# Patient Record
Sex: FEMALE | Race: WHITE | NOT HISPANIC OR LATINO | Employment: OTHER | URBAN - METROPOLITAN AREA
[De-identification: names, ages, dates, MRNs, and addresses within clinical notes are randomized per-mention and may not be internally consistent; named-entity substitution may affect disease eponyms.]

---

## 2017-01-03 ENCOUNTER — LAB (OUTPATIENT)
Dept: LAB | Facility: CLINIC | Age: 82
End: 2017-01-03
Payer: MEDICARE

## 2017-01-03 ENCOUNTER — GENERIC CONVERSION - ENCOUNTER (OUTPATIENT)
Dept: OTHER | Facility: OTHER | Age: 82
End: 2017-01-03

## 2017-01-03 ENCOUNTER — ALLSCRIPTS OFFICE VISIT (OUTPATIENT)
Dept: OTHER | Facility: OTHER | Age: 82
End: 2017-01-03

## 2017-01-03 DIAGNOSIS — G62.9 POLYNEUROPATHY: ICD-10-CM

## 2017-01-03 LAB
ALBUMIN SERPL BCP-MCNC: 4 G/DL (ref 3.5–5)
ALP SERPL-CCNC: 81 U/L (ref 46–116)
ALT SERPL W P-5'-P-CCNC: 34 U/L (ref 12–78)
ANION GAP SERPL CALCULATED.3IONS-SCNC: 7 MMOL/L (ref 4–13)
AST SERPL W P-5'-P-CCNC: 23 U/L (ref 5–45)
BASOPHILS # BLD AUTO: 0.02 THOUSANDS/ΜL (ref 0–0.1)
BASOPHILS NFR BLD AUTO: 0 % (ref 0–1)
BILIRUB SERPL-MCNC: 1.11 MG/DL (ref 0.2–1)
BUN SERPL-MCNC: 14 MG/DL (ref 5–25)
CALCIUM SERPL-MCNC: 9.1 MG/DL (ref 8.3–10.1)
CHLORIDE SERPL-SCNC: 103 MMOL/L (ref 100–108)
CO2 SERPL-SCNC: 31 MMOL/L (ref 21–32)
CREAT SERPL-MCNC: 0.7 MG/DL (ref 0.6–1.3)
EOSINOPHIL # BLD AUTO: 0.05 THOUSAND/ΜL (ref 0–0.61)
EOSINOPHIL NFR BLD AUTO: 1 % (ref 0–6)
ERYTHROCYTE [DISTWIDTH] IN BLOOD BY AUTOMATED COUNT: 14.3 % (ref 11.6–15.1)
GFR SERPL CREATININE-BSD FRML MDRD: >60 ML/MIN/1.73SQ M
GLUCOSE SERPL-MCNC: 105 MG/DL (ref 65–140)
HCT VFR BLD AUTO: 41.2 % (ref 34.8–46.1)
HGB BLD-MCNC: 13.4 G/DL (ref 11.5–15.4)
LYMPHOCYTES # BLD AUTO: 1.1 THOUSANDS/ΜL (ref 0.6–4.47)
LYMPHOCYTES NFR BLD AUTO: 17 % (ref 14–44)
MCH RBC QN AUTO: 28.9 PG (ref 26.8–34.3)
MCHC RBC AUTO-ENTMCNC: 32.5 G/DL (ref 31.4–37.4)
MCV RBC AUTO: 89 FL (ref 82–98)
MONOCYTES # BLD AUTO: 0.63 THOUSAND/ΜL (ref 0.17–1.22)
MONOCYTES NFR BLD AUTO: 10 % (ref 4–12)
NEUTROPHILS # BLD AUTO: 4.72 THOUSANDS/ΜL (ref 1.85–7.62)
NEUTS SEG NFR BLD AUTO: 72 % (ref 43–75)
NRBC BLD AUTO-RTO: 0 /100 WBCS
PLATELET # BLD AUTO: 184 THOUSANDS/UL (ref 149–390)
PMV BLD AUTO: 10.3 FL (ref 8.9–12.7)
POTASSIUM SERPL-SCNC: 3.7 MMOL/L (ref 3.5–5.3)
PROT SERPL-MCNC: 7.8 G/DL (ref 6.4–8.2)
RBC # BLD AUTO: 4.63 MILLION/UL (ref 3.81–5.12)
SODIUM SERPL-SCNC: 141 MMOL/L (ref 136–145)
WBC # BLD AUTO: 6.54 THOUSAND/UL (ref 4.31–10.16)

## 2017-01-03 PROCEDURE — 85025 COMPLETE CBC W/AUTO DIFF WBC: CPT

## 2017-01-03 PROCEDURE — 80053 COMPREHEN METABOLIC PANEL: CPT

## 2017-01-03 PROCEDURE — 36415 COLL VENOUS BLD VENIPUNCTURE: CPT

## 2017-03-24 ENCOUNTER — ALLSCRIPTS OFFICE VISIT (OUTPATIENT)
Dept: OTHER | Facility: OTHER | Age: 82
End: 2017-03-24

## 2017-04-18 ENCOUNTER — GENERIC CONVERSION - ENCOUNTER (OUTPATIENT)
Dept: OTHER | Facility: OTHER | Age: 82
End: 2017-04-18

## 2017-04-20 ENCOUNTER — HOSPITAL ENCOUNTER (OUTPATIENT)
Dept: GASTROENTEROLOGY | Facility: HOSPITAL | Age: 82
Discharge: HOME | End: 2017-04-20
Attending: INTERNAL MEDICINE | Admitting: INTERNAL MEDICINE
Payer: MEDICARE

## 2017-04-20 VITALS
BODY MASS INDEX: 25.11 KG/M2 | HEIGHT: 61 IN | WEIGHT: 133 LBS | SYSTOLIC BLOOD PRESSURE: 196 MMHG | DIASTOLIC BLOOD PRESSURE: 98 MMHG | OXYGEN SATURATION: 96 % | HEART RATE: 70 BPM | RESPIRATION RATE: 18 BRPM

## 2017-04-20 PROCEDURE — 91020 GASTRIC MOTILITY STUDIES: CPT

## 2017-04-25 ENCOUNTER — ALLSCRIPTS OFFICE VISIT (OUTPATIENT)
Dept: OTHER | Facility: OTHER | Age: 82
End: 2017-04-25

## 2017-05-16 ENCOUNTER — ALLSCRIPTS OFFICE VISIT (OUTPATIENT)
Dept: OTHER | Facility: OTHER | Age: 82
End: 2017-05-16

## 2017-06-14 ENCOUNTER — ALLSCRIPTS OFFICE VISIT (OUTPATIENT)
Dept: OTHER | Facility: OTHER | Age: 82
End: 2017-06-14

## 2017-06-14 DIAGNOSIS — G62.9 POLYNEUROPATHY: ICD-10-CM

## 2017-06-14 DIAGNOSIS — R13.10 DYSPHAGIA: ICD-10-CM

## 2017-06-14 DIAGNOSIS — L93.0 DISCOID LUPUS ERYTHEMATOSUS: ICD-10-CM

## 2017-06-14 DIAGNOSIS — K20.90 ESOPHAGITIS: ICD-10-CM

## 2017-06-14 DIAGNOSIS — R68.89 OTHER GENERAL SYMPTOMS AND SIGNS: ICD-10-CM

## 2017-06-14 DIAGNOSIS — R03.0 ELEVATED BLOOD PRESSURE READING WITHOUT DIAGNOSIS OF HYPERTENSION: ICD-10-CM

## 2017-07-11 ENCOUNTER — APPOINTMENT (OUTPATIENT)
Dept: LAB | Facility: CLINIC | Age: 82
End: 2017-07-11
Payer: MEDICARE

## 2017-07-11 ENCOUNTER — TRANSCRIBE ORDERS (OUTPATIENT)
Dept: LAB | Facility: CLINIC | Age: 82
End: 2017-07-11

## 2017-07-11 ENCOUNTER — GENERIC CONVERSION - ENCOUNTER (OUTPATIENT)
Dept: OTHER | Facility: OTHER | Age: 82
End: 2017-07-11

## 2017-07-11 ENCOUNTER — ALLSCRIPTS OFFICE VISIT (OUTPATIENT)
Dept: OTHER | Facility: OTHER | Age: 82
End: 2017-07-11

## 2017-07-11 DIAGNOSIS — R68.89 OTHER GENERAL SYMPTOMS AND SIGNS: ICD-10-CM

## 2017-07-11 DIAGNOSIS — G62.9 POLYNEUROPATHY: ICD-10-CM

## 2017-07-11 DIAGNOSIS — L93.0 DISCOID LUPUS ERYTHEMATOSUS: ICD-10-CM

## 2017-07-11 DIAGNOSIS — K20.90 ESOPHAGITIS: ICD-10-CM

## 2017-07-11 DIAGNOSIS — R03.0 ELEVATED BLOOD PRESSURE READING WITHOUT DIAGNOSIS OF HYPERTENSION: ICD-10-CM

## 2017-07-11 LAB
ALBUMIN SERPL BCP-MCNC: 4.2 G/DL (ref 3.5–5)
ALP SERPL-CCNC: 65 U/L (ref 46–116)
ALT SERPL W P-5'-P-CCNC: 22 U/L (ref 12–78)
ANION GAP SERPL CALCULATED.3IONS-SCNC: 6 MMOL/L (ref 4–13)
AST SERPL W P-5'-P-CCNC: 21 U/L (ref 5–45)
BASOPHILS # BLD AUTO: 0.02 THOUSANDS/ΜL (ref 0–0.1)
BASOPHILS NFR BLD AUTO: 0 % (ref 0–1)
BILIRUB SERPL-MCNC: 1.17 MG/DL (ref 0.2–1)
BUN SERPL-MCNC: 20 MG/DL (ref 5–25)
CALCIUM SERPL-MCNC: 9.6 MG/DL (ref 8.3–10.1)
CHLORIDE SERPL-SCNC: 103 MMOL/L (ref 100–108)
CHOLEST SERPL-MCNC: 164 MG/DL (ref 50–200)
CO2 SERPL-SCNC: 30 MMOL/L (ref 21–32)
CREAT SERPL-MCNC: 0.75 MG/DL (ref 0.6–1.3)
EOSINOPHIL # BLD AUTO: 0.04 THOUSAND/ΜL (ref 0–0.61)
EOSINOPHIL NFR BLD AUTO: 1 % (ref 0–6)
ERYTHROCYTE [DISTWIDTH] IN BLOOD BY AUTOMATED COUNT: 13.5 % (ref 11.6–15.1)
GFR SERPL CREATININE-BSD FRML MDRD: >60 ML/MIN/1.73SQ M
GLUCOSE P FAST SERPL-MCNC: 96 MG/DL (ref 65–99)
HCT VFR BLD AUTO: 39.9 % (ref 34.8–46.1)
HDLC SERPL-MCNC: 80 MG/DL (ref 40–60)
HGB BLD-MCNC: 13.5 G/DL (ref 11.5–15.4)
LDLC SERPL CALC-MCNC: 74 MG/DL (ref 0–100)
LYMPHOCYTES # BLD AUTO: 1.31 THOUSANDS/ΜL (ref 0.6–4.47)
LYMPHOCYTES NFR BLD AUTO: 21 % (ref 14–44)
MCH RBC QN AUTO: 30.1 PG (ref 26.8–34.3)
MCHC RBC AUTO-ENTMCNC: 33.8 G/DL (ref 31.4–37.4)
MCV RBC AUTO: 89 FL (ref 82–98)
MONOCYTES # BLD AUTO: 0.72 THOUSAND/ΜL (ref 0.17–1.22)
MONOCYTES NFR BLD AUTO: 11 % (ref 4–12)
NEUTROPHILS # BLD AUTO: 4.21 THOUSANDS/ΜL (ref 1.85–7.62)
NEUTS SEG NFR BLD AUTO: 67 % (ref 43–75)
NRBC BLD AUTO-RTO: 0 /100 WBCS
PLATELET # BLD AUTO: 173 THOUSANDS/UL (ref 149–390)
PMV BLD AUTO: 10.9 FL (ref 8.9–12.7)
POTASSIUM SERPL-SCNC: 4.1 MMOL/L (ref 3.5–5.3)
PROT SERPL-MCNC: 7.8 G/DL (ref 6.4–8.2)
RBC # BLD AUTO: 4.49 MILLION/UL (ref 3.81–5.12)
SODIUM SERPL-SCNC: 139 MMOL/L (ref 136–145)
TRIGL SERPL-MCNC: 48 MG/DL
WBC # BLD AUTO: 6.31 THOUSAND/UL (ref 4.31–10.16)

## 2017-07-11 PROCEDURE — 80061 LIPID PANEL: CPT

## 2017-07-11 PROCEDURE — 36415 COLL VENOUS BLD VENIPUNCTURE: CPT

## 2017-07-11 PROCEDURE — 85025 COMPLETE CBC W/AUTO DIFF WBC: CPT

## 2017-07-11 PROCEDURE — 80053 COMPREHEN METABOLIC PANEL: CPT

## 2017-07-14 ENCOUNTER — APPOINTMENT (OUTPATIENT)
Dept: RADIOLOGY | Facility: MEDICAL CENTER | Age: 82
End: 2017-07-14
Attending: INTERNAL MEDICINE
Payer: MEDICARE

## 2017-07-14 PROCEDURE — 71020 HB CHEST X-RAY 2VW FRONTAL&LATL: CPT

## 2017-07-24 ENCOUNTER — GENERIC CONVERSION - ENCOUNTER (OUTPATIENT)
Dept: OTHER | Facility: OTHER | Age: 82
End: 2017-07-24

## 2017-11-22 ENCOUNTER — GENERIC CONVERSION - ENCOUNTER (OUTPATIENT)
Dept: OTHER | Facility: OTHER | Age: 82
End: 2017-11-22

## 2018-01-09 NOTE — RESULT NOTES
Verified Results  (1) CBC/PLT/DIFF 21Jun2016 10:51AM Zulma Buitrago Order Number: [id number]   Order Number: [id number]     Test Name Result Flag Reference   WBC COUNT 6 57 Thousand/uL  4 31-10 16   RBC COUNT 4 73 Million/uL  3 81-5 12   HEMOGLOBIN 14 0 g/dL  11 5-15 4   HEMATOCRIT 42 1 %  34 8-46  1   MCV 89 fL  82-98   MCH 29 6 pg  26 8-34 3   MCHC 33 3 g/dL  31 4-37 4   RDW 13 7 %  11 6-15 1   MPV 10 9 fL  8 9-12 7   PLATELET COUNT 470 Thousands/uL  149-390   nRBC AUTOMATED 0 /100 WBCs     NEUTROPHILS RELATIVE PERCENT 65 %  43-75   LYMPHOCYTES RELATIVE PERCENT 22 %  14-44   MONOCYTES RELATIVE PERCENT 12 %  4-12   EOSINOPHILS RELATIVE PERCENT 1 %  0-6   BASOPHILS RELATIVE PERCENT 0 %  0-1   NEUTROPHILS ABSOLUTE COUNT 4 25 Thousands/?L  1 85-7 62   LYMPHOCYTES ABSOLUTE COUNT 1 47 Thousands/?L  0 60-4 47   MONOCYTES ABSOLUTE COUNT 0 78 Thousand/?L  0 17-1 22   EOSINOPHILS ABSOLUTE COUNT 0 05 Thousand/?L  0 00-0 61   BASOPHILS ABSOLUTE COUNT 0 02 Thousands/?L  0 00-0 10     (1) COMPREHENSIVE METABOLIC PANEL 60NFM8502 89:49QC Zulma Buitrago Order Number: [id number]   Order Number: [id number]     Test Name Result Flag Reference   GLUCOSE,RANDM 100 mg/dL     If the patient is fasting, the ADA then defines impaired fasting glucose as > 100 mg/dL and diabetes as > or equal to 123 mg/dL     SODIUM 138 mmol/L  136-145   POTASSIUM 3 7 mmol/L  3 5-5 3   CHLORIDE 102 mmol/L  100-108   CARBON DIOXIDE 29 mmol/L  21-32   ANION GAP (CALC) 7 mmol/L  4-13   BLOOD UREA NITROGEN 18 mg/dL  5-25   CREATININE 0 76 mg/dL  0 60-1 30   Standardized to IDMS reference method   CALCIUM 9 3 mg/dL  8 3-10 1   BILI, TOTAL 1 15 mg/dL H 0 20-1 00   ALK PHOSPHATAS 51 U/L     ALT (SGPT) 22 U/L  12-78   AST(SGOT) 21 U/L  5-45   ALBUMIN 4 1 g/dL  3 5-5 0   TOTAL PROTEIN 7 9 g/dL  6 4-8 2   eGFR Non-African American      >60 0 ml/min/1 73sq [facility] [employer] Disease Education Program recommendations are as follows:  GFR calculation is accurate only with a steady state creatinine  Chronic Kidney disease less than 60 ml/min/1 73 sq  meters  Kidney failure less than 15 ml/min/1 73 sq  meters

## 2018-01-10 NOTE — PROGRESS NOTES
Chief Complaint  patient's bp was reported elevated at another appointment and she also went to drug store and it was elevated there  bp today check by charles was 128/78  double checked by harman was 126/80   The patient has an appointment scheduled on 5/16/17 for a check on her bp  please advise      Active Problems    1  Acute reaction to stress (308 9) (F43 0)   2  Aortic regurgitation (424 1) (I35 1)   3  Constipation (564 00) (K59 00)   4  Discoid lupus erythematosus (695 4) (L93 0)   5  Dysphagia (787 20) (R13 10)   6  Esophagitis (530 10) (K20 9)   7  Forgetfulness (780 99) (R68 89)   8  Gastric erosion, 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)   9  History of chest pain (V13 89) (Z87 898)   10  Left shoulder tendonitis (726 10) (M75 82)   11  Medicare annual wellness visit, subsequent (V70 0) (Z00 00)   12  Osteoarthritis (715 90) (M19 90)   13  Peripheral neuropathy (356 9) (G62 9)   14  Subacromial bursitis (726 19) (M75 50)   15  History of Weight loss (783 21) (R63 4)    Current Meds   1  Aspirin 81 MG TABS; TAKE 1 TABLET DAILY; Therapy: (Recorded:01Jun2012) to Recorded   2  CVS Adult 50+ Probiotic Oral Capsule; take 1 capsule daily; Therapy: 95EXK2033 to Recorded   3  Fish Oil CAPS; 1000mg 2 po qd; Therapy: (Christie Blanco) to Recorded   4  Gabapentin 100 MG Oral Capsule; TAKE 1 CAPSULE AT BEDTIME,;   Therapy: 37Ylj9587 to (Evaluate:16Sep2017)  Requested for: 20Mar2017; Last   Rx:20Mar2017 Ordered   5  Metamucil 28 3 % POWD; PRN; Therapy: 06UKJ7105 to Recorded   6  MiraLax Oral Powder; Therapy: (Geoffrey Cabrera) to Recorded   7  Multivitamins TABS; TAKE 1 TABLET DAILY; Therapy: (Christie Blanco) to Recorded   8  PreserVision AREDS Oral Capsule; TAKE 1 CAPSULE DAILY; Therapy: 74JFI4776 to (Evaluate:06Jan2016); Last Rx:05Jan2016 Ordered   9  Stool Softener CAPS; Therapy: (Geoffrey Cabrera) to Recorded   10  Turmeric 450 MG Oral Capsule; take 1 capsule daily;     Therapy: 38ORC2512 to Recorded    Allergies    1  No Known Drug Allergies    Vitals  Signs    Systolic: 873  Diastolic: 80  Systolic: 804  Diastolic: 78    Discussion/Summary    BP ok today keep appt in May  Future Appointments    Date/Time Provider Specialty Site   05/16/2017 09:15 AM Yazmin Bobby DO Family Medicine [address] FAMILY MEDICINE   07/11/2017 11:15 AM Yazmin Bobby DO Family Medicine [address] FAMILY MEDICINE     Signatures   Electronically signed by : Shabana Olivarez DO;  Apr 25 2017  3:44PM EST                       (Author)

## 2018-01-10 NOTE — PROGRESS NOTES
Assessment    1  Acute reaction to stress (308 9) (F43 0)   2  Peripheral neuropathy (356 9) (G62 9)    Plan  Peripheral neuropathy    · Gabapentin 100 MG Oral Capsule; TAKE 1 CAPSULE AT BEDTIME, can increase to  2 at night if needed    Discussion/Summary    June CBC/CMP ok - Hb 14     Inquires re donating blood which she has done for years - as Hb 14, she could donate - but limit to twice a year and do not do if feels any worse  She will be seeing GI re dysphagia treatment/ EGD  Can use stool softener daily, use Miralax as needed  She will see Ortho re left knee- also discuss left hip and shoulder w them, therapy can help  Re neuropathy- saw Pain Mngmnt yrs ago - could try Gabapentin low dose at bedtime and titrate as tolerated - she will consider    BP much improved after sitting    Try to attend [employer] program, use family support re stress reaction dealing w alcoholic daughter w over 15 rehabs, now back on house arrest  Hold other meds  Chief Complaint  Patient is in today for multiples issues      History of Present Illness  was here in June- upset daughter Vi Edwards in senior living / work release again w alcoholism - past involved w [facility] - looking at getting back into program   Pain "left hip" - no trauma - has appt w [facility] 8/31 re knee    Also - c/o long term neuropathy Poor sleep w pain and stress      Review of Systems    Constitutional: no fever and no chills  Cardiovascular: No complaints of slow heart rate, no fast heart rate, no chest pain, no palpitations, no leg claudication, no lower extremity edema  Respiratory: No complaints of shortness of breath, no wheezing, no cough, no SOB on exertion, no orthopnea, no PND  Gastrointestinal: no abdominal pain  Genitourinary: no dysuria  Active Problems    1  Aortic regurgitation (424 1) (I35 1)   2  Constipation (564 00) (K59 00)   3  Discoid lupus erythematosus (695 4) (L93 0)   4  Dysphagia (787 20) (R13 10)   5   Esophagitis (530 10) (K20 9)   6  Forgetfulness (780 99) (R68 89)   7  History of chest pain (V13 89) (Z87 898)   8  Left shoulder tendonitis (726 10) (M75 82)   9  Osteoarthritis (715 90) (M19 90)   10  Subacromial bursitis (726 19) (M75 50)   11  History of Weight loss (783 21) (R63 4)    Past Medical History    1  History of chest pain (V13 89) (Z87 898)   2  History of Weight loss (783 21) (R63 4)    Surgical History    1  History of Appendectomy   2  History of Cataract Extraction   3  History of Cholecystectomy   4  History of Diagnostic Esophagogastroduodenoscopy   5  History of Hysterectomy   6  History of Knee Replacement    Family History  Mother    1  Family history of Diabetes Mellitus (V18 0)  Father    2  Family history of Coronary Artery Disease (V17 49)    Social History    · Denied: History of Alcohol Use (History)   · Marital History - Currently    · Never A Smoker    Current Meds   1  Aspirin 81 MG TABS; TAKE 1 TABLET DAILY; Therapy: (Recorded:01Jun2012) to Recorded   2  Biotin 1000 MCG Oral Tablet; Take 1 tablet twice daily Recorded   3  CholestOff 450 MG Oral Tablet; TAKE AS DIRECTED PER PACKAGE INSTRUCTIONS; Therapy: 17ONL4290 to (Last Rx:81Czk0881) Ordered   4  Fish Oil CAPS; 1000mg 2 po qd; Therapy: (Sherral Mins) to Recorded   5  Lecithin 1200 MG Oral Capsule; TAKE 1 CAPSULE DAILY; Therapy: (Sherral Mins) to Recorded   6  MiraLax Oral Powder; Therapy: (Ashley Brayden) to Recorded   7  Multivitamins TABS; TAKE 1 TABLET DAILY; Therapy: (Sherral Mins) to Recorded   8  Pantoprazole Sodium 40 MG Oral Tablet Delayed Release; TAKE 1 TABLET Daily for   stomach acid; Therapy: 21Jun2016 to (Evaluate:62Ltr6080); Last Rx:21Jun2016 Ordered   9  Plaquenil 200 MG Oral Tablet; Uses 1 pill a day Nov-April and 1 pill twice a day May-Oct;   Therapy: (Lc Slaughter) to Recorded   10  PreserVision AREDS Oral Capsule; TAKE 1 CAPSULE DAILY;     Therapy: 39XGY5832 to (Evaluate:06Jan2016); Last Rx:05Jan2016 Ordered   11  Stool Softener CAPS; Therapy: (Subha Pacheco) to Recorded   12  Vitamin D 1000 UNIT Oral Tablet; TAKE 1 TABLET DAILY; Therapy: (Recorded:01Jun2012) to Recorded    Allergies    1  No Known Drug Allergies    Vitals  Vital Signs    Recorded: 71Fwk1304 01:30PM Recorded: 84OLP3244 71:01NL   Systolic 596 506   Diastolic 70 80   Heart Rate  68   Height  5 ft 6 in   Weight  133 lb 4 00 oz   BMI Calculated  21 51   BSA Calculated  1 69     Physical Exam    Constitutional very anxious, upset today but looks healthy at 85! Gaming Perry Eyes   Conjunctiva and lids: No swelling, erythema or discharge  Pulmonary   Auscultation of lungs: Clear to auscultation  Cardiovascular RRR  no edema  Musculoskeletal min tender left fem head w good ROM Neg SLR no weakness leg  left shoulder dec ROM  Psychiatric   Orientation to person, place, and time: Normal   anxious          Future Appointments    Date/Time Provider Specialty Site   09/16/2016 02:00 PM Yasmin Stacy MD Gastroenterology Adult [facility] OUTPATIENT   10/06/2016 03:30 PM Yasmin Stacy MD Gastroenterology Adult [facility] 9   01/03/2017 10:30 AM Juan Francisco Mcguire DO Family Medicine [address] FAMILY MEDICINE     Signatures   Electronically signed by : Dayanna Paez DO; Aug 29 2016  1:54PM EST                       (Author)

## 2018-01-11 NOTE — RESULT NOTES
Message   esophagus has distal narrowing, suspicious for achalasia  please bring patient back in July to see me to discuss possible endoscopic intervention  thanks  Verified Results  * FL ESOPHAGRAM COMPLETE 48LJI9800 08:38AM Sharmaine Grand Order Number: [id number]   Performing Comments: evaluate for achalasia and presbyesophagus    - Patient Instructions: To schedule this appointment, please contact Central Scheduling at [phone]  Test Name Result Flag Reference   FL ESOPHAGRAM COMPLETE (Report)     BARIUM SWALLOW-ESOPHAGRAM     INDICATION: Food getting caught in throat     COMPARISON: None     IMAGES: 13 exposures, 422 fluoroscopic image captures including cine images of swallowing  FLUOROSCOPY TIME: 2 9 minutes     TECHNIQUE:   The patient was given effervescent granules and barium by mouth and images of the esophagus were obtained  FINDINGS:     Images of the pharynx during swallowing demonstrate no evidence of penetration or aspiration  There is no evidence of cricopharyngeal dysfunction  No mass is visualized  There is decreased primary peristalsis with nonperistaltic contractions  There may be narrowing of the distal esophagus best seen on upright swallows  The esophagus above this is tortuous and mildly dilated with retention of barium following swallows  The proximal esophageal mucosa is granular appearance which is suggestive of esophagitis  Evaluation of the distal esophageal mucosa is limited as this was not viewed in double contrast      Gastroesophageal reflux was not observed  There is a small hiatal hernia  IMPRESSION:     1  Decreased primary peristalsis with nonperistaltic contractions  This may be related to narrowing of the distal esophagus with more proximal dilatation and barium retention  Early or mild achalasia is not excluded  2  Granular appearing proximal esophageal mucosa suggesting esophagitis   Limited double contrast evaluation of the distal esophagus  3  Small hiatal hernia         Workstation performed: [id number]     Signed by:   Sherlyn Haskins MD   5/25/16   355

## 2018-01-12 VITALS — DIASTOLIC BLOOD PRESSURE: 80 MMHG | SYSTOLIC BLOOD PRESSURE: 126 MMHG

## 2018-01-13 VITALS
SYSTOLIC BLOOD PRESSURE: 132 MMHG | DIASTOLIC BLOOD PRESSURE: 64 MMHG | HEIGHT: 66 IN | WEIGHT: 130.25 LBS | BODY MASS INDEX: 20.93 KG/M2 | HEART RATE: 72 BPM

## 2018-01-13 NOTE — MISCELLANEOUS
Message  I called the patient to inform her the biopsy result showing gastropathy  She reported having no symptoms and she does not want to take PPI  I also proposed to her regarding esophageal manometry, patient would rather wait to see how severe the problem is before she does the manometry test  I explained to her the functional and structural disoders of esaphgus and the purposes of EGD and manometry  She voiced understanding  She has an appointment with Mariely Edwards in November  She also wants to see me in January  we will schedule        Signatures   Electronically signed by : Musa Guallpa MD; Sep 24 2016 11:18AM EST                       (Author)

## 2018-01-13 NOTE — MISCELLANEOUS
Franklyn Benton,    Our office has tried to contact you  Please call us at [phone] for your results      Thank you,  [facility]      Electronically signed by:Vibha Harvey   Oct 20 2016 11:09AM EST Author

## 2018-01-14 VITALS
TEMPERATURE: 98.1 F | BODY MASS INDEX: 21.05 KG/M2 | WEIGHT: 131 LBS | DIASTOLIC BLOOD PRESSURE: 70 MMHG | HEIGHT: 66 IN | HEART RATE: 60 BPM | SYSTOLIC BLOOD PRESSURE: 120 MMHG | OXYGEN SATURATION: 99 %

## 2018-01-14 VITALS
DIASTOLIC BLOOD PRESSURE: 74 MMHG | HEART RATE: 76 BPM | WEIGHT: 132 LBS | BODY MASS INDEX: 21.21 KG/M2 | HEIGHT: 66 IN | SYSTOLIC BLOOD PRESSURE: 130 MMHG

## 2018-01-14 VITALS
HEIGHT: 66 IN | SYSTOLIC BLOOD PRESSURE: 130 MMHG | TEMPERATURE: 98.2 F | WEIGHT: 132 LBS | BODY MASS INDEX: 21.21 KG/M2 | OXYGEN SATURATION: 98 % | DIASTOLIC BLOOD PRESSURE: 60 MMHG | HEART RATE: 68 BPM

## 2018-01-14 VITALS
DIASTOLIC BLOOD PRESSURE: 74 MMHG | BODY MASS INDEX: 24.4 KG/M2 | WEIGHT: 129.13 LBS | HEART RATE: 72 BPM | SYSTOLIC BLOOD PRESSURE: 133 MMHG

## 2018-01-15 NOTE — PROGRESS NOTES
Assessment    1  Medicare annual wellness visit, subsequent (V70 0) (Z00 00)   2  Peripheral neuropathy (356 9) (G62 9)   3  Never a smoker    Plan  Medicare annual wellness visit, subsequent    · *VB - Fall Risk Assessment  (Dx Z13 89 Screen for Neurologic Disorder);  Status:Complete - Retrospective By Protocol Authorization;   Done: 23ANQ0588 10:04AM   · *VB - Urinary Incontinence Screen (Dx Z13 89 Screen for UI); Status:Complete -  Retrospective By Protocol Authorization;   Done: 97JRO3704 10:04AM   · *VB-Depression Screening; Status:Complete - Retrospective By Protocol Authorization;    Done: 02ZBL3105 10:05AM  Peripheral neuropathy    · From  Gabapentin 100 MG Oral Capsule TAKE 1 CAPSULE AT BEDTIME, can  increase to 2 at night if needed To Gabapentin 100 MG Oral Capsule TAKE 1 CAPSULE  AT BEDTIME,   · (1) CBC/PLT/DIFF; Status:Active; Requested KPL:36PTB6032;    · (1) COMPREHENSIVE METABOLIC PANEL; Status:Active; Requested PNE:58OCK1136;     Discussion/Summary    She is doing well, she does note some weight loss, has been watching diet due to concern with diabetes  We did discuss last glucometer reading was fine at 100, we will redo fasting blood work today  She will continue to see her gastroenterologist     She continues to remain very active, is exercising regularly, call us if any chest pain, shortness of breath, increase lightheadedness or other issues arise  She does feel gabapentin 1 at bedtime is helping with neuropathy, continue as is  Sees derm Q 6 mo as before  Visit here 6 mo - call sooner as needed  Impression: Subsequent Annual Wellness Visit  Cardiovascular screening and counseling: screening is current  Diabetes screening and counseling: screening is current  Immunizations: influenza vaccine is up to date this year, the lifetime pneumococcal vaccine has been completed, Tdap vaccination up to date and 2010        Chief Complaint  AWV/ Check up      Advance Directives  Advance Directive [facility]:   YES - Patient has an advance health care directive  The patient has a living will located   History of Present Illness  HPI: She is in today for routine follow-up/AWV  She is doing very well at 80years of age but is frustrated with 's declining health  Exercises Ã45 minutes a day 6 days a week    Does see gastroenterology, is concerned with using PPIs, does admit to trying omeprazole Ã30 days as was directed but noted no benefit and stopped it  Had undergone EGD back in September  Does use gabapentin 1 at bedtime and feels this is helping quite a bit with paresthesias feet  She is watching her diet, perhaps to closely  Is concerned has diabetes which she does not   Welcome to [employer] and Wellness Visits: The patient is being seen for the subsequent annual wellness visit  Medicare Screening and Risk Factors   Hospitalizations: no previous hospitalizations  Medicare Screening Tests Risk Questions   Drug and Alcohol Use: The patient has never smoked cigarettes  The patient reports never drinking alcohol  She has never used illicit drugs  Diet and Physical Activity: Current diet includes 4 servings of fruit per day, 3 servings of vegetables per day, 1 servings of meat per day, 1 servings of whole grains per day, 1 servings of dairy products per day, 3 cups of tea per day and 6 glasses of water per day  She exercises 6 times per week  Exercise: walking, stretching, strength training 45 minutes per day  Mood Disorder and Cognitive Impairment Screening: She denies feeling down, depressed, or hopeless over the past two weeks  She denies feeling little interest or pleasure in doing things over the past two weeks     Cognitive impairment screening: denies difficulty learning/retaining new information, denies difficulty handling complex tasks, denies difficulty with reasoning, denies difficulty with spatial ability and orientation, denies difficulty with language and denies difficulty with behavior  Functional Ability/Level of Safety: Hearing is slightly decreased and a hearing aid is not used  Activities of daily living details: does not need help using the phone, no transportation help needed, does not need help shopping, no meal preparation help needed, does not need help doing housework, does not need help doing laundry, does not need help managing medications and does not need help managing money  Fall risk factors: The patient fell NONE times in the past 12 months  and no urinary incontinence  Home safety risk factors:  no grab bars in the bathroom and no handrails on the stairs, but no unfamiliar surroundings, no loose rugs, no poor household lighting, no uneven floors and no household clutter  Advance Directives: Advance directives: living will and durable power of  for health care directives  Co-Managers and Medical Equipment/Suppliers: See Patient Care Team      Patient Care Team    Care Team Member Role Specialty Office Number   Brennan Singh MD Specialist Gastroenterology Adult [phone]   [facility]  Physician Assistant [phone]     Review of Systems    Constitutional: Some fatigue which appears very appropriate at 80years of age, but no fever, no chills and no malaise  Eyes: Sees eye doctor  ENT: no earache and no sore throat  Cardiovascular: no chest pain, no palpitations, the heart is not racing, no lightheadedness and no lower extremity edema  Respiratory: no shortness of breath, no wheezing and no cough  Gastrointestinal: as noted in HPI  Genitourinary: no dysuria  Musculoskeletal: no diffuse joint pain and no generalized muscle aches  Integumentary and Breasts: Sees dermatology as before  Neurological: no headache, no confusion, no dizziness and no fainting  Psychiatric: no anxiety and no depression  Hematologic and Lymphatic: negative  Active Problems    1  Acute reaction to stress (308 9) (F43 0)   2  Aortic regurgitation (424 1) (I35 1)   3  Constipation (564 00) (K59 00)   4  Discoid lupus erythematosus (695 4) (L93 0)   5  Dysphagia (787 20) (R13 10)   6  Esophagitis (530 10) (K20 9)   7  Forgetfulness (780 99) (R68 89)   8  Gastric erosion, 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)   9  History of chest pain (V13 89) (Z87 898)   10  Left shoulder tendonitis (726 10) (M75 82)   11  Osteoarthritis (715 90) (M19 90)   12  Peripheral neuropathy (356 9) (G62 9)   13  Subacromial bursitis (726 19) (M75 50)   14  History of Weight loss (783 21) (R63 4)    Past Medical History    · History of chest pain (V13 89) (G66 035)   · History of Weight loss (783 21) (R63 4)    The active problems and past medical history were reviewed and updated today  Surgical History    · History of Appendectomy   · History of Cataract Extraction   · History of Cholecystectomy   · History of Diagnostic Esophagogastroduodenoscopy   · History of Hysterectomy   · History of Knee Replacement    The surgical history was reviewed and updated today  Family History  Mother    · Family history of Diabetes Mellitus (V18 0)  Father    · Family history of Coronary Artery Disease (V17 49)    Social History    · Denied: History of Alcohol Use (History)   · Marital History - Currently    · Never a smoker  The social history was reviewed and updated today  Current Meds   1  Aspirin 81 MG TABS; TAKE 1 TABLET DAILY; Therapy: (Recorded:01Jun2012) to Recorded   2  CVS Adult 50+ Probiotic Oral Capsule; take 1 capsule daily; Therapy: 44AXF7628 to Recorded   3  Fish Oil CAPS; 1000mg 2 po qd; Therapy: (Johnathan Lopez) to Recorded   4  Gabapentin 100 MG Oral Capsule; TAKE 1 CAPSULE AT BEDTIME, can increase to 2 at   night if needed; Therapy: 14Xqy1175 to (Evaluate:64Kll2163)  Requested for: 01Sep2016; Last   Rx:01Sep2016 Ordered   5  Metamucil 28 3 % POWD; PRN; Therapy: 19LWQ0351 to Recorded   6  MiraLax Oral Powder;    Therapy: (Monika Point) to Recorded   7  Multivitamins TABS; TAKE 1 TABLET DAILY; Therapy: (Arlyce Leriche) to Recorded   8  Omeprazole 40 MG Oral Capsule Delayed Release; TAKE 1 CAPSULE DAILY; Therapy: 52KOH0306 to (Evaluate:15Mar2017)  Requested for: 16Sep2016; Last   Rx:16Sep2016 Ordered   9  PreserVision AREDS Oral Capsule; TAKE 1 CAPSULE DAILY; Therapy: 38NIF4962 to (Evaluate:06Jan2016); Last Rx:05Jan2016 Ordered   10  Stool Softener CAPS; Therapy: (Monika Point) to Recorded   11  Turmeric 450 MG Oral Capsule; take 1 capsule daily; Therapy: 51OSB8030 to Recorded    The medication list was reviewed and updated today  Allergies    1  No Known Drug Allergies    Immunizations   ** Printed in Appendix #1 below  Vitals  Signs    Heart Rate: 72  Systolic: 562  Diastolic: 74  Weight: 166 lb 2 08 oz    Physical Exam    Constitutional   General appearance: No acute distress, well appearing and well nourished  Head and Face   Head and face: Normal     Eyes No pallor or icterus  Ears, Nose, Mouth, and Throat Has hearing aids  Neck   Neck: Supple, symmetric, trachea midline, no masses  Thyroid: Normal, no thyromegaly  Pulmonary   Auscultation of lungs: Clear to auscultation  Cardiovascular RRR 1/6 BILL R2ics  Carotid pulses: 2+ bilaterally  No ankle edema  Lymphatic   Palpation of lymph nodes in neck: No lymphadenopathy  Musculoskeletal   Gait and station: Normal     Neurologic A&O x 3  Cortical function: Normal mental status  Coordination: Normal finger to nose and heel to shin  Psychiatric   Judgment and insight: Normal     Orientation to person, place, and time: Normal     Recent and remote memory: Intact      Mood and affect: Normal        Results/Data  *VB-Depression Screening 87XCZ8666 10:05AM Hubert Penny     Test Name Result Flag Reference   Depression Scale Result      Depression Screen - Negative For Symptoms     *VB - Fall Risk Assessment  (Dx Z13 89 Screen for Neurologic Disorder) 95IOI6818 10:04AM Josiephine Chimera     Test Name Result Flag Reference   Falls Risk      No falls in the past year     *VB - Urinary Incontinence Screen (Dx Z13 89 Screen for UI) 81LNB2558 10:04AM Josiephine Chimera     Test Name Result Flag Reference   Urinary Incontinence Assessment 40TGW6726         Future Appointments    Date/Time Provider Specialty Site   2017 10:15 AM Indira Tena DO Family Medicine [facility] MEDICINE     Signatures   Electronically signed by : Harry Farley DO; Reynaldo  3 2017 10:58AM EST                       (Author)    Appendix #1     Patient: Dana Cedeno ; : 1930; MRN: [id number]      1 2 3 4 5    Influenza  12-Nov-2008 Oct 2012 18-Sep-2013 18-Sep-2014 01-Sep-2016    PPSV  2014        Pneumo Other  2001        Tdap  16-Sep-2010

## 2018-01-15 NOTE — RESULT NOTES
Message   gastritis on biopsy  Verified Results  (1) TISSUE EXAM [id number] 02:27PM Blair Ruggiero     Test Name Result Flag Reference   LAB AP CASE REPORT (Report)     Surgical Pathology Report             Case: [id number]                   Authorizing Provider: Amalia Soria MD       Collected:      09/16/2016 1427        Ordering Location:   [address] [place] [address]   Received:      09/19/2016 [address] [place] [address] Endoscopy                               Pathologist:      Faisal Aldana MD                               Specimens:  A) - Stomach, Gastric erosion bx                                    B) - Esophagus   LAB AP FINAL DIAGNOSIS (Report)     A  Stomach, biopsy:   - Reactive (chemical) gastropathy   - H pylori organisms are not identified on immunostain   - No intestinal metaplasia, dysplasia, or malignancy identified    B  Esophagus, biopsy:   - Mild chronic inflammation in junctional mucosa   - No intestinal metaplasia, dysplasia, or malignancy identified  Electronically signed by Faisal Aldana MD on 9/21/2016 at 3:24 PM   LAB AP SURGICAL ADDITIONAL INFORMATION (Report)     These tests were developed and their performance characteristics   determined by [facility] or [facility]  They may not be cleared or approved by the [place]  Food and   Drug Administration  The FDA has determined that such clearance or   approval is not necessary  These tests are used for clinical purposes  They should not be regarded as investigational or for research  This   laboratory has been approved by CLIA 88, designated as a high-complexity   laboratory and is qualified to perform these tests  Interpretation performed at [facility]   LAB AP GROSS DESCRIPTION (Report)     A  The specimen is received in formalin, labeled with the patient's name   and hospital number, and is designated gastric erosion biopsy rule out   H  pylori    The specimen consists of several, rubbery, tan-brown tissue   fragments measuring 0 6 x 0 6 x 0 2 cm in aggregate dimension  Entirely   submitted  One cassette  B  The specimen is received in formalin, labeled with the patient's name   and hospital number, and is designated esophagus rule out eosinophilic   esophagitis  The specimen consists of several, rubbery, tan-brown tissue   fragments measuring 0 5 x 0 5 x 0 2 cm in aggregate dimension  Entirely   submitted  One cassette  Note: The estimated total formalin fixation time based upon information   provided by the submitting clinician and the standard processing schedule   is over 72 hours      SRS   LAB AP CLINICAL INFORMATION R/o H  Pylori     R/o H  Pylori

## 2018-01-16 NOTE — RESULT NOTES
Verified Results  (1) CBC/PLT/DIFF [id number] 10:53AM Yulisa Veras Order Number: [id number]     Test Name Result Flag Reference   WBC COUNT 6 54 Thousand/uL  4 31-10 16   RBC COUNT 4 63 Million/uL  3 81-5 12   HEMOGLOBIN 13 4 g/dL  11 5-15 4   HEMATOCRIT 41 2 %  34 8-46  1   MCV 89 fL  82-98   MCH 28 9 pg  26 8-34 3   MCHC 32 5 g/dL  31 4-37 4   RDW 14 3 %  11 6-15 1   MPV 10 3 fL  8 9-12 7   PLATELET COUNT 156 Thousands/uL  149-390   nRBC AUTOMATED 0 /100 WBCs     NEUTROPHILS RELATIVE PERCENT 72 %  43-75   LYMPHOCYTES RELATIVE PERCENT 17 %  14-44   MONOCYTES RELATIVE PERCENT 10 %  4-12   EOSINOPHILS RELATIVE PERCENT 1 %  0-6   BASOPHILS RELATIVE PERCENT 0 %  0-1   NEUTROPHILS ABSOLUTE COUNT 4 72 Thousands/?L  1 85-7 62   LYMPHOCYTES ABSOLUTE COUNT 1 10 Thousands/?L  0 60-4 47   MONOCYTES ABSOLUTE COUNT 0 63 Thousand/?L  0 17-1 22   EOSINOPHILS ABSOLUTE COUNT 0 05 Thousand/?L  0 00-0 61   BASOPHILS ABSOLUTE COUNT 0 02 Thousands/?L  0 00-0 10   - Patient Instructions: This bloodwork is non-fasting  Please drink two glasses of water morning of bloodwork  - Patient Instructions: This bloodwork is non-fasting  Please drink two glasses of water morning of bloodwork  (1) COMPREHENSIVE METABOLIC PANEL [id number] 36:61FM Yulisa Veras Order Number: [id number]     Test Name Result Flag Reference   GLUCOSE,RANDM 105 mg/dL     If the patient is fasting, the ADA then defines impaired fasting glucose as > 100 mg/dL and diabetes as > or equal to 123 mg/dL     SODIUM 141 mmol/L  136-145   POTASSIUM 3 7 mmol/L  3 5-5 3   CHLORIDE 103 mmol/L  100-108   CARBON DIOXIDE 31 mmol/L  21-32   ANION GAP (CALC) 7 mmol/L  4-13   BLOOD UREA NITROGEN 14 mg/dL  5-25   CREATININE 0 70 mg/dL  0 60-1 30   Standardized to IDMS reference method   CALCIUM 9 1 mg/dL  8 3-10 1   BILI, TOTAL 1 11 mg/dL H 0 20-1 00   ALK PHOSPHATAS 81 U/L     ALT (SGPT) 34 U/L  12-78   AST(SGOT) 23 U/L  5-45   ALBUMIN 4 0 g/dL  3 5-5 0   TOTAL PROTEIN 7 8 g/dL  6 4-8 2   eGFR Non-African American      >60 0 ml/min/1 73sq [facility] [employer] Disease Education Program recommendations are as follows:  GFR calculation is accurate only with a steady state creatinine  Chronic Kidney disease less than 60 ml/min/1 73 sq  meters  Kidney failure less than 15 ml/min/1 73 sq  meters

## 2018-01-16 NOTE — RESULT NOTES
Discussion/Summary   chest x-ray was negative  Verified Results  * XR CHEST PA & LATERAL [id number] 02:02AM Stacy Goodrich Order Number: [id number]     Test Name Result Flag Reference   XR CHEST PA & LATERAL (Report)     CHEST - DUAL ENERGY     INDICATION: Dysphagia for a year     COMPARISON: January 20, 2015     VIEWS: PA (including soft tissue/bone algorithms) and lateral projections     IMAGES: 4     FINDINGS:        Heart is normal in size  Aorta is ectatic  Hilar regions were unremarkable  Lungs are hyperinflated  There is no evidence of focal consolidation, pulmonary edema pleural effusion  Thoracic kyphosis noted with diffuse degenerative changes similar to prior exam        IMPRESSION:     No active pulmonary disease         Workstation performed: [id number]     Signed by:   Arnoldo Vogel MD   7/19/17

## 2018-01-18 NOTE — PROGRESS NOTES
Assessment    1  Subacromial bursitis (858 49) (G19 98)    Plan  Subacromial bursitis    · Naproxen 500 MG Oral Tablet; TAKE 1 TABLET EVERY 12 HOURS AS NEEDED    Discussion/Summary  Discussion Summary:   Patient's symptoms are likely consistent with subacromial bursitis  At this point I will treat patient with a short course of anti-inflammatories  Advised to avoid any overhead activities  Follow-up with PCP in 2 weeks or sooner if any worsening of the symptoms as patient may need physical therapy or orthopedics consult on the line if no improvement in symptoms  Medication Side Effects Reviewed: Possible side effects of new medications were reviewed with the patient/guardian today  Understands and agrees with treatment plan: The treatment plan was reviewed with the patient/guardian  The patient/guardian understands and agrees with the treatment plan      Chief Complaint    1  Shoulder Pain  Chief Complaint Free Text Note Form: Patient here with left shoulder pain for several weeks  Denies injury  Pain level "8" with movement  No pain at rest       History of Present Illness  HPI: Patient denies any injury or trauma to the left shoulder but complains of pain in the left lateral shoulder which is worse with reaching above the shoulder and away from the body  Hospital Based Practices Required Assessment:   Pain Assessment   the patient states they have pain  The pain is located in the left shoulder  (on a scale of 0 to 10, the patient rates the pain at 8 )   Abuse And Domestic Violence Screen    Yes, the patient is safe at home  The patient states no one is hurting them  Depression And Suicide Screen  No, the patient has not had thoughts of hurting themself  No, the patient has not felt depressed in the past 7 days  Prefered Language is  [place]  Primary Language is  English  Shoulder Pain: Angel Guzman presents with complaints of shoulder pain     Associated symptoms include decreased range of motion, but no swelling, no clicking, no shoulder bruising, no instability, no redness, no warmth, no numbness in the arm, no weakness in the arm, no pain in the arm, no paresthesias, no pain in the neck, no fever, no localized rash and no generalized rash  Review of Systems  Focused-Female:   Constitutional: No fever, no chills, feels well, no tiredness, no recent weight gain or loss  Active Problems    1  Aortic regurgitation (424 1) (I35 1)   2  Discoid lupus erythematosus (695 4) (L93 0)   3  Dysphagia (787 20) (R13 10)   4  History of chest pain (V13 89) (Z87 898)   5  Osteoarthritis (715 90) (M19 90)   6  History of Weight loss (783 21) (R63 4)    Past Medical History    1  History of chest pain (V13 89) (Z87 898)   2  History of Weight loss (783 21) (R63 4)    Family History    1  Family history of Diabetes Mellitus (V18 0)    2  Family history of Coronary Artery Disease (V17 49)    Social History    · Denied: History of Alcohol Use (History)   · Marital History - Currently    · Never A Smoker    Surgical History    1  History of Appendectomy   2  History of Cataract Extraction   3  History of Cholecystectomy   4  History of Diagnostic Esophagogastroduodenoscopy   5  History of Hysterectomy   6  History of Knee Replacement    Current Meds   1  Aspirin 81 MG Oral Tablet; TAKE 1 TABLET DAILY; Therapy: (Recorded:01Jun2012) to Recorded   2  Biotin 1000 MCG Oral Tablet; Take 1 tablet twice daily Recorded   3  Calcium + D TABS; 600mg-200mg 1 po bid; Therapy: (Ravindra Kraus) to Recorded   4  CholestOff 450 MG Oral Tablet; TAKE AS DIRECTED PER PACKAGE INSTRUCTIONS; Therapy: [id number] to (Last Rx:[id number]) Ordered   5  Fish Oil CAPS; 1000mg 2 po qd; Therapy: (Ravindra Kraus) to Recorded   6  Lecithin 1200 MG Oral Capsule; TAKE 1 CAPSULE DAILY; Therapy: (Ravindra Kraus) to Recorded   7  Multivitamins TABS; TAKE 1 TABLET DAILY;    Therapy: (Recorded:01Jun2012) to Recorded   8  Plaquenil 200 MG Oral Tablet; Uses 1 pill a day Nov-April and 1 pill twice a day May-Oct;   Therapy: ([facility]) to Recorded   9  PreserVision AREDS Oral Capsule; TAKE 1 CAPSULE DAILY; Therapy: 89MKP4705 to (Evaluate:06Jan2016); Last Rx:05Jan2016 Ordered   10  Vitamin D 1000 UNIT Oral Tablet; TAKE 1 TABLET DAILY; Therapy: (Recorded:01Jun2012) to Recorded    Allergies    1  No Known Drug Allergies    Vitals  Signs [Data Includes: Current Encounter]   Recorded: 23AIM9422 10:41AM   Temperature: 98 F  Heart Rate: 56  Respiration: 16  Systolic: 348  Diastolic: 80  Height: 5 ft 6 in  Weight: 140 lb   BMI Calculated: 22 6  BSA Calculated: 1 72  O2 Saturation: 99  Pain Scale: 8    Physical Exam    Constitutional   General appearance: No acute distress, well appearing and well nourished  Musculoskeletal   Gait and station: Normal   Vital signs stable and no acute pain or distress  Patient has mild-to-moderate restriction of internal rotation and flexion and abduction of the shoulder with normal range of motion in other areas  Moderate tenderness to palpation of the before meals bursal area  Also mild tenderness to palpation of the coracoid process  Unable to do good Bateman and impingement testing due to guarding from the patient  Normal palpation of the paravertebral muscles of the neck and trapezius area  Distal examination neurovascularly intact        Future Appointments    Date/Time Provider Specialty Site   05/16/2016 04:00 PM Cristina Franco MD Gastroenterology Adult [facility]   06/21/2016 10:00 AM Ervin Swann [facility] Medicine [address] [place] [address]     Signatures   Electronically signed by : SOWMYA Rod ; Mar 30 2016  1:03PM EST                       (Author)

## 2018-02-01 ENCOUNTER — TELEPHONE (OUTPATIENT)
Dept: FAMILY MEDICINE CLINIC | Facility: CLINIC | Age: 83
End: 2018-02-01

## 2018-02-01 NOTE — TELEPHONE ENCOUNTER
She called to ask if she should have any lab work done prior  To visit in Feb?  I did mention that you usually prefer to see pt and then order any testing

## 2018-02-13 DIAGNOSIS — G62.89 OTHER POLYNEUROPATHY: Primary | ICD-10-CM

## 2018-02-13 RX ORDER — POLYETHYLENE GLYCOL 3350 17 G/17G
POWDER, FOR SOLUTION ORAL
COMMUNITY
End: 2021-08-10 | Stop reason: ALTCHOICE

## 2018-02-13 RX ORDER — DIAPER,BRIEF,ADULT, DISPOSABLE
EACH MISCELLANEOUS
COMMUNITY
End: 2018-04-24 | Stop reason: ALTCHOICE

## 2018-02-13 RX ORDER — DOCUSATE CALCIUM 240 MG
CAPSULE ORAL
COMMUNITY

## 2018-02-13 RX ORDER — GABAPENTIN 100 MG/1
CAPSULE ORAL
Qty: 90 CAPSULE | Refills: 3 | Status: SHIPPED | OUTPATIENT
Start: 2018-02-13 | End: 2018-02-22 | Stop reason: SDUPTHER

## 2018-02-13 RX ORDER — PROPRANOLOL/HYDROCHLOROTHIAZID 40 MG-25MG
1 TABLET ORAL DAILY
COMMUNITY
Start: 2016-11-11 | End: 2018-08-14 | Stop reason: ALTCHOICE

## 2018-02-13 RX ORDER — HYDROXYCHLOROQUINE SULFATE 200 MG/1
TABLET, FILM COATED ORAL
COMMUNITY
End: 2019-06-18 | Stop reason: ALTCHOICE

## 2018-02-14 ENCOUNTER — OFFICE VISIT (OUTPATIENT)
Dept: GASTROENTEROLOGY | Facility: MEDICAL CENTER | Age: 83
End: 2018-02-14
Payer: MEDICARE

## 2018-02-14 VITALS
WEIGHT: 131 LBS | HEIGHT: 61 IN | TEMPERATURE: 98.2 F | BODY MASS INDEX: 24.73 KG/M2 | DIASTOLIC BLOOD PRESSURE: 82 MMHG | HEART RATE: 66 BPM | SYSTOLIC BLOOD PRESSURE: 134 MMHG

## 2018-02-14 DIAGNOSIS — K22.89 PRESBYESOPHAGUS: Primary | ICD-10-CM

## 2018-02-14 PROCEDURE — 99214 OFFICE O/P EST MOD 30 MIN: CPT | Performed by: INTERNAL MEDICINE

## 2018-02-14 NOTE — LETTER
February 15, 2018     Stacie Albarran DO  [address]    Patient: Matt Ontiveros   YOB: 1930   Date of Visit: 2/14/2018       Dear Dr Mily Morales: Thank you for referring Mey Lancaster to me for evaluation  Below are my notes for this consultation  If you have questions, please do not hesitate to call me  I look forward to following your patient along with you           Sincerely,        Sera Payne DO        CC: No Recipients

## 2018-02-14 NOTE — PROGRESS NOTES
[facility] - Outpatient Follow-up Note  Kevin Liu 80 y o  female MRN: [id number]  Encounter: [id number]          ASSESSMENT AND PLAN:      1  Dysphagia -  This is likely due to presbyesophagus, could also be due other age related dysmotility issue  Symptomatic management discussed with the patient  I encouraged her to eat small meals to control symptoms as they present  Follow up on an as needed basis  ______________________________________________________________________    SUBJECTIVE:  Kevin Liu, 80 y o  female here today for follow up for dysphagia  She has a long history of dysphagia, which she believes is related mainly to stress  She estimates she has had 4-6 episodes of difficulty swallowing since her last visit in March of 2017  During these episodes she feels as if food is getting stuck and she then becomes nauseated  She is not having any reflux symptoms including heart burn, acid taste in mouth  Her weight has been stable  REVIEW OF SYSTEMS IS OTHERWISE NEGATIVE  Historical Information   Past Medical History:   Diagnosis Date    Aortic regurgitation     Constipation     Discoid lupus erythematosus     Dysphagia     Esophagitis     Forgetfulness     Weight loss      Past Surgical History:   Procedure Laterality Date    APPENDECTOMY      CHOLECYSTECTOMY      EYE SURGERY      HYSTERECTOMY      SC ESOPHAGOGASTRODUODENOSCOPY TRANSORAL DIAGNOSTIC N/A 9/16/2016    Procedure: ESOPHAGOGASTRODUODENOSCOPY (EGD); Surgeon: Ahmed Galeazzi, MD;  Location: [facility]; Service: Gastroenterology    REPLACEMENT TOTAL KNEE       Social History   History   Alcohol Use No     History   Drug Use No     History   Smoking Status    Never Smoker   Smokeless Tobacco    Never Used     No family history on file      Meds/Allergies       Current Outpatient Prescriptions:     aspirin 81 MG tablet    Cyanocobalamin (B-12 PO)    docusate calcium (SURFAK) 240 mg capsule    fish oil 1,000 mg    gabapentin (NEURONTIN) 100 mg capsule    hydroxychloroquine (PLAQUENIL) 200 mg tablet    Lecithin 1200 MG CAPS    Multiple Vitamins-Minerals (PRESERVISION AREDS PO)    Plant Sterols and Stanols (CHOLESTOFF) 450 MG TABS    polyethylene glycol (MIRALAX) powder    Psyllium (METAMUCIL FIBER PO)    Turmeric 450 MG CAPS    Vitamin D, Cholecalciferol, 1000 UNITS CAPS    No Known Allergies        Objective     Blood pressure 134/82, pulse 66, temperature 98 2 °F (36 8 °C), temperature source Oral, height 5' 1" (1 549 m), weight 59 4 kg (131 lb)  PHYSICAL EXAM:      General Appearance:   Alert, cooperative, no distress   HEENT:   Normocephalic, atraumatic, anicteric      Neck:  Supple, symmetrical, trachea midline   Lungs:   Clear to auscultation bilaterally; no rales, rhonchi or wheezing; respirations unlabored    Heart[de-identified]   Regular rate and rhythm; no murmur, rub, or gallop  Abdomen:   Soft, non-tender, non-distended; normal bowel sounds; no masses, no organomegaly    Genitalia:   Deferred    Rectal:   Deferred    Extremities:  No cyanosis, clubbing or edema    Pulses:  2+ and symmetric    Skin:  No jaundice, rashes, or lesions    Lymph nodes:  No palpable cervical lymphadenopathy        Lab Results:   No visits with results within 1 Day(s) from this visit     Latest known visit with results is:   Appointment on 07/11/2017   Component Date Value    WBC 07/11/2017 6 31     RBC 07/11/2017 4 49     Hemoglobin 07/11/2017 13 5     Hematocrit 07/11/2017 39 9     MCV 07/11/2017 89     MCH 07/11/2017 30 1     MCHC 07/11/2017 33 8     RDW 07/11/2017 13 5     MPV 07/11/2017 10 9     Platelets 95/95/8445 173     nRBC 07/11/2017 0     Neutrophils Relative 07/11/2017 67     Lymphocytes Relative 07/11/2017 21     Monocytes Relative 07/11/2017 11     Eosinophils Relative 07/11/2017 1     Basophils Relative 07/11/2017 0     Neutrophils Absolute 07/11/2017 4 21     Lymphocytes Absolute 07/11/2017 1 31     Monocytes Absolute 07/11/2017 0 72     Eosinophils Absolute 07/11/2017 0 04     Basophils Absolute 07/11/2017 0 02     Sodium 07/11/2017 139     Potassium 07/11/2017 4 1     Chloride 07/11/2017 103     CO2 07/11/2017 30     Anion Gap 07/11/2017 6     BUN 07/11/2017 20     Creatinine 07/11/2017 0 75     Glucose, Fasting 07/11/2017 96     Calcium 07/11/2017 9 6     AST 07/11/2017 21     ALT 07/11/2017 22     Alkaline Phosphatase 07/11/2017 65     Total Protein 07/11/2017 7 8     Albumin 07/11/2017 4 2     Total Bilirubin 07/11/2017 1 17*    eGFR 07/11/2017 >60 0     Cholesterol 07/11/2017 164     Triglycerides 07/11/2017 48     HDL, Direct 07/11/2017 80*    LDL Calculated 07/11/2017 74          Radiology Results:   CXR performed 7/14/2017 showed no active pulmonary disease  Heart normal in size  Thoracic kyphosis noted with diffuse degenerative changes similar to prior exam      Endoscopy Results:  EGD perfroemd 9/16/2016 showed normal esophagus  Areas of gastric erosion found  Normal duodenum  Past esophagram showed decreased primary peristalisis with non peristatic contractions  Attestation:   By signing my name below, Suzanne Palacios, [email or web address] that this documentation has been prepared under the direction and in the presence of [employer], DO  Electronically Signed: Lester Barros  2/14/2018    I, [employer], personally performed the services described in this documentation  All medical record entries made by the scribe were at my direction and in my presence  I have reviewed the chart and discharge instructions and agree that the record reflects my personal performance and is accurate and complete  [employer], DO  2/14/2018

## 2018-02-22 ENCOUNTER — OFFICE VISIT (OUTPATIENT)
Dept: FAMILY MEDICINE CLINIC | Facility: CLINIC | Age: 83
End: 2018-02-22
Payer: MEDICARE

## 2018-02-22 VITALS
OXYGEN SATURATION: 99 % | SYSTOLIC BLOOD PRESSURE: 130 MMHG | HEIGHT: 61 IN | HEART RATE: 63 BPM | DIASTOLIC BLOOD PRESSURE: 62 MMHG | WEIGHT: 130.8 LBS | BODY MASS INDEX: 24.7 KG/M2

## 2018-02-22 DIAGNOSIS — G62.89 OTHER POLYNEUROPATHY: ICD-10-CM

## 2018-02-22 DIAGNOSIS — Z00.00 MEDICARE ANNUAL WELLNESS VISIT, SUBSEQUENT: Primary | ICD-10-CM

## 2018-02-22 DIAGNOSIS — R07.9 CHEST PAIN, UNSPECIFIED TYPE: ICD-10-CM

## 2018-02-22 PROCEDURE — 93000 ELECTROCARDIOGRAM COMPLETE: CPT | Performed by: FAMILY MEDICINE

## 2018-02-22 PROCEDURE — G0439 PPPS, SUBSEQ VISIT: HCPCS | Performed by: FAMILY MEDICINE

## 2018-02-22 PROCEDURE — 99213 OFFICE O/P EST LOW 20 MIN: CPT | Performed by: FAMILY MEDICINE

## 2018-02-22 RX ORDER — ASCORBIC ACID 500 MG
500 TABLET ORAL DAILY
COMMUNITY
End: 2018-08-14 | Stop reason: ALTCHOICE

## 2018-02-22 RX ORDER — HYDROCORTISONE ACETATE 0.5 %
1 CREAM (GRAM) TOPICAL DAILY
COMMUNITY
End: 2018-04-24 | Stop reason: ALTCHOICE

## 2018-02-22 RX ORDER — DIPHENOXYLATE HYDROCHLORIDE AND ATROPINE SULFATE 2.5; .025 MG/1; MG/1
1 TABLET ORAL DAILY
COMMUNITY

## 2018-02-22 RX ORDER — GABAPENTIN 100 MG/1
200 CAPSULE ORAL
Qty: 180 CAPSULE | Refills: 3 | Status: SHIPPED | OUTPATIENT
Start: 2018-02-22 | End: 2018-05-09 | Stop reason: ALTCHOICE

## 2018-02-22 NOTE — PROGRESS NOTES
[place] DRUMMOND   [facility]    [facility]    [address] [facility] [address]    [place], [place], [address]       MEDICARE SUBSEQUENT VISIT NOTE      NAME: Alice Padilla  AGE: 80 y o  SEX: female  : 1930   MRN: [id number]    DATE: 2018  TIME: 4:19 PM    Assessment and Plan     Problem List Items Addressed This Visit     Peripheral neuropathy    Relevant Medications    gabapentin (NEURONTIN) 100 mg capsule      Other Visit Diagnoses     Medicare annual wellness visit, subsequent    -  Primary    Chest pain, unspecified type        Relevant Orders    CBC    Comprehensive metabolic panel    POCT ECG (Completed)            Patient Instructions    She looks well here today, she did have an episode bilateral sternal chest pain times 30 minutes 1 week ago with walking back into home, no issues since  EKG run here today, no prior for comparison available, we will try to track down tracing from her 2015 stress testing for comparison  If she would develop chest pain again I would want her to be seen at the emergency room  Her blood pressure is fine here today, last lipid panel in July showed cholesterol 164, HDL 80, LDL 74  She will continue on baby aspirin daily  She continues to see Dermatology, she uses Plaquenil via them April through September roughly  I did give slip to redo CBC, CMP  Last blood work in July had also included same which were okay   TSH was also fine  She is up-to-date with immunizations, does a yearly flu shot, has had pneumococcal vaccine x2, tetanus shot was   she will continue to see her orthopedist, CHRIS, regarding left shoulder pain  We will see her again in 6 months, sooner if needed  If EKG has changed we will set her up with Cardiology        Medicare Wellness Counseling/ Discussion              Chief Complaint     Chief Complaint   Patient presents with   [facility] Wellness Visit History of Present Illness     Meli Delgado is a 80y o -year-old female who is in today for a routine visit/annual wellness visit, she did have episode bilateral upper sternal chest pressure walking into home 1 week ago, lasted about 30 minutes, not associated with shortness of breath, belching, lightheadedness, palpitations  Otherwise has felt well, since 1 week ago she has felt as at her baseline  She does continue to be under stress caring for her  with dementia, continues to reside in her daughter's home  She is currently off Plaquenil  Bowels have been okay, has not been requiring supplementation for this  She is using Neurontin 200 milligram at night regarding pain, tolerating this well  Review of Systems     Review of Systems   Constitutional: Positive for fatigue  Negative for activity change, appetite change, chills, diaphoresis, fever and unexpected weight change  HENT: Negative for congestion, mouth sores, sore throat and voice change  Eyes: Negative for pain, discharge and visual disturbance  Respiratory: Positive for chest tightness (See HPI, no issue since)  Negative for cough, shortness of breath and wheezing  Cardiovascular: Positive for chest pain and palpitations (Past palpitations, none recent)  Negative for leg swelling  Gastrointestinal: Positive for abdominal distention (Had seen Gastroenterology in past, ongoing distention at)  Negative for abdominal pain, anal bleeding, blood in stool, constipation, diarrhea, nausea and vomiting  Endocrine: Negative for polydipsia and polyuria  Genitourinary: Negative for dysuria and hematuria  Musculoskeletal: Positive for arthralgias  Negative for joint swelling  Skin: Negative for rash and wound  Neurological: Negative for dizziness, seizures, syncope, speech difficulty, weakness, light-headedness and headaches  Hematological: Negative for adenopathy  Does not bruise/bleed easily     Psychiatric/Behavioral: Negative for behavioral problems and confusion  The patient is nervous/anxious  Active Problem List     Patient Active Problem List   Diagnosis    Aortic regurgitation    Constipation    Discoid lupus erythematosus    Dysphagia    Esophagitis    Forgetfulness    Gastric erosion, chronic    Left shoulder tendonitis    Osteoarthritis    Peripheral neuropathy    Subacromial bursitis       Past Medical History:  Past Medical History:   Diagnosis Date    Aortic regurgitation     Constipation     Discoid lupus erythematosus     Dysphagia     Esophagitis     Forgetfulness     Transient elevated blood pressure     last assessed: 5/16/2017    Weight loss        Past Surgical History:  Past Surgical History:   Procedure Laterality Date    APPENDECTOMY      CATARACT EXTRACTION      CHOLECYSTECTOMY      ESOPHAGOGASTRODUODENOSCOPY  06/2013    diagnostic- neg id have dilatation    EYE SURGERY      HYSTERECTOMY      MO ESOPHAGOGASTRODUODENOSCOPY TRANSORAL DIAGNOSTIC N/A 9/16/2016    Procedure: ESOPHAGOGASTRODUODENOSCOPY (EGD); Surgeon: Camila Madrid MD;  Location: [facility]; Service: Gastroenterology    REPLACEMENT TOTAL KNEE Left 01/2007       Family History:  Family History   Problem Relation Age of Onset    Diabetes Mother     Coronary artery disease Father        Social History:  Social History     Social History    Marital status: /Civil Union     Spouse name: N/A    Number of children: N/A    Years of education: N/A     Occupational History    Not on file       Social History Main Topics    Smoking status: Never Smoker    Smokeless tobacco: Never Used    Alcohol use No    Drug use: No    Sexual activity: Not on file     Other Topics Concern    Not on file     Social History Narrative    No narrative on file       Objective     Vitals:    02/22/18 1351   BP: 130/62   Pulse: 63   SpO2: 99%   Weight: 59 3 kg (130 lb 12 8 oz)   Height: 5' 1" (1 549 m)     Body mass index is 24 71 kg/m²  BP Readings from Last 3 Encounters:   02/22/18 130/62   02/14/18 134/82   07/11/17 132/64       Wt Readings from Last 3 Encounters:   02/22/18 59 3 kg (130 lb 12 8 oz)   02/14/18 59 4 kg (131 lb)   07/11/17 59 1 kg (130 lb 4 oz)       Physical Exam   Constitutional: She is oriented to person, place, and time  She appears well-developed and well-nourished  No distress  Pleasant mildly anxious 80year-old, appears younger   HENT:   Mouth/Throat: Oropharynx is clear and moist    TM clear b/l hearing aid bilateral     Eyes: Conjunctivae are normal  No scleral icterus  Neck: Normal range of motion  Cardiovascular: Normal rate and regular rhythm  2/6 BILL R2ics     Pulmonary/Chest: Effort normal and breath sounds normal    Abdominal: Soft  There is no tenderness  Musculoskeletal: She exhibits no edema  Lymphadenopathy:     She has no cervical adenopathy  Neurological: She is alert and oriented to person, place, and time  No cranial nerve deficit  Coordination normal    Skin: Skin is warm and dry  She is not diaphoretic  Psychiatric: She has a normal mood and affect  Her behavior is normal  Judgment and thought content normal          ALLERGIES:  No Known Allergies    Current Medications     Current Outpatient Prescriptions   Medication Sig Dispense Refill    ascorbic acid (VITAMIN C) 500 mg tablet Take 500 mg by mouth daily      aspirin 81 MG tablet Take 81 mg by mouth daily   fish oil 1,000 mg Take 1,000 mg by mouth daily   gabapentin (NEURONTIN) 100 mg capsule Take 2 capsules (200 mg total) by mouth daily at bedtime 180 capsule 3    Glucosamine-Chondroit-Vit C-Mn (GLUCOSAMINE CHONDR 1500 COMPLX) CAPS Take 1 capsule by mouth daily      hydroxychloroquine (PLAQUENIL) 200 mg tablet Take by mouth Uses 1 daily April through September via Dermatology or as directed      Lecithin 1200 MG CAPS Take by mouth      Multiple Vitamins-Minerals (PRESERVISION AREDS PO) Take by mouth   multivitamin (THERAGRAN) TABS Take 1 tablet by mouth daily      Turmeric 500 MG CAPS Take 1 capsule by mouth daily      Vitamin D, Cholecalciferol, 1000 UNITS CAPS Take by mouth   Cyanocobalamin (B-12 PO) Take by mouth   docusate calcium (SURFAK) 240 mg capsule Take by mouth      Plant Sterols and Stanols (CHOLESTOFF) 450 MG TABS Take by mouth   polyethylene glycol (MIRALAX) powder Take by mouth      Psyllium (METAMUCIL FIBER PO) Take by mouth       No current facility-administered medications for this visit            Health Maintenance     Health Maintenance   Topic Date Due    GLAUCOMA SCREENING 67+ YR  12/23/1997    DTaP,Tdap,and Td Vaccines (2 - Td) 10/14/2010    INFLUENZA VACCINE  09/01/2017    Fall Risk  02/22/2019    Depression Screening PHQ-9  02/22/2019    Urinary Incontinence Screening  02/22/2019    PNEUMOCOCCAL POLYSACCHARIDE VACCINE AGE 72 AND OVER  Completed       Immunization History   Administered Date(s) Administered    Influenza 11/01/2005, 10/03/2012, 09/20/2017    Influenza Split High Dose Preservative Free IM 09/18/2014, 09/01/2016    Influenza TIV (IM) 11/12/2008, 10/01/2012, 09/18/2013    Pneumococcal Polysaccharide PPV23 04/29/1991, 07/26/2001, 06/17/2014    Tdap 09/16/2010       AWV Clinical     ISAR:   Previous hospitalizations?:  No       Once in a Lifetime Medicare Screening:       Medicare Screening Tests and Risk Assessment:   AAA Risk Assessment    Osteoporosis Risk Assessment    HIV Risk Assessment        Drug and Alcohol Use:   Tobacco use    Cigarettes:  never smoker    Tobacco use duration    Tobacco Cessation Readiness    Alcohol use    Alcohol use:  never    Alcohol Treatment Readiness   Illicit Drug Use    Drug use:  never        Diet & Exercise:   Diet   What is your diet?:  Regular, Limited junk food   How many servings a day of the following:   Fruits and Vegetables:  5 or more Meat:  1-2   Whole Grains:  2 Simple Carbs:  2   Dairy:  1 Tea:  3   Exercise    Do you currently exercise?:  yes   Times per week:  6     Type of exercise:  walking, weights   stretching        Cognitive Impairment Screening:   Cognitive Impairment Screening    Do you have difficulty learning or retaining new information?:  No Do you have difficulty handling new tasks?:  No   Do you have difficulty with reasoning?:  No Do you have difficulty with spatial ability and orientation?:  No   Do you have difficulty with language?:  No Do you have difficulty with behavior?:  No       Functional Ability/Level of Safety:   Hearing    Hearing difficulties:  No    Hearing aid:  Yes    Hearing Impairment Assessment    Current Activities    Help needed with the folllowing:    Using the phone:  No Transportation:  No   Shopping:  No Preparing Meals:  No   Doing Housework:  No Doing Laundry:  No   Managing Medications:  No Managing Money:  No   ADL    Fall Risk   Have you fallen in the last 12 months?:  No    Injury History       Home Safety:   Home Safety Risk Factors   Unfamilar with surroundings:  No Uneven floors:  No   Stairs or handrail saftey risk:  No Loose rugs:  No   Household clutter:  No Poor household lighting:  No   No grab bars in bathroom:  Yes        Advanced Directives:   Advanced Directives    Living Will:  Yes    Patient's End of Life Decisions        Urinary Incontinence:       Glaucoma:             Provider Screening    No data filed              Pertinent Laboratory/Diagnostic Studies:  Results for orders placed or performed in visit on 07/11/17   CBC and differential   Result Value Ref Range    WBC 6 31 4 31 - 10 16 Thousand/uL    RBC 4 49 3 81 - 5 12 Million/uL    Hemoglobin 13 5 11 5 - 15 4 g/dL    Hematocrit 39 9 34 8 - 46 1 %    MCV 89 82 - 98 fL    MCH 30 1 26 8 - 34 3 pg    MCHC 33 8 31 4 - 37 4 g/dL    RDW 13 5 11 6 - 15 1 %    MPV 10 9 8 9 - 12 7 fL    Platelets 032 049 - 300 Thousands/uL    nRBC 0 /100 WBCs    Neutrophils Relative 67 43 - 75 % Lymphocytes Relative 21 14 - 44 %    Monocytes Relative 11 4 - 12 %    Eosinophils Relative 1 0 - 6 %    Basophils Relative 0 0 - 1 %    Neutrophils Absolute 4 21 1 85 - 7 62 Thousands/µL    Lymphocytes Absolute 1 31 0 60 - 4 47 Thousands/µL    Monocytes Absolute 0 72 0 17 - 1 22 Thousand/µL    Eosinophils Absolute 0 04 0 00 - 0 61 Thousand/µL    Basophils Absolute 0 02 0 00 - 0 10 Thousands/µL   Comprehensive metabolic panel   Result Value Ref Range    Sodium 139 136 - 145 mmol/L    Potassium 4 1 3 5 - 5 3 mmol/L    Chloride 103 100 - 108 mmol/L    CO2 30 21 - 32 mmol/L    Anion Gap 6 4 - 13 mmol/L    BUN 20 5 - 25 mg/dL    Creatinine 0 75 0 60 - 1 30 mg/dL    Glucose, Fasting 96 65 - 99 mg/dL    Calcium 9 6 8 3 - 10 1 mg/dL    AST 21 5 - 45 U/L    ALT 22 12 - 78 U/L    Alkaline Phosphatase 65 46 - 116 U/L    Total Protein 7 8 6 4 - 8 2 g/dL    Albumin 4 2 3 5 - 5 0 g/dL    Total Bilirubin 1 17 (H) 0 20 - 1 00 mg/dL    eGFR >60 0 ml/min/1 73sq m   Lipid panel   Result Value Ref Range    Cholesterol 164 50 - 200 mg/dL    Triglycerides 48 <=150 mg/dL    HDL, Direct 80 (H) 40 - 60 mg/dL    LDL Calculated 74 0 - 100 mg/dL       Orders Placed This Encounter   Procedures    CBC    Comprehensive metabolic panel    POCT ECG             Misty Kanner, DO

## 2018-02-22 NOTE — PATIENT INSTRUCTIONS
She looks well here today, she did have an episode bilateral sternal chest pain times 30 minutes 1 week ago with walking back into home, no issues since  EKG run here today, no prior for comparison available, we will try to track down tracing from her 2015 stress testing for comparison  If she would develop chest pain again I would want her to be seen at the emergency room  Her blood pressure is fine here today, last lipid panel in July showed cholesterol 164, HDL 80, LDL 74  She will continue on baby aspirin daily  She continues to see Dermatology, she uses Plaquenil via them April through September roughly  I did give slip to redo CBC, CMP  Last blood work in July had also included same which were okay  2015 TSH was also fine  She is up-to-date with immunizations, does a yearly flu shot, has had pneumococcal vaccine x2, tetanus shot was 2010  she will continue to see her orthopedist, CHRIS, regarding left shoulder pain  We will see her again in 6 months, sooner if needed  If EKG has changed we will set her up with Cardiology

## 2018-02-22 NOTE — PROGRESS NOTES
Problem List Items Addressed This Visit     Peripheral neuropathy    Relevant Medications    gabapentin (NEURONTIN) 100 mg capsule      Other Visit Diagnoses     Medicare annual wellness visit, subsequent    -  Primary    Chest pain, unspecified type        Relevant Orders    CBC    Comprehensive metabolic panel    POCT ECG (Completed)          Patient Instructions    She looks well here today, she did have an episode bilateral sternal chest pain times 30 minutes 1 week ago with walking back into home, no issues since  EKG run here today, no prior for comparison available, we will try to track down tracing from her 2015 stress testing for comparison  If she would develop chest pain again I would want her to be seen at the emergency room  Her blood pressure is fine here today, last lipid panel in July showed cholesterol 164, HDL 80, LDL 74  She will continue on baby aspirin daily  She continues to see Dermatology, she uses Plaquenil via them April through September roughly  I did give slip to redo CBC, CMP  Last blood work in July had also included same which were okay  2015 TSH was also fine  She is up-to-date with immunizations, does a yearly flu shot, has had pneumococcal vaccine x2, tetanus shot was 2010  she will continue to see her orthopedist, CHRIS, regarding left shoulder pain  We will see her again in 6 months, sooner if needed  If EKG has changed we will set her up with Cardiology        AWV Clinical     ISAR:   Previous hospitalizations?:  No       Once in a Lifetime Medicare Screening:       Medicare Screening Tests and Risk Assessment:   AAA Risk Assessment    Osteoporosis Risk Assessment    HIV Risk Assessment        Drug and Alcohol Use:   Tobacco use    Cigarettes:  never smoker    Tobacco use duration    Tobacco Cessation Readiness    Alcohol use    Alcohol use:  never    Alcohol Treatment Readiness   Illicit Drug Use    Drug use:  never        Diet & Exercise:   Diet   What is your diet?:  Regular, Limited junk food   How many servings a day of the following:   Fruits and Vegetables:  5 or more Meat:  1-2   Whole Grains:  2 Simple Carbs:  2   Dairy:  1     Tea:  3   Exercise    Do you currently exercise?:  yes   Times per week:  6     Type of exercise:  walking, weights   stretching        Cognitive Impairment Screening:   Cognitive Impairment Screening    Do you have difficulty learning or retaining new information?:  No Do you have difficulty handling new tasks?:  No   Do you have difficulty with reasoning?:  No Do you have difficulty with spatial ability and orientation?:  No   Do you have difficulty with language?:  No Do you have difficulty with behavior?:  No       Functional Ability/Level of Safety:   Hearing    Hearing difficulties:  No    Hearing aid:  Yes    Hearing Impairment Assessment    Current Activities    Help needed with the folllowing:    Using the phone:  No Transportation:  No   Shopping:  No Preparing Meals:  No   Doing Housework:  No Doing Laundry:  No   Managing Medications:  No Managing Money:  No   ADL    Fall Risk   Have you fallen in the last 12 months?:  No    Injury History       Home Safety:   Home Safety Risk Factors   Unfamilar with surroundings:  No Uneven floors:  No   Stairs or handrail saftey risk:  No Loose rugs:  No   Household clutter:  No Poor household lighting:  No   No grab bars in bathroom:  Yes        Advanced Directives:   Advanced Directives    Living Will:  Yes    Patient's End of Life Decisions        Urinary Incontinence:       Glaucoma:             Provider Screening    No data filed          ### SEE OTHER NOTE TODAY RE: VITALS/ROS/PMHx/PSHx/ALLERGIES/FAMHx/SOCHx/IMMUN/HEALTH MAINT ###

## 2018-04-24 ENCOUNTER — OFFICE VISIT (OUTPATIENT)
Dept: FAMILY MEDICINE CLINIC | Facility: CLINIC | Age: 83
End: 2018-04-24
Payer: MEDICARE

## 2018-04-24 VITALS
SYSTOLIC BLOOD PRESSURE: 148 MMHG | HEART RATE: 79 BPM | DIASTOLIC BLOOD PRESSURE: 82 MMHG | WEIGHT: 127.8 LBS | HEIGHT: 61 IN | OXYGEN SATURATION: 95 % | BODY MASS INDEX: 24.13 KG/M2

## 2018-04-24 DIAGNOSIS — R03.0 ELEVATED SYSTOLIC BLOOD PRESSURE READING WITHOUT DIAGNOSIS OF HYPERTENSION: ICD-10-CM

## 2018-04-24 DIAGNOSIS — F43.9 STRESS AT HOME: Primary | ICD-10-CM

## 2018-04-24 DIAGNOSIS — S00.33XS CONTUSION OF NOSE, SEQUELA: ICD-10-CM

## 2018-04-24 PROCEDURE — 99213 OFFICE O/P EST LOW 20 MIN: CPT | Performed by: FAMILY MEDICINE

## 2018-04-24 NOTE — PATIENT INSTRUCTIONS
Medically stable here today, we discussed home stressors, she will observe, hold off on other meds, continue with routine exercise/walking, try to get proper rest, continue with healthy diet  Systolic blood pressure slightly on higher side at 1:48 a m  After sitting, we will hold off on adding medication for now  She did have a contusion nasal when door at home was blown shot by when, slight drift, just observe as she is healing  She does have a routine appointment in August, she will keep that appointment and call us sooner if needed  Recent CMP, CBC were fine, we will also  TSH at next visit  She last saw Gastroenterology in February, no increased dysphagia

## 2018-04-24 NOTE — PROGRESS NOTES
FAMILY PRACTICE OFFICE VISIT  Tracy GONZALEZ  [facility]  [address], [address]      NAME: Tasha Hernandez  AGE: 80 y o  SEX: female  : 1930   MRN: [id number]    DATE: 2018  TIME: 6:35 PM    Assessment and Plan     Problem List Items Addressed This Visit     None      Visit Diagnoses     Stress at home    -  Primary    Elevated systolic blood pressure reading without diagnosis of hypertension        Contusion of nose, sequela              Patient Instructions   Medically stable here today, we discussed home stressors, she will observe, hold off on other meds, continue with routine exercise/walking, try to get proper rest, continue with healthy diet  Systolic blood pressure slightly on higher side at 1:48 a m  After sitting, we will hold off on adding medication for now  She did have a contusion nasal when door at home was blown shot by when, slight drift, just observe as she is healing  She does have a routine appointment in August, she will keep that appointment and call us sooner if needed  Recent CMP, CBC were fine, we will also  TSH at next visit  She last saw Gastroenterology in February, no increased dysphagia        Chief Complaint     Chief Complaint   Patient presents with    Headache     on and off for the last couple weeks        History of Present Illness   Tasha Hernandez is a 80y o -year-old female who is in to discuss anxiety she has been noting -    w dementia  A lot of stress w this -  Feels stress w family ' telling me what to do'     She continues w walking/ exercise bike w/out any lightheadedness, CP or inc SOB   No inc palps    Also had episode w wind shutting door-  Hit nose -  Improving but still swollen    Review of Systems   Review of Systems   Constitutional: Negative for appetite change, fatigue (has fatigue but actually good energy for 80 yrs age), fever and unexpected weight change  HENT: Negative for congestion, sore throat and trouble swallowing  Respiratory: Negative for cough, chest tightness and shortness of breath  Cardiovascular: Negative for chest pain, palpitations and leg swelling  Gastrointestinal: Negative for abdominal pain, blood in stool and nausea  No change bowel   Genitourinary: Negative for dysuria and hematuria  Musculoskeletal: Positive for arthralgias (shoulder pain   felt injections did not help)  Negative for gait problem  Neurological: Positive for headaches  Negative for dizziness, seizures, syncope, speech difficulty and light-headedness  Hematological: Does not bruise/bleed easily  Psychiatric/Behavioral: The patient is nervous/anxious  Active Problem List     Patient Active Problem List   Diagnosis    Aortic regurgitation    Constipation    Discoid lupus erythematosus    Dysphagia    Esophagitis    Forgetfulness    Gastric erosion, chronic    Left shoulder tendonitis    Osteoarthritis    Peripheral neuropathy    Subacromial bursitis       Past Medical History:  Past Medical History:   Diagnosis Date    Aortic regurgitation     Constipation     Discoid lupus erythematosus     Dysphagia     Esophagitis     Forgetfulness     Transient elevated blood pressure     last assessed: 5/16/2017    Weight loss        Past Surgical History:  Past Surgical History:   Procedure Laterality Date    APPENDECTOMY      CATARACT EXTRACTION      CHOLECYSTECTOMY      ESOPHAGOGASTRODUODENOSCOPY  06/2013    diagnostic- neg id have dilatation    EYE SURGERY      HYSTERECTOMY      [place] ESOPHAGOGASTRODUODENOSCOPY TRANSORAL DIAGNOSTIC N/A 9/16/2016    Procedure: ESOPHAGOGASTRODUODENOSCOPY (EGD); Surgeon: Gentry Perez MD;  Location: [facility];   Service: Gastroenterology    REPLACEMENT TOTAL KNEE Left 01/2007       Family History:  Family History   Problem Relation Age of Onset    Diabetes Mother    Mya Perez Coronary artery disease Father        Social History:  Social History     Social History    Marital status: /Civil Union     Spouse name: N/A    Number of children: N/A    Years of education: N/A     Occupational History    Not on file  Social History Main Topics    Smoking status: Never Smoker    Smokeless tobacco: Never Used    Alcohol use No    Drug use: No    Sexual activity: Not on file     Other Topics Concern    Not on file     Social History Narrative    No narrative on file       Objective     Vitals:    04/24/18 0918   BP: 148/82   Pulse: 79   SpO2: 95%   Weight: 58 kg (127 lb 12 8 oz)   Height: 5' 1" (1 549 m)     Body mass index is 24 15 kg/m²  BP Readings from Last 3 Encounters:   04/24/18 148/82   02/22/18 130/62   02/14/18 134/82       Wt Readings from Last 3 Encounters:   04/24/18 58 kg (127 lb 12 8 oz)   02/22/18 59 3 kg (130 lb 12 8 oz)   02/14/18 59 4 kg (131 lb)       Physical Exam   Constitutional: She is oriented to person, place, and time  She appears well-developed and well-nourished  No distress  Somewhat anxious but looks very healthy at 80 yrs age   HENT:   Mouth/Throat: Oropharynx is clear and moist  No oropharyngeal exudate  Swelling mid nasal bridge w tenderness   Good air flow   Eyes: Conjunctivae and EOM are normal  Pupils are equal, round, and reactive to light  No scleral icterus  Cardiovascular: Normal rate and regular rhythm  Murmur (1/6 BILL) heard  No carotid bruit   Pulmonary/Chest: Effort normal and breath sounds normal  No respiratory distress  Abdominal: Soft  There is no tenderness  Musculoskeletal: She exhibits no edema  Lymphadenopathy:     She has no cervical adenopathy  Neurological: She is alert and oriented to person, place, and time  No cranial nerve deficit  She exhibits normal muscle tone  Coordination normal    Psychiatric: She has a normal mood and affect   Her behavior is normal        ALLERGIES:  No Known Allergies    Current Medications     Current Outpatient Prescriptions   Medication Sig Dispense Refill    ascorbic acid (VITAMIN C) 500 mg tablet Take 500 mg by mouth daily      aspirin 81 MG tablet Take 81 mg by mouth daily   Cyanocobalamin (B-12 PO) Take by mouth   docusate calcium (SURFAK) 240 mg capsule Take by mouth      fish oil 1,000 mg Take 1,000 mg by mouth daily   gabapentin (NEURONTIN) 100 mg capsule Take 2 capsules (200 mg total) by mouth daily at bedtime 180 capsule 3    hydroxychloroquine (PLAQUENIL) 200 mg tablet Take by mouth Uses 1 daily April through September via Dermatology or as directed      Multiple Vitamins-Minerals (PRESERVISION AREDS PO) Take by mouth   multivitamin (THERAGRAN) TABS Take 1 tablet by mouth daily      polyethylene glycol (MIRALAX) powder Take by mouth      Psyllium (METAMUCIL FIBER PO) Take by mouth      Turmeric 500 MG CAPS Take 1 capsule by mouth daily      Vitamin D, Cholecalciferol, 1000 UNITS CAPS Take by mouth  No current facility-administered medications for this visit  Most recent labs available from [address] [place] [address]   ( others may be available in Care Everywhere / Media sections)  Lab Results   Component Value Date    WBC 6 31 07/11/2017    HGB 13 5 07/11/2017    HCT 39 9 07/11/2017     07/11/2017    CHOL 164 07/11/2017    TRIG 48 07/11/2017    HDL 80 (H) 07/11/2017    ALT 22 07/11/2017    AST 21 07/11/2017     07/11/2017    K 4 1 07/11/2017     07/11/2017    CREATININE 0 75 07/11/2017    BUN 20 07/11/2017    CO2 30 07/11/2017    GLUF 96 07/11/2017     Lab Results   Component Value Date    LDLCALC 74 07/11/2017     No results found for: TSH      No orders of the defined types were placed in this encounter          Ramiro Wong DO

## 2018-05-09 ENCOUNTER — TELEPHONE (OUTPATIENT)
Dept: FAMILY MEDICINE CLINIC | Facility: CLINIC | Age: 83
End: 2018-05-09

## 2018-05-09 NOTE — TELEPHONE ENCOUNTER
Pt called stating that she would like to stop her gabapentin until she sees you in August   Pt states over the past few months she has been having night terrors and mood swings and she would like to see if it is related to the gabapentin  She would like to know if and how she should taper the medication to stop it  Please advise

## 2018-06-01 ENCOUNTER — OFFICE VISIT (OUTPATIENT)
Dept: FAMILY MEDICINE CLINIC | Facility: CLINIC | Age: 83
End: 2018-06-01
Payer: MEDICARE

## 2018-06-01 VITALS
SYSTOLIC BLOOD PRESSURE: 128 MMHG | HEIGHT: 61 IN | WEIGHT: 126.2 LBS | OXYGEN SATURATION: 95 % | BODY MASS INDEX: 23.83 KG/M2 | DIASTOLIC BLOOD PRESSURE: 74 MMHG | HEART RATE: 78 BPM

## 2018-06-01 DIAGNOSIS — R07.9 CHEST PAIN, UNSPECIFIED TYPE: Primary | ICD-10-CM

## 2018-06-01 DIAGNOSIS — R94.31 ABNORMAL ELECTROCARDIOGRAM (ECG) (EKG): ICD-10-CM

## 2018-06-01 PROCEDURE — 99214 OFFICE O/P EST MOD 30 MIN: CPT | Performed by: FAMILY MEDICINE

## 2018-06-01 NOTE — PROGRESS NOTES
FAMILY PRACTICE OFFICE VISIT  Selene Roberson HODA GONZALEZ  [address] [place] [address] Practice  [address], [address]      NAME: Rommel Waite  AGE: 80 y o  SEX: female  : 1930   MRN: [id number]    DATE: 2018  TIME: 3:06 PM    Assessment and Plan     Problem List Items Addressed This Visit     None      Visit Diagnoses     Chest pain, unspecified type    -  Primary    Relevant Orders    NM myocardial perfusion spect (rx stress and/or rest)    Abnormal electrocardiogram (ECG) (EKG)        Relevant Orders    NM myocardial perfusion spect (rx stress and/or rest)          Patient Instructions   She has ongoing  Intermittent episodes right-sided chest pain which are occasionally exertional   Last EKG in February with normal sinus rhythm, first-degree AV block/ LAD, possible anteroseptal infarction  She has been exercising without increased shortness of breath and is able to do exercise bike without chest pain most of the time --   but I would like her to redo nuclear stress test, last was in   She feels uncomfortable walking treadmill due to fall risk  -- see previous blood work, she does not wish to use statins  Stay on aspirin 81 mg   BP is excellent  If pain would worsen or if she develops shortness of breath/ other symptomatology she should present to emergency room  Chief Complaint     Chief Complaint   Patient presents with    Pain     right rib cage, consistent        History of Present Illness   Rommel Waite is a 80y o -year-old female who presents today as she had an episode overnight right-sided chest pain, I had seen her earlier this year with right-sided chest pain which was nonexertional, this was also nonexertional but she relates she does occasionally get right-sided chest pain with her exercise  No increased dysphagia, no recent illness or cold, no cough    No chest pain, not associated with increased palpitations or lightheadedness  She does not feel pain is worsened with range of motion, does not feel it is associated with back pain, nonradicular  She denies abdominal pain or change in bowel, no acid wash  She did have stress testing few years ago, EKG earlier this year was unchanged from prior      Review of Systems   Review of Systems   Constitutional: Negative for appetite change, fatigue, fever and unexpected weight change  HENT: Negative for sore throat and trouble swallowing  Respiratory: Negative for cough, shortness of breath and wheezing  Cardiovascular: Positive for chest pain  Negative for palpitations and leg swelling  Gastrointestinal: Negative for abdominal pain and blood in stool  Genitourinary: Negative for dysuria and hematuria  Neurological: Negative for dizziness, light-headedness and headaches  Active Problem List     Patient Active Problem List   Diagnosis    Aortic regurgitation    Constipation    Discoid lupus erythematosus    Dysphagia    Esophagitis    Forgetfulness    Gastric erosion, chronic    Left shoulder tendonitis    Osteoarthritis    Peripheral neuropathy    Subacromial bursitis       Past Medical History:  Past Medical History:   Diagnosis Date    Aortic regurgitation     Constipation     Discoid lupus erythematosus     Dysphagia     Esophagitis     Forgetfulness     Transient elevated blood pressure     last assessed: 5/16/2017    Weight loss        Past Surgical History:  Past Surgical History:   Procedure Laterality Date    APPENDECTOMY      CATARACT EXTRACTION      CHOLECYSTECTOMY      ESOPHAGOGASTRODUODENOSCOPY  06/2013    diagnostic- neg id have dilatation    EYE SURGERY      HYSTERECTOMY      [place] ESOPHAGOGASTRODUODENOSCOPY TRANSORAL DIAGNOSTIC N/A 9/16/2016    Procedure: ESOPHAGOGASTRODUODENOSCOPY (EGD); Surgeon: Luis Holm MD;  Location: [facility];   Service: Gastroenterology    REPLACEMENT TOTAL KNEE Left 01/2007       Family History:  Family History   Problem Relation Age of Onset    Diabetes Mother     Coronary artery disease Father        Social History:  Social History     Social History    Marital status: /Civil Union     Spouse name: N/A    Number of children: N/A    Years of education: N/A     Occupational History    Not on file  Social History Main Topics    Smoking status: Never Smoker    Smokeless tobacco: Never Used    Alcohol use No    Drug use: No    Sexual activity: Not on file     Other Topics Concern    Not on file     Social History Narrative    No narrative on file       Objective     Vitals:    06/01/18 1106   BP: 128/74   Pulse: 78   SpO2: 95%   Weight: 57 2 kg (126 lb 3 2 oz)   Height: 5' 1" (1 549 m)     Body mass index is 23 85 kg/m²  BP Readings from Last 3 Encounters:   06/01/18 128/74   04/24/18 148/82   02/22/18 130/62       Wt Readings from Last 3 Encounters:   06/01/18 57 2 kg (126 lb 3 2 oz)   04/24/18 58 kg (127 lb 12 8 oz)   02/22/18 59 3 kg (130 lb 12 8 oz)       Physical Exam   Constitutional: She is oriented to person, place, and time  She appears well-developed and well-nourished  No distress  Very pleasant 51-year-old, appears younger than stated age   Eyes: Conjunctivae are normal  No scleral icterus  Cardiovascular: Normal rate and regular rhythm  Murmur (1/6 systolic ejection murmur right 2nd intercostal space) heard  Pulmonary/Chest: Effort normal and breath sounds normal  No respiratory distress  Abdominal: Soft  There is no tenderness  Musculoskeletal: She exhibits no edema  Nontender thoracic spine, nontender chest wall, pain not reproduced with range of motion right shoulder   Lymphadenopathy:     She has no cervical adenopathy  Neurological: She is alert and oriented to person, place, and time  Psychiatric: She has a normal mood and affect   Her behavior is normal        ALLERGIES:  No Known Allergies    Current Medications     Current Outpatient Prescriptions   Medication Sig Dispense Refill    ascorbic acid (VITAMIN C) 500 mg tablet Take 500 mg by mouth daily      aspirin 81 MG tablet Take 81 mg by mouth daily   Cyanocobalamin (B-12 PO) Take by mouth   docusate calcium (SURFAK) 240 mg capsule Take by mouth      fish oil 1,000 mg Take 1,000 mg by mouth daily   hydroxychloroquine (PLAQUENIL) 200 mg tablet Take by mouth Uses 1 daily April through September via Dermatology or as directed      Multiple Vitamins-Minerals (PRESERVISION AREDS PO) Take by mouth   multivitamin (THERAGRAN) TABS Take 1 tablet by mouth daily      polyethylene glycol (MIRALAX) powder Take by mouth      Psyllium (METAMUCIL FIBER PO) Take by mouth      Turmeric 500 MG CAPS Take 1 capsule by mouth daily      Vitamin D, Cholecalciferol, 1000 UNITS CAPS Take by mouth  No current facility-administered medications for this visit                Most recent labs available from [address] [place] [address]   ( others may be available in [facility] / Media sections)  Lab Results   Component Value Date    WBC 6 31 07/11/2017    HGB 13 5 07/11/2017    HCT 39 9 07/11/2017     07/11/2017    CHOL 164 07/11/2017    TRIG 48 07/11/2017    HDL 80 (H) 07/11/2017    ALT 22 07/11/2017    AST 21 07/11/2017     07/11/2017    K 4 1 07/11/2017     07/11/2017    CREATININE 0 75 07/11/2017    BUN 20 07/11/2017    CO2 30 07/11/2017    GLUF 96 07/11/2017     Lab Results   Component Value Date    LDLCALC 74 07/11/2017     No results found for: TSH      Orders Placed This Encounter   Procedures    NM myocardial perfusion spect (rx stress and/or rest)         Obadiah Alpers, DO

## 2018-06-01 NOTE — PATIENT INSTRUCTIONS
She has ongoing  Intermittent episodes right-sided chest pain which are occasionally exertional   Last EKG in February with normal sinus rhythm, first-degree AV block/ LAD, possible anteroseptal infarction  She has been exercising without increased shortness of breath and is able to do exercise bike without chest pain most of the time --   but I would like her to redo nuclear stress test, last was in 2015  She feels uncomfortable walking treadmill due to fall risk  -- see previous blood work, she does not wish to use statins  Stay on aspirin 81 mg   BP is excellent  If pain would worsen or if she develops shortness of breath/ other symptomatology she should present to emergency room

## 2018-06-13 ENCOUNTER — HOSPITAL ENCOUNTER (OUTPATIENT)
Dept: NON INVASIVE DIAGNOSTICS | Facility: HOSPITAL | Age: 83
Discharge: HOME | End: 2018-06-13
Payer: MEDICARE

## 2018-06-13 ENCOUNTER — HOSPITAL ENCOUNTER (OUTPATIENT)
Dept: NUCLEAR MEDICINE | Facility: HOSPITAL | Age: 83
Discharge: HOME | End: 2018-06-13
Payer: MEDICARE

## 2018-06-13 DIAGNOSIS — R94.31 ABNORMAL ELECTROCARDIOGRAM (ECG) (EKG): ICD-10-CM

## 2018-06-13 DIAGNOSIS — R07.9 CHEST PAIN, UNSPECIFIED TYPE: ICD-10-CM

## 2018-06-13 PROCEDURE — 93017 CV STRESS TEST TRACING ONLY: CPT

## 2018-06-13 PROCEDURE — 78452 HT MUSCLE IMAGE SPECT MULT: CPT

## 2018-06-13 PROCEDURE — A9502 TC99M TETROFOSMIN: HCPCS

## 2018-06-13 RX ADMIN — REGADENOSON 0.4 MG: 0.08 INJECTION, SOLUTION INTRAVENOUS at 13:10

## 2018-06-14 LAB
CHEST PAIN STATEMENT: NORMAL
MAX DIASTOLIC BP: 80 MMHG
MAX HEART RATE: 108 BPM
MAX PREDICTED HEART RATE: 133 BPM
MAX. SYSTOLIC BP: 168 MMHG
PROTOCOL NAME: NORMAL
REASON FOR TERMINATION: NORMAL
TARGET HR FORMULA: NORMAL
TEST INDICATION: NORMAL
TIME IN EXERCISE PHASE: NORMAL

## 2018-08-13 RX ORDER — NAPROXEN 500 MG/1
TABLET ORAL
COMMUNITY
End: 2018-08-14 | Stop reason: ALTCHOICE

## 2018-08-13 RX ORDER — CHLORAL HYDRATE 500 MG
CAPSULE ORAL
COMMUNITY
End: 2019-09-20 | Stop reason: ALTCHOICE

## 2018-08-13 RX ORDER — LEVOFLOXACIN 500 MG/1
TABLET, FILM COATED ORAL
COMMUNITY
End: 2018-07-14 | Stop reason: ALTCHOICE

## 2018-08-13 RX ORDER — METHYLPREDNISOLONE ACETATE 40 MG/ML
1 INJECTION, SUSPENSION INTRA-ARTICULAR; INTRALESIONAL; INTRAMUSCULAR; SOFT TISSUE
COMMUNITY
End: 2018-08-14 | Stop reason: ALTCHOICE

## 2018-08-13 RX ORDER — OXYCODONE HYDROCHLORIDE AND ACETAMINOPHEN 5; 325 MG/1; MG/1
TABLET ORAL
COMMUNITY
End: 2018-08-01

## 2018-08-13 RX ORDER — PYRIDOXINE HCL (VITAMIN B6) 100 MG
TABLET ORAL
COMMUNITY
End: 2018-08-14 | Stop reason: ALTCHOICE

## 2018-08-13 RX ORDER — CLOBETASOL PROPIONATE 0.46 MG/ML
SOLUTION TOPICAL
COMMUNITY
End: 2018-08-14 | Stop reason: ALTCHOICE

## 2018-08-13 RX ORDER — FLUOCINOLONE ACETONIDE 0.11 MG/ML
OIL AURICULAR (OTIC)
COMMUNITY
End: 2018-08-14 | Stop reason: ALTCHOICE

## 2018-08-13 RX ORDER — GABAPENTIN 100 MG/1
CAPSULE ORAL
COMMUNITY
End: 2018-08-14 | Stop reason: ALTCHOICE

## 2018-08-13 RX ORDER — OMEPRAZOLE 40 MG/1
CAPSULE, DELAYED RELEASE ORAL
COMMUNITY
End: 2018-08-14 | Stop reason: ALTCHOICE

## 2018-08-13 RX ORDER — NICOTINE POLACRILEX 2 MG
GUM BUCCAL
COMMUNITY
End: 2018-08-14 | Stop reason: ALTCHOICE

## 2018-08-14 ENCOUNTER — APPOINTMENT (OUTPATIENT)
Dept: LAB | Facility: CLINIC | Age: 83
End: 2018-08-14
Payer: MEDICARE

## 2018-08-14 ENCOUNTER — OFFICE VISIT (OUTPATIENT)
Dept: FAMILY MEDICINE CLINIC | Facility: CLINIC | Age: 83
End: 2018-08-14
Payer: MEDICARE

## 2018-08-14 VITALS
HEART RATE: 83 BPM | BODY MASS INDEX: 22.66 KG/M2 | SYSTOLIC BLOOD PRESSURE: 116 MMHG | DIASTOLIC BLOOD PRESSURE: 90 MMHG | OXYGEN SATURATION: 96 % | WEIGHT: 120 LBS | HEIGHT: 61 IN

## 2018-08-14 DIAGNOSIS — G60.9 PERIPHERAL NEUROPATHY, IDIOPATHIC: ICD-10-CM

## 2018-08-14 DIAGNOSIS — R53.83 FATIGUE, UNSPECIFIED TYPE: ICD-10-CM

## 2018-08-14 DIAGNOSIS — R68.89 FORGETFULNESS: Primary | ICD-10-CM

## 2018-08-14 DIAGNOSIS — R13.10 DYSPHAGIA, UNSPECIFIED TYPE: ICD-10-CM

## 2018-08-14 DIAGNOSIS — L93.0 DISCOID LUPUS ERYTHEMATOSUS: ICD-10-CM

## 2018-08-14 DIAGNOSIS — R63.4 WEIGHT LOSS, UNINTENTIONAL: ICD-10-CM

## 2018-08-14 DIAGNOSIS — F41.8 SITUATIONAL ANXIETY: ICD-10-CM

## 2018-08-14 DIAGNOSIS — K90.89 OTHER INTESTINAL MALABSORPTION: ICD-10-CM

## 2018-08-14 PROBLEM — F43.0 STRESS REACTION: Status: ACTIVE | Noted: 2018-08-14

## 2018-08-14 LAB
25(OH)D3 SERPL-MCNC: 27 NG/ML (ref 30–100)
ALBUMIN SERPL BCP-MCNC: 4.1 G/DL (ref 3.5–5)
ALP SERPL-CCNC: 57 U/L (ref 46–116)
ALT SERPL W P-5'-P-CCNC: 21 U/L (ref 12–78)
ANION GAP SERPL CALCULATED.3IONS-SCNC: 7 MMOL/L (ref 4–13)
AST SERPL W P-5'-P-CCNC: 20 U/L (ref 5–45)
BILIRUB SERPL-MCNC: 1.32 MG/DL (ref 0.2–1)
BUN SERPL-MCNC: 17 MG/DL (ref 5–25)
CALCIUM SERPL-MCNC: 9.3 MG/DL (ref 8.3–10.1)
CHLORIDE SERPL-SCNC: 102 MMOL/L (ref 100–108)
CO2 SERPL-SCNC: 29 MMOL/L (ref 21–32)
CREAT SERPL-MCNC: 0.76 MG/DL (ref 0.6–1.3)
ERYTHROCYTE [DISTWIDTH] IN BLOOD BY AUTOMATED COUNT: 13.2 % (ref 11.6–15.1)
GFR SERPL CREATININE-BSD FRML MDRD: 71 ML/MIN/1.73SQ M
GLUCOSE P FAST SERPL-MCNC: 87 MG/DL (ref 65–99)
HCT VFR BLD AUTO: 41.1 % (ref 34.8–46.1)
HGB BLD-MCNC: 13.3 G/DL (ref 11.5–15.4)
MCH RBC QN AUTO: 30.1 PG (ref 26.8–34.3)
MCHC RBC AUTO-ENTMCNC: 32.4 G/DL (ref 31.4–37.4)
MCV RBC AUTO: 93 FL (ref 82–98)
PLATELET # BLD AUTO: 177 THOUSANDS/UL (ref 149–390)
PMV BLD AUTO: 10.9 FL (ref 8.9–12.7)
POTASSIUM SERPL-SCNC: 3.7 MMOL/L (ref 3.5–5.3)
PROT SERPL-MCNC: 7.9 G/DL (ref 6.4–8.2)
RBC # BLD AUTO: 4.42 MILLION/UL (ref 3.81–5.12)
SODIUM SERPL-SCNC: 138 MMOL/L (ref 136–145)
TSH SERPL DL<=0.05 MIU/L-ACNC: 2.48 UIU/ML (ref 0.36–3.74)
VIT B12 SERPL-MCNC: 875 PG/ML (ref 100–900)
WBC # BLD AUTO: 7.61 THOUSAND/UL (ref 4.31–10.16)

## 2018-08-14 PROCEDURE — 84443 ASSAY THYROID STIM HORMONE: CPT | Performed by: FAMILY MEDICINE

## 2018-08-14 PROCEDURE — 36415 COLL VENOUS BLD VENIPUNCTURE: CPT | Performed by: FAMILY MEDICINE

## 2018-08-14 PROCEDURE — 80053 COMPREHEN METABOLIC PANEL: CPT | Performed by: FAMILY MEDICINE

## 2018-08-14 PROCEDURE — 82607 VITAMIN B-12: CPT | Performed by: FAMILY MEDICINE

## 2018-08-14 PROCEDURE — 99214 OFFICE O/P EST MOD 30 MIN: CPT | Performed by: FAMILY MEDICINE

## 2018-08-14 PROCEDURE — 82306 VITAMIN D 25 HYDROXY: CPT | Performed by: FAMILY MEDICINE

## 2018-08-14 PROCEDURE — 85027 COMPLETE CBC AUTOMATED: CPT | Performed by: FAMILY MEDICINE

## 2018-08-14 NOTE — PROGRESS NOTES
FAMILY PRACTICE OFFICE VISIT  Selene DRUMMOND  Jarrett GONZALEZ  [address] [facility]  [address] [facility] 97  [facility], [address] [place] [address], [facility]      NAME: Rommel Waite  AGE: 80 y o  SEX: female  : 1930   MRN: [id number]    DATE: 2018  TIME: 12:12 PM    Assessment and Plan     Problem List Items Addressed This Visit        Digestive    Dysphagia       Musculoskeletal and Integument    Discoid lupus erythematosus       Other    Forgetfulness - Primary    Relevant Orders    CBC    Comprehensive metabolic panel    TSH, 3rd generation with Free T4 reflex    Vitamin B12    Vitamin D 25 hydroxy    Situational anxiety    Weight loss, unintentional    Relevant Orders    CBC    Comprehensive metabolic panel    TSH, 3rd generation with Free T4 reflex    Vitamin B12    Vitamin D 25 hydroxy      Other Visit Diagnoses     Peripheral neuropathy, idiopathic         Relevant Orders    Vitamin B12    Vitamin D 25 hydroxy    Fatigue, unspecified type        Relevant Orders    Vitamin D 25 hydroxy    Other intestinal malabsorption         Relevant Orders    Vitamin D 25 hydroxy          Patient Instructions   With her ongoing weight loss ( down 10 lbs x 1 yr, down 7 lbs x 4 months) we will  further blood work, I would like her to try to make time for herself, she does have some minor forgetfulness which does not appear inappropriate for her age, we did discuss doing a brain scan, after discussion we will hold off for now  She could see Geriatrics for consultation/2nd opinion, she will consider this  Regarding dysphagia she could re-evaluate with Gastroenterology, she will consider this  She will continue to see her Dermatologist, she is currently on Plaquenil which she uses part time of the year longterm  We will hold off on medication for anxiety as this could worsen her forgetfulness  Visits here Q 4 months, sooner as needed  Also do flu shot in October        Chief Complaint     Chief Complaint   Patient presents with    Follow-up     Check up, c/o possible poison ivy, fatigue, hair loss        History of Present Illness   Fredrick Mahajan is a 80y o -year-old female who  is in today for a routine follow-up, she had an annual wellness visit back in February, had seen her few times since, back in early June she had chest discomfort with subsequent negative stress testing  She remains active, actually feels very stressed,  continues w dementia,  'so much to do' also dealing w one daughters relapse again w alcoholism  Eating reg meals but' still losing weight "  ( Saw GI earlier in yr -had EGD 2016 -does not want to use PPI, dysphagia 'once a month or so' Sleeps 7 hrs  Some forgetfulness  Notes generalized hair loss   ' just want to get away'    At 80 yrs age she is doing well but having a very difficult time dealing w aging  Sees Derm routinely re discoid lupus -   Currently back on Plaquenil      Review of Systems   Review of Systems   Constitutional: Positive for fatigue and unexpected weight change  Negative for activity change, appetite change, chills and fever  HENT: Negative for congestion, ear pain, sinus pain and sore throat  Eyes: Negative for visual disturbance  Respiratory: Negative for cough, choking, chest tightness, shortness of breath and wheezing  Cardiovascular: Positive for chest pain (had occ chest pain  nonexertional -   had neg stress test)  Negative for palpitations and leg swelling  Gastrointestinal: Negative for abdominal pain, blood in stool, nausea and vomiting  Genitourinary: Negative for dysuria and hematuria  Skin: Negative for rash  Neurological: Positive for light-headedness (rare w positional chnge)   Negative for dizziness, seizures, syncope, facial asymmetry, speech difficulty (neg other than occ difficulty recalling words for objects ie 'mariola' was diff to come up w recently), weakness and headaches (minimal at times, related to 'stress' and 'glasses')  Hematological: Negative for adenopathy  Does not bruise/bleed easily  Psychiatric/Behavioral: Negative for behavioral problems  The patient is nervous/anxious  Active Problem List     Patient Active Problem List   Diagnosis    Aortic regurgitation    Constipation    Discoid lupus erythematosus    Dysphagia    Esophagitis    Forgetfulness    Gastric erosion, chronic    Left shoulder tendonitis    Osteoarthritis of knee    Peripheral neuropathy    Situational anxiety    Weight loss, unintentional       Past Medical History:  Past Medical History:   Diagnosis Date    Aortic regurgitation     Constipation     Discoid lupus erythematosus     Dysphagia     Esophagitis     Forgetfulness     Transient elevated blood pressure     last assessed: 5/16/2017    Weight loss        Past Surgical History:  Past Surgical History:   Procedure Laterality Date    APPENDECTOMY      CATARACT EXTRACTION      CHOLECYSTECTOMY      ESOPHAGOGASTRODUODENOSCOPY  06/2013    diagnostic- neg id have dilatation    EYE SURGERY      HYSTERECTOMY      AR ESOPHAGOGASTRODUODENOSCOPY TRANSORAL DIAGNOSTIC N/A 9/16/2016    Procedure: ESOPHAGOGASTRODUODENOSCOPY (EGD); Surgeon: Silvia Enriquez MD;  [facility]: [facility]; Service: Gastroenterology    REPLACEMENT TOTAL KNEE Left 01/2007       Family History:  Family History   Problem Relation Age of Onset    Diabetes Mother     Coronary artery disease Father        Social History:  Social History     Social History    Marital status: /Civil Union     Spouse name: N/A    Number of children: N/A    Years of education: N/A     Occupational History    Not on file       Social History Main Topics    Smoking status: Never Smoker    Smokeless tobacco: Never Used    Alcohol use No    Drug use: No    Sexual activity: Not on file     Other Topics Concern    Not on file     Social History Narrative    No narrative on file       Objective     Vitals:    08/14/18 1127   BP: 116/90   Pulse: 83   SpO2: 96%   Weight: 54 4 kg (120 lb)   Height: 5' 1" (1 549 m)     Body mass index is 22 67 kg/m²  BP Readings from Last 3 Encounters:   08/14/18 116/90   06/01/18 128/74   04/24/18 148/82       Wt Readings from Last 3 Encounters:   08/14/18 54 4 kg (120 lb)   06/01/18 57 2 kg (126 lb 3 2 oz)   04/24/18 58 kg (127 lb 12 8 oz)       Physical Exam   Constitutional: She is oriented to person, place, and time  Sl anxious, well kept, appears well   HENT:   Mouth/Throat: Oropharynx is clear and moist  No oropharyngeal exudate  Eyes: Conjunctivae are normal  No scleral icterus  Neck: Neck supple  Cardiovascular: Normal rate and regular rhythm  Murmur (2/6 BILL R2ics) heard  Pulmonary/Chest: Effort normal and breath sounds normal  No respiratory distress  She has no wheezes  She has no rales  Abdominal: Soft  There is no tenderness  Musculoskeletal: She exhibits no edema  Lymphadenopathy:     She has no cervical adenopathy  Neurological: She is alert and oriented to person, place, and time  No cranial nerve deficit  She exhibits normal muscle tone  Coordination normal    Aware of day/ date -   3/3 recall   Clock draw wnl       ALLERGIES:  Allergies   Allergen Reactions    Gabapentin      dreams       Current Medications     Current Outpatient Prescriptions   Medication Sig Dispense Refill    aspirin 81 MG tablet Take 81 mg by mouth daily   docusate calcium (SURFAK) 240 mg capsule Take by mouth      fish oil 1,000 mg Take 1,000 mg by mouth daily   hydroxychloroquine (PLAQUENIL) 200 mg tablet Take by mouth Uses 1 daily April through September via Dermatology or as directed      Multiple Vitamins-Minerals (PRESERVISION AREDS PO) Take by mouth        multivitamin (THERAGRAN) TABS Take 1 tablet by mouth daily      Omega-3 1000 MG CAPS Omega 3      polyethylene glycol (MIRALAX) powder Take by mouth      Psyllium (METAMUCIL FIBER PO) Take by mouth      Vitamin D, Cholecalciferol, 1000 UNITS CAPS Take by mouth  No current facility-administered medications for this visit                Most recent labs available from [address] [place] [address]   ( others may be available in [facility] / Media sections)  Lab Results   Component Value Date    WBC 6 31 07/11/2017    HGB 13 5 07/11/2017    HCT 39 9 07/11/2017     07/11/2017    CHOL 164 07/11/2017    TRIG 48 07/11/2017    HDL 80 (H) 07/11/2017    ALT 22 07/11/2017    AST 21 07/11/2017     07/11/2017    K 4 1 07/11/2017     07/11/2017    CREATININE 0 75 07/11/2017    BUN 20 07/11/2017    CO2 30 07/11/2017    GLUF 96 07/11/2017     Lab Results   Component Value Date    LDLCALC 74 07/11/2017     Lab Results   Component Value Date    HKU4VMDJRUUX 2 450 12/22/2015         Orders Placed This Encounter   Procedures    CBC    Comprehensive metabolic panel    TSH, 3rd generation with Free T4 reflex    Vitamin B12    Vitamin D 25 hydroxy         Homero Carvalho, DO

## 2018-08-14 NOTE — PATIENT INSTRUCTIONS
With her ongoing weight loss ( down 10 lbs x 1 yr, down 7 lbs x 4 months) we will  further blood work, I would like her to try to make time for herself, she does have some minor forgetfulness which does not appear inappropriate for her age, we did discuss doing a brain scan, after discussion we will hold off for now  She could see Geriatrics for consultation/2nd opinion, she will consider this  Regarding dysphagia she could re-evaluate with Gastroenterology, she will consider this  She will continue to see her Dermatologist, she is currently on Plaquenil which she uses part time of the year longterm  We will hold off on medication for anxiety as this could worsen her forgetfulness  Visits here Q 4 months, sooner as needed  Also do flu shot in October

## 2018-08-15 DIAGNOSIS — E55.9 VITAMIN D DEFICIENCY: Primary | ICD-10-CM

## 2018-08-15 RX ORDER — MULTIVIT-MIN/IRON/FOLIC ACID/K 18-600-40
1 CAPSULE ORAL DAILY
Refills: 0
Start: 2018-08-15

## 2018-12-11 ENCOUNTER — OFFICE VISIT (OUTPATIENT)
Dept: FAMILY MEDICINE CLINIC | Facility: CLINIC | Age: 83
End: 2018-12-11
Payer: MEDICARE

## 2018-12-11 VITALS
DIASTOLIC BLOOD PRESSURE: 62 MMHG | WEIGHT: 125 LBS | BODY MASS INDEX: 23.6 KG/M2 | HEIGHT: 61 IN | OXYGEN SATURATION: 97 % | HEART RATE: 81 BPM | SYSTOLIC BLOOD PRESSURE: 106 MMHG

## 2018-12-11 DIAGNOSIS — R51.9 NONINTRACTABLE EPISODIC HEADACHE, UNSPECIFIED HEADACHE TYPE: ICD-10-CM

## 2018-12-11 DIAGNOSIS — R68.89 FORGETFULNESS: Primary | ICD-10-CM

## 2018-12-11 PROBLEM — R63.4 WEIGHT LOSS, UNINTENTIONAL: Status: RESOLVED | Noted: 2018-08-14 | Resolved: 2018-12-11

## 2018-12-11 PROCEDURE — 99214 OFFICE O/P EST MOD 30 MIN: CPT | Performed by: FAMILY MEDICINE

## 2018-12-11 NOTE — PATIENT INSTRUCTIONS
She has ongoing mild forgetfulness along w frontoparietal headaches almost daily  Otherwise she looks very good at 80years of age, she will continue with medication as is, her blood work few months ago showed CBC, CMP, TSH, B12 to be fine with vitamin-D level of 27  Stress testing was okay back in June  Weight loss has stabilized  She is no longer seeing Gastroenterology  She will do CT scan of head without dye  If change in headache pattern, other new symptoms consider MRI  As before she could see geriatric specialist for 2nd opinion      Visit with us 6 months with IRLANDA

## 2018-12-11 NOTE — PROGRESS NOTES
FAMILY PRACTICE OFFICE VISIT  Jayden Barbosa HODA GONZALEZ  [facility]  [address], [address]      NAME: Dawit Goldberg  AGE: 80 y o  SEX: female  : 1930   MRN: [id number]    DATE: 2018  TIME: 2:00 PM    Assessment and Plan     Problem List Items Addressed This Visit        Unprioritized    Forgetfulness - Primary    Relevant Orders    CT head wo contrast      Other Visit Diagnoses     Nonintractable episodic headache, unspecified headache type        Relevant Orders    CT head wo contrast          Patient Instructions   She has ongoing mild forgetfulness along w frontoparietal headaches almost daily  Otherwise she looks very good at 80years of age, she will continue with medication as is, her blood work few months ago showed CBC, CMP, TSH, B12 to be fine with vitamin-D level of 27  Stress testing was okay back in   Weight loss has stabilized  She is no longer seeing Gastroenterology  She will do CT scan of head without dye  If change in headache pattern, other new symptoms consider MRI  As before she could see geriatric specialist for 2nd opinion  Visit with us 6 months with AWV      Chief Complaint     Chief Complaint   Patient presents with    Follow-up     4 month check up        History of Present Illness   Dawit Goldberg is a 80y o -year-old female who is in for reg check -  Was seen in August -   BW was ok then -  Mild forgetfulness, bothersome,     Almost daily headache frontoparietal  -  Not assoc w vision change, weakness limb, imbalance, N/V -  Does not awaken her  Feels 'something is not right"  Had seen Eye Dr in past re 'flashing light both sides' at times-  None recently     Continues to care for  w dementia  Walks 6 days a week, does light weights 3 x a week,  Does ROM -   No exertional c/o        Review of Systems   Review of Systems   Constitutional: Negative for appetite change, fatigue, fever and unexpected weight change  HENT: Negative for sore throat and trouble swallowing  Respiratory: Negative for cough, chest tightness and shortness of breath  Cardiovascular: Negative for chest pain, palpitations and leg swelling  Gastrointestinal: Negative for abdominal pain and blood in stool  No acid reflux     No change in bowel   Genitourinary: Negative for dysuria and hematuria  Neurological: Negative for dizziness, seizures, syncope, weakness, light-headedness and headaches  Hematological: Does not bruise/bleed easily  Psychiatric/Behavioral: Negative for behavioral problems and hallucinations  Active Problem List     Patient Active Problem List   Diagnosis    Aortic regurgitation    Constipation    Discoid lupus erythematosus    Dysphagia    Esophagitis    Forgetfulness    Gastric erosion, chronic    Left shoulder tendonitis    Osteoarthritis of knee    Peripheral neuropathy    Situational anxiety       Past Medical History:  Past Medical History:   Diagnosis Date    Aortic regurgitation     Constipation     Discoid lupus erythematosus     Dysphagia     Esophagitis     Forgetfulness     Transient elevated blood pressure     last assessed: 5/16/2017    Weight loss        Past Surgical History:  Past Surgical History:   Procedure Laterality Date    APPENDECTOMY      CATARACT EXTRACTION      CHOLECYSTECTOMY      ESOPHAGOGASTRODUODENOSCOPY  06/2013    diagnostic- neg id have dilatation    EYE SURGERY      HYSTERECTOMY      CA ESOPHAGOGASTRODUODENOSCOPY TRANSORAL DIAGNOSTIC N/A 9/16/2016    Procedure: ESOPHAGOGASTRODUODENOSCOPY (EGD); Surgeon: Prince Gill MD;  [facility];   Service: Gastroenterology    REPLACEMENT TOTAL KNEE Left 01/2007       Family History:  Family History   Problem Relation Age of Onset    Diabetes Mother     Coronary artery disease Father        Social History:  Social History     Social History    Marital status: /Civil Union     Spouse name: N/A    Number of children: N/A    Years of education: N/A     Occupational History    Not on file  Social History Main Topics    Smoking status: Never Smoker    Smokeless tobacco: Never Used    Alcohol use No    Drug use: No    Sexual activity: Not on file     Other Topics Concern    Not on file     Social History Narrative    No narrative on file       Objective     Vitals:    12/11/18 1253   BP: 106/62   BP Location: Left arm   Patient Position: Sitting   Cuff Size: Standard   Pulse: 81   SpO2: 97%   Weight: 56 7 kg (125 lb)   Height: 5' 1" (1 549 m)     Body mass index is 23 62 kg/m²  BP Readings from Last 3 Encounters:   12/11/18 106/62   08/14/18 116/90   06/01/18 128/74       Wt Readings from Last 3 Encounters:   12/11/18 56 7 kg (125 lb)   08/14/18 54 4 kg (120 lb)   06/01/18 57 2 kg (126 lb 3 2 oz)       Physical Exam   Constitutional: She is oriented to person, place, and time  She appears well-developed and well-nourished  No distress  Eyes: Conjunctivae are normal  No scleral icterus  Neck: Neck supple  Cardiovascular: Normal rate, regular rhythm and normal heart sounds  Pulmonary/Chest: Effort normal and breath sounds normal  No respiratory distress  She has no wheezes  She has no rales  Musculoskeletal: She exhibits no edema  Lymphadenopathy:     She has no cervical adenopathy  Neurological: She is alert and oriented to person, place, and time  No cranial nerve deficit  Coordination normal      Aware of day/ date -   3/3 recall   Clock draw wnl    Psychiatric: She has a normal mood and affect  Her behavior is normal        ALLERGIES:  Allergies   Allergen Reactions    Gabapentin      dreams       Current Medications     Current Outpatient Prescriptions   Medication Sig Dispense Refill    aspirin 81 MG tablet Take 81 mg by mouth daily        BIOTIN PO Take 1 capsule by mouth daily      Cholecalciferol (VITAMIN D) 2000 units CAPS Take 1 capsule (2,000 Units total) by mouth daily  0    docusate calcium (SURFAK) 240 mg capsule Take by mouth      fish oil 1,000 mg Take 1,000 mg by mouth daily   hydroxychloroquine (PLAQUENIL) 200 mg tablet Take by mouth Uses 1 daily April through September via Dermatology or as directed      Multiple Vitamins-Minerals (PRESERVISION AREDS PO) Take by mouth   multivitamin (THERAGRAN) TABS Take 1 tablet by mouth daily      Omega-3 1000 MG CAPS Omega 3      polyethylene glycol (MIRALAX) powder Take by mouth      Psyllium (METAMUCIL FIBER PO) Take by mouth       No current facility-administered medications for this visit                Most recent labs available from [address] [place] [address]   ( others may be available in [facility] / Media sections)  Lab Results   Component Value Date    WBC 7 61 08/14/2018    HGB 13 3 08/14/2018    HCT 41 1 08/14/2018     08/14/2018    CHOL 159 11/26/2013    TRIG 48 07/11/2017    HDL 80 (H) 07/11/2017    ALT 21 08/14/2018    AST 20 08/14/2018     12/22/2015    K 3 7 08/14/2018     08/14/2018    CREATININE 0 76 08/14/2018    BUN 17 08/14/2018    CO2 29 08/14/2018    GLUF 87 08/14/2018     Lab Results   Component Value Date    LDLCALC 74 07/11/2017     Lab Results   Component Value Date    [id number] 2 480 08/14/2018         Orders Placed This Encounter   Procedures    CT head wo contrast         Desiree Dennis DO

## 2018-12-26 ENCOUNTER — HOSPITAL ENCOUNTER (OUTPATIENT)
Dept: CT IMAGING | Facility: HOSPITAL | Age: 83
Discharge: HOME | End: 2018-12-26
Payer: MEDICARE

## 2018-12-26 DIAGNOSIS — R51.9 NONINTRACTABLE EPISODIC HEADACHE, UNSPECIFIED HEADACHE TYPE: ICD-10-CM

## 2018-12-26 DIAGNOSIS — R68.89 FORGETFULNESS: ICD-10-CM

## 2018-12-26 PROCEDURE — 70450 CT HEAD/BRAIN W/O DYE: CPT

## 2019-03-21 ENCOUNTER — OFFICE VISIT (OUTPATIENT)
Dept: FAMILY MEDICINE CLINIC | Facility: CLINIC | Age: 84
End: 2019-03-21
Payer: MEDICARE

## 2019-03-21 VITALS
SYSTOLIC BLOOD PRESSURE: 152 MMHG | HEIGHT: 61 IN | DIASTOLIC BLOOD PRESSURE: 74 MMHG | WEIGHT: 121.8 LBS | BODY MASS INDEX: 23 KG/M2 | HEART RATE: 67 BPM | OXYGEN SATURATION: 97 %

## 2019-03-21 DIAGNOSIS — F41.8 SITUATIONAL ANXIETY: Primary | ICD-10-CM

## 2019-03-21 PROCEDURE — 99213 OFFICE O/P EST LOW 20 MIN: CPT | Performed by: FAMILY MEDICINE

## 2019-03-21 NOTE — PROGRESS NOTES
FAMILY PRACTICE OFFICE VISIT  Pamela GONZALEZ  [address] [facility]  [address], [address]      NAME: Tung Magallanes  AGE: 80 y o  SEX: female  : 1930   MRN: [id number]    DATE: 3/21/2019  TIME: 3:01 PM    Assessment and Plan     Problem List Items Addressed This Visit        Other    Situational anxiety - Primary          Patient Instructions   We reviewed her 's health conditions, she will continue to try and maintain proper sleep, watch healthy diet, do her exercises along with devotionals as is  We did discuss the options of using low-dose benzodiazepine, I would like to avoid those if we can, if situation worsens consider alprazolam 0 25 mg 1/2 to 1 pill once or twice daily only as needed  She will call us as needed      Chief Complaint     Chief Complaint   Patient presents with    Stress       History of Present Illness   Tung Magallanes is a 80y o -year-old female who is in today accompanied by 1 of her daughters  She has been under significant stress, her  with long-term dementia had undergone surgery for bladder tumor, postop aspiration, had been in rehab, went home for 5 days and was readmitted, currently back in rehab  She is concerned she will not be able to take care of him, uncertain what she will do, has to drive significant distance to visit him, she does continue to reside with 1 of her daughters  [facility]  That daughter occasionally travels to [place]  She does note some imbalance at times but otherwise is very healthy at 80years of age  She tries to do her exercise bike routinely, occasionally walks for exercise  No confusion or increased mental status change  Review of Systems   Review of Systems   Constitutional: Negative for appetite change  HENT: Negative for congestion and trouble swallowing      Respiratory: Negative for cough, chest tightness, shortness of breath and wheezing  Cardiovascular: Negative for chest pain, palpitations and leg swelling  Gastrointestinal: Negative for abdominal pain, blood in stool, nausea and vomiting  Genitourinary: Negative for dysuria and hematuria  Neurological: Negative for dizziness, syncope and headaches  Psychiatric/Behavioral: Positive for sleep disturbance (Sleeps about 5 and 0 5 hours at night, occasionally naps)  Negative for confusion  Active Problem List     Patient Active Problem List   Diagnosis    Aortic regurgitation    Constipation    Discoid lupus erythematosus    Dysphagia    Esophagitis    Forgetfulness    Gastric erosion, chronic    Left shoulder tendonitis    Osteoarthritis of knee    Peripheral neuropathy    Situational anxiety       Past Medical History:  Past Medical History:   Diagnosis Date    Aortic regurgitation     Constipation     Discoid lupus erythematosus     Dysphagia     Esophagitis     Forgetfulness     Transient elevated blood pressure     last assessed: 5/16/2017    Weight loss        Past Surgical History:  Past Surgical History:   Procedure Laterality Date    APPENDECTOMY      CATARACT EXTRACTION      CHOLECYSTECTOMY      ESOPHAGOGASTRODUODENOSCOPY  06/2013    diagnostic- neg id have dilatation    EYE SURGERY      HYSTERECTOMY      OK ESOPHAGOGASTRODUODENOSCOPY TRANSORAL DIAGNOSTIC N/A 9/16/2016    Procedure: ESOPHAGOGASTRODUODENOSCOPY (EGD); Surgeon: Herson Dan MD;  [facility];   Service: Gastroenterology    REPLACEMENT TOTAL KNEE Left 01/2007       Family History:  Family History   Problem Relation Age of Onset    Diabetes Mother     Coronary artery disease Father        Social History:  Social History     Tobacco Use    Smoking status: Never Smoker    Smokeless tobacco: Never Used   Substance Use Topics    Alcohol use: No       Objective     Vitals:    03/21/19 0943   BP: 152/74   BP Location: Left arm   Patient Position: Sitting   Cuff Size: Standard   Pulse: 67   SpO2: 97%   Weight: 55 2 kg (121 lb 12 8 oz)   Height: 5' 1" (1 549 m)     Body mass index is 23 01 kg/m²  BP Readings from Last 3 Encounters:   03/21/19 152/74   12/11/18 106/62   08/14/18 116/90       Wt Readings from Last 3 Encounters:   03/21/19 55 2 kg (121 lb 12 8 oz)   12/11/18 56 7 kg (125 lb)   08/14/18 54 4 kg (120 lb)       Physical Exam   Constitutional: She is oriented to person, place, and time  Anxious, slightly tearful but overall appears well   Eyes: Conjunctivae are normal  No scleral icterus  Cardiovascular: Normal rate, regular rhythm and normal heart sounds  Pulmonary/Chest: Effort normal and breath sounds normal  No respiratory distress  Musculoskeletal: She exhibits no edema  Neurological: She is alert and oriented to person, place, and time  ALLERGIES:  Allergies   Allergen Reactions    Gabapentin      dreams       Current Medications     Current Outpatient Medications   Medication Sig Dispense Refill    aspirin 81 MG tablet Take 81 mg by mouth daily   BIOTIN PO Take 1 capsule by mouth daily      Cholecalciferol (VITAMIN D) 2000 units CAPS Take 1 capsule (2,000 Units total) by mouth daily  0    fish oil 1,000 mg Take 1,000 mg by mouth daily   hydroxychloroquine (PLAQUENIL) 200 mg tablet Take by mouth Uses 1 daily April through September via Dermatology or as directed      Multiple Vitamins-Minerals (PRESERVISION AREDS PO) Take by mouth   multivitamin (THERAGRAN) TABS Take 1 tablet by mouth daily      Omega-3 1000 MG CAPS Omega 3      polyethylene glycol (MIRALAX) powder Take by mouth      Psyllium (METAMUCIL FIBER PO) Take by mouth      docusate calcium (SURFAK) 240 mg capsule Take by mouth       No current facility-administered medications for this visit                Most recent labs available from [address]   ( others may be available in [facility] / Media sections)  Lab Results   Component Value Date WBC 7 61 08/14/2018    HGB 13 3 08/14/2018    HCT 41 1 08/14/2018     08/14/2018    CHOL 159 11/26/2013    TRIG 48 07/11/2017    HDL 80 (H) 07/11/2017    ALT 21 08/14/2018    AST 20 08/14/2018     12/22/2015    K 3 7 08/14/2018     08/14/2018    CREATININE 0 76 08/14/2018    BUN 17 08/14/2018    CO2 29 08/14/2018    GLUF 87 08/14/2018     Lab Results   Component Value Date    LDLCALC 74 07/11/2017     Lab Results   Component Value Date    [id number] 2 480 08/14/2018         No orders of the defined types were placed in this encounter          Bi Herrera DO

## 2019-03-21 NOTE — PATIENT INSTRUCTIONS
We reviewed her 's health conditions, she will continue to try and maintain proper sleep, watch healthy diet, do her exercises along with devotionals as is  We did discuss the options of using low-dose benzodiazepine, I would like to avoid those if we can, if situation worsens consider alprazolam 0 25 mg 1/2 to 1 pill once or twice daily only as needed    She will call us as needed

## 2019-06-18 ENCOUNTER — OFFICE VISIT (OUTPATIENT)
Dept: FAMILY MEDICINE CLINIC | Facility: CLINIC | Age: 84
End: 2019-06-18
Payer: MEDICARE

## 2019-06-18 ENCOUNTER — APPOINTMENT (OUTPATIENT)
Dept: LAB | Facility: CLINIC | Age: 84
End: 2019-06-18
Payer: MEDICARE

## 2019-06-18 VITALS
OXYGEN SATURATION: 98 % | HEIGHT: 61 IN | BODY MASS INDEX: 22.09 KG/M2 | DIASTOLIC BLOOD PRESSURE: 76 MMHG | WEIGHT: 117 LBS | HEART RATE: 71 BPM | SYSTOLIC BLOOD PRESSURE: 124 MMHG

## 2019-06-18 DIAGNOSIS — F41.8 SITUATIONAL ANXIETY: ICD-10-CM

## 2019-06-18 DIAGNOSIS — Z00.00 MEDICARE ANNUAL WELLNESS VISIT, SUBSEQUENT: Primary | ICD-10-CM

## 2019-06-18 DIAGNOSIS — K27.9 PUD (PEPTIC ULCER DISEASE): ICD-10-CM

## 2019-06-18 DIAGNOSIS — R68.89 FORGETFULNESS: ICD-10-CM

## 2019-06-18 DIAGNOSIS — R63.4 WEIGHT LOSS: ICD-10-CM

## 2019-06-18 LAB
ALBUMIN SERPL BCP-MCNC: 4.3 G/DL (ref 3.5–5)
ALP SERPL-CCNC: 66 U/L (ref 46–116)
ALT SERPL W P-5'-P-CCNC: 25 U/L (ref 12–78)
ANION GAP SERPL CALCULATED.3IONS-SCNC: 5 MMOL/L (ref 4–13)
AST SERPL W P-5'-P-CCNC: 18 U/L (ref 5–45)
BILIRUB SERPL-MCNC: 1.01 MG/DL (ref 0.2–1)
BUN SERPL-MCNC: 15 MG/DL (ref 5–25)
CALCIUM SERPL-MCNC: 9.5 MG/DL (ref 8.3–10.1)
CHLORIDE SERPL-SCNC: 100 MMOL/L (ref 100–108)
CO2 SERPL-SCNC: 30 MMOL/L (ref 21–32)
CREAT SERPL-MCNC: 0.68 MG/DL (ref 0.6–1.3)
ERYTHROCYTE [DISTWIDTH] IN BLOOD BY AUTOMATED COUNT: 13.6 % (ref 11.6–15.1)
GFR SERPL CREATININE-BSD FRML MDRD: 78 ML/MIN/1.73SQ M
GLUCOSE P FAST SERPL-MCNC: 93 MG/DL (ref 65–99)
HCT VFR BLD AUTO: 41.4 % (ref 34.8–46.1)
HGB BLD-MCNC: 13.3 G/DL (ref 11.5–15.4)
MCH RBC QN AUTO: 30 PG (ref 26.8–34.3)
MCHC RBC AUTO-ENTMCNC: 32.1 G/DL (ref 31.4–37.4)
MCV RBC AUTO: 93 FL (ref 82–98)
PLATELET # BLD AUTO: 157 THOUSANDS/UL (ref 149–390)
PMV BLD AUTO: 11.3 FL (ref 8.9–12.7)
POTASSIUM SERPL-SCNC: 3.7 MMOL/L (ref 3.5–5.3)
PROT SERPL-MCNC: 8.1 G/DL (ref 6.4–8.2)
RBC # BLD AUTO: 4.44 MILLION/UL (ref 3.81–5.12)
SODIUM SERPL-SCNC: 135 MMOL/L (ref 136–145)
WBC # BLD AUTO: 5.07 THOUSAND/UL (ref 4.31–10.16)

## 2019-06-18 PROCEDURE — G0438 PPPS, INITIAL VISIT: HCPCS | Performed by: FAMILY MEDICINE

## 2019-06-18 PROCEDURE — 85027 COMPLETE CBC AUTOMATED: CPT | Performed by: FAMILY MEDICINE

## 2019-06-18 PROCEDURE — 80053 COMPREHEN METABOLIC PANEL: CPT | Performed by: FAMILY MEDICINE

## 2019-06-18 PROCEDURE — 99213 OFFICE O/P EST LOW 20 MIN: CPT | Performed by: FAMILY MEDICINE

## 2019-06-18 PROCEDURE — 36415 COLL VENOUS BLD VENIPUNCTURE: CPT | Performed by: FAMILY MEDICINE

## 2019-08-12 ENCOUNTER — TELEPHONE (OUTPATIENT)
Dept: FAMILY MEDICINE CLINIC | Facility: CLINIC | Age: 84
End: 2019-08-12

## 2019-08-12 DIAGNOSIS — R68.89 FORGETFULNESS: Primary | ICD-10-CM

## 2019-08-12 NOTE — TELEPHONE ENCOUNTER
Pt LM that she would like a referral to a geriatric doctor in the area   She said you discussed this with her at previous visit

## 2019-09-20 ENCOUNTER — OFFICE VISIT (OUTPATIENT)
Dept: FAMILY MEDICINE CLINIC | Facility: CLINIC | Age: 84
End: 2019-09-20
Payer: MEDICARE

## 2019-09-20 VITALS
WEIGHT: 118 LBS | SYSTOLIC BLOOD PRESSURE: 132 MMHG | HEART RATE: 73 BPM | HEIGHT: 61 IN | DIASTOLIC BLOOD PRESSURE: 80 MMHG | OXYGEN SATURATION: 98 % | BODY MASS INDEX: 22.28 KG/M2

## 2019-09-20 DIAGNOSIS — R68.89 FORGETFULNESS: Primary | ICD-10-CM

## 2019-09-20 DIAGNOSIS — Z23 NEED FOR INFLUENZA VACCINATION: ICD-10-CM

## 2019-09-20 DIAGNOSIS — I35.1 AORTIC VALVE INSUFFICIENCY, ETIOLOGY OF CARDIAC VALVE DISEASE UNSPECIFIED: ICD-10-CM

## 2019-09-20 DIAGNOSIS — F41.8 SITUATIONAL ANXIETY: ICD-10-CM

## 2019-09-20 PROCEDURE — 99213 OFFICE O/P EST LOW 20 MIN: CPT | Performed by: FAMILY MEDICINE

## 2019-09-20 PROCEDURE — G0008 ADMIN INFLUENZA VIRUS VAC: HCPCS | Performed by: FAMILY MEDICINE

## 2019-09-20 PROCEDURE — 90662 IIV NO PRSV INCREASED AG IM: CPT | Performed by: FAMILY MEDICINE

## 2019-09-20 NOTE — PROGRESS NOTES
FAMILY PRACTICE OFFICE VISIT  [facility]  [address] [facility] [address]  [place], [place], [address]      NAME: Tomma Lefort  AGE: 80 y o  SEX: female  : 1930   MRN: [id number]    DATE: 2019  TIME: 2:48 PM    Assessment and Plan     Problem List Items Addressed This Visit        Cardiovascular and Mediastinum    Aortic regurgitation       Other    Forgetfulness - Primary    Situational anxiety      Other Visit Diagnoses     Need for influenza vaccination        Relevant Orders    influenza vaccine, [id number], high-dose, PF 0 5 mL (FLUZONE HIGH-DOSE) (Completed)          Patient Instructions     She is doing well here today, she had been here back in  for a full annual wellness visit, , continue with exercise as tolerated/ medications as is  CBC, CMP were fine back in   CT of head was performed in 2018, that was unremarkable  1 year ago vitamin-D 27, B12 875, TSH 2 5  Had scored 26/30 with MMSE at last visit in     I do feel she is okay for independent living, we will fill out form/paperwork for this  She will keep next appointment as scheduled  Call us sooner if needed    Flu shot given here today    Immunization History   Administered Date(s) Administered    INFLUENZA 2005, 10/03/2012, 2017, 10/25/2018    Influenza Split High Dose Preservative Free IM 2014, 2016    Influenza TIV (IM) 2008, 10/01/2012, 2013    Influenza, high dose seasonal 0 5 mL 2019    Pneumococcal Polysaccharide PPV23 1991, 2001, 2014    Tdap 2010       Chief Complaint     Chief Complaint   Patient presents with   [place] Evaluation       History of Present Illness   Tomma Lefort is a 80y o -year-old female who is in today to fill out forms for a move, she is planning on moving into an apartment at facility accompanied by her  who has dementia, Kristina Olivo is at a level where she can do independent living, her  requires more care  She has mild forgetfulness, no worse than before, I had seen her back in June for a full physical,   She is feeling about the same, continues to exercise routinely with walking along with exercise bike, has had no falls  Remains anxious especially with 's health  Has no increased shortness of breath, chest discomfort, abdominal pain, dysphagia  Does see Dermatology, off all medication for discoid lupus  She does drive  Review of Systems   Review of Systems   Constitutional: Negative for appetite change, fatigue, fever and unexpected weight change  HENT: Negative for sore throat and trouble swallowing  Respiratory: Negative for cough, chest tightness and shortness of breath  Cardiovascular: Negative for chest pain, palpitations and leg swelling  Gastrointestinal: Negative for abdominal pain, blood in stool, nausea and vomiting  No acid reflux     No change in bowel   Genitourinary: Negative for dysuria and hematuria  Skin: Negative for wound  Neurological: Negative for dizziness, syncope, light-headedness and headaches  Hematological: Does not bruise/bleed easily     Psychiatric/Behavioral:        Only mild forgetfulness, is anxious, no depression       Active Problem List     Patient Active Problem List   Diagnosis    Aortic regurgitation    Constipation    Discoid lupus erythematosus    Dysphagia    Forgetfulness    Gastric erosion, chronic    Left shoulder tendonitis    Osteoarthritis of knee    Peripheral neuropathy    Situational anxiety       Past Medical History:  Reviewed    Past Surgical History:  Past Surgical History:   Procedure Laterality Date    APPENDECTOMY      CATARACT EXTRACTION      CHOLECYSTECTOMY      ESOPHAGOGASTRODUODENOSCOPY  06/2013    diagnostic- neg id have dilatation    EYE SURGERY      HYSTERECTOMY      ND ESOPHAGOGASTRODUODENOSCOPY TRANSORAL DIAGNOSTIC N/A 9/16/2016    Procedure: ESOPHAGOGASTRODUODENOSCOPY (EGD); Surgeon: Garret Ozuna MD;  Location: [facility]; Service: Gastroenterology    REPLACEMENT TOTAL KNEE Left 01/2007       Family History:  Reviewed    Social History:  Social History     Tobacco Use    Smoking status: Never Smoker    Smokeless tobacco: Never Used   Substance Use Topics    Alcohol use: No       Objective     Vitals:    09/20/19 1343   BP: 132/80   BP Location: Left arm   Patient Position: Sitting   Cuff Size: Large   Pulse: 73   SpO2: 98%   Weight: 53 5 kg (118 lb)   Height: 5' 1" (1 549 m)     Body mass index is 22 3 kg/m²  BP Readings from Last 3 Encounters:   09/20/19 132/80   06/18/19 124/76   03/21/19 152/74       Wt Readings from Last 3 Encounters:   09/20/19 53 5 kg (118 lb)   06/18/19 53 1 kg (117 lb)   03/21/19 55 2 kg (121 lb 12 8 oz)       Physical Exam   Constitutional: She is oriented to person, place, and time  She appears well-developed and well-nourished  No distress  Pleasant female, looks great at age 80, ambulates without cane or walker  HENT:   Mouth/Throat: Oropharynx is clear and moist  No oropharyngeal exudate  TM clear   Eyes: Conjunctivae are normal  No scleral icterus  Cardiovascular: Normal rate and regular rhythm  Murmur (1/6 systolic ejection murmur right 2nd intercostal space) heard  No carotid bruit   Pulmonary/Chest: Effort normal and breath sounds normal  No respiratory distress  Abdominal: Soft  There is no tenderness  Musculoskeletal: She exhibits no edema  Lymphadenopathy:     She has no cervical adenopathy  Neurological: She is alert and oriented to person, place, and time  Psychiatric: She has a normal mood and affect  Her behavior is normal        ALLERGIES:  Allergies   Allergen Reactions    Gabapentin      dreams       Current Medications     Current Outpatient Medications   Medication Sig Dispense Refill    aspirin 81 MG tablet Take 81 mg by mouth daily        BIOTIN PO Take 1 capsule by mouth daily      Cholecalciferol (VITAMIN D) 2000 units CAPS Take 1 capsule (2,000 Units total) by mouth daily  0    fish oil 1,000 mg Take 1,000 mg by mouth daily   Multiple Vitamins-Minerals (PRESERVISION AREDS PO) Take by mouth   multivitamin (THERAGRAN) TABS Take 1 tablet by mouth daily      docusate calcium (SURFAK) 240 mg capsule Take by mouth      polyethylene glycol (MIRALAX) powder Take by mouth      Psyllium (METAMUCIL FIBER PO) Take by mouth      TURMERIC PO Take by mouth daily       No current facility-administered medications for this visit               Orders Placed This Encounter   Procedures    influenza vaccine, [id number], high-dose, PF 0 5 mL (FLUZONE HIGH-DOSE)         Vinh Hairston DO

## 2019-09-20 NOTE — PATIENT INSTRUCTIONS
She is doing well here today, she had been here back in June for a full annual wellness visit, , continue with exercise as tolerated/ medications as is  CBC, CMP were fine back in June  CT of head was performed in December 2018, that was unremarkable  1 year ago vitamin-D 27, B12 875, TSH 2 5  Had scored 26/30 with MMSE at last visit in June    I do feel she is okay for independent living, we will fill out form/paperwork for this  She will keep next appointment as scheduled  Call us sooner if needed    Flu shot given here today    Immunization History   Administered Date(s) Administered    INFLUENZA 11/01/2005, 10/03/2012, 09/20/2017, 10/25/2018    Influenza Split High Dose Preservative Free IM 09/18/2014, 09/01/2016    Influenza TIV (IM) 11/12/2008, 10/01/2012, 09/18/2013    Influenza, high dose seasonal 0 5 mL 09/20/2019    Pneumococcal Polysaccharide PPV23 04/29/1991, 07/26/2001, 06/17/2014    Tdap 09/16/2010

## 2019-11-04 ENCOUNTER — TELEPHONE (OUTPATIENT)
Dept: OTHER | Facility: OTHER | Age: 84
End: 2019-11-04

## 2019-11-05 ENCOUNTER — TELEPHONE (OUTPATIENT)
Dept: FAMILY MEDICINE CLINIC | Facility: CLINIC | Age: 84
End: 2019-11-05

## 2019-11-05 NOTE — TELEPHONE ENCOUNTER
Alma from [facility]/[phone]/ PT: Jensen Blanton/:1930/PT requesting constipation medication daily   [place] Text Dr [email or web address]

## 2019-11-05 NOTE — TELEPHONE ENCOUNTER
Dr Aaron Franz received a call last evening regarding constipation, unsure why this was sent to him, I had received a recent notice from them that Lucy Hodges did not want to use Metamucil or MiraLax daily, she was to use this as needed    We will re-fax at order back to them

## 2019-11-26 ENCOUNTER — OFFICE VISIT (OUTPATIENT)
Dept: FAMILY MEDICINE CLINIC | Facility: CLINIC | Age: 84
End: 2019-11-26
Payer: MEDICARE

## 2019-11-26 VITALS
BODY MASS INDEX: 23.03 KG/M2 | SYSTOLIC BLOOD PRESSURE: 130 MMHG | HEIGHT: 61 IN | WEIGHT: 122 LBS | RESPIRATION RATE: 18 BRPM | OXYGEN SATURATION: 98 % | HEART RATE: 88 BPM | TEMPERATURE: 98.3 F | DIASTOLIC BLOOD PRESSURE: 78 MMHG

## 2019-11-26 DIAGNOSIS — R68.89 FORGETFULNESS: Primary | ICD-10-CM

## 2019-11-26 DIAGNOSIS — F41.9 ANXIETY: ICD-10-CM

## 2019-11-26 PROCEDURE — 99214 OFFICE O/P EST MOD 30 MIN: CPT | Performed by: FAMILY MEDICINE

## 2019-11-26 RX ORDER — SERTRALINE HYDROCHLORIDE 25 MG/1
25 TABLET, FILM COATED ORAL EVERY MORNING
Qty: 90 TABLET | Refills: 1 | Status: SHIPPED | OUTPATIENT
Start: 2019-11-26 | End: 2019-11-29 | Stop reason: SINTOL

## 2019-11-26 NOTE — PROGRESS NOTES
FAMILY PRACTICE OFFICE VISIT  Mary BARBA O  [facility]  [address] [facility] [address]  [address] [place], [place], [address]      NAME: Lisandra Morton  AGE: 80 y o  SEX: female  : 1930   MRN: [id number]    DATE: 2019  TIME: 1:56 PM    Assessment and Plan     Problem List Items Addressed This Visit        Other    Anxiety    Relevant Medications    sertraline (ZOLOFT) 25 mg tablet    Forgetfulness - Primary          Patient Instructions   We reviewed medication, she relates her bowels have been okay, she wishes to stop Metamucil, stop Biotin  Regarding anxiety she will start sertraline 25 mg once every morning, we did discuss this may take 3 to 6 weeks to take effect, I would like to re-evaluate in 3 months  Call sooner if needed  Regarding forgetfulness, she inquires if she should use Aricept which her  uses, we can consider adding that at follow-up, hold off for now- MMSE   earlier in year  Regarding blood work, back in  CBC, CMP were unremarkable  She does continue with headaches, fortunately CT of head was unremarkable back in December  Last year vitamin-D 27, stay on vitamin-D as is  B12 was normal as was TSH in   Continue with routine walking/exercise as tolerated  Chief Complaint     Chief Complaint   Patient presents with    Follow-up       History of Present Illness   Lisandra Morton is a 80y o -year-old female who is in today for a follow-up visit, she had a full annual wellness back in , I had also seen her back in September  She is now residing in personal care at [address] [place] [address]  See previous visits regarding forgetfulness, MMSE back in   Easily distracted, anxious, she relates family would like her to take something for anxiety, she is agreeable to this  Does use weights 3 x a week, walks 290 mins a day ( at least)    Sleeps well      Past issue w constipation -  she relates she is fairly regular now with utilizing prune juice daily, also receives stool softener daily  Wishes to discontinue order for Metamucil  Does use MiraLax as needed  Review of Systems   Review of Systems   Constitutional: Positive for unexpected weight change (up 4 lbs)  Negative for appetite change, fatigue and fever  HENT: Negative for sore throat and trouble swallowing  Respiratory: Negative for cough, chest tightness and shortness of breath  Cardiovascular: Negative for chest pain, palpitations and leg swelling  Gastrointestinal: Negative for abdominal pain, blood in stool, nausea and vomiting  No acid reflux     No change in bowel   Genitourinary: Negative for dysuria and hematuria  Neurological: Positive for headaches (daily headache as before -    prior imaging ok   no change in pattern)  Negative for dizziness, syncope and light-headedness  Hematological: Does not bruise/bleed easily  Active Problem List     Patient Active Problem List   Diagnosis    Aortic regurgitation    Constipation    Discoid lupus erythematosus    Dysphagia    Forgetfulness    Gastric erosion, chronic    Left shoulder tendonitis    Osteoarthritis of knee    Peripheral neuropathy    Anxiety       Past Medical History:  Reviewed    Past Surgical History:  Reviewed    Family History:  Reviewed    Social History:  Reviewed    Objective     Vitals:    11/26/19 1302   BP: 130/78   Pulse: 88   Resp: 18   Temp: 98 3 °F (36 8 °C)   SpO2: 98%   Weight: 55 3 kg (122 lb)   Height: 5' 1" (1 549 m)     Body mass index is 23 05 kg/m²  BP Readings from Last 3 Encounters:   11/26/19 130/78   09/20/19 132/80   06/18/19 124/76       Wt Readings from Last 3 Encounters:   11/26/19 55 3 kg (122 lb)   09/20/19 53 5 kg (118 lb)   06/18/19 53 1 kg (117 lb)       Physical Exam   Constitutional: She is oriented to person, place, and time  She appears well-developed and well-nourished  No distress     Cardiovascular: Normal rate, regular rhythm and normal heart sounds  Pulmonary/Chest: Effort normal and breath sounds normal  No respiratory distress  Abdominal: Soft  She exhibits no distension  Musculoskeletal: She exhibits no edema  Neurological: She is alert and oriented to person, place, and time  ALLERGIES:  Allergies   Allergen Reactions    Gabapentin      dreams       Current Medications     Current Outpatient Medications   Medication Sig Dispense Refill    aspirin 81 MG tablet Take 81 mg by mouth daily   Cholecalciferol (VITAMIN D) 2000 units CAPS Take 1 capsule (2,000 Units total) by mouth daily  0    docusate calcium (SURFAK) 240 mg capsule Take by mouth      fish oil 1,000 mg Take 1,000 mg by mouth daily   Multiple Vitamins-Minerals (PRESERVISION AREDS PO) Take by mouth   multivitamin (THERAGRAN) TABS Take 1 tablet by mouth daily      polyethylene glycol (MIRALAX) powder Take by mouth      TURMERIC PO Take by mouth daily      sertraline (ZOLOFT) 25 mg tablet Take 1 tablet (25 mg total) by mouth every morning ( Anxiety) 90 tablet 1     No current facility-administered medications for this visit  No orders of the defined types were placed in this encounter          Josephine Chong DO

## 2019-11-29 ENCOUNTER — TELEPHONE (OUTPATIENT)
Dept: FAMILY MEDICINE CLINIC | Facility: CLINIC | Age: 84
End: 2019-11-29

## 2019-11-29 NOTE — TELEPHONE ENCOUNTER
Pt will try taking it tonight  If she still feels so poorly on it she will stop it  Stated that she felt so shakey on med, that  she thought she would fall

## 2019-11-29 NOTE — TELEPHONE ENCOUNTER
Since starting sertraline, she feels nauseous, woozy, and more shakey than she felt from the anxiety  She did not take it today

## 2019-11-29 NOTE — TELEPHONE ENCOUNTER
I had started her on the lowest dose of sertraline hoping she would tolerate it regarding anxiety, usually side effects resolve after few days but if she feels poorly with it she can stop it  Other medications may have the same side effect

## 2019-12-06 ENCOUNTER — TELEPHONE (OUTPATIENT)
Dept: FAMILY MEDICINE CLINIC | Facility: CLINIC | Age: 84
End: 2019-12-06

## 2019-12-06 NOTE — TELEPHONE ENCOUNTER
Pt called today regarding her anxiety meds, she said that it is making her feel whoozy and took her 3 days to get over how she was feeling when she stopped taking it  She said that she would like another medication that isn't as strong because she doesn't want to take that one anymore  Please advise

## 2019-12-09 NOTE — TELEPHONE ENCOUNTER
Please call her back, I had tried very low-dose dose of sertraline at 25 mg, this caused significant side effect, I am concerned that any medication in that class may cause side effects, I would hold off on adding other medication

## 2019-12-18 ENCOUNTER — TELEPHONE (OUTPATIENT)
Dept: FAMILY MEDICINE CLINIC | Facility: CLINIC | Age: 84
End: 2019-12-18

## 2019-12-18 NOTE — TELEPHONE ENCOUNTER
Pt called requesting us to d/c Sertraline as this was d/c'd but Dr Idalia Neal but [facility] has dispenced this  I have called [facility] and spoke w/[facility]) And have updated to longer dispense  Pt also wanted to stop dispensing all supplement meds as cost is double through pharmacy  Have also told to hold dispensing

## 2020-02-27 ENCOUNTER — OFFICE VISIT (OUTPATIENT)
Dept: FAMILY MEDICINE CLINIC | Facility: CLINIC | Age: 85
End: 2020-02-27

## 2020-02-27 VITALS
OXYGEN SATURATION: 97 % | HEART RATE: 84 BPM | RESPIRATION RATE: 18 BRPM | WEIGHT: 124.4 LBS | BODY MASS INDEX: 23.49 KG/M2 | TEMPERATURE: 97.8 F | SYSTOLIC BLOOD PRESSURE: 128 MMHG | HEIGHT: 61 IN | DIASTOLIC BLOOD PRESSURE: 76 MMHG

## 2020-02-27 DIAGNOSIS — F41.9 ANXIETY: ICD-10-CM

## 2020-02-27 DIAGNOSIS — K59.00 CONSTIPATION, UNSPECIFIED CONSTIPATION TYPE: ICD-10-CM

## 2020-02-27 DIAGNOSIS — R13.10 DYSPHAGIA, UNSPECIFIED TYPE: ICD-10-CM

## 2020-02-27 DIAGNOSIS — K25.7 GASTRIC EROSION, CHRONIC: Primary | ICD-10-CM

## 2020-02-27 PROCEDURE — 1160F RVW MEDS BY RX/DR IN RCRD: CPT | Performed by: INTERNAL MEDICINE

## 2020-02-27 PROCEDURE — 1036F TOBACCO NON-USER: CPT | Performed by: INTERNAL MEDICINE

## 2020-02-27 PROCEDURE — 4040F PNEUMOC VAC/ADMIN/RCVD: CPT | Performed by: INTERNAL MEDICINE

## 2020-02-27 PROCEDURE — 99214 OFFICE O/P EST MOD 30 MIN: CPT | Performed by: INTERNAL MEDICINE

## 2020-02-27 PROCEDURE — 3008F BODY MASS INDEX DOCD: CPT | Performed by: INTERNAL MEDICINE

## 2020-02-27 RX ORDER — LIDOCAINE HCL/PF 50 MG/5 ML
1 SYRINGE (ML) INJECTION
COMMUNITY
End: 2020-11-27 | Stop reason: ALTCHOICE

## 2020-02-27 NOTE — PROGRESS NOTES
Assessment/Plan:     1  Gastric erosion, chronic  Assessment & Plan:  Reports that at times she feels reflux but does not take anything regularly for this  2  Dysphagia, unspecified type  Assessment & Plan: At times this can act but but overall she is managing okay as well  3  Constipation, unspecified constipation type  Assessment & Plan:  Discussed measures to help including walking, fluid intake, prune juice (which she has been doing)      4  Anxiety  Assessment & Plan:  Was given a trial of Zoloft but felt like a zombie  She is dealing with this okay  Subjective:      Patient ID: Fahad Camargo is a 80 y o  female  [place] is here today for a follow up/paperwork  She is generally doing okay overall  At times she will note dysphagia but is largely okay  She denies nausea/vomiting  She reports constipation at times  ROS is largely negative  The following portions of the patient's history were reviewed and updated as appropriate: allergies, past family history, past medical history, past social history, past surgical history and problem list     Current Outpatient Medications:     aspirin 81 MG tablet, Take 81 mg by mouth daily  , Disp: , Rfl:     Cholecalciferol (VITAMIN D) 2000 units CAPS, Take 1 capsule (2,000 Units total) by mouth daily, Disp: , Rfl: 0    docusate calcium (SURFAK) 240 mg capsule, Take by mouth, Disp: , Rfl:     fish oil 1,000 mg, Take 1,000 mg by mouth daily  , Disp: , Rfl:     Lidocaine HCl 10 MG/ML SOSY, 1 mL, Disp: , Rfl:     Multiple Vitamins-Minerals (PRESERVISION AREDS PO), Take by mouth , Disp: , Rfl:     multivitamin (THERAGRAN) TABS, Take 1 tablet by mouth daily, Disp: , Rfl:     polyethylene glycol (MIRALAX) powder, Take by mouth, Disp: , Rfl:     TURMERIC PO, Take by mouth daily, Disp: , Rfl:     Review of Systems   Constitutional: Negative for appetite change, fever and unexpected weight change     Respiratory: Negative for cough, choking and shortness of breath  Cardiovascular: Negative for chest pain, palpitations and leg swelling  Gastrointestinal: Positive for constipation  Negative for abdominal pain, nausea and vomiting  Musculoskeletal: Positive for arthralgias  Neurological: Negative for dizziness, light-headedness and headaches  Psychiatric/Behavioral: Negative for sleep disturbance  Objective:      /76   Pulse 84   Temp 97 8 °F (36 6 °C)   Resp 18   Ht 5' 1" (1 549 m)   Wt 56 4 kg (124 lb 6 4 oz)   SpO2 97%   BMI 23 51 kg/m²          Physical Exam   Constitutional: She is oriented to person, place, and time  She appears well-developed and well-nourished  No distress  HENT:   Head: Normocephalic and atraumatic  Eyes: Conjunctivae and EOM are normal  Right eye exhibits no discharge  Left eye exhibits no discharge  No scleral icterus  Neck: Normal range of motion  Cardiovascular: Normal rate and regular rhythm  Murmur heard  Pulmonary/Chest: Effort normal and breath sounds normal  No respiratory distress  She has no wheezes  Abdominal: Soft  Bowel sounds are normal  There is no tenderness  Musculoskeletal: Normal range of motion  She exhibits no edema  Lymphadenopathy:     She has no cervical adenopathy  Neurological: She is alert and oriented to person, place, and time  Skin: Skin is warm and dry  She is not diaphoretic  No erythema  Psychiatric: She has a normal mood and affect  Her speech is normal and behavior is normal  Judgment and thought content normal    Vitals reviewed

## 2020-06-11 ENCOUNTER — OFFICE VISIT (OUTPATIENT)
Dept: FAMILY MEDICINE CLINIC | Facility: CLINIC | Age: 85
End: 2020-06-11
Payer: MEDICARE

## 2020-06-11 VITALS
RESPIRATION RATE: 18 BRPM | TEMPERATURE: 98.6 F | WEIGHT: 126.6 LBS | OXYGEN SATURATION: 97 % | SYSTOLIC BLOOD PRESSURE: 138 MMHG | HEIGHT: 61 IN | DIASTOLIC BLOOD PRESSURE: 82 MMHG | HEART RATE: 80 BPM | BODY MASS INDEX: 23.9 KG/M2

## 2020-06-11 DIAGNOSIS — F41.9 ANXIETY: ICD-10-CM

## 2020-06-11 DIAGNOSIS — L93.0 DISCOID LUPUS ERYTHEMATOSUS: ICD-10-CM

## 2020-06-11 DIAGNOSIS — R68.89 FORGETFULNESS: ICD-10-CM

## 2020-06-11 DIAGNOSIS — Z00.00 MEDICARE ANNUAL WELLNESS VISIT, SUBSEQUENT: Primary | ICD-10-CM

## 2020-06-11 PROCEDURE — G0439 PPPS, SUBSEQ VISIT: HCPCS | Performed by: FAMILY MEDICINE

## 2020-06-11 PROCEDURE — 4040F PNEUMOC VAC/ADMIN/RCVD: CPT | Performed by: FAMILY MEDICINE

## 2020-06-11 PROCEDURE — 1160F RVW MEDS BY RX/DR IN RCRD: CPT | Performed by: FAMILY MEDICINE

## 2020-06-11 PROCEDURE — 1170F FXNL STATUS ASSESSED: CPT | Performed by: FAMILY MEDICINE

## 2020-06-11 PROCEDURE — 3008F BODY MASS INDEX DOCD: CPT | Performed by: FAMILY MEDICINE

## 2020-06-11 PROCEDURE — 99213 OFFICE O/P EST LOW 20 MIN: CPT | Performed by: FAMILY MEDICINE

## 2020-06-11 PROCEDURE — 1036F TOBACCO NON-USER: CPT | Performed by: FAMILY MEDICINE

## 2020-06-11 PROCEDURE — 1123F ACP DISCUSS/DSCN MKR DOCD: CPT | Performed by: FAMILY MEDICINE

## 2020-06-11 PROCEDURE — 1125F AMNT PAIN NOTED PAIN PRSNT: CPT | Performed by: FAMILY MEDICINE

## 2020-07-22 LAB — EXT SARS-COV-2: NOT DETECTED

## 2020-11-02 LAB — EXT SARS-COV-2: NOT DETECTED

## 2021-02-09 ENCOUNTER — ANESTHESIA (INPATIENT)
Dept: NON INVASIVE DIAGNOSTICS | Facility: HOSPITAL | Age: 86
DRG: 242 | End: 2021-02-09
Payer: MEDICARE

## 2021-02-09 ENCOUNTER — APPOINTMENT (INPATIENT)
Dept: RADIOLOGY | Facility: HOSPITAL | Age: 86
DRG: 242 | End: 2021-02-09
Payer: MEDICARE

## 2021-02-09 ENCOUNTER — OFFICE VISIT (OUTPATIENT)
Dept: FAMILY MEDICINE CLINIC | Facility: CLINIC | Age: 86
End: 2021-02-09
Payer: MEDICARE

## 2021-02-09 ENCOUNTER — APPOINTMENT (EMERGENCY)
Dept: RADIOLOGY | Facility: HOSPITAL | Age: 86
DRG: 242 | End: 2021-02-09
Payer: MEDICARE

## 2021-02-09 ENCOUNTER — HOSPITAL ENCOUNTER (INPATIENT)
Facility: HOSPITAL | Age: 86
LOS: 3 days | Discharge: HOME HEALTH | DRG: 242 | End: 2021-02-12
Attending: EMERGENCY MEDICINE | Admitting: HOSPITALIST
Payer: MEDICARE

## 2021-02-09 VITALS
DIASTOLIC BLOOD PRESSURE: 70 MMHG | WEIGHT: 129 LBS | TEMPERATURE: 97.1 F | BODY MASS INDEX: 24.35 KG/M2 | SYSTOLIC BLOOD PRESSURE: 164 MMHG | HEART RATE: 40 BPM | HEIGHT: 61 IN | OXYGEN SATURATION: 96 %

## 2021-02-09 VITALS — HEART RATE: 66 BPM

## 2021-02-09 DIAGNOSIS — R00.1 BRADYCARDIA: Primary | ICD-10-CM

## 2021-02-09 DIAGNOSIS — I44.2 COMPLETE HEART BLOCK (HCC): ICD-10-CM

## 2021-02-09 DIAGNOSIS — I44.2 HEART BLOCK AV THIRD DEGREE (HCC): ICD-10-CM

## 2021-02-09 DIAGNOSIS — R53.83 FATIGUE, UNSPECIFIED TYPE: ICD-10-CM

## 2021-02-09 DIAGNOSIS — R42 LIGHTHEADEDNESS: ICD-10-CM

## 2021-02-09 PROBLEM — I44.1 HEART BLOCK AV SECOND DEGREE: Status: ACTIVE | Noted: 2021-02-09

## 2021-02-09 PROBLEM — I16.1 HYPERTENSIVE EMERGENCY: Status: RESOLVED | Noted: 2021-02-09 | Resolved: 2021-02-09

## 2021-02-09 PROBLEM — I49.9 DYSRHYTHMIAS: Status: ACTIVE | Noted: 2021-02-09

## 2021-02-09 PROBLEM — R77.8 ELEVATED TROPONIN LEVEL: Status: ACTIVE | Noted: 2021-02-09

## 2021-02-09 PROBLEM — I16.0 HYPERTENSIVE URGENCY: Status: ACTIVE | Noted: 2021-02-09

## 2021-02-09 PROBLEM — R10.9 ABDOMINAL DISCOMFORT: Status: ACTIVE | Noted: 2021-02-09

## 2021-02-09 PROBLEM — I16.1 HYPERTENSIVE EMERGENCY: Status: ACTIVE | Noted: 2021-02-09

## 2021-02-09 PROBLEM — R79.89 ELEVATED TROPONIN LEVEL: Status: ACTIVE | Noted: 2021-02-09

## 2021-02-09 LAB
ANION GAP SERPL CALCULATED.3IONS-SCNC: 10 MMOL/L (ref 4–13)
APTT PPP: 36 SECONDS (ref 23–37)
ATRIAL RATE: 74 BPM
BASOPHILS # BLD AUTO: 0.03 THOUSANDS/ΜL (ref 0–0.1)
BASOPHILS NFR BLD AUTO: 0 % (ref 0–1)
BUN SERPL-MCNC: 27 MG/DL (ref 5–25)
CALCIUM SERPL-MCNC: 9.3 MG/DL (ref 8.3–10.1)
CHLORIDE SERPL-SCNC: 106 MMOL/L (ref 100–108)
CO2 SERPL-SCNC: 27 MMOL/L (ref 21–32)
CREAT SERPL-MCNC: 0.95 MG/DL (ref 0.6–1.3)
EOSINOPHIL # BLD AUTO: 0.04 THOUSAND/ΜL (ref 0–0.61)
EOSINOPHIL NFR BLD AUTO: 1 % (ref 0–6)
ERYTHROCYTE [DISTWIDTH] IN BLOOD BY AUTOMATED COUNT: 14.4 % (ref 11.6–15.1)
GFR SERPL CREATININE-BSD FRML MDRD: 53 ML/MIN/1.73SQ M
GLUCOSE SERPL-MCNC: 101 MG/DL (ref 65–140)
GLUCOSE SERPL-MCNC: 123 MG/DL (ref 65–140)
HCT VFR BLD AUTO: 41.9 % (ref 34.8–46.1)
HGB BLD-MCNC: 13.5 G/DL (ref 11.5–15.4)
IMM GRANULOCYTES # BLD AUTO: 0.05 THOUSAND/UL (ref 0–0.2)
IMM GRANULOCYTES NFR BLD AUTO: 1 % (ref 0–2)
INR PPP: 1.21 (ref 0.84–1.19)
LYMPHOCYTES # BLD AUTO: 1 THOUSANDS/ΜL (ref 0.6–4.47)
LYMPHOCYTES NFR BLD AUTO: 11 % (ref 14–44)
MCH RBC QN AUTO: 30.3 PG (ref 26.8–34.3)
MCHC RBC AUTO-ENTMCNC: 32.2 G/DL (ref 31.4–37.4)
MCV RBC AUTO: 94 FL (ref 82–98)
MONOCYTES # BLD AUTO: 0.75 THOUSAND/ΜL (ref 0.17–1.22)
MONOCYTES NFR BLD AUTO: 9 % (ref 4–12)
NEUTROPHILS # BLD AUTO: 6.96 THOUSANDS/ΜL (ref 1.85–7.62)
NEUTS SEG NFR BLD AUTO: 78 % (ref 43–75)
NRBC BLD AUTO-RTO: 0 /100 WBCS
P AXIS: 91 DEGREES
PLATELET # BLD AUTO: 196 THOUSANDS/UL (ref 149–390)
PMV BLD AUTO: 10.3 FL (ref 8.9–12.7)
POTASSIUM SERPL-SCNC: 3.5 MMOL/L (ref 3.5–5.3)
PROTHROMBIN TIME: 15.1 SECONDS (ref 11.6–14.5)
QRS AXIS: -60 DEGREES
QRSD INTERVAL: 134 MS
QT INTERVAL: 582 MS
QTC INTERVAL: 456 MS
RBC # BLD AUTO: 4.46 MILLION/UL (ref 3.81–5.12)
SODIUM SERPL-SCNC: 143 MMOL/L (ref 136–145)
T WAVE AXIS: 92 DEGREES
TROPONIN I SERPL-MCNC: 0.05 NG/ML
TROPONIN I SERPL-MCNC: 0.22 NG/ML
TROPONIN I SERPL-MCNC: 0.22 NG/ML
TSH SERPL DL<=0.05 MIU/L-ACNC: 3.13 UIU/ML (ref 0.36–3.74)
VENTRICULAR RATE: 37 BPM
WBC # BLD AUTO: 8.83 THOUSAND/UL (ref 4.31–10.16)

## 2021-02-09 PROCEDURE — 02H63JZ INSERTION OF PACEMAKER LEAD INTO RIGHT ATRIUM, PERCUTANEOUS APPROACH: ICD-10-PCS | Performed by: INTERNAL MEDICINE

## 2021-02-09 PROCEDURE — 85610 PROTHROMBIN TIME: CPT | Performed by: EMERGENCY MEDICINE

## 2021-02-09 PROCEDURE — 33208 INSRT HEART PM ATRIAL & VENT: CPT | Performed by: INTERNAL MEDICINE

## 2021-02-09 PROCEDURE — 99214 OFFICE O/P EST MOD 30 MIN: CPT | Performed by: FAMILY MEDICINE

## 2021-02-09 PROCEDURE — 93306 TTE W/DOPPLER COMPLETE: CPT

## 2021-02-09 PROCEDURE — 93010 ELECTROCARDIOGRAM REPORT: CPT

## 2021-02-09 PROCEDURE — 71045 X-RAY EXAM CHEST 1 VIEW: CPT

## 2021-02-09 PROCEDURE — 84443 ASSAY THYROID STIM HORMONE: CPT | Performed by: STUDENT IN AN ORGANIZED HEALTH CARE EDUCATION/TRAINING PROGRAM

## 2021-02-09 PROCEDURE — 85730 THROMBOPLASTIN TIME PARTIAL: CPT | Performed by: EMERGENCY MEDICINE

## 2021-02-09 PROCEDURE — 84484 ASSAY OF TROPONIN QUANT: CPT | Performed by: STUDENT IN AN ORGANIZED HEALTH CARE EDUCATION/TRAINING PROGRAM

## 2021-02-09 PROCEDURE — 0JH606Z INSERTION OF PACEMAKER, DUAL CHAMBER INTO CHEST SUBCUTANEOUS TISSUE AND FASCIA, OPEN APPROACH: ICD-10-PCS | Performed by: INTERNAL MEDICINE

## 2021-02-09 PROCEDURE — 99285 EMERGENCY DEPT VISIT HI MDM: CPT | Performed by: EMERGENCY MEDICINE

## 2021-02-09 PROCEDURE — 85025 COMPLETE CBC W/AUTO DIFF WBC: CPT | Performed by: EMERGENCY MEDICINE

## 2021-02-09 PROCEDURE — C1898 LEAD, PMKR, OTHER THAN TRANS: HCPCS

## 2021-02-09 PROCEDURE — 93005 ELECTROCARDIOGRAM TRACING: CPT

## 2021-02-09 PROCEDURE — 93000 ELECTROCARDIOGRAM COMPLETE: CPT | Performed by: FAMILY MEDICINE

## 2021-02-09 PROCEDURE — 80048 BASIC METABOLIC PNL TOTAL CA: CPT | Performed by: EMERGENCY MEDICINE

## 2021-02-09 PROCEDURE — 99223 1ST HOSP IP/OBS HIGH 75: CPT

## 2021-02-09 PROCEDURE — C1785 PMKR, DUAL, RATE-RESP: HCPCS

## 2021-02-09 PROCEDURE — 99285 EMERGENCY DEPT VISIT HI MDM: CPT

## 2021-02-09 PROCEDURE — 84484 ASSAY OF TROPONIN QUANT: CPT | Performed by: EMERGENCY MEDICINE

## 2021-02-09 PROCEDURE — 36415 COLL VENOUS BLD VENIPUNCTURE: CPT | Performed by: EMERGENCY MEDICINE

## 2021-02-09 PROCEDURE — 82948 REAGENT STRIP/BLOOD GLUCOSE: CPT

## 2021-02-09 PROCEDURE — C1892 INTRO/SHEATH,FIXED,PEEL-AWAY: HCPCS | Performed by: PHYSICIAN ASSISTANT

## 2021-02-09 PROCEDURE — 33208 INSRT HEART PM ATRIAL & VENT: CPT | Performed by: PHYSICIAN ASSISTANT

## 2021-02-09 PROCEDURE — 02HK3JZ INSERTION OF PACEMAKER LEAD INTO RIGHT VENTRICLE, PERCUTANEOUS APPROACH: ICD-10-PCS | Performed by: INTERNAL MEDICINE

## 2021-02-09 PROCEDURE — 99223 1ST HOSP IP/OBS HIGH 75: CPT | Performed by: STUDENT IN AN ORGANIZED HEALTH CARE EDUCATION/TRAINING PROGRAM

## 2021-02-09 RX ORDER — ONDANSETRON 2 MG/ML
4 INJECTION INTRAMUSCULAR; INTRAVENOUS ONCE AS NEEDED
Status: DISCONTINUED | OUTPATIENT
Start: 2021-02-09 | End: 2021-02-09 | Stop reason: HOSPADM

## 2021-02-09 RX ORDER — ASPIRIN 81 MG/1
81 TABLET, CHEWABLE ORAL DAILY
Status: DISCONTINUED | OUTPATIENT
Start: 2021-02-10 | End: 2021-02-12 | Stop reason: HOSPADM

## 2021-02-09 RX ORDER — CEFAZOLIN SODIUM 1 G/50ML
SOLUTION INTRAVENOUS AS NEEDED
Status: DISCONTINUED | OUTPATIENT
Start: 2021-02-09 | End: 2021-02-09

## 2021-02-09 RX ORDER — HYDRALAZINE HYDROCHLORIDE 20 MG/ML
10 INJECTION INTRAMUSCULAR; INTRAVENOUS ONCE
Status: DISCONTINUED | OUTPATIENT
Start: 2021-02-09 | End: 2021-02-09

## 2021-02-09 RX ORDER — ATROPINE SULFATE 0.4 MG/ML
0.4 AMPUL (ML) INJECTION 3 TIMES DAILY PRN
Status: DISCONTINUED | OUTPATIENT
Start: 2021-02-09 | End: 2021-02-12 | Stop reason: HOSPADM

## 2021-02-09 RX ORDER — HYDRALAZINE HYDROCHLORIDE 20 MG/ML
10 INJECTION INTRAMUSCULAR; INTRAVENOUS ONCE
Status: COMPLETED | OUTPATIENT
Start: 2021-02-09 | End: 2021-02-09

## 2021-02-09 RX ORDER — CHLORHEXIDINE GLUCONATE 0.12 MG/ML
15 RINSE ORAL ONCE
Status: DISCONTINUED | OUTPATIENT
Start: 2021-02-09 | End: 2021-02-12 | Stop reason: HOSPADM

## 2021-02-09 RX ORDER — GENTAMICIN SULFATE 40 MG/ML
INJECTION, SOLUTION INTRAMUSCULAR; INTRAVENOUS CODE/TRAUMA/SEDATION MEDICATION
Status: COMPLETED | OUTPATIENT
Start: 2021-02-09 | End: 2021-02-09

## 2021-02-09 RX ORDER — HEPARIN SODIUM 5000 [USP'U]/ML
5000 INJECTION, SOLUTION INTRAVENOUS; SUBCUTANEOUS EVERY 8 HOURS SCHEDULED
Status: DISCONTINUED | OUTPATIENT
Start: 2021-02-09 | End: 2021-02-12 | Stop reason: HOSPADM

## 2021-02-09 RX ORDER — SODIUM CHLORIDE 9 MG/ML
100 INJECTION, SOLUTION INTRAVENOUS CONTINUOUS
Status: DISCONTINUED | OUTPATIENT
Start: 2021-02-09 | End: 2021-02-09

## 2021-02-09 RX ORDER — OXYCODONE HYDROCHLORIDE AND ACETAMINOPHEN 5; 325 MG/1; MG/1
1 TABLET ORAL EVERY 8 HOURS PRN
Status: DISCONTINUED | OUTPATIENT
Start: 2021-02-09 | End: 2021-02-12 | Stop reason: HOSPADM

## 2021-02-09 RX ORDER — FENTANYL CITRATE/PF 50 MCG/ML
25 SYRINGE (ML) INJECTION
Status: DISCONTINUED | OUTPATIENT
Start: 2021-02-09 | End: 2021-02-09 | Stop reason: HOSPADM

## 2021-02-09 RX ORDER — FENTANYL CITRATE 50 UG/ML
INJECTION, SOLUTION INTRAMUSCULAR; INTRAVENOUS AS NEEDED
Status: DISCONTINUED | OUTPATIENT
Start: 2021-02-09 | End: 2021-02-09

## 2021-02-09 RX ORDER — PROPOFOL 10 MG/ML
INJECTION, EMULSION INTRAVENOUS CONTINUOUS PRN
Status: DISCONTINUED | OUTPATIENT
Start: 2021-02-09 | End: 2021-02-09

## 2021-02-09 RX ORDER — SODIUM CHLORIDE 9 MG/ML
50 INJECTION, SOLUTION INTRAVENOUS CONTINUOUS
Status: DISCONTINUED | OUTPATIENT
Start: 2021-02-10 | End: 2021-02-09

## 2021-02-09 RX ORDER — ACETAMINOPHEN 325 MG/1
650 TABLET ORAL EVERY 4 HOURS PRN
Status: DISCONTINUED | OUTPATIENT
Start: 2021-02-09 | End: 2021-02-12 | Stop reason: HOSPADM

## 2021-02-09 RX ORDER — KETAMINE HYDROCHLORIDE 50 MG/ML
INJECTION, SOLUTION, CONCENTRATE INTRAMUSCULAR; INTRAVENOUS AS NEEDED
Status: DISCONTINUED | OUTPATIENT
Start: 2021-02-09 | End: 2021-02-09

## 2021-02-09 RX ORDER — LIDOCAINE HYDROCHLORIDE 10 MG/ML
INJECTION, SOLUTION EPIDURAL; INFILTRATION; INTRACAUDAL; PERINEURAL CODE/TRAUMA/SEDATION MEDICATION
Status: COMPLETED | OUTPATIENT
Start: 2021-02-09 | End: 2021-02-09

## 2021-02-09 RX ADMIN — LIDOCAINE HYDROCHLORIDE 30 ML: 10 INJECTION, SOLUTION EPIDURAL; INFILTRATION; INTRACAUDAL; PERINEURAL at 16:08

## 2021-02-09 RX ADMIN — HYDRALAZINE HYDROCHLORIDE 10 MG: 20 INJECTION INTRAMUSCULAR; INTRAVENOUS at 18:38

## 2021-02-09 RX ADMIN — HEPARIN SODIUM 5000 UNITS: 5000 INJECTION INTRAVENOUS; SUBCUTANEOUS at 21:22

## 2021-02-09 RX ADMIN — FENTANYL CITRATE 50 MCG: 50 INJECTION, SOLUTION INTRAMUSCULAR; INTRAVENOUS at 15:54

## 2021-02-09 RX ADMIN — SODIUM CHLORIDE 100 ML/HR: 0.9 INJECTION, SOLUTION INTRAVENOUS at 14:22

## 2021-02-09 RX ADMIN — PROPOFOL 50 MCG/KG/MIN: 10 INJECTION, EMULSION INTRAVENOUS at 15:54

## 2021-02-09 RX ADMIN — HYDRALAZINE HYDROCHLORIDE 10 MG: 20 INJECTION INTRAMUSCULAR; INTRAVENOUS at 18:03

## 2021-02-09 RX ADMIN — KETAMINE HYDROCHLORIDE 10 MG: 50 INJECTION INTRAMUSCULAR; INTRAVENOUS at 15:54

## 2021-02-09 RX ADMIN — FENTANYL CITRATE 25 MCG: 50 INJECTION, SOLUTION INTRAMUSCULAR; INTRAVENOUS at 16:46

## 2021-02-09 RX ADMIN — CEFAZOLIN SODIUM 1000 MG: 1 SOLUTION INTRAVENOUS at 15:54

## 2021-02-09 RX ADMIN — FENTANYL CITRATE 25 MCG: 50 INJECTION, SOLUTION INTRAMUSCULAR; INTRAVENOUS at 16:24

## 2021-02-09 RX ADMIN — GENTAMICIN SULFATE 80 MG: 40 INJECTION, SOLUTION INTRAMUSCULAR; INTRAVENOUS at 16:26

## 2021-02-09 NOTE — ANESTHESIA POSTPROCEDURE EVALUATION
Post-Op Assessment Note    CV Status:  Stable    Pain management: adequate     Mental Status:  Alert   PONV Controlled:  None   Airway Patency:  Patent      Post Op Vitals Reviewed: Yes      Staff: Anesthesiologist         No complications documented      BP (!) 222/97 (02/09/21 1740)    Temp     Pulse 56 (02/09/21 1740)   Resp 12 (02/09/21 1740)    SpO2 95 % (02/09/21 1740)

## 2021-02-09 NOTE — DISCHARGE INSTRUCTIONS
Please refer to post pacemaker implantation discharge instructions and restrictions and your pacemaker booklet/temporary card  Keep incision dry for one week  Do not use lotions/powders/creams on incision  Leave outer bandage in place for 1 week - it is water proof, and as long as it is fully adhered to your skin you may shower with it  If it appears as though the bandage is coming off and/or there is any communication to the area of device incision, please then keep the whole area dry for the remaining week  After 1 week, please remove by pulling all edges away from the center of the bandage  No overhead reaching/pushing/pulling/lifting greater than 5-10lbs with left arm for six weeks  Please call the office if you notice redness, swelling, bleeding, or drainage from incision or if you develop fevers  AFTER PACEMAKER CARE:    If you have any questions, please call [phone] to speak with a nurse (8:30am-4pm, or [phone] after hours)  For appointments, please call [phone]  WHAT YOU SHOULD KNOW:   A pacemaker is a small, battery-powered device that is placed under your skin in your upper chest area with wires placed through a vein that lead directly into the heart  It helps regulate your heart rate and prevent your heart from beating too slowly  AFTER YOU LEAVE:     Medicines:     · Pain medicine: You may need medicine to take away or decrease pain  ¨ Learn how to take your medicine  Ask what medicine and how much you should take  Be sure you know how, when, and how often to take it  Usually Over the counter pain medicine is sufficient to control pain (Acetominophen or Ibuprofen) Ask your doctor if you may take these  If this does not control your pain, narcotic pain killers may be prescribed, please call if you need prescription  ¨ Do not wait until the pain is severe before you take your medicine  Tell caregivers if your pain does not decrease      ¨ Pain medicine can make you dizzy or sleepy  Prevent falls by calling someone when you get out of bed or if you need help  Take your medicine as directed  Call your healthcare provider if you think your medicine is not helping or if you have side effects  Tell him if you are allergic to any medicine  Follow up with your cardiologist after your procedure: You will need a follow-up visit approximately 2 weeks after you leave the hospital  Your cardiologist will check your wound and make sure that your pacemaker is working correctly  Follow the instructions to check your pacemaker: Your cardiologist or primary healthcare provider will check your pacemaker and the battery regularly  He will use a computer to check your pacemaker over the telephone or wireless device which will be given to you  Pacemaker batteries usually last 8 to 10 years  The pacemaker unit will be replaced when the battery gets low  This is a simpler procedure than the original one to implant your pacemaker  Wound care:  Keep your incision dry for one week  Do not use lotions/powders/creams on incision  Leave outer bandage in place for 1 week - it is water proof, and as long as it is fully adhered to your skin you may shower with it  If it appears as though the bandage is coming off and/or there is any communication to the area of device incision, please then keep the whole area dry for the remaining week  After 1 week, please remove by pulling all edges away from the center of the bandage  Please call the office if you notice redness, swelling, bleeding, or drainage from incision or if you develop fevers  Activity:   · Arm movement and lifting:  Be careful using the arm on the side of your pacemaker  Do not move your arm for the first 24 hours after your procedure  Do not  lift your arm above your shoulder or lift more than 10 pounds for one month after your procedure   Avoid pushing, pulling, or repetitive arm movements for one month  This helps the leads stay in place and helps your wound heal  Ask your caregiver when you can drive after your procedure  You may move your arm side to side without lifting above your shoulder, and do not need to wear a sling at home  · Driving: you are ok to drive 48 hours after pacemaker is implanted   · Sports:  Ask your caregiver when it is okay to play tennis, golf, basketball, or any sport that requires you to lift your arms  Do not play full contact sports, such as football, that could damage your pacemaker  Ask your cardiologist or primary healthcare provider how much and what kinds of physical activity are safe for you  Living with a pacemaker:   · Tell all caregivers you have a pacemaker: This includes surgeons, radiologists, and medical technicians  You may want to wear a medical alert ID bracelet or necklace that states that you have a pacemaker  · Carry your pacemaker ID card: Make sure you receive a pacemaker ID card  Carry it with you at all times  It lists important information about your pacemaker  Show it to airport security if you travel  · Avoid electrical interference:  Avoid welding equipment and other equipment with large magnets or electric fields  These things could interfere with how your pacemaker works  Use your cell phone on the ear opposite from your pacemaker  Do not carry your cell phone in your shirt pocket over your chest      · Some Pacemakers are MRI safe  Ask you doctor if it is safe to proceed with MRI and let the radiologist and staff know you have a pacemaker  · Do not touch the skin around your pacemaker: This can cause damage to the lead wires or move the pacemaker unit from where it should be  Contact your cardiologist or primary healthcare provider if:   · The area around your pacemaker has increasing amount of pain after surgery  The pain should improve over first few days after implantation       · The skin around your stitches has increasing redness, swelling, or has drainage  This may mean that you have an infection  · You have a fever  · You have chills, a cough, and feel weak or achy  These are also signs of infection  · Your feet or ankles are more swollen than your baseline  · Your Heart rate is less than 50 beats per minute     Seek care immediately if:   · Your bandage becomes soaked with blood  · Your pacemaker is swelling rapidly    · Your stitches open up  · You feel your heart suddenly beating very slowly or quickly  · You become too weak or dizzy to stand, or you pass out  · Your arm or leg feels warm, tender, and painful  It may look swollen and red  · You have chest pain that does not go away with rest or medicine  · You feel lightheaded, short of breath, and have chest pain  · You cough up blood  © 2014 [address] is for End User's use only and may not be sold, redistributed or otherwise used for commercial purposes  All illustrations and images included in CareNotes® are the copyrighted property of [employer]  or Toni Coelho  The above information is an  only  It is not intended as medical advice for individual conditions or treatments  Talk to your doctor, nurse or pharmacist before following any medical regimen to see if it is safe and effective for you

## 2021-02-09 NOTE — ASSESSMENT & PLAN NOTE
Possibly secondary to complete heart block  Has not been on any anti-hypertensives  Currently downtrending without intervention   - BP control  Would avoid overcorrecting more than 25% from her admission BP of 231/103 within the first 24 hours

## 2021-02-09 NOTE — ED NOTES
Sami Hadley (Daughter)  [phone]  Requesting to be called with an update when procedure is complete     Barbara Morgan  02/09/21 [address] Hiren Oglesby  02/09/21 7675

## 2021-02-09 NOTE — H&P
H&P- Rebeka Coto 12/23/1930, [address]  female MRN: [id number]    Unit/Bed#: ED 14 Encounter: [id number]    Primary Care Provider: Mary Mercedes DO   Date and time admitted to hospital: 2/9/2021 11:25 AM        * Complete heart block [facility])  Assessment & Plan  [address] female admitted after being sent from her PCP office due to bradycardia secondary to a complete heart block  - Cardiology recommendations appreciated  Echo pending   - TSH pending  - Telemetry  - For interventional procedure this afternoon    Elevated troponin level  Assessment & Plan  Secondary to CHB  No chest pain  Trend  Recent Labs     02/09/21  1149   TROPONINI 0 05*         Abdominal discomfort  Assessment & Plan  Chronic  Was in the process of obtaining referral for GI evaluation  She would like to defer GI consultation at this time until her primary issue has been addressed  Hypertensive urgency  Assessment & Plan  Possibly secondary to complete heart block  Has not been on any anti-hypertensives  Currently downtrending without intervention   - BP control  Would avoid overcorrecting more than 25% from her admission BP of 231/103 within the first 24 hours  VTE Prophylaxis: Pharmacologic VTE Prophylaxis contraindicated due to For procedure  / sequential compression device   Code Status: DNR/DNI  POLST: There is no POLST form on file for this patient (pre-hospital)  Discussion with family:  Kash Means    Anticipated Length of Stay:  Patient will be admitted on an Inpatient basis with an anticipated length of stay of  > 2 midnights  Justification for Hospital Stay: cardiology intervention, echo, likely pacemaker placement, hypertension management    Total Time for Visit, including Counseling / Coordination of Care: 75 minutes  Greater than 50% of this total time spent on direct patient counseling and coordination of care      Chief Complaint:   Fatigue    History of Present Illness:    Rebeka Coto is a [address]  female who presents with Fatigue  Patient states that she has been having intermittent weakness and fatigue  She describes herself as a healthy active lady who would go biking and brisk walking on a regular basis  Over the last 2 weeks, she noted that she was having significant fatigue even with minimal activity and could no longer walk 100 steps before feeling wasted  This was associated with occasional palpitations, but without any chest pain, fever, chills, or shortness of breath  She was seen in the primary care office this morning where an EKG was performed due to her symptoms  She was noted to have complete heart block, thus was requested to be transferred to our institution for further evaluation  In the ED, her EKG findings were confirmed  Cardiology is currently planning pacemaker placement this afternoon  I confirmed her code status and she stated that she would like to be placed on DNR/DNI  I spoke to her  on the phone  Unfortunately, her  has dementia as per Coleman Redmond  She stated that her daughter is currently aware of her current treatment plan  She was sent to our institution after she was found to have intermittent complete heart block in her PCP office  Review of Systems:    Review of Systems   Constitutional: Positive for fatigue  Negative for chills and fever  HENT: Negative for ear pain  Eyes: Negative for pain  Respiratory: Negative for apnea, cough, chest tightness, shortness of breath and wheezing  Cardiovascular: Positive for palpitations  Negative for chest pain and leg swelling  Gastrointestinal: Positive for abdominal pain (Discomfort)  Negative for abdominal distention, diarrhea, nausea and vomiting  Endocrine: Negative for polydipsia  Genitourinary: Negative for dysuria and hematuria  Musculoskeletal: Negative for gait problem, neck pain and neck stiffness  Skin: Negative for color change and pallor     Neurological: Positive for weakness and light-headedness  Negative for seizures, syncope and numbness  Psychiatric/Behavioral: Negative for agitation and confusion  Past Medical and Surgical History:     Past Medical History:   Diagnosis Date    Aortic regurgitation     Constipation     Discoid lupus erythematosus     Dysphagia     Esophagitis     Forgetfulness     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    Transient elevated blood pressure     last assessed: 5/16/2017    Weight loss        Past Surgical History:   Procedure Laterality Date    APPENDECTOMY      CATARACT EXTRACTION      CHOLECYSTECTOMY      ESOPHAGOGASTRODUODENOSCOPY  06/2013    diagnostic- neg id have dilatation    EYE SURGERY      HYSTERECTOMY      HI ESOPHAGOGASTRODUODENOSCOPY TRANSORAL DIAGNOSTIC N/A 9/16/2016    Procedure: ESOPHAGOGASTRODUODENOSCOPY (EGD); Surgeon: Ashley Verma MD;  Location: [facility]; Service: Gastroenterology    REPLACEMENT TOTAL KNEE Left 01/2007       Meds/Allergies:    Prior to Admission medications    Medication Sig Start Date End Date Taking? Authorizing Provider   aspirin 81 MG tablet Take 81 mg by mouth daily  Yes Historical Provider, MD   Cholecalciferol (VITAMIN D) 2000 units CAPS Take 1 capsule (2,000 Units total) by mouth daily 8/15/18  Yes Tiara Ramirez DO   docusate calcium (SURFAK) 240 mg capsule Take by mouth   Yes Historical Provider, MD   fish oil 1,000 mg Take 1,000 mg by mouth daily  Yes Historical Provider, MD   Multiple Vitamins-Minerals (PRESERVISION AREDS PO) Take by mouth  Yes Historical Provider, MD   multivitamin (THERAGRAN) TABS Take 1 tablet by mouth daily   Yes Historical Provider, MD   polyethylene glycol (MIRALAX) powder Take by mouth   Yes Historical Provider, MD   TURMERIC PO Take by mouth daily  2/9/21  Historical Provider, MD     I have reviewed home medications with patient personally  Allergies:    Allergies   Allergen Reactions    Gabapentin      dreams    Sertraline GI Intolerance ' felt like a zombie'       Social History:     Marital Status: /Civil Union   Occupation: Retired  Substance Use History:   Social History     Substance and Sexual Activity   Alcohol Use No     Social History     Tobacco Use   Smoking Status Never Smoker   Smokeless Tobacco Never Used     Social History     Substance and Sexual Activity   Drug Use No       Family History:    Family History   Problem Relation Age of Onset    Diabetes Mother     Coronary artery disease Father        Physical Exam:     Vitals:   Blood Pressure: (!) 205/91 (02/09/21 1325)  Pulse: (!) 36 (02/09/21 1325)  Temperature: 97 7 °F (36 5 °C) (02/09/21 1140)  Temp Source: Oral (02/09/21 1140)  Respirations: 18 (02/09/21 1325)  Weight - Scale: 58 5 kg (129 lb) (02/09/21 1132)  SpO2: 94 % (02/09/21 1325)    Physical Exam  Vitals signs reviewed  Constitutional:       General: She is not in acute distress  Appearance: She is not ill-appearing or toxic-appearing  HENT:      Head: Normocephalic  Mouth/Throat:      Mouth: Mucous membranes are moist    Eyes:      General: No scleral icterus  Pupils: Pupils are equal, round, and reactive to light  Cardiovascular:      Rate and Rhythm: Regular rhythm  Bradycardia present  Heart sounds: No murmur  No gallop  Pulmonary:      Effort: Pulmonary effort is normal  No respiratory distress  Breath sounds: No wheezing or rales  Abdominal:      General: There is no distension  Palpations: Abdomen is soft  Tenderness: There is no abdominal tenderness  There is no guarding  Musculoskeletal:      Right lower leg: No edema  Left lower leg: No edema  Skin:     General: Skin is warm  Neurological:      General: No focal deficit present  Mental Status: She is alert  Psychiatric:         Mood and Affect: Mood normal          Behavior: Behavior normal        Additional Data:     Lab Results: I have personally reviewed pertinent reports        Results from last 7 days   Lab Units 02/09/21  1149   WBC Thousand/uL 8 83   HEMOGLOBIN g/dL 13 5   HEMATOCRIT % 41 9   PLATELETS Thousands/uL 196   NEUTROS PCT % 78*   LYMPHS PCT % 11*   MONOS PCT % 9   EOS PCT % 1     Results from last 7 days   Lab Units 02/09/21  1149   SODIUM mmol/L 143   POTASSIUM mmol/L 3 5   CHLORIDE mmol/L 106   CO2 mmol/L 27   BUN mg/dL 27*   CREATININE mg/dL 0 95   ANION GAP mmol/L 10   CALCIUM mg/dL 9 3   GLUCOSE RANDOM mg/dL 123     Results from last 7 days   Lab Units 02/09/21  1149   INR  1 21*                   Imaging: I have personally reviewed pertinent reports  XR chest portable    (Results Pending)       EKG, Pathology, and Other Studies Reviewed on Admission:   · EKG: sinus rhythm with 2nd degree A-V block with 2:1 AV conduction, bifascicular block    Allscripts / Epic Records Reviewed: Yes     ** Please Note: This note has been constructed using a voice recognition system   **

## 2021-02-09 NOTE — PATIENT INSTRUCTIONS
She has had increased lightheadedness, palpitations along with fatigue for 2 weeks, heart rate here today is only 40, EKG run today in office     -  EKG shows sinus rhythm with complete heart block, 36 bpm, idioventricular rhythm with fusion complex, she will proceed to the emergency room at [facility] for further evaluation, workup

## 2021-02-09 NOTE — PROGRESS NOTES
FAMILY PRACTICE OFFICE VISIT  [facility]  [address], [address]      NAME: Imelda Tomlin  AGE: 80 y o  SEX: female  : 1930   MRN: [id number]    DATE: 2021  TIME: 12:05 PM    Assessment and Plan     Problem List Items Addressed This Visit     None      Visit Diagnoses     Bradycardia    -  Primary    Relevant Orders    Transfer to other facility (Completed)    POCT ECG (Completed)    Lightheadedness        Relevant Orders    Transfer to other facility (Completed)    POCT ECG (Completed)    Fatigue, unspecified type        Relevant Orders    Transfer to other facility (Completed)    Complete heart block ([address] )        Relevant Orders    Transfer to other facility (Completed)    POCT ECG (Completed)          Patient Instructions   She has had increased lightheadedness, palpitations along with fatigue for 2 weeks, heart rate here today is only 40, EKG run today in office     -  EKG shows sinus rhythm with complete heart block, 36 bpm, idioventricular rhythm with fusion complex, she will proceed to the emergency room at [facility] for further evaluation, workup  Chief Complaint     Chief Complaint   Patient presents with    Follow-up       History of Present Illness   Imelda Tomlin is a 80y o -year-old female who is in today for a routine follow-up  She admits to increased fatigue with increased lightheadedness especially with positional changes, for the past 2 weeks  She has not passed out  She has occ palps  She is not on any prescription medications  She continues to live with her  in assisted living, he has dementia  Review of Systems   Review of Systems   Constitutional: Positive for fatigue  Negative for appetite change, fever and unexpected weight change  HENT: Negative for sore throat and trouble swallowing  Respiratory: Positive for chest tightness  Negative for cough and shortness of breath  Cardiovascular: Positive for chest pain (in past few weeks has had some nonspec chest discomfort) and palpitations  Negative for leg swelling  Gastrointestinal: Positive for abdominal pain  Negative for blood in stool, nausea and vomiting  Long hx abd discomfort-   Has seen GI   Genitourinary: Negative for dysuria and hematuria  Neurological: Positive for light-headedness  Negative for dizziness, syncope and headaches  Hematological: Does not bruise/bleed easily  Psychiatric/Behavioral: Negative for behavioral problems and confusion  The patient is nervous/anxious  Active Problem List     Patient Active Problem List   Diagnosis    Aortic regurgitation    Constipation    Discoid lupus erythematosus    Dysphagia - improved    Forgetfulness    Gastric erosion, -  resolved    Left shoulder tendonitis    Osteoarthritis of knee    Peripheral neuropathy    Anxiety       Past Medical History:  Reviewed    Past Surgical History:  Reviewed    Family History:  Reviewed    Social History:  Reviewed    Objective     Vitals:    02/09/21 0921   BP: 164/70   BP Location: Left arm   Patient Position: Sitting   Cuff Size: Standard   Pulse: (!) 40   Temp: (!) 97 1 °F (36 2 °C)   SpO2: 96%   Weight: 58 5 kg (129 lb)   Height: 5' 1" (1 549 m)     Body mass index is 24 37 kg/m²  BP Readings from Last 3 Encounters:   02/09/21 (!) 231/103   02/09/21 164/70   06/11/20 138/82       Wt Readings from Last 3 Encounters:   02/09/21 58 5 kg (129 lb)   02/09/21 58 5 kg (129 lb)   06/11/20 57 4 kg (126 lb 9 6 oz)       Physical Exam  Constitutional:       General: She is not in acute distress  Appearance: She is not toxic-appearing  Cardiovascular:      Rate and Rhythm: Regular rhythm  Bradycardia present  Pulmonary:      Effort: Pulmonary effort is normal  No respiratory distress  Breath sounds: No wheezing, rhonchi or rales     Abdominal: Palpations: Abdomen is soft  Tenderness: There is no abdominal tenderness  There is no guarding  Musculoskeletal:      Comments: Tr edema ankle   Skin:     Coloration: Skin is not jaundiced  Neurological:      Mental Status: She is alert and oriented to person, place, and time  Mental status is at baseline  Cranial Nerves: No cranial nerve deficit  Psychiatric:         Mood and Affect: Mood normal          Behavior: Behavior normal          ALLERGIES:  Allergies   Allergen Reactions    Gabapentin      dreams    Sertraline GI Intolerance     ' felt like a zombie'       Current Medications     Current Outpatient Medications   Medication Sig Dispense Refill    aspirin 81 MG tablet Take 81 mg by mouth daily   Cholecalciferol (VITAMIN D) 2000 units CAPS Take 1 capsule (2,000 Units total) by mouth daily  0    docusate calcium (SURFAK) 240 mg capsule Take by mouth      fish oil 1,000 mg Take 1,000 mg by mouth daily   Multiple Vitamins-Minerals (PRESERVISION AREDS PO) Take by mouth   multivitamin (THERAGRAN) TABS Take 1 tablet by mouth daily      polyethylene glycol (MIRALAX) powder Take by mouth      TURMERIC PO Take by mouth daily       No current facility-administered medications for this visit               Orders Placed This Encounter   Procedures    POCT ECG    Transfer to other facility         Tiaar Girard DO

## 2021-02-09 NOTE — ANESTHESIA PREPROCEDURE EVALUATION
Procedure:  CARDIAC PACER IMPLANT    Relevant Problems   CARDIO   (+) Aortic regurgitation   (+) Complete heart block (HCC)   (+) Dysrhythmias   (+) Heart block AV second degree   (+) Hypertensive emergency      GI/HEPATIC   (+) Dysphagia - improved   (+) Gastric erosion, -  resolved      MUSCULOSKELETAL   (+) Osteoarthritis of knee      NEURO/PSYCH   (+) Anxiety        Physical Exam    Airway    Mallampati score: II  TM Distance: >3 FB       Dental   No notable dental hx     Cardiovascular  Rhythm: irregular, Rate: abnormal,     Pulmonary  Breath sounds clear to auscultation,     Other Findings        Anesthesia Plan  ASA Score- 3 Emergent    Anesthesia Type- IV sedation with anesthesia with ASA Monitors  Additional Monitors:   Airway Plan:     Comment: Npo since 7:30am        Plan Factors-Exercise tolerance (METS): <4 METS  Chart reviewed  EKG reviewed  Existing labs reviewed  Patient summary reviewed  Patient is not a current smoker  Patient not instructed to abstain from smoking on day of procedure  Induction- intravenous  Postoperative Plan-     Informed Consent- Anesthetic plan and risks discussed with patient

## 2021-02-09 NOTE — ASSESSMENT & PLAN NOTE
Chronic  Was in the process of obtaining referral for GI evaluation  She would like to defer GI consultation at this time until her primary issue has been addressed

## 2021-02-09 NOTE — ASSESSMENT & PLAN NOTE
80year old female admitted after being sent from her PCP office due to bradycardia secondary to a complete heart block  - Cardiology recommendations appreciated   Echo pending   - TSH pending  - Telemetry  - For interventional procedure this afternoon

## 2021-02-09 NOTE — CONSULTS
Consult - Cardiology   Pippa Joseph 80 y o  female MRN: [id number]  Unit/Bed#: ED 14 Encounter: [id number]        Reason For Consult: Heart block                ASSESSMENT:  1  Bradycardia, initial EKG sinus with 2-1 av block and subsequent telemetry strips showing intermittent complete heart block  2  Palpitations  3  Anxiety  4  Nonischemic nuclear stress test in June 2018, EF reported at 68% at this time    PLAN/ DISCUSSION:     Patient seen in the ED with heart rate in the low 30s  Blood pressure stable  Feeling relatively well  · Stat echocardiogram    · Stat chest x-ray    · NPO, start gentle IV fluids    · Discussed the case electrophysiology who will be in-house this afternoon  Will plan to add the patient on for pacemaker as long as staff is available  Type of device pending images of the echocardiogram    · Patient and daughter at bedside agreeable with plan     History Of Present Illness: This is a rather healthy 51-year-old female without any major medical issues  She follows closely with her PCP, Dr Tan Fountain  She is still relatively independent for her age  She does have a reported history of "forgetfulness" but is alert awake and oriented x3 today and sharp with her conversation  She has history of anxiety for which she does not take any medications  She has no history radiation or surgeries to the chest   She has no allergies to antibiotics that she knows of  She has never seen a cardiologist and has no problems with her heart that she knows of  This patient has been dealing with some lightheadedness for approximately 2 weeks  With this she has had some occasional palpitations  She has not passed out but notes that it is more of a lingering feeling  She was hoping to see her PCP earlier but due to the impending snowstorm a week before last and the actual snow storm last week she was unable to make it to his office        When she arrived her PCP office an EKG was performed which reportedly showed complete heart block with heart rate of 36  She was referred to the ED  EKG on arrival showed sinus with 2-1 av block  Telemetry showing intermittent third-degree heart block  Past Medical History:        Past Medical History:   Diagnosis Date    Aortic regurgitation     Constipation     Discoid lupus erythematosus     Dysphagia     Esophagitis     Forgetfulness     Transient elevated blood pressure     last assessed: 5/16/2017    Weight loss       Past Surgical History:   Procedure Laterality Date    APPENDECTOMY      CATARACT EXTRACTION      CHOLECYSTECTOMY      ESOPHAGOGASTRODUODENOSCOPY  06/2013    diagnostic- neg id have dilatation    EYE SURGERY      HYSTERECTOMY      NM ESOPHAGOGASTRODUODENOSCOPY TRANSORAL DIAGNOSTIC N/A 9/16/2016    Procedure: ESOPHAGOGASTRODUODENOSCOPY (EGD); Surgeon: Nas Ramirez MD;  Location: [facility]; Service: Gastroenterology    REPLACEMENT TOTAL KNEE Left 01/2007        Allergy:        Allergies   Allergen Reactions    Gabapentin      dreams    Sertraline GI Intolerance     ' felt like a zombie'       Medications:       Prior to Admission medications    Medication Sig Start Date End Date Taking? Authorizing Provider   aspirin 81 MG tablet Take 81 mg by mouth daily  Yes Historical Provider, MD   Cholecalciferol (VITAMIN D) 2000 units CAPS Take 1 capsule (2,000 Units total) by mouth daily 8/15/18  Yes Erla January, DO   docusate calcium (SURFAK) 240 mg capsule Take by mouth   Yes Historical Provider, MD   fish oil 1,000 mg Take 1,000 mg by mouth daily  Yes Historical Provider, MD   Multiple Vitamins-Minerals (PRESERVISION AREDS PO) Take by mouth     Yes Historical Provider, MD   multivitamin (THERAGRAN) TABS Take 1 tablet by mouth daily   Yes Historical Provider, MD   polyethylene glycol (MIRALAX) powder Take by mouth   Yes Historical Provider, MD   TURMERIC PO Take by mouth daily  2/9/21  Historical Provider, MD       Family History: Family History   Problem Relation Age of Onset    Diabetes Mother     Coronary artery disease Father         Social History:       Social History     Socioeconomic History    Marital status: /Civil Union     Spouse name: None    Number of children: None    Years of education: None    Highest education level: None   Occupational History    None   Social Needs    Financial resource strain: None    Food insecurity     Worry: None     Inability: None    Transportation needs     Medical: None     Non-medical: None   Tobacco Use    Smoking status: Never Smoker    Smokeless tobacco: Never Used   Substance and Sexual Activity    Alcohol use: No    Drug use: No    Sexual activity: None   Lifestyle    Physical activity     Days per week: None     Minutes per session: None    Stress: None   Relationships    Social connections     Talks on phone: None     Gets together: None     Attends Synagogue service: None     Active member of club or organization: None     Attends meetings of clubs or organizations: None     Relationship status: None    Intimate partner violence     Fear of current or ex partner: None     Emotionally abused: None     Physically abused: None     Forced sexual activity: None   Other Topics Concern    None   Social History Narrative    None       ROS:  Symptoms per HPI  Remainder review of systems is negative    Exam:  General:  alert, oriented and in no distress, cooperative  Head: Normocephalic, atraumatic  Eyes:  EOMI  Pupils - equal, round, reactive to accomodation  No icterus  Normal Conjunctiva     Oropharynx: moist and normal-appearing mucosa  Neck: supple, symmetrical, trachea midline and no JVD  Heart:  Bradycardic, No: murmer, rub or gallop, S1 & S2 normal   Respiratory effort / Chest Inspection: unlabored  Lungs:  normal air entry, lungs clear to auscultation and no rales, rhonchi or wheezing   Abdomen: flat, normal findings: bowel sounds normal and soft, non-tender  Lower Limbs:  no pitting edema  Pulses[de-identified]  RLE - DP: present 2+                 LLE - DP: present 2+  Musculoskeletal: ROM grossly normal        DATA:      ECG:        Sinus rhythm with 2nd degree A-V block with 2:1 A-V conduction  Right bundle branch block  Left anterior fascicular block   Bifascicular block   Left ventricular hypertrophy with QRS widening and repolarization abnormality  Abnormal ECG               Telemetry: Mobitz II, Third degree (CHB)  HR low 30's          Echocardiogram:           Ischemic Testing:         Weights: Wt Readings from Last 3 Encounters:   02/09/21 58 5 kg (129 lb)   02/09/21 58 5 kg (129 lb)   06/11/20 57 4 kg (126 lb 9 6 oz)   , Body mass index is 24 37 kg/m²           Lab Studies:    Results from last 7 days   Lab Units 02/09/21  1149   TROPONIN I ng/mL 0 05*          Results from last 7 days   Lab Units 02/09/21  1149   WBC Thousand/uL 8 83   HEMOGLOBIN g/dL 13 5   HEMATOCRIT % 41 9   PLATELETS Thousands/uL 196   ,   Results from last 7 days   Lab Units 02/09/21  1149   POTASSIUM mmol/L 3 5   CHLORIDE mmol/L 106   CO2 mmol/L 27   BUN mg/dL 27*   CREATININE mg/dL 0 95   CALCIUM mg/dL 9 3

## 2021-02-09 NOTE — ED PROVIDER NOTES
History  Chief Complaint   Patient presents with    Palpitations     Pt coming from Dr Aditi Tucker office  EKG showed completed heart block  Reporting palpitations, dizziness, lethargy  81 yo female referred to ED from Dr Carmen Mcfadden office, for bradycardia  She was being evaluated for 6 month checkup and had also been experiencing fatigue, FRANK, and lightheadedness  EKG in the office which I reviewed is consistent with 3rd degree heart block  She is awake alert, hypertensive, not currently experiencing any chest pain or shortness of breath, symptomatic with exertion  History provided by:  Patient      Prior to Admission Medications   Prescriptions Last Dose Informant Patient Reported? Taking? Cholecalciferol (VITAMIN D) 2000 units CAPS 2/9/2021 at Unknown time  No Yes   Sig: Take 1 capsule (2,000 Units total) by mouth daily   Multiple Vitamins-Minerals (PRESERVISION AREDS PO)   Yes Yes   Sig: Take by mouth  aspirin 81 MG tablet 2/9/2021 at Unknown time  Yes Yes   Sig: Take 81 mg by mouth daily  docusate calcium (SURFAK) 240 mg capsule   Yes Yes   Sig: Take by mouth   fish oil 1,000 mg   Yes Yes   Sig: Take 1,000 mg by mouth daily     multivitamin (THERAGRAN) TABS   Yes Yes   Sig: Take 1 tablet by mouth daily   polyethylene glycol (MIRALAX) powder   Yes Yes   Sig: Take by mouth      Facility-Administered Medications: None       Past Medical History:   Diagnosis Date    Aortic regurgitation     Constipation     Discoid lupus erythematosus     Dysphagia     Esophagitis     Forgetfulness     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    Transient elevated blood pressure     last assessed: 5/16/2017    Weight loss        Past Surgical History:   Procedure Laterality Date    APPENDECTOMY      CATARACT EXTRACTION      CHOLECYSTECTOMY      ESOPHAGOGASTRODUODENOSCOPY  06/2013    diagnostic- neg id have dilatation    EYE SURGERY      HYSTERECTOMY      [place] ESOPHAGOGASTRODUODENOSCOPY TRANSORAL DIAGNOSTIC N/A 9/16/2016    Procedure: ESOPHAGOGASTRODUODENOSCOPY (EGD); Surgeon: Orlando Carson MD;  Location[facility]; Service: Gastroenterology    REPLACEMENT TOTAL KNEE Left 01/2007       Family History   Problem Relation Age of Onset    Diabetes Mother     Coronary artery disease Father      I have reviewed and agree with the history as documented  E-Cigarette/Vaping     E-Cigarette/Vaping Substances     Social History     Tobacco Use    Smoking status: Never Smoker    Smokeless tobacco: Never Used   Substance Use Topics    Alcohol use: No    Drug use: No       Review of Systems   Constitutional: Positive for activity change and fatigue  Negative for appetite change, chills and fever  HENT: Negative for sore throat  Respiratory: Positive for shortness of breath  Negative for cough and wheezing  Cardiovascular: Negative for chest pain and palpitations  Gastrointestinal: Negative for abdominal pain, diarrhea, nausea and vomiting  Genitourinary: Negative for dysuria and hematuria  Musculoskeletal: Negative for neck pain  Skin: Negative for rash  Neurological: Positive for weakness and light-headedness  Negative for dizziness and headaches  Psychiatric/Behavioral: Negative for suicidal ideas  All other systems reviewed and are negative  Physical Exam  Physical Exam  Vitals signs and nursing note reviewed  Constitutional:       General: She is not in acute distress  Appearance: She is well-developed  She is not diaphoretic  Comments: Elderly appearing c/w recorded age, hypertensive in the ED, increased from the office   HENT:      Head: Normocephalic and atraumatic  Right Ear: External ear normal       Left Ear: External ear normal       Nose: Nose normal    Eyes:      Conjunctiva/sclera: Conjunctivae normal       Pupils: Pupils are equal, round, and reactive to light  Neck:      Musculoskeletal: Normal range of motion and neck supple     Cardiovascular:      Rate and Rhythm: Regular rhythm  Bradycardia present  Pulmonary:      Effort: Pulmonary effort is normal    Abdominal:      Palpations: Abdomen is soft  Musculoskeletal: Normal range of motion  Skin:     General: Skin is warm and dry  Findings: No rash  Neurological:      Mental Status: She is alert and oriented to person, place, and time  Gait: Gait normal    Psychiatric:         Behavior: Behavior normal          Thought Content:  Thought content normal          Judgment: Judgment normal          Vital Signs  ED Triage Vitals   Temperature Pulse Respirations Blood Pressure SpO2   02/09/21 1140 02/09/21 1132 02/09/21 1132 02/09/21 1132 02/09/21 1132   97 7 °F (36 5 °C) (!) 38 20 (!) 231/103 93 %      Temp Source Heart Rate Source Patient Position - Orthostatic VS BP Location FiO2 (%)   02/09/21 1140 02/09/21 1132 02/09/21 1132 02/09/21 1132 --   Oral Monitor Lying Right arm       Pain Score       02/09/21 1224       No Pain           Vitals:    02/09/21 1655 02/09/21 1710 02/09/21 1713 02/09/21 1740   BP: (!) 198/95 (!) 212/102 (!) 209/93 (!) 222/97   Pulse: 60 58 62 56   Patient Position - Orthostatic VS:             Visual Acuity      ED Medications  Medications   chlorhexidine (PERIDEX) 0 12 % oral rinse 15 mL (15 mL Swish & Spit Not Given 2/9/21 1441)   sodium chloride 0 9 % infusion (100 mL/hr Intravenous Continued from OR 2/9/21 1655)   atropine injection 0 4 mg (has no administration in time range)   fentaNYL (SUBLIMAZE) injection 25 mcg (has no administration in time range)   ondansetron (ZOFRAN) injection 4 mg (has no administration in time range)   lidocaine (PF) (XYLOCAINE-MPF) 1 % injection (30 mL Infiltration Given 2/9/21 1608)   gentamicin (GARAMYCIN) 40 mg/mL injection (80 mg Irrigation Given 2/9/21 1626)       Diagnostic Studies  Results Reviewed     Procedure Component Value Units Date/Time    Troponin I [[id number]]  (Abnormal) Collected: 02/09/21 1149    Lab Status: Final result Specimen: Blood from Arm, Left Updated: 02/09/21 1225     Troponin I 0 05 ng/mL     Protime-INR [[id number]]  (Abnormal) Collected: 02/09/21 1149    Lab Status: Final result Specimen: Blood from Arm, Left Updated: 02/09/21 1224     Protime 15 1 seconds      INR 1 21    APTT [[id number]]  (Normal) Collected: 02/09/21 1149    Lab Status: Final result Specimen: Blood from Arm, Left Updated: 02/09/21 1224     PTT 36 seconds     Basic metabolic panel [[id number]]  (Abnormal) Collected: 02/09/21 1149    Lab Status: Final result Specimen: Blood from Arm, Left Updated: 02/09/21 1214     Sodium 143 mmol/L      Potassium 3 5 mmol/L      Chloride 106 mmol/L      CO2 27 mmol/L      ANION GAP 10 mmol/L      BUN 27 mg/dL      Creatinine 0 95 mg/dL      Glucose 123 mg/dL      Calcium 9 3 mg/dL      eGFR 53 ml/min/1 73sq m     Narrative:      [place] guidelines for Chronic Kidney Disease (CKD):     Stage 1 with normal or high GFR (GFR > 90 mL/min/1 73 square meters)    Stage 2 Mild CKD (GFR = 60-89 mL/min/1 73 square meters)    Stage 3A Moderate CKD (GFR = 45-59 mL/min/1 73 square meters)    Stage 3B Moderate CKD (GFR = 30-44 mL/min/1 73 square meters)    Stage 4 Severe CKD (GFR = 15-29 mL/min/1 73 square meters)    Stage 5 End Stage CKD (GFR <15 mL/min/1 73 square meters)  Note: GFR calculation is accurate only with a steady state creatinine    CBC and differential [[id number]]  (Abnormal) Collected: 02/09/21 1149    Lab Status: Final result Specimen: Blood from Arm, Left Updated: 02/09/21 1200     WBC 8 83 Thousand/uL      RBC 4 46 Million/uL      Hemoglobin 13 5 g/dL      Hematocrit 41 9 %      MCV 94 fL      MCH 30 3 pg      MCHC 32 2 g/dL      RDW 14 4 %      MPV 10 3 fL      Platelets 624 Thousands/uL      nRBC 0 /100 WBCs      Neutrophils Relative 78 %      Immat GRANS % 1 %      Lymphocytes Relative 11 %      Monocytes Relative 9 %      Eosinophils Relative 1 %      Basophils Relative 0 %      Neutrophils Absolute 6 96 Thousands/µL      Immature Grans Absolute 0 05 Thousand/uL      Lymphocytes Absolute 1 00 Thousands/µL      Monocytes Absolute 0 75 Thousand/µL      Eosinophils Absolute 0 04 Thousand/µL      Basophils Absolute 0 03 Thousands/µL                  XR chest portable   Final Result by Ruben Odell MD (02/09 1739)      Pacemaker well-positioned with no pneumothorax  Persistent moderate pulmonary edema with small effusions  Workstation performed: [id number]         XR chest portable   Final Result by Eloise Salvador DO (02/09 1529)      Patchy airspace opacity right lung base may represent atelectasis or developing pneumonia  Trace right pleural effusion  Workstation performed: [id number]                    Procedures  ECG 12 Lead Documentation Only    Date/Time: 2/9/2021 11:34 AM  Performed by: Aidee Forte MD  Authorized by: Aidee Forte MD     Rate:     ECG rate:  37    ECG rate assessment: bradycardic    Conduction:     Conduction: abnormal      Abnormal conduction: bifascicular block and 3rd degree               ED Course  ED Course as of Feb 09 1759   Tue Feb 09, 2021   1230 TT message to cardiology      1251 Minimally elevated, no chest pain   Troponin I(!): 0 05   1322 Spoke with cardiology, who will see her in ED      1347 D/W Dr Bobby Mendez  Will go to cath lab for pacemaker, admit to [address] 20yo+      Most Recent Value   SBIRT (25 yo +)   In order to provide better care to our patients, we are screening all of our patients for alcohol and drug use  Would it be okay to ask you these screening questions?   Unable to answer at this time Filed at: 02/09/2021 1133                    MDM    Disposition  Final diagnoses:   Bradycardia   Heart block AV third degree [facility])     Time reflects when diagnosis was documented in both MDM as applicable and the Disposition within this note     Time User Action Codes Description Comment    2/9/2021  1:12 PM Renetta Thomas Add [R00 1] Bradycardia     2/9/2021  1:12 PM Moody MATHIS Add [I44 2] Heart block AV third degree [facility])       ED Disposition     ED Disposition Condition Date/Time Comment    Admit Stable Tue Feb 9, 2021  2:19 PM Case was discussed with Dr Lyn Garcia and the patient's admission status was agreed to be Admission Status: inpatient status to the service of Dr Nivia Maria          Follow-up Information     Follow up With Specialties Details Why Contact Info Additional [facility] Follow up 2/26/2021 - 1:00PM  This is an appointment for device interrogation and site check  Please call the office to reschedule if necessary at: [phone]  *This is NOT an appointment with the physician, only a follow up with our device clinic  * [facility] [id number]  [address] [place], [place], [address] [phone]          Patient's Medications   Discharge Prescriptions    No medications on file     No discharge procedures on file      PDMP Review     None          ED Provider  Electronically Signed by           Janki Nieto MD  02/09/21 16 Brooke Jenkins MD  02/09/21 2446

## 2021-02-10 PROBLEM — R93.89 ABNORMAL CHEST XRAY: Status: ACTIVE | Noted: 2021-02-10

## 2021-02-10 LAB
ALBUMIN SERPL BCP-MCNC: 3 G/DL (ref 3.5–5)
ALP SERPL-CCNC: 63 U/L (ref 46–116)
ALT SERPL W P-5'-P-CCNC: 87 U/L (ref 12–78)
ANION GAP SERPL CALCULATED.3IONS-SCNC: 9 MMOL/L (ref 4–13)
AST SERPL W P-5'-P-CCNC: 26 U/L (ref 5–45)
BASOPHILS # BLD AUTO: 0.04 THOUSANDS/ΜL (ref 0–0.1)
BASOPHILS NFR BLD AUTO: 0 % (ref 0–1)
BILIRUB SERPL-MCNC: 1.65 MG/DL (ref 0.2–1)
BUN SERPL-MCNC: 23 MG/DL (ref 5–25)
CALCIUM ALBUM COR SERPL-MCNC: 9.4 MG/DL (ref 8.3–10.1)
CALCIUM SERPL-MCNC: 8.6 MG/DL (ref 8.3–10.1)
CHLORIDE SERPL-SCNC: 109 MMOL/L (ref 100–108)
CO2 SERPL-SCNC: 24 MMOL/L (ref 21–32)
CREAT SERPL-MCNC: 0.74 MG/DL (ref 0.6–1.3)
EOSINOPHIL # BLD AUTO: 0.03 THOUSAND/ΜL (ref 0–0.61)
EOSINOPHIL NFR BLD AUTO: 0 % (ref 0–6)
ERYTHROCYTE [DISTWIDTH] IN BLOOD BY AUTOMATED COUNT: 14.5 % (ref 11.6–15.1)
GFR SERPL CREATININE-BSD FRML MDRD: 72 ML/MIN/1.73SQ M
GLUCOSE SERPL-MCNC: 99 MG/DL (ref 65–140)
HCT VFR BLD AUTO: 38.5 % (ref 34.8–46.1)
HGB BLD-MCNC: 12.1 G/DL (ref 11.5–15.4)
IMM GRANULOCYTES # BLD AUTO: 0.03 THOUSAND/UL (ref 0–0.2)
IMM GRANULOCYTES NFR BLD AUTO: 0 % (ref 0–2)
LYMPHOCYTES # BLD AUTO: 0.98 THOUSANDS/ΜL (ref 0.6–4.47)
LYMPHOCYTES NFR BLD AUTO: 10 % (ref 14–44)
MAGNESIUM SERPL-MCNC: 2.4 MG/DL (ref 1.6–2.6)
MCH RBC QN AUTO: 29.8 PG (ref 26.8–34.3)
MCHC RBC AUTO-ENTMCNC: 31.4 G/DL (ref 31.4–37.4)
MCV RBC AUTO: 95 FL (ref 82–98)
MONOCYTES # BLD AUTO: 0.81 THOUSAND/ΜL (ref 0.17–1.22)
MONOCYTES NFR BLD AUTO: 8 % (ref 4–12)
NEUTROPHILS # BLD AUTO: 8.15 THOUSANDS/ΜL (ref 1.85–7.62)
NEUTS SEG NFR BLD AUTO: 82 % (ref 43–75)
NRBC BLD AUTO-RTO: 0 /100 WBCS
PLATELET # BLD AUTO: 152 THOUSANDS/UL (ref 149–390)
PMV BLD AUTO: 9.7 FL (ref 8.9–12.7)
POTASSIUM SERPL-SCNC: 3.5 MMOL/L (ref 3.5–5.3)
PROT SERPL-MCNC: 6.1 G/DL (ref 6.4–8.2)
RBC # BLD AUTO: 4.06 MILLION/UL (ref 3.81–5.12)
SODIUM SERPL-SCNC: 142 MMOL/L (ref 136–145)
TROPONIN I SERPL-MCNC: 0.36 NG/ML
TROPONIN I SERPL-MCNC: 0.37 NG/ML
WBC # BLD AUTO: 10.04 THOUSAND/UL (ref 4.31–10.16)

## 2021-02-10 PROCEDURE — 99024 POSTOP FOLLOW-UP VISIT: CPT

## 2021-02-10 PROCEDURE — 97166 OT EVAL MOD COMPLEX 45 MIN: CPT

## 2021-02-10 PROCEDURE — 85025 COMPLETE CBC W/AUTO DIFF WBC: CPT | Performed by: STUDENT IN AN ORGANIZED HEALTH CARE EDUCATION/TRAINING PROGRAM

## 2021-02-10 PROCEDURE — 80053 COMPREHEN METABOLIC PANEL: CPT | Performed by: STUDENT IN AN ORGANIZED HEALTH CARE EDUCATION/TRAINING PROGRAM

## 2021-02-10 PROCEDURE — 83735 ASSAY OF MAGNESIUM: CPT | Performed by: STUDENT IN AN ORGANIZED HEALTH CARE EDUCATION/TRAINING PROGRAM

## 2021-02-10 PROCEDURE — 97163 PT EVAL HIGH COMPLEX 45 MIN: CPT

## 2021-02-10 PROCEDURE — 84484 ASSAY OF TROPONIN QUANT: CPT | Performed by: STUDENT IN AN ORGANIZED HEALTH CARE EDUCATION/TRAINING PROGRAM

## 2021-02-10 PROCEDURE — 99232 SBSQ HOSP IP/OBS MODERATE 35: CPT | Performed by: PHYSICIAN ASSISTANT

## 2021-02-10 RX ORDER — FUROSEMIDE 10 MG/ML
20 INJECTION INTRAMUSCULAR; INTRAVENOUS ONCE
Status: COMPLETED | OUTPATIENT
Start: 2021-02-10 | End: 2021-02-10

## 2021-02-10 RX ORDER — HYDRALAZINE HYDROCHLORIDE 20 MG/ML
5 INJECTION INTRAMUSCULAR; INTRAVENOUS ONCE
Status: COMPLETED | OUTPATIENT
Start: 2021-02-10 | End: 2021-02-10

## 2021-02-10 RX ORDER — AMLODIPINE BESYLATE 5 MG/1
5 TABLET ORAL DAILY
Status: DISCONTINUED | OUTPATIENT
Start: 2021-02-10 | End: 2021-02-12 | Stop reason: HOSPADM

## 2021-02-10 RX ADMIN — FUROSEMIDE 20 MG: 10 INJECTION, SOLUTION INTRAMUSCULAR; INTRAVENOUS at 14:03

## 2021-02-10 RX ADMIN — HEPARIN SODIUM 5000 UNITS: 5000 INJECTION INTRAVENOUS; SUBCUTANEOUS at 06:27

## 2021-02-10 RX ADMIN — Medication 1 TABLET: at 09:11

## 2021-02-10 RX ADMIN — HEPARIN SODIUM 5000 UNITS: 5000 INJECTION INTRAVENOUS; SUBCUTANEOUS at 14:03

## 2021-02-10 RX ADMIN — ASPIRIN 81 MG: 81 TABLET, CHEWABLE ORAL at 09:11

## 2021-02-10 RX ADMIN — AMLODIPINE BESYLATE 5 MG: 5 TABLET ORAL at 09:13

## 2021-02-10 RX ADMIN — HEPARIN SODIUM 5000 UNITS: 5000 INJECTION INTRAVENOUS; SUBCUTANEOUS at 22:32

## 2021-02-10 RX ADMIN — HYDRALAZINE HYDROCHLORIDE 5 MG: 20 INJECTION INTRAMUSCULAR; INTRAVENOUS at 17:24

## 2021-02-10 NOTE — ASSESSMENT & PLAN NOTE
Recent Labs     02/09/21  2312 02/10/21  0312 02/10/21  0641   TROPONINI 0 22* 0 37* 0 36*   Secondary to complete heart block   No chest pain

## 2021-02-10 NOTE — PLAN OF CARE
Problem: Potential for Falls  Goal: Patient will remain free of falls  Description: INTERVENTIONS:  - Assess patient frequently for physical needs  -  Identify cognitive and physical deficits and behaviors that affect risk of falls    -  [place] fall precautions as indicated by assessment   - Educate patient/family on patient safety including physical limitations  - Instruct patient to call for assistance with activity based on assessment  - Modify environment to reduce risk of injury  - Consider OT/PT consult to assist with strengthening/mobility  Outcome: Progressing     Problem: PAIN - ADULT  Goal: Verbalizes/displays adequate comfort level or baseline comfort level  Description: Interventions:  - Encourage patient to monitor pain and request assistance  - Assess pain using appropriate pain scale  - Administer analgesics based on type and severity of pain and evaluate response  - Implement non-pharmacological measures as appropriate and evaluate response  - Consider cultural and social influences on pain and pain management  - Notify physician/advanced practitioner if interventions unsuccessful or patient reports new pain  Outcome: Progressing     Problem: INFECTION - ADULT  Goal: Absence or prevention of progression during hospitalization  Description: INTERVENTIONS:  - Assess and monitor for signs and symptoms of infection  - Monitor lab/diagnostic results  - Monitor all insertion sites, i e  indwelling lines, tubes, and drains  - Monitor endotracheal if appropriate and nasal secretions for changes in amount and color  - [place] appropriate cooling/warming therapies per order  - Administer medications as ordered  - Instruct and encourage patient and family to use good hand hygiene technique  - Identify and instruct in appropriate isolation precautions for identified infection/condition  Outcome: Progressing  Goal: Absence of fever/infection during neutropenic period  Description: INTERVENTIONS:  - Monitor WBC    Outcome: Progressing     Problem: SAFETY ADULT  Goal: Patient will remain free of falls  Description: INTERVENTIONS:  - Assess patient frequently for physical needs  -  Identify cognitive and physical deficits and behaviors that affect risk of falls    -  [place] fall precautions as indicated by assessment   - Educate patient/family on patient safety including physical limitations  - Instruct patient to call for assistance with activity based on assessment  - Modify environment to reduce risk of injury  - Consider OT/PT consult to assist with strengthening/mobility  Outcome: Progressing  Goal: Maintain or return to baseline ADL function  Description: INTERVENTIONS:  -  Assess patient's ability to carry out ADLs; assess patient's baseline for ADL function and identify physical deficits which impact ability to perform ADLs (bathing, care of mouth/teeth, toileting, grooming, dressing, etc )  - Assess/evaluate cause of self-care deficits   - Assess range of motion  - Assess patient's mobility; develop plan if impaired  - Assess patient's need for assistive devices and provide as appropriate  - Encourage maximum independence but intervene and supervise when necessary  - Involve family in performance of ADLs  - Assess for home care needs following discharge   - Consider OT consult to assist with ADL evaluation and planning for discharge  - Provide patient education as appropriate  Outcome: Progressing  Goal: Maintain or return mobility status to optimal level  Description: INTERVENTIONS:  - Assess patient's baseline mobility status (ambulation, transfers, stairs, etc )    - Identify cognitive and physical deficits and behaviors that affect mobility  - Identify mobility aids required to assist with transfers and/or ambulation (gait belt, sit-to-stand, lift, walker, cane, etc )  - [place] fall precautions as indicated by assessment  - Record patient progress and toleration of activity level on Mobility SBAR; progress patient to next Phase/Stage  - Instruct patient to call for assistance with activity based on assessment  - Consider rehabilitation consult to assist with strengthening/weightbearing, etc   Outcome: Progressing     Problem: DISCHARGE PLANNING  Goal: Discharge to home or other facility with appropriate resources  Description: INTERVENTIONS:  - Identify barriers to discharge w/patient and caregiver  - Arrange for needed discharge resources and transportation as appropriate  - Identify discharge learning needs (meds, wound care, etc )  - Arrange for interpretive services to assist at discharge as needed  - Refer to Case Management Department for coordinating discharge planning if the patient needs post-hospital services based on physician/advanced practitioner order or complex needs related to functional status, cognitive ability, or social support system  Outcome: Progressing     Problem: Knowledge Deficit  Goal: Patient/family/caregiver demonstrates understanding of disease process, treatment plan, medications, and discharge instructions  Description: Complete learning assessment and assess knowledge base    Interventions:  - Provide teaching at level of understanding  - Provide teaching via preferred learning methods  Outcome: Progressing

## 2021-02-10 NOTE — ASSESSMENT & PLAN NOTE
80year old female admitted after being sent from her PCP office due to bradycardia secondary to a complete heart block    - underwent insertion of permanent pacemaker 2/9/21   - TSH within normal limits at 3 132  - Currently ventricularly paced in the 70s

## 2021-02-10 NOTE — PLAN OF CARE
Problem: Potential for Falls  Goal: Patient will remain free of falls  Description: INTERVENTIONS:  - Assess patient frequently for physical needs  -  Identify cognitive and physical deficits and behaviors that affect risk of falls    -  [place] fall precautions as indicated by assessment   - Educate patient/family on patient safety including physical limitations  - Instruct patient to call for assistance with activity based on assessment  - Modify environment to reduce risk of injury  - Consider OT/PT consult to assist with strengthening/mobility  Outcome: Progressing     Problem: PAIN - ADULT  Goal: Verbalizes/displays adequate comfort level or baseline comfort level  Description: Interventions:  - Encourage patient to monitor pain and request assistance  - Assess pain using appropriate pain scale  - Administer analgesics based on type and severity of pain and evaluate response  - Implement non-pharmacological measures as appropriate and evaluate response  - Consider cultural and social influences on pain and pain management  - Notify physician/advanced practitioner if interventions unsuccessful or patient reports new pain  Outcome: Progressing     Problem: INFECTION - ADULT  Goal: Absence or prevention of progression during hospitalization  Description: INTERVENTIONS:  - Assess and monitor for signs and symptoms of infection  - Monitor lab/diagnostic results  - Monitor all insertion sites, i e  indwelling lines, tubes, and drains  - Monitor endotracheal if appropriate and nasal secretions for changes in amount and color  - [place] appropriate cooling/warming therapies per order  - Administer medications as ordered  - Instruct and encourage patient and family to use good hand hygiene technique  - Identify and instruct in appropriate isolation precautions for identified infection/condition  Outcome: Progressing  Goal: Absence of fever/infection during neutropenic period  Description: INTERVENTIONS:  - Monitor WBC    Outcome: Progressing     Problem: SAFETY ADULT  Goal: Patient will remain free of falls  Description: INTERVENTIONS:  - Assess patient frequently for physical needs  -  Identify cognitive and physical deficits and behaviors that affect risk of falls    -  [place] fall precautions as indicated by assessment   - Educate patient/family on patient safety including physical limitations  - Instruct patient to call for assistance with activity based on assessment  - Modify environment to reduce risk of injury  - Consider OT/PT consult to assist with strengthening/mobility  Outcome: Progressing  Goal: Maintain or return to baseline ADL function  Description: INTERVENTIONS:  -  Assess patient's ability to carry out ADLs; assess patient's baseline for ADL function and identify physical deficits which impact ability to perform ADLs (bathing, care of mouth/teeth, toileting, grooming, dressing, etc )  - Assess/evaluate cause of self-care deficits   - Assess range of motion  - Assess patient's mobility; develop plan if impaired  - Assess patient's need for assistive devices and provide as appropriate  - Encourage maximum independence but intervene and supervise when necessary  - Involve family in performance of ADLs  - Assess for home care needs following discharge   - Consider OT consult to assist with ADL evaluation and planning for discharge  - Provide patient education as appropriate  Outcome: Progressing  Goal: Maintain or return mobility status to optimal level  Description: INTERVENTIONS:  - Assess patient's baseline mobility status (ambulation, transfers, stairs, etc )    - Identify cognitive and physical deficits and behaviors that affect mobility  - Identify mobility aids required to assist with transfers and/or ambulation (gait belt, sit-to-stand, lift, walker, cane, etc )  - [place] fall precautions as indicated by assessment  - Record patient progress and toleration of activity level on Mobility SBAR; progress patient to next Phase/Stage  - Instruct patient to call for assistance with activity based on assessment  - Consider rehabilitation consult to assist with strengthening/weightbearing, etc   Outcome: Progressing     Problem: DISCHARGE PLANNING  Goal: Discharge to home or other facility with appropriate resources  Description: INTERVENTIONS:  - Identify barriers to discharge w/patient and caregiver  - Arrange for needed discharge resources and transportation as appropriate  - Identify discharge learning needs (meds, wound care, etc )  - Arrange for interpretive services to assist at discharge as needed  - Refer to Case Management Department for coordinating discharge planning if the patient needs post-hospital services based on physician/advanced practitioner order or complex needs related to functional status, cognitive ability, or social support system  Outcome: Progressing     Problem: Knowledge Deficit  Goal: Patient/family/caregiver demonstrates understanding of disease process, treatment plan, medications, and discharge instructions  Description: Complete learning assessment and assess knowledge base    Interventions:  - Provide teaching at level of understanding  - Provide teaching via preferred learning methods  Outcome: Progressing

## 2021-02-10 NOTE — PLAN OF CARE
Problem: Potential for Falls  Goal: Patient will remain free of falls  Description: INTERVENTIONS:  - Assess patient frequently for physical needs  -  Identify cognitive and physical deficits and behaviors that affect risk of falls    -  [place] fall precautions as indicated by assessment   - Educate patient/family on patient safety including physical limitations  - Instruct patient to call for assistance with activity based on assessment  - Modify environment to reduce risk of injury  - Consider OT/PT consult to assist with strengthening/mobility  Outcome: Progressing     Problem: PAIN - ADULT  Goal: Verbalizes/displays adequate comfort level or baseline comfort level  Description: Interventions:  - Encourage patient to monitor pain and request assistance  - Assess pain using appropriate pain scale  - Administer analgesics based on type and severity of pain and evaluate response  - Implement non-pharmacological measures as appropriate and evaluate response  - Consider cultural and social influences on pain and pain management  - Notify physician/advanced practitioner if interventions unsuccessful or patient reports new pain  Outcome: Progressing     Problem: INFECTION - ADULT  Goal: Absence or prevention of progression during hospitalization  Description: INTERVENTIONS:  - Assess and monitor for signs and symptoms of infection  - Monitor lab/diagnostic results  - Monitor all insertion sites, i e  indwelling lines, tubes, and drains  - Monitor endotracheal if appropriate and nasal secretions for changes in amount and color  - [place] appropriate cooling/warming therapies per order  - Administer medications as ordered  - Instruct and encourage patient and family to use good hand hygiene technique  - Identify and instruct in appropriate isolation precautions for identified infection/condition  Outcome: Progressing  Goal: Absence of fever/infection during neutropenic period  Description: INTERVENTIONS:  - Monitor WBC    Outcome: Progressing     Problem: SAFETY ADULT  Goal: Patient will remain free of falls  Description: INTERVENTIONS:  - Assess patient frequently for physical needs  -  Identify cognitive and physical deficits and behaviors that affect risk of falls    -  [place] fall precautions as indicated by assessment   - Educate patient/family on patient safety including physical limitations  - Instruct patient to call for assistance with activity based on assessment  - Modify environment to reduce risk of injury  - Consider OT/PT consult to assist with strengthening/mobility  Outcome: Progressing  Goal: Maintain or return to baseline ADL function  Description: INTERVENTIONS:  -  Assess patient's ability to carry out ADLs; assess patient's baseline for ADL function and identify physical deficits which impact ability to perform ADLs (bathing, care of mouth/teeth, toileting, grooming, dressing, etc )  - Assess/evaluate cause of self-care deficits   - Assess range of motion  - Assess patient's mobility; develop plan if impaired  - Assess patient's need for assistive devices and provide as appropriate  - Encourage maximum independence but intervene and supervise when necessary  - Involve family in performance of ADLs  - Assess for home care needs following discharge   - Consider OT consult to assist with ADL evaluation and planning for discharge  - Provide patient education as appropriate  Outcome: Progressing  Goal: Maintain or return mobility status to optimal level  Description: INTERVENTIONS:  - Assess patient's baseline mobility status (ambulation, transfers, stairs, etc )    - Identify cognitive and physical deficits and behaviors that affect mobility  - Identify mobility aids required to assist with transfers and/or ambulation (gait belt, sit-to-stand, lift, walker, cane, etc )  - [place] fall precautions as indicated by assessment  - Record patient progress and toleration of activity level on Mobility SBAR; progress patient to next Phase/Stage  - Instruct patient to call for assistance with activity based on assessment  - Consider rehabilitation consult to assist with strengthening/weightbearing, etc   Outcome: Progressing     Problem: DISCHARGE PLANNING  Goal: Discharge to home or other facility with appropriate resources  Description: INTERVENTIONS:  - Identify barriers to discharge w/patient and caregiver  - Arrange for needed discharge resources and transportation as appropriate  - Identify discharge learning needs (meds, wound care, etc )  - Arrange for interpretive services to assist at discharge as needed  - Refer to Case Management Department for coordinating discharge planning if the patient needs post-hospital services based on physician/advanced practitioner order or complex needs related to functional status, cognitive ability, or social support system  Outcome: Progressing     Problem: Knowledge Deficit  Goal: Patient/family/caregiver demonstrates understanding of disease process, treatment plan, medications, and discharge instructions  Description: Complete learning assessment and assess knowledge base    Interventions:  - Provide teaching at level of understanding  - Provide teaching via preferred learning methods  Outcome: Progressing

## 2021-02-10 NOTE — ASSESSMENT & PLAN NOTE
Noted pulmonary edema on chest x-ray  Worsened compared to prior a few days ago  Echo with normal EF but mild to moderate pulmonary hypertension  Will give 1 time dose of IV lasix now     May need additional dose but will defer this right now  Monitor volume status going foward

## 2021-02-10 NOTE — ASSESSMENT & PLAN NOTE
Possibly secondary to complete heart block  Has not been on any anti-hypertensives as an outpt    Started on oral amlodipine 5 mg daily by cards  Monitor pressures over the next 24 hrs for possible need of titration

## 2021-02-10 NOTE — PLAN OF CARE
Problem: OCCUPATIONAL THERAPY ADULT  Goal: Performs self-care activities at highest level of function for planned discharge setting  See evaluation for individualized goals  Description: Treatment Interventions: ADL retraining, Functional transfer training, UE strengthening/ROM, Endurance training, Patient/family training, Equipment evaluation/education, Compensatory technique education, Energy conservation, Activityengagement          See flowsheet documentation for full assessment, interventions and recommendations  Note: Limitation: Decreased ADL status, Decreased UE strength, Decreased Safe judgement during ADL, Decreased cognition, Decreased endurance, Decreased self-care trans, Decreased high-level ADLs  Prognosis: Fair  Assessment: Pt is a 80 y o  female seen for OT evaluation s/p adm [facility] on [facility]/2021 w/ intermitted weakness, bradycardia, and fatigue and dx'd w/ complete heart block, hypertensive urgency, and elevated troponin level  Pt s/p permanent pacemaker placement on 2/9/21  Comorbidities affecting pts functional performance include a significant PMH of Aortic regurgitation, Constipation, Dysphagia, Esophagitis, Forgetfulness, and Heart block AV second degree  Pt with active OT orders and activity orders for Up with assistance  Pt lives with spouse in a one level apt at [facility]  At baseline, pt was I w/ ADLs and functional mobility/transfers w/o use of AD, required assist w/ IADLs, (-) , and denies falls PTA  Upon evaluation, pt currently requires Min A for UB ADLs, Mod A for LB ADLs, Mod A for toileting, Min A of 2 for bed mobility, and Min A for functional mobility/transfers 2* the following deficits impacting occupational performance: weakness, decreased ROM, decreased strength, decreased balance, decreased tolerance, impaired problem solving and decreased safety awareness   These impairments, as well at pts difficulty performing ADLS and limited insight into deficits limit pts ability to safely engage in all baseline areas of occupation  The patient's raw score on the AM-PAC Daily Activity inpatient short form is 13, standardized score is 32 03, less than 39 4  Patients at this level are likely to benefit from DC to post-acute rehabilitation services  Please refer to the recommendation of the Occupational Therapist for safe DC planning  Pt to continue to benefit from continued acute OT services during hospital stay to address defined deficits and to maximize level of functional independence in the following Occupational Performance areas: grooming, bathing/shower, toilet hygiene, dressing, medication management, health maintenance, functional mobility, community mobility, clothing management and social participation  From OT standpoint, recommend return to [facility] with continued OT upon D/C   OT will continue to follow pt 3-5x/wk to address the following goals to  w/in 10-14 days:     OT Discharge Recommendation: Other (Comment)(Return to long term with OT as appropriate)  OT - OK to Discharge: Yes(when medically cleared )

## 2021-02-10 NOTE — PROGRESS NOTES
Progress Note - Cardiology   Soha Morley 80 y o  female MRN: [id number]  Unit/Bed#: E4 -01 Encounter: [id number]      Assessment/Recommendations/Discussion:   1  Complete heart block, now status post dual chamber pacemaker  2  Symptomatic bradycardia  3  Palpitations  4  Anxiety  5  Nonischemic nuclear stress 2018  6  Confusion today    PLAN   Reviewed telemetry  Currently with ventricular paced rhythm 70s   Blood pressure still elevated  Added amlodipine today   She is much more confused today compared to yesterday, and very anxious   Reviewed echo  Preserved EF 55% with markedly dilated left atrium, mildly dilated aorta in the ascending aorta 3 9 cm   Continue telemetry monitoring today   Physical therapy evaluation   Plan for discharge tomorrow, hopefully blood pressure will be improved by then    Subjective:   HPI  Stable post pacemaker  Tele paced at 70      Review of Systems: As noted in HPI  Rest of ROS is negative  Vitals:   /84 (BP Location: Right arm)   Pulse 65   Temp 98 1 °F (36 7 °C) (Temporal)   Resp 18   Ht 5' 1" (1 549 m)   Wt 58 8 kg (129 lb 10 1 oz)   LMP  (LMP Unknown)   SpO2 93%   BMI 24 49 kg/m²   Vitals:    02/09/21 2000 02/10/21 0554   Weight: 58 8 kg (129 lb 10 1 oz) 58 8 kg (129 lb 10 1 oz)       Intake/Output Summary (Last 24 hours) at 2/10/2021 1054  Last data filed at 2/9/2021 1840  Gross per 24 hour   Intake 500 ml   Output 200 ml   Net 300 ml       Physical Exam:  General appearance: alert and oriented, in no acute distress  Head: Normocephalic, without obvious abnormality, atraumatic  Eyes: conjunctivae/corneas clear  Anicteric    Neck: no adenopathy, no carotid bruit, no JVD  Lungs: clear to auscultation bilaterally  Heart:  Pacemaker left upper chest, regular rate and rhythm, S1, S2 normal, no murmur, no click, rub or gallop  Abdomen:  soft, non-tender; bowel sounds normal; no masses,  no organomegaly  Extremities: extremities normal, warm and well-perfused; no cyanosis, clubbing, or edema  Skin: Skin color, texture, turgor normal  No rashes or lesions     TELEMETRY:  Paced at 70    Lab Results:  Results from last 7 days   Lab Units 02/10/21  0323   WBC Thousand/uL 10 04   HEMOGLOBIN g/dL 12 1   HEMATOCRIT % 38 5   PLATELETS Thousands/uL 152     Results from last 7 days   Lab Units 02/10/21  0321   POTASSIUM mmol/L 3 5   CHLORIDE mmol/L 109*   CO2 mmol/L 24   BUN mg/dL 23   CREATININE mg/dL 0 74   CALCIUM mg/dL 8 6   ALK PHOS U/L 63   ALT U/L 87*   AST U/L 26     Results from last 7 days   Lab Units 02/10/21  0321   POTASSIUM mmol/L 3 5   CHLORIDE mmol/L 109*   CO2 mmol/L 24   BUN mg/dL 23   CREATININE mg/dL 0 74   CALCIUM mg/dL 8 6           Medications:    Current Facility-Administered Medications:     acetaminophen (TYLENOL) tablet 650 mg, 650 mg, Oral, Q4H PRN, Greyson Choe DO    amLODIPine (NORVASC) tablet 5 mg, 5 mg, Oral, Daily, Irene MixerDO, 5 mg at 02/10/21 4512    aspirin chewable tablet 81 mg, 81 mg, Oral, Daily, Shaun Nesbitt MD, 81 mg at 02/10/21 0911    atropine injection 0 4 mg, 0 4 mg, Intravenous, TID PRN, Shaun Nesbitt MD    chlorhexidine (PERIDEX) 0 12 % oral rinse 15 mL, 15 mL, Swish & Spit, Once, Shaun Nesbitt MD    heparin (porcine) subcutaneous injection 5,000 Units, 5,000 Units, Subcutaneous, Q8H [facility], 5,000 Units at 02/10/21 6488 **AND** Platelet count, , , Once, Taqueria Waite DO    multivitamin-minerals (CENTRUM) tablet 1 tablet, 1 tablet, Oral, Daily, Shaun Nesbitt MD, 1 tablet at 02/10/21 0911    oxyCODONE-acetaminophen (PERCOCET) 5-325 mg per tablet 1 tablet, 1 tablet, Oral, Q8H PRN, Greyson Choe DO    This note was completed in part utilizing [employer] Direct Software  Grammatical errors, random word insertions, spelling mistakes, and incomplete sentences may be an occasional consequence of this system secondary to software limitations, ambient noise, and hardware issues    If you have any questions or concerns about the content, text, or information contained within the body of this dictation, please contact the provider for clarification      Mir Ko DO, [facility] [place]  2/10/2021 10:54 AM

## 2021-02-10 NOTE — PHYSICAL THERAPY NOTE
PT EVALUATION    Pt  Name: Katie Robbins  Pt  Age: 80 y o  MRN: [id number]  LENGTH OF STAY: 1    Patient Active Problem List   Diagnosis    Aortic regurgitation    Constipation    Discoid lupus erythematosus    Dysphagia - improved    Forgetfulness    Gastric erosion, -  resolved    Left shoulder tendonitis    Osteoarthritis of knee    Peripheral neuropathy    Anxiety    Dysrhythmias    Heart block AV second degree    Complete heart block (HCC)    Hypertensive urgency    Abdominal discomfort    Elevated troponin level    Abnormal chest xray       Admitting Diagnoses:   Palpitations [R00 2]  Bradycardia [R00 1]  Heart block AV third degree (HCC) [I44 2]    Past Medical History:   Diagnosis Date    Aortic regurgitation     Constipation     Discoid lupus erythematosus     Dysphagia     Esophagitis     Forgetfulness     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    Transient elevated blood pressure     last assessed: 5/16/2017    Weight loss        Past Surgical History:   Procedure Laterality Date    APPENDECTOMY      CATARACT EXTRACTION      CHOLECYSTECTOMY      ESOPHAGOGASTRODUODENOSCOPY  06/2013    diagnostic- neg id have dilatation    EYE SURGERY      HYSTERECTOMY      OK ESOPHAGOGASTRODUODENOSCOPY TRANSORAL DIAGNOSTIC N/A 9/16/2016    Procedure: ESOPHAGOGASTRODUODENOSCOPY (EGD); Surgeon: Oskar Oropeza MD;  [facility]; Service: Gastroenterology    REPLACEMENT TOTAL KNEE Left 01/2007       Imaging Studies:  XR chest portable   Final Result by Rock Ari MD (02/09 3129)      Pacemaker well-positioned with no pneumothorax  Persistent moderate pulmonary edema with small effusions  Workstation performed: [id number]         XR chest portable   Final Result by Eloise Salvador DO (02/09 1529)      Patchy airspace opacity right lung base may represent atelectasis or developing pneumonia  Trace right pleural effusion                    Workstation performed: [id number]              02/10/21 1034   PT Last Visit   PT Visit Date 02/10/21   Note Type   Note type Evaluation   Pain Assessment   Pain Score No Pain   Home Living   Type of Home Assisted living  [facility]   Home Layout One level  (0STE)   Bathroom Shower/Tub Walk-in shower   Bathroom Toilet Raised   Bathroom Equipment Grab bars in shower; Shower chair;Grab bars around toilet   Home Equipment Walker;Cane  (does not use at baseline)   Additional Comments Pt [facility]/ [facility] at [facility]   Prior Function   Level of [address] with ADLs and functional mobility  (pt denies using AD)   Lives With Spouse; Facility staff   Receives Help From Personal care attendant   ADL Assistance Independent   Falls in the last 6 months 0   Vocational Retired   Comments (-)    Restrictions/Precautions   Wells Jennifer Bearing Precautions Per Order Yes   Braces or Orthoses Sling  (LUE )   Other Precautions Cognitive; Chair Alarm; Bed Alarm;Telemetry; Fall Risk  (LUE precautions s/p pacemaker placement)   General   Family/Caregiver Present No   Cognition   Overall Cognitive Status Impaired   Arousal/Participation Alert   Attention Attends with cues to redirect   Orientation Level Oriented to person;Oriented to place  (oriented to general time)   Following Commands Follows one step commands with increased time or repetition   Comments pt confused & agitated upon arrival require max redirection   RUE Assessment   RUE Assessment   (refer to OT)   RLE Assessment   RLE Assessment WFL  (4/5 grossly)   LLE Assessment   LLE Assessment WFL  (4/5 grossly)   Bed Mobility   Supine to Sit 4  Minimal assistance   Additional items HOB elevated; Increased time required;Verbal cues;LE management;Assist x 2   Additional Comments cues for techniques & safety   Transfers   Sit to Stand 4  Minimal assistance   Additional items Assist x 1; Increased time required;Verbal cues   Stand to Sit 4  Minimal assistance   Additional items Assist x 1; Increased time required;Verbal cues   Additional Comments cues for techniques & safety   Ambulation/Elevation   Gait pattern Wide LAILA; Decreased foot clearance; Inconsistent nathaniel   Gait Assistance 4  Minimal assist   Additional items Verbal cues; Tactile cues; Assist x 1   Assistive Device Other (Comment)  (hand held assistance)   Distance 30'x2  (require seated rest in between amb trial 2* to fatigue)   Balance   Static Sitting Fair +   Dynamic Sitting Fair   Static Standing Fair -   Dynamic Standing Poor +   Ambulatory Poor +   Endurance Deficit   Endurance Deficit Yes   Endurance Deficit Description fatigue; SOB   Activity Tolerance   Activity Tolerance Patient limited by fatigue;Treatment limited secondary to medical complications (Comment)   Medical Staff Made Aware NASRIN Suarez   Nurse Made Aware MICAELA Matute   Assessment   Prognosis Fair   Problem List Decreased strength;Decreased endurance; Impaired balance;Decreased mobility; Decreased cognition; Impaired judgement;Decreased safety awareness  (LUE precautions s/p pacemaker placement)   Assessment Pt  89 y  o female admitted for Complete heart block ([facility] [address] )  S/p pacemaker placement on 2/9/21  Pt referred to PT for mobility assessment & D/C planning w/ orders of up w/ assistance  PTA, pt resident of [facility] &  reports being I w/o AD  On eval, pt demonstrate dec mobility, balance, endurance & amb  Pt require minAx2 for bed mobility & minAx1 for transfers & amb w hand held assistance + cues for techniques  Gait deviations as above but no gross LOB noted  (+) SOB during amb requiring seated rest w/ SpO2 at 94-96% RA  No dizziness reported t/o session  Nsg staff most recent vital signs as follows: /100 (BP Location: Right arm)   Pulse 76   Temp 97 6 °F (36 4 °C) (Temporal)   Resp 18   Ht 5' 1" (1 549 m)   Wt 58 8 kg (129 lb 10 1 oz)   LMP  (LMP Unknown)   SpO2 94%   BMI 24 49 kg/m²    Of note, pt confused & agitated upon arrival  Pt require inc time to redirect, appease pt & emotional support  Called daughter over the phone to talk to pt  At end of session, pt OOB in chair w/o issues, call bell & phone in reach, chair alarm activated  Pt functioning below baseline hence will continue skilled PT to improve function & safety  The patient's AM-PAC Basic Mobility Inpatient Short Form Raw Score is 15, Standardized Score is 36 97  A standardized score less than 42 9 suggests the patient may benefit from discharge to post-acute rehabilitation services  From PT standpoint, pt may return to [facility] w/ PT when medically cleared  CM to follow  Nsg staff to continue to mobilized pt (OOB in chair for all meals & ambulate in room/unit) as tolerated to prevent further decline in function  Nsg notified  Goals   Patient Goals to see her children   STG Expiration Date 02/20/21   Short Term Goal #1 Goals to be met in 10 days; pt will be able to: 1) inc strength & balance by 1/2 grade to improve overall functional mobility & dec fall risk; 2) inc bed mobility to S for pt to be able to get in/OOB safely w/ proper techniques 100% of the time, to dec caregiver burden & safely function at home; 3) inc transfers to S for pt to transition safely from one surface to another w/o % of the time, to dec caregiver burden & safely function at home; 4) inc amb w/ appropriate AD approx  >80' w/ S for pt to ambulate household distances w/o any % of the time, to dec caregiver burden & safely function at home; 5) pt/caregiver ed   Plan   Treatment/Interventions Functional transfer training;LE strengthening/ROM; Therapeutic exercise; Endurance training;Patient/family training;Bed mobility;Gait training;Spoke to nursing;OT   PT Frequency Other (Comment)  (3-5x/wk)   Recommendation   PT Discharge Recommendation Home with skilled therapy; Return to previous environment with social support  (return to nursing home w/ PT)   Equipment Recommended Other (Comment)  (monitor)   AM-PAC Basic Mobility Inpatient   Turning in Bed Without Bedrails 2   Lying on Back to Sitting on Edge of Flat Bed 2   Moving Bed to Chair 3   Standing Up From Chair 3   Walk in Room 3   Climb 3-5 Stairs 2   Basic Mobility Inpatient Raw Score 15   Basic Mobility Standardized Score 36 97   Hx/personal factors: co-morbidities, advanced age, telemetry, dec cognition, fall risk and assist w/ ADL's  Examination: dec mobility, dec balance, dec endurance, dec amb, high fall risk, dec cognition  Clinical: unpredictable (ongoing medical status, abnormal lab values and high fall risk)  Complexity: high     Anahi Veliz, PT

## 2021-02-10 NOTE — OCCUPATIONAL THERAPY NOTE
Occupational Therapy Evaluation     Patient Name: Nadja Mckeon  GQPMM'O Date: 2/10/2021  Problem List  Principal Problem:    Complete heart block ([address] )  Active Problems:    Hypertensive urgency    Abdominal discomfort    Elevated troponin level    Abnormal chest xray    Past Medical History  Past Medical History:   Diagnosis Date    Aortic regurgitation     Constipation     Discoid lupus erythematosus     Dysphagia     Esophagitis     Forgetfulness     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    Transient elevated blood pressure     last assessed: 5/16/2017    Weight loss      Past Surgical History  Past Surgical History:   Procedure Laterality Date    APPENDECTOMY      CATARACT EXTRACTION      CHOLECYSTECTOMY      ESOPHAGOGASTRODUODENOSCOPY  06/2013    diagnostic- neg id have dilatation    EYE SURGERY      HYSTERECTOMY      HI ESOPHAGOGASTRODUODENOSCOPY TRANSORAL DIAGNOSTIC N/A 9/16/2016    Procedure: ESOPHAGOGASTRODUODENOSCOPY (EGD); Surgeon: Daphney Rivero MD;  Location:  [facility]; Service: Gastroenterology    REPLACEMENT TOTAL KNEE Left 01/2007             02/10/21 1029   Note Type   Note type Evaluation   Restrictions/Precautions   Weight Bearing Precautions Per Order Yes   LUE Weight Bearing Per Order NWB  (pacemaker placement 2/9/21)   Braces or Orthoses Sling  (L UE)   Other Precautions Cognitive; Chair Alarm; Bed Alarm;WBS;Fall Risk;Multiple lines;Telemetry  ((+) pacemaker placement 2/9/21)   Pain Assessment   Pain Assessment Tool Pain Assessment not indicated - pt denies pain   Pain Score No Pain   Home Living   Type of Home Assisted living  [facility]   Home Layout One level  (0 CARMELINA)   Bathroom Shower/Tub Walk-in shower   Bathroom Toilet Raised   Bathroom Equipment Grab bars in shower; Shower chair;Grab bars around toilet   [address] Walker;Cane  (denies use )   Additional Comments Pt lives with spouse in a one level apt at [facility]  Prior Function   Level of [place] Independent with ADLs and functional mobility; Needs assistance with IADLs   Lives With Spouse; Facility staff   Receives Help From Personal care attendant; Family   ADL Assistance Independent   IADLs Needs assistance   Falls in the last 6 months 0   Vocational Retired   Comments At baseline, pt was I w/ ADLs and functional mobility/transfers w/o use of AD, required assist w/ IADLs, (-) , and denies falls PTA  Lifestyle   Autonomy At baseline, pt was I w/ ADLs and functional mobility/transfers w/o use of AD, required assist w/ IADLs, (-) , and denies falls PTA  Reciprocal Relationships Spouse, dtr, facility staff   Service to Others Retired   Intrinsic Gratification Spending time with family   Psychosocial   Psychosocial (WDL) WDL   ADL   Where Assessed Chair   Eating Assistance 4  Minimal Assistance   Grooming Assistance 4  Minimal Assistance   [address] 4  Minimal Assistance   LB [address] 3  Moderate Assistance   [address] 4  [address] 3  Moderate [address]  3  Moderate Assistance   Functional Assistance 3  Moderate Assistance   Bed Mobility   Supine to Sit 4  Minimal assistance   Additional items Assist x 1;HOB elevated; Increased time required;Verbal cues;LE management   Sit to Supine Unable to assess   Additional Comments Pt seated OOB in chair at end of session w/ chair alarm activated  Call bell and phone within reach  All needs met and pt reports no further questions for OT at this time   Transfers   Sit to Stand 4  Minimal assistance   Additional items Assist x 1; Increased time required;Verbal cues   Stand to Sit 4  Minimal assistance   Additional items Assist x 1; Increased time required;Verbal cues   Additional Comments Cues for safe technique and hand placement   Functional Mobility   Functional Mobility 4  Minimal assistance   Additional Comments Assist x1 Additional items Hand hold assistance   Balance   Static Sitting Fair +   Dynamic Sitting Fair   Static Standing Fair -   Dynamic Standing Poor +   Ambulatory Poor +   Activity Tolerance   Activity Tolerance Patient limited by fatigue;Treatment limited secondary to medical complications (Comment); Other (Comment)  (treatment limited 2* cognitive deficits)   Medical Staff Made Aware Edwin PT   Nurse Made Aware yes; Andrew Velazquez, RN   RUE Assessment   RUE Assessment [facility]   RUE Strength   RUE Overall Strength Within Functional Limits - able to perform ADL tasks with strength  (4/5 throughout)   LUE Assessment   LUE Assessment X  (not tested, sling donned  Pacemaker placement 2/9/21)   Hand Function   Gross Motor Coordination Functional   Fine Motor Coordination Functional   Sensation   Light Touch No apparent deficits   Proprioception   Proprioception No apparent deficits   Vision-Basic Assessment   Current Vision Wears glasses all the time   Vision - Complex Assessment   Ocular Range of Motion [facility]   Acuity Able to read clock/calendar on wall without difficulty; Able to read employee name badge without difficulty   Perception   Inattention/Neglect Appears intact   Cognition   Overall Cognitive Status Impaired   Arousal/Participation Alert   Attention Attends with cues to redirect   Orientation Level Oriented to person;Oriented to place;Oriented to time;Disoriented to situation   Memory Decreased recall of precautions;Decreased recall of recent events;Decreased short term memory   Following Commands Follows one step commands with increased time or repetition   Comments Pt w/ increased confusion and agitiation upon entering room, requiring max redirection  Pt requesting to see her children right now, difficult to redirect  Fozia Prince, called and pt with increased participation after phone call  Assessment   Limitation Decreased ADL status; Decreased UE strength;Decreased Safe judgement during ADL;Decreased cognition;Decreased endurance;Decreased self-care trans;Decreased high-level ADLs   Prognosis Fair   Assessment Pt is a 80 y o  female seen for OT evaluation s/p adm to [facility] on 2/9/2021 w/ intermitted weakness, bradycardia, and fatigue and dx'd w/ complete heart block, hypertensive urgency, and elevated troponin level  Pt s/p permanent pacemaker placement on 2/9/21  Comorbidities affecting pts functional performance include a significant PMH of Aortic regurgitation, Constipation, Dysphagia, Esophagitis, Forgetfulness, and Heart block AV second degree  Pt with active OT orders and activity orders for Up with assistance  Pt lives with spouse in a one level apt at [facility]  At baseline, pt was I w/ ADLs and functional mobility/transfers w/o use of AD, required assist w/ IADLs, (-) , and denies falls PTA  Upon evaluation, pt currently requires Min A for UB ADLs, Mod A for LB ADLs, Mod A for toileting, Min A of 2 for bed mobility, and Min A for functional mobility/transfers 2* the following deficits impacting occupational performance: weakness, decreased ROM, decreased strength, decreased balance, decreased tolerance, impaired problem solving and decreased safety awareness  These impairments, as well at pts difficulty performing ADLS and limited insight into deficits limit pts ability to safely engage in all baseline areas of occupation  The patient's raw score on the AM-PAC Daily Activity inpatient short form is 13, standardized score is 32 03, less than 39 4  Patients at this level are likely to benefit from DC to post-acute rehabilitation services  Please refer to the recommendation of the Occupational Therapist for safe DC planning   Pt to continue to benefit from continued acute OT services during hospital stay to address defined deficits and to maximize level of functional independence in the following Occupational Performance areas: grooming, bathing/shower, toilet hygiene, dressing, medication management, health maintenance, functional mobility, community mobility, clothing management and social participation  From OT standpoint, recommend return to [facility] with continued OT upon D/C  OT will continue to follow pt 3-5x/wk to address the following goals to  w/in 10-14 days:   Goals   Patient Goals To see her children   LTG Time Frame 10-14   Long Term Goal Please refer to LTGs listed below   Plan   Treatment Interventions ADL retraining;Functional transfer training;UE strengthening/ROM; Endurance training;Patient/family training;Equipment evaluation/education; Compensatory technique education; Energy conservation; Activityengagement   Goal Expiration Date 21   OT Treatment Day 0   OT Frequency 3-5x/wk   Recommendation   OT Discharge Recommendation Other (Comment)  (Return to [facility] with OT as appropriate)   OT - OK to Discharge Yes  (when medically cleared )   AM-PAC Daily Activity Inpatient   Lower Body Dressing 2   Bathing 2   Toileting 3   Upper Body Dressing 2   Grooming 2   Eating 2   Daily Activity Raw Score 13   Daily Activity Standardized Score (Calc for Raw Score >=11) 32 03   AM-PAC Applied Cognition Inpatient   Following a Speech/Presentation 3   Understanding Ordinary Conversation 4   Taking Medications 2   Remembering Where Things Are Placed or Put Away 2   Remembering List of 4-5 Errands 2   Taking Care of Complicated Tasks 2   Applied Cognition Raw Score 15   Applied Cognition Standardized Score 33 54   Barthel Index   Feeding 5   Bathing 0   Grooming Score 0   Dressing Score 5   Bladder Score 10   Bowels Score 10   Toilet Use Score 5   Transfers (Bed/Chair) Score 10   Mobility (Level Surface) Score 0   Stairs Score 0   Barthel Index Score 45   Modified Vance Scale   Modified Vance Scale 4        GOALS    1  Pt will improve activity tolerance to G for min 30 min txment sessions for increase engagement in functional tasks    2   Pt will complete bed mobility at a Mod I level w/ G balance/safety demonstrated to decrease caregiver assistance required     3  Pt will complete UB/LB dressing/self care w/ mod I using adaptive device and DME as needed    4  Pt will complete bathing w/ Mod I w/ use of AE and DME as needed    5  Pt will complete toileting w/ mod I w/ G hygiene/thoroughness using DME as needed    6  Pt will improve functional transfers to Mod I on/off all surfaces using DME as needed w/ G balance/safety     7  Pt will improve functional mobility during ADL/IADL/leisure tasks to Mod I using DME as needed w/ G balance/safety     8  Pt will be attentive 100% of the time during ongoing cognitive assessment w/ G participation to assist w/ safe d/c planning/recommendations    9  Pt will demonstrate G carryover of pt/caregiver education and training as appropriate w/o cues w/ good tolerance to increase safety during functional tasks    10  Pt will verbalize 3 potential fall hazards and identify appropriate compensatory techniques to decrease fall risk in home environment     11   Pt will increase standing tolerance to 8-10 mins with Fair+ dynamic standing balance to increase safety during participation in ADLs       Flakito Baker OTR/L

## 2021-02-10 NOTE — PLAN OF CARE
Problem: PHYSICAL THERAPY ADULT  Goal: Performs mobility at highest level of function for planned discharge setting  See evaluation for individualized goals  Description: Treatment/Interventions: Functional transfer training, LE strengthening/ROM, Therapeutic exercise, Endurance training, Patient/family training, Bed mobility, Gait training, Spoke to nursing, OT  Equipment Recommended: Other (Comment)(monitor)       See flowsheet documentation for full assessment, interventions and recommendations  Note: Prognosis: Fair  Problem List: Decreased strength, Decreased endurance, Impaired balance, Decreased mobility, Decreased cognition, Impaired judgement, Decreased safety awareness(LUE precautions s/p pacemaker placement)  Assessment: Pt  89 y  o female admitted for Complete heart block ([facility] [address] )  S/p pacemaker placement on 2/9/21  Pt referred to PT for mobility assessment & D/C planning w/ orders of up w/ assistance  PTA, [facility] resident [facility] &  reports being I w/o AD  On eval, pt demonstrate dec mobility, balance, endurance & amb  Pt require minAx2 for bed mobility & minAx1 for transfers & amb w hand held assistance + cues for techniques  Gait deviations as above but no gross LOB noted  (+) SOB during amb requiring seated rest w/ SpO2 at 94-96% RA  No dizziness reported t/o session  Nsg staff most recent vital signs as follows: /100 (BP Location: Right arm)   Pulse 76   Temp 97 6 °F (36 4 °C) (Temporal)   Resp 18   Ht 5' 1" (1 549 m)   Wt 58 8 kg (129 lb 10 1 oz)   LMP  (LMP Unknown)   SpO2 94%   BMI 24 49 kg/m²   Of note, pt confused & agitated upon arrival  Pt require inc time to redirect, appease pt & emotional support  Called daughter over the phone to talk to pt  At end of session, pt OOB in chair w/o issues, call bell & phone in reach, chair alarm activated  Pt functioning below baseline hence will continue skilled PT to improve function & safety   The patient's AM-PAC Basic Mobility Inpatient Short Form Raw Score is 15, Standardized Score is 36 97  A standardized score less than 42 9 suggests the patient may benefit from discharge to post-acute rehabilitation services  From PT standpoint, pt may return to assisted w/ PT when medically cleared  CM to follow  Nsg staff to continue to mobilized pt (OOB in chair for all meals & ambulate in room/unit) as tolerated to prevent further decline in function  Nsg notified  PT Discharge Recommendation: Home with skilled therapy, Return to previous environment with social support(return to [facility] w/ PT)          See flowsheet documentation for full assessment

## 2021-02-10 NOTE — PROGRESS NOTES
Progress Note - Faina Payne 12/23/1930, 80 y o  female MRN: [id number]    Unit/Bed#: E4 -01 Encounter: [id number]    Primary Care Provider: Clarisse Ignacio DO   Date and time admitted to hospital: 2/9/2021 11:25 AM        * Complete heart block [facility])  Assessment & Plan  80year old female admitted after being sent from her PCP office due to bradycardia secondary to a complete heart block  - underwent insertion of permanent pacemaker 2/9/21   - TSH within normal limits at 3 132  - Currently ventricularly paced in the 70s    Hypertensive urgency  Assessment & Plan  Possibly secondary to complete heart block  Has not been on any anti-hypertensives as an outpt  Started on oral amlodipine 5 mg daily by cards  Monitor pressures over the next 24 hrs for possible need of titration    Abnormal chest xray  Assessment & Plan  Noted pulmonary edema on chest x-ray  Worsened compared to prior a few days ago  Echo with normal EF but mild to moderate pulmonary hypertension  Will give 1 time dose of IV lasix now  May need additional dose but will defer this right now  Monitor volume status going foward    Elevated troponin level  Assessment & Plan    Recent Labs     02/09/21  2312 02/10/21  0312 02/10/21  0641   TROPONINI 0 22* 0 37* 0 36*   Secondary to complete heart block  No chest pain    Abdominal discomfort  Assessment & Plan  Chronic  Was in the process of obtaining referral for GI evaluation  She would like to defer GI consultation at this time until her primary issue has been addressed  From Shakira Ryan     VTE Pharmacologic Prophylaxis:   Pharmacologic: Heparin  Mechanical VTE Prophylaxis in Place: Yes    Discharge Plan: With need for continued hospitalization for blood pressure management    Discussions with Specialists or Other Care Team Provider:  nursing    Education and Discussions with Family / Patient:  Patient, daughter via telephone    Time Spent for Care: 20 minutes    More than 50% of total time spent on counseling and coordination of care as described above  Current Length of Stay: 1 day(s)  Current Patient Status: Inpatient   Code Status: Level 1 - Full Code    Subjective:   Patient resting in bed comfortably  She is quite angry that she is not allowed to have visitors  Requesting to see her daughter and  at bedside  Discussed need for continued inpatient stay for heart block and monitoring of blood pressures  Discussed on phone with daughter  -Updated  She will attempt to call the patient later today  Objective:     Vitals:   Temp (24hrs), Av 6 °F (36 4 °C), Min:97 4 °F (36 3 °C), Max:98 1 °F (36 7 °C)    Temp:  [97 4 °F (36 3 °C)-98 1 °F (36 7 °C)] 97 6 °F (36 4 °C)  HR:  [0-100] 76  Resp:  [12-34] 18  BP: (164-222)/() 166/100  SpO2:  [91 %-96 %] 94 %  Body mass index is 24 49 kg/m²  Input and Output Summary (last 24 hours): Intake/Output Summary (Last 24 hours) at 2/10/2021 1227  Last data filed at 2021 1840  Gross per 24 hour   Intake 500 ml   Output 200 ml   Net 300 ml       Physical Exam:     Physical Exam  Vitals signs and nursing note reviewed  Constitutional:       General: She is not in acute distress  Appearance: Normal appearance  She is normal weight  She is not ill-appearing, toxic-appearing or diaphoretic  HENT:      Head: Normocephalic and atraumatic  Cardiovascular:      Rate and Rhythm: Normal rate and regular rhythm  Pulmonary:      Effort: Pulmonary effort is normal  No respiratory distress  Breath sounds: Normal breath sounds  No stridor  No wheezing or rhonchi  Comments: Pacemaker bandage in place  Abdominal:      General: Bowel sounds are normal       Palpations: Abdomen is soft  Skin:     General: Skin is warm and dry     Neurological:      Comments: Anxious   Psychiatric:         Mood and Affect: Mood normal          Behavior: Behavior normal          Additional Data:     Labs:    Results from last 7 days Lab Units 02/10/21  0323   WBC Thousand/uL 10 04   HEMOGLOBIN g/dL 12 1   HEMATOCRIT % 38 5   PLATELETS Thousands/uL 152   NEUTROS PCT % 82*   LYMPHS PCT % 10*   MONOS PCT % 8   EOS PCT % 0     Results from last 7 days   Lab Units 02/10/21  0321   POTASSIUM mmol/L 3 5   CHLORIDE mmol/L 109*   CO2 mmol/L 24   BUN mg/dL 23   CREATININE mg/dL 0 74   CALCIUM mg/dL 8 6   ALK PHOS U/L 63   ALT U/L 87*   AST U/L 26     Results from last 7 days   Lab Units 02/09/21  1149   INR  1 21*       * I Have Reviewed All Lab Data Listed Above  * Additional Pertinent Lab Tests Reviewed: [place] Admission Reviewed    Imaging:    Imaging Reports Reviewed Today Include:   Imaging Personally Reviewed by Myself Includes:      Recent Cultures (last 7 days):           Last 24 Hours Medication List:   Current Facility-Administered Medications   Medication Dose Route Frequency Provider Last Rate    acetaminophen  650 mg Oral Q4H PRN Chin Diallo DO      amLODIPine  5 mg Oral Daily Jolanta Kamara DO      aspirin  81 mg Oral Daily Katie Howard MD      atropine  0 4 mg Intravenous TID PRN Katie Howard MD      chlorhexidine  15 mL Swish & Spit Once Katie Howard MD      furosemide  20 mg Intravenous Once Mandi Chairez PA-C      heparin (porcine)  5,000 Units Subcutaneous Q8H [address] Chin Diallo DO      multivitamin-minerals  1 tablet Oral Daily Katie Howard MD      oxyCODONE-acetaminophen  1 tablet Oral Q8H PRN Chin Diallo DO          Today, Patient Was Seen By: Mandi Chairez PA-C    ** Please Note: This note has been constructed using a voice recognition system   **

## 2021-02-11 PROBLEM — I50.31 ACUTE DIASTOLIC CONGESTIVE HEART FAILURE (HCC): Status: ACTIVE | Noted: 2021-02-10

## 2021-02-11 LAB
ALBUMIN SERPL BCP-MCNC: 3.2 G/DL (ref 3.5–5)
ALP SERPL-CCNC: 67 U/L (ref 46–116)
ALT SERPL W P-5'-P-CCNC: 72 U/L (ref 12–78)
ANION GAP SERPL CALCULATED.3IONS-SCNC: 11 MMOL/L (ref 4–13)
AST SERPL W P-5'-P-CCNC: 31 U/L (ref 5–45)
BILIRUB SERPL-MCNC: 1.67 MG/DL (ref 0.2–1)
BUN SERPL-MCNC: 22 MG/DL (ref 5–25)
CALCIUM ALBUM COR SERPL-MCNC: 9.3 MG/DL (ref 8.3–10.1)
CALCIUM SERPL-MCNC: 8.7 MG/DL (ref 8.3–10.1)
CHLORIDE SERPL-SCNC: 106 MMOL/L (ref 100–108)
CO2 SERPL-SCNC: 25 MMOL/L (ref 21–32)
CREAT SERPL-MCNC: 0.77 MG/DL (ref 0.6–1.3)
GFR SERPL CREATININE-BSD FRML MDRD: 68 ML/MIN/1.73SQ M
GLUCOSE SERPL-MCNC: 84 MG/DL (ref 65–140)
POTASSIUM SERPL-SCNC: 3 MMOL/L (ref 3.5–5.3)
PROT SERPL-MCNC: 6.6 G/DL (ref 6.4–8.2)
SODIUM SERPL-SCNC: 142 MMOL/L (ref 136–145)

## 2021-02-11 PROCEDURE — 99232 SBSQ HOSP IP/OBS MODERATE 35: CPT | Performed by: PHYSICIAN ASSISTANT

## 2021-02-11 PROCEDURE — 97530 THERAPEUTIC ACTIVITIES: CPT

## 2021-02-11 PROCEDURE — 99024 POSTOP FOLLOW-UP VISIT: CPT | Performed by: INTERNAL MEDICINE

## 2021-02-11 PROCEDURE — 97535 SELF CARE MNGMENT TRAINING: CPT

## 2021-02-11 PROCEDURE — 80053 COMPREHEN METABOLIC PANEL: CPT | Performed by: PHYSICIAN ASSISTANT

## 2021-02-11 RX ORDER — POTASSIUM CHLORIDE 20 MEQ/1
40 TABLET, EXTENDED RELEASE ORAL ONCE
Status: COMPLETED | OUTPATIENT
Start: 2021-02-11 | End: 2021-02-11

## 2021-02-11 RX ORDER — POTASSIUM CHLORIDE 20 MEQ/1
20 TABLET, EXTENDED RELEASE ORAL ONCE
Status: COMPLETED | OUTPATIENT
Start: 2021-02-12 | End: 2021-02-12

## 2021-02-11 RX ORDER — FUROSEMIDE 10 MG/ML
20 INJECTION INTRAMUSCULAR; INTRAVENOUS
Status: COMPLETED | OUTPATIENT
Start: 2021-02-11 | End: 2021-02-12

## 2021-02-11 RX ORDER — HYDRALAZINE HYDROCHLORIDE 20 MG/ML
5 INJECTION INTRAMUSCULAR; INTRAVENOUS ONCE
Status: COMPLETED | OUTPATIENT
Start: 2021-02-11 | End: 2021-02-11

## 2021-02-11 RX ADMIN — Medication 1 TABLET: at 08:49

## 2021-02-11 RX ADMIN — HEPARIN SODIUM 5000 UNITS: 5000 INJECTION INTRAVENOUS; SUBCUTANEOUS at 05:16

## 2021-02-11 RX ADMIN — FUROSEMIDE 20 MG: 10 INJECTION, SOLUTION INTRAMUSCULAR; INTRAVENOUS at 14:26

## 2021-02-11 RX ADMIN — HYDRALAZINE HYDROCHLORIDE 5 MG: 20 INJECTION INTRAMUSCULAR; INTRAVENOUS at 05:16

## 2021-02-11 RX ADMIN — MAGNESIUM OXIDE TAB 400 MG (241.3 MG ELEMENTAL MG) 400 MG: 400 (241.3 MG) TAB at 17:45

## 2021-02-11 RX ADMIN — ASPIRIN 81 MG: 81 TABLET, CHEWABLE ORAL at 08:49

## 2021-02-11 RX ADMIN — HEPARIN SODIUM 5000 UNITS: 5000 INJECTION INTRAVENOUS; SUBCUTANEOUS at 21:06

## 2021-02-11 RX ADMIN — HEPARIN SODIUM 5000 UNITS: 5000 INJECTION INTRAVENOUS; SUBCUTANEOUS at 14:09

## 2021-02-11 RX ADMIN — AMLODIPINE BESYLATE 5 MG: 5 TABLET ORAL at 08:49

## 2021-02-11 RX ADMIN — POTASSIUM CHLORIDE 40 MEQ: 1500 TABLET, EXTENDED RELEASE ORAL at 14:26

## 2021-02-11 RX ADMIN — POTASSIUM CHLORIDE 40 MEQ: 1500 TABLET, EXTENDED RELEASE ORAL at 21:06

## 2021-02-11 NOTE — ASSESSMENT & PLAN NOTE
Possibly secondary to complete heart block  BP was elevated in the 665N systolically upon arrival   Has not been on any anti-hypertensives as an outpt    Started on oral amlodipine 5 mg daily by cards  Continue to monitor pressures - they are improving   Most recent BP is 156/76  Cardiology considering adding ACE or ARB if BP still elevated

## 2021-02-11 NOTE — PLAN OF CARE
Problem: Potential for Falls  Goal: Patient will remain free of falls  Description: INTERVENTIONS:  - Assess patient frequently for physical needs  -  Identify cognitive and physical deficits and behaviors that affect risk of falls    -  [place] fall precautions as indicated by assessment   - Educate patient/family on patient safety including physical limitations  - Instruct patient to call for assistance with activity based on assessment  - Modify environment to reduce risk of injury  - Consider OT/PT consult to assist with strengthening/mobility  Outcome: Progressing     Problem: PAIN - ADULT  Goal: Verbalizes/displays adequate comfort level or baseline comfort level  Description: Interventions:  - Encourage patient to monitor pain and request assistance  - Assess pain using appropriate pain scale  - Administer analgesics based on type and severity of pain and evaluate response  - Implement non-pharmacological measures as appropriate and evaluate response  - Consider cultural and social influences on pain and pain management  - Notify physician/advanced practitioner if interventions unsuccessful or patient reports new pain  Outcome: Progressing     Problem: INFECTION - ADULT  Goal: Absence or prevention of progression during hospitalization  Description: INTERVENTIONS:  - Assess and monitor for signs and symptoms of infection  - Monitor lab/diagnostic results  - Monitor all insertion sites, i e  indwelling lines, tubes, and drains  - Monitor endotracheal if appropriate and nasal secretions for changes in amount and color  - [place] appropriate cooling/warming therapies per order  - Administer medications as ordered  - Instruct and encourage patient and family to use good hand hygiene technique  - Identify and instruct in appropriate isolation precautions for identified infection/condition  Outcome: Progressing  Goal: Absence of fever/infection during neutropenic period  Description: INTERVENTIONS:  - Monitor WBC    Outcome: Progressing     Problem: SAFETY ADULT  Goal: Patient will remain free of falls  Description: INTERVENTIONS:  - Assess patient frequently for physical needs  -  Identify cognitive and physical deficits and behaviors that affect risk of falls    -  [place] fall precautions as indicated by assessment   - Educate patient/family on patient safety including physical limitations  - Instruct patient to call for assistance with activity based on assessment  - Modify environment to reduce risk of injury  - Consider OT/PT consult to assist with strengthening/mobility  Outcome: Progressing  Goal: Maintain or return to baseline ADL function  Description: INTERVENTIONS:  -  Assess patient's ability to carry out ADLs; assess patient's baseline for ADL function and identify physical deficits which impact ability to perform ADLs (bathing, care of mouth/teeth, toileting, grooming, dressing, etc )  - Assess/evaluate cause of self-care deficits   - Assess range of motion  - Assess patient's mobility; develop plan if impaired  - Assess patient's need for assistive devices and provide as appropriate  - Encourage maximum independence but intervene and supervise when necessary  - Involve family in performance of ADLs  - Assess for home care needs following discharge   - Consider OT consult to assist with ADL evaluation and planning for discharge  - Provide patient education as appropriate  Outcome: Progressing  Goal: Maintain or return mobility status to optimal level  Description: INTERVENTIONS:  - Assess patient's baseline mobility status (ambulation, transfers, stairs, etc )    - Identify cognitive and physical deficits and behaviors that affect mobility  - Identify mobility aids required to assist with transfers and/or ambulation (gait belt, sit-to-stand, lift, walker, cane, etc )  - [place] fall precautions as indicated by assessment  - Record patient progress and toleration of activity level on Mobility SBAR; progress patient to next Phase/Stage  - Instruct patient to call for assistance with activity based on assessment  - Consider rehabilitation consult to assist with strengthening/weightbearing, etc   Outcome: Progressing     Problem: DISCHARGE PLANNING  Goal: Discharge to home or other facility with appropriate resources  Description: INTERVENTIONS:  - Identify barriers to discharge w/patient and caregiver  - Arrange for needed discharge resources and transportation as appropriate  - Identify discharge learning needs (meds, wound care, etc )  - Arrange for interpretive services to assist at discharge as needed  - Refer to Case Management Department for coordinating discharge planning if the patient needs post-hospital services based on physician/advanced practitioner order or complex needs related to functional status, cognitive ability, or social support system  Outcome: Progressing     Problem: Knowledge Deficit  Goal: Patient/family/caregiver demonstrates understanding of disease process, treatment plan, medications, and discharge instructions  Description: Complete learning assessment and assess knowledge base    Interventions:  - Provide teaching at level of understanding  - Provide teaching via preferred learning methods  Outcome: Progressing     Problem: CARDIOVASCULAR - ADULT  Goal: Maintains optimal cardiac output and hemodynamic stability  Description: INTERVENTIONS:  - Monitor I/O, vital signs and rhythm  - Monitor for S/S and trends of decreased cardiac output  - Administer and titrate ordered vasoactive medications to optimize hemodynamic stability  - Assess quality of pulses, skin color and temperature  - Assess for signs of decreased coronary artery perfusion  - Instruct patient to report change in severity of symptoms  Outcome: Progressing  Goal: Absence of cardiac dysrhythmias or at baseline rhythm  Description: INTERVENTIONS:  - Continuous cardiac monitoring, vital signs, obtain 12 lead EKG if ordered  - Administer antiarrhythmic and heart rate control medications as ordered  - Monitor electrolytes and administer replacement therapy as ordered  Outcome: Progressing     Problem: Prexisting or High Potential for Compromised Skin Integrity  Goal: Skin integrity is maintained or improved  Description: INTERVENTIONS:  - Identify patients at risk for skin breakdown  - Assess and monitor skin integrity  - Assess and monitor nutrition and hydration status  - Monitor labs   - Assess for incontinence   - Turn and reposition patient  - Assist with mobility/ambulation  - Relieve pressure over bony prominences  - Avoid friction and shearing  - Provide appropriate hygiene as needed including keeping skin clean and dry  - Evaluate need for skin moisturizer/barrier cream  - Collaborate with interdisciplinary team   - Patient/family teaching  - Consider wound care consult   Outcome: Progressing

## 2021-02-11 NOTE — CASE MANAGEMENT
Deep reviewed pt care coordination rounds with JENNIFER Acosta  Pt is a tentative d/c today back to [facility]  A post acute care recommendation was made by your care team for [facility]  Discussed Freedom of Choice with caregiver  List of agencies given to caregiver via in person  caregiver aware the list is custom filtered for them by preference  and that [facility] post acute providers are designated  Daughter states that [facility] is contracted with [address] [place] [address] home therapy and they would like to resume service with them  Deep informed MD to write scripts for PT/OT to be faxed to [facility]

## 2021-02-11 NOTE — PLAN OF CARE
Problem: Potential for Falls  Goal: Patient will remain free of falls  Description: INTERVENTIONS:  - Assess patient frequently for physical needs  -  Identify cognitive and physical deficits and behaviors that affect risk of falls    -  [place] fall precautions as indicated by assessment   - Educate patient/family on patient safety including physical limitations  - Instruct patient to call for assistance with activity based on assessment  - Modify environment to reduce risk of injury  - Consider OT/PT consult to assist with strengthening/mobility  Outcome: Progressing     Problem: PAIN - ADULT  Goal: Verbalizes/displays adequate comfort level or baseline comfort level  Description: Interventions:  - Encourage patient to monitor pain and request assistance  - Assess pain using appropriate pain scale  - Administer analgesics based on type and severity of pain and evaluate response  - Implement non-pharmacological measures as appropriate and evaluate response  - Consider cultural and social influences on pain and pain management  - Notify physician/advanced practitioner if interventions unsuccessful or patient reports new pain  Outcome: Progressing     Problem: INFECTION - ADULT  Goal: Absence or prevention of progression during hospitalization  Description: INTERVENTIONS:  - Assess and monitor for signs and symptoms of infection  - Monitor lab/diagnostic results  - Monitor all insertion sites, i e  indwelling lines, tubes, and drains  - Monitor endotracheal if appropriate and nasal secretions for changes in amount and color  - [place] appropriate cooling/warming therapies per order  - Administer medications as ordered  - Instruct and encourage patient and family to use good hand hygiene technique  - Identify and instruct in appropriate isolation precautions for identified infection/condition  Outcome: Progressing  Goal: Absence of fever/infection during neutropenic period  Description: INTERVENTIONS:  - Monitor WBC    Outcome: Progressing     Problem: SAFETY ADULT  Goal: Patient will remain free of falls  Description: INTERVENTIONS:  - Assess patient frequently for physical needs  -  Identify cognitive and physical deficits and behaviors that affect risk of falls    -  [place] fall precautions as indicated by assessment   - Educate patient/family on patient safety including physical limitations  - Instruct patient to call for assistance with activity based on assessment  - Modify environment to reduce risk of injury  - Consider OT/PT consult to assist with strengthening/mobility  Outcome: Progressing  Goal: Maintain or return to baseline ADL function  Description: INTERVENTIONS:  -  Assess patient's ability to carry out ADLs; assess patient's baseline for ADL function and identify physical deficits which impact ability to perform ADLs (bathing, care of mouth/teeth, toileting, grooming, dressing, etc )  - Assess/evaluate cause of self-care deficits   - Assess range of motion  - Assess patient's mobility; develop plan if impaired  - Assess patient's need for assistive devices and provide as appropriate  - Encourage maximum independence but intervene and supervise when necessary  - Involve family in performance of ADLs  - Assess for home care needs following discharge   - Consider OT consult to assist with ADL evaluation and planning for discharge  - Provide patient education as appropriate  Outcome: Progressing  Goal: Maintain or return mobility status to optimal level  Description: INTERVENTIONS:  - Assess patient's baseline mobility status (ambulation, transfers, stairs, etc )    - Identify cognitive and physical deficits and behaviors that affect mobility  - Identify mobility aids required to assist with transfers and/or ambulation (gait belt, sit-to-stand, lift, walker, cane, etc )  - [place] fall precautions as indicated by assessment  - Record patient progress and toleration of activity level on Mobility SBAR; progress patient to next Phase/Stage  - Instruct patient to call for assistance with activity based on assessment  - Consider rehabilitation consult to assist with strengthening/weightbearing, etc   Outcome: Progressing     Problem: DISCHARGE PLANNING  Goal: Discharge to home or other facility with appropriate resources  Description: INTERVENTIONS:  - Identify barriers to discharge w/patient and caregiver  - Arrange for needed discharge resources and transportation as appropriate  - Identify discharge learning needs (meds, wound care, etc )  - Arrange for interpretive services to assist at discharge as needed  - Refer to Case Management Department for coordinating discharge planning if the patient needs post-hospital services based on physician/advanced practitioner order or complex needs related to functional status, cognitive ability, or social support system  Outcome: Progressing     Problem: Knowledge Deficit  Goal: Patient/family/caregiver demonstrates understanding of disease process, treatment plan, medications, and discharge instructions  Description: Complete learning assessment and assess knowledge base    Interventions:  - Provide teaching at level of understanding  - Provide teaching via preferred learning methods  Outcome: Progressing     Problem: CARDIOVASCULAR - ADULT  Goal: Maintains optimal cardiac output and hemodynamic stability  Description: INTERVENTIONS:  - Monitor I/O, vital signs and rhythm  - Monitor for S/S and trends of decreased cardiac output  - Administer and titrate ordered vasoactive medications to optimize hemodynamic stability  - Assess quality of pulses, skin color and temperature  - Assess for signs of decreased coronary artery perfusion  - Instruct patient to report change in severity of symptoms  Outcome: Progressing  Goal: Absence of cardiac dysrhythmias or at baseline rhythm  Description: INTERVENTIONS:  - Continuous cardiac monitoring, vital signs, obtain 12 lead EKG if ordered  - Administer antiarrhythmic and heart rate control medications as ordered  - Monitor electrolytes and administer replacement therapy as ordered  Outcome: Progressing     Problem: Prexisting or High Potential for Compromised Skin Integrity  Goal: Skin integrity is maintained or improved  Description: INTERVENTIONS:  - Identify patients at risk for skin breakdown  - Assess and monitor skin integrity  - Assess and monitor nutrition and hydration status  - Monitor labs   - Assess for incontinence   - Turn and reposition patient  - Assist with mobility/ambulation  - Relieve pressure over bony prominences  - Avoid friction and shearing  - Provide appropriate hygiene as needed including keeping skin clean and dry  - Evaluate need for skin moisturizer/barrier cream  - Collaborate with interdisciplinary team   - Patient/family teaching  - Consider wound care consult   Outcome: Progressing

## 2021-02-11 NOTE — PLAN OF CARE
Problem: OCCUPATIONAL THERAPY ADULT  Goal: Performs self-care activities at highest level of function for planned discharge setting  See evaluation for individualized goals  Description: Treatment Interventions: ADL retraining, Functional transfer training, UE strengthening/ROM, Endurance training, Patient/family training, Equipment evaluation/education, Compensatory technique education, Energy conservation, Activityengagement          See flowsheet documentation for full assessment, interventions and recommendations  Outcome: Progressing  Note: Limitation: Decreased ADL status, Decreased UE strength, Decreased Safe judgement during ADL, Decreased cognition, Decreased endurance, Decreased self-care trans, Decreased high-level ADLs  Prognosis: Fair  Assessment: Pt seen for OT treatment session focusing on functional activity tolerance, bed mobility, ADLs, and functional mobility/transfers  Pt alert and cooperative throughout session  Pt lying supine at start of session, completing bed mobility (supine>sit) with Supervision with cues for technique  Transfers (sit<>stand) completed with Min A with cues for safe technique and hand placement  Min A required for functional mobility with increased dynamic standing tolerance demonstrated  Pt able to tolerate approx  5 mins of upright activity prior to requesting seated rest break  Grooming tasks completed with Supervision while standing at sink to brush hair  No LOBs noted  LB dressing completed with Min A  Pt able to don/doff B/L socks and thread B/L LEs into underwear with Supervision while seated  CGA required when standing to pull underwear over hips 2* decreased dynamic standing balance demonstrated  UB dressing completed with Min A with assist to thread L UE and cues for sequencing  Pt lying supine at end of session with call bell and phone within reach  Bed alarm activated  All needs met and pt reports no further questions for OT at this time   Continue to recommend return to [facility] with OT as appropriate when medically cleared  OT to follow pt on caseload        OT Discharge Recommendation: Other (Comment)([facility] with [facility] as [facility])  OT - OK to Discharge: Yes(when medically cleared)

## 2021-02-11 NOTE — OCCUPATIONAL THERAPY NOTE
[address] Progress Note     Patient Name: Alfie Gibbons  VCUFG'V Date: 2/11/2021  Problem List  Principal Problem:    Complete heart block [facility])  Active Problems:    Hypertensive urgency    Abdominal discomfort    Elevated troponin level    Acute diastolic congestive heart failure ([facility] [address] )          02/11/21 1556   OT Last Visit   OT Visit Date 02/11/21   Note Type   Note Type Treatment   Restrictions/Precautions   Weight Bearing Precautions Per Order Yes   LUE Weight Bearing Per Order NWB  (pacemaker placement 2/9/21)   Braces or Orthoses Sling  (L UE)   Other Precautions Cognitive; Chair Alarm; Bed Alarm;WBS;Fall Risk;Telemetry   General   Response to Previous Treatment Patient with no complaints from previous session   Lifestyle   Autonomy At baseline, pt was I w/ ADLs and functional mobility/transfers w/o use of AD, required assist w/ IADLs, (-) , and denies falls PTA  Reciprocal Relationships Spouse, dtr, facility staff   Service to Others Retired   Intrinsic Gratification Spending time with family   Pain Assessment   Pain Assessment Tool Pain Assessment not indicated - pt denies pain   Pain Score No Pain   ADL   Grooming Assistance 5  Supervision/Setup   Grooming Deficit Setup;Supervision/safety; Increased time to complete;Wash/dry hands;Brushing hair   Grooming Comments Standing at sink, no LOBs noted   UB Dressing Assistance 4  Minimal Assistance   UB Dressing Deficit Setup;Verbal cueing;Supervision/safety; Increased time to complete; Thread LUE   UB Dressing Comments UB dressing completed with Min A with assist to thread L UE and cues for sequencing  LB Dressing Assistance 4  Minimal Assistance   LB Dressing Deficit Setup;Steadying; Requires assistive device for steadying;Verbal cueing;Supervision/safety; Increased time to complete; Don/doff R sock; Don/doff L sock; Thread RLE into underwear; Thread LLE into underwear;Pull up over hips   LB Dressing Comments LB dressing completed with Min A   Pt able to don/doff B/L socks and thread B/L LEs into underwear with Supervision while seated  CGA required when standing to pull underwear over hips 2* decreased dynamic standing balance demonstrated  Functional Standing Tolerance   Time 5 mins   Activity Dynamic standing balance activity   Comments No LOBs noted   Bed Mobility   Supine to Sit 5  Supervision   Additional items HOB elevated; Bedrails; Increased time required;Verbal cues   Sit to Supine 5  Supervision   Additional items Increased time required;Verbal cues   Additional Comments Pt lying supine at end of session with call bell and phone within reach  Bed alarm activated  All needs met and pt reports no further questions for OT at this time  Transfers   Sit to Stand 4  Minimal assistance   Additional items Assist x 1; Increased time required;Verbal cues   Stand to Sit 4  Minimal assistance   Additional items Assist x 1; Armrests; Increased time required;Verbal cues   Additional Comments Cues for safe technique   Functional Mobility   Functional Mobility 4  Minimal assistance   Additional Comments Assist x1   Additional items Hand hold assistance   Cognition   Overall Cognitive Status Impaired   Arousal/Participation Alert   Attention Attends with cues to redirect   Orientation Level Oriented X4   Memory Decreased recall of precautions   Following Commands Follows one step commands with increased time or repetition   Activity Tolerance   Activity Tolerance Patient tolerated treatment well   Medical Staff Mis Delgadillo, RN   Assessment   Assessment Pt seen for OT treatment session focusing on functional activity tolerance, bed mobility, ADLs, and functional mobility/transfers  Pt alert and cooperative throughout session  Pt lying supine at start of session, completing bed mobility (supine>sit) with Supervision with cues for technique  Transfers (sit<>stand) completed with Min A with cues for safe technique and hand placement   Min A required for functional mobility with increased dynamic standing tolerance demonstrated  Pt able to tolerate approx  5 mins of upright activity prior to requesting seated rest break  Grooming tasks completed with Supervision while standing at sink to brush hair  No LOBs noted  LB dressing completed with Min A  Pt able to don/doff B/L socks and thread B/L LEs into underwear with Supervision while seated  CGA required when standing to pull underwear over hips 2* decreased dynamic standing balance demonstrated  UB dressing completed with Min A with assist to thread L UE and cues for sequencing  Pt lying supine at end of session with call bell and phone within reach  Bed alarm activated  All needs met and pt reports no further questions for OT at this time  Continue to recommend return to [facility] with OT as appropriate when medically cleared  OT to follow pt on caseload  Plan   Treatment Interventions ADL retraining;Functional transfer training; Endurance training;Patient/family training;Equipment evaluation/education; Compensatory technique education; Energy conservation; Activityengagement   Goal Expiration Date 02/24/21   OT Treatment Day 1   OT Frequency 3-5x/wk   Recommendation   OT Discharge Recommendation Other (Comment)  (Return to [facility] with OT as appropriate)   OT - OK to Discharge Yes  (when medically cleared)   AM-PAC Daily Activity Inpatient   Lower Body Dressing 3   Bathing 3   Toileting 3   Upper Body Dressing 3   Grooming 3   Eating 3   Daily Activity Raw Score 18   Daily Activity Standardized Score (Calc for Raw Score >=11) 38 66   AM-PAC Applied Cognition Inpatient   Following a Speech/Presentation 3   Understanding Ordinary Conversation 4   Taking Medications 2   Remembering Where Things Are Placed or Put Away 2   Remembering List of 4-5 Errands 2   Taking Care of Complicated Tasks 2   Applied Cognition Raw Score 15   Applied Cognition Standardized Score 33 54         Perri Crigler, OTR/L

## 2021-02-11 NOTE — PROGRESS NOTES
Cardiology   MD Arianna Barber MD Ather Mansoor, MD Bonner Keeler DO, Gerald Reilly DO, Jesus Barney DO, Formerly [facility] [place]  -------------------------------------------------------------------  Pending sale to [facility]  [address]  [place], [place] [address]  [place]  [phone]        Progress Note - Cardiology   Chito Lutz 80 y o  female MRN: [id number]  Unit/Bed#: E4 -01 Encounter: [id number]        Assessment/Recommendations/Discussion:   1  Complete HB, s/p dual chamber Medtronic PPM placed 2/9/2021  2  Acute diastolic CHF, exacerbated by above  3  Non MI related troponin elevation secondary to above  4  HTN    · Still SOB with walking to bathroom  CXR reviewed from 2/9/2021, revealing pulmonary edema and vascular congestion  Echo showed dilated IVC (normal IVC incorrectly reported)  Will repeat IV lasix 20 mg now and again in AM   Repeat CXR and NTproBNP, BMP tomorrow (ordered)  · Replete potassium (will order 40 MEQ now, again this evening, and 20 MEQ in AM)  · Add ACEi or ARB tomorrow if BP still up        Subjective: Pt seen/examined    C/O SOB with ambulation          Physical Exam:  GEN:  NAD  HEENT:  MMM, NCAT, pink conjunctiva, EOMI, nonicteric sclera  CV:  NO JVD/HJR, RR, NO M/R/G, +S1/S2, NO PARASTERNAL HEAVE/THRILL, NO LE EDEMA, NO HEPATIC SYSTOLIC PULSATION, WARM EXTREMITIES  RESP:  CTAB/L  ABD:  SOFT, NT, NO GROSS ORGANOMEGALY        Vitals:   /76 (BP Location: Right arm)   Pulse 66   Temp 98 3 °F (36 8 °C) (Tympanic)   Resp 20   Ht 5' 1" (1 549 m)   Wt 58 2 kg (128 lb 4 9 oz)   LMP  (LMP Unknown)   SpO2 96%   BMI 24 24 kg/m²   Vitals:    02/10/21 0554 02/11/21 0537   Weight: 58 8 kg (129 lb 10 1 oz) 58 2 kg (128 lb 4 9 oz)       Intake/Output Summary (Last 24 hours) at 2/11/2021 1402  Last data filed at 2/10/2021 2001  Gross per 24 hour   Intake 240 ml   Output 450 ml   Net -210 ml       TELEMETRY: v-paced  Lab Results:  Results from last 7 days   Lab Units 02/10/21  0323   WBC Thousand/uL 10 04   HEMOGLOBIN g/dL 12 1   HEMATOCRIT % 38 5   PLATELETS Thousands/uL 152     Results from last 7 days   Lab Units 02/11/21  0446   POTASSIUM mmol/L 3 0*   CHLORIDE mmol/L 106   CO2 mmol/L 25   BUN mg/dL 22   CREATININE mg/dL 0 77   CALCIUM mg/dL 8 7   ALK PHOS U/L 67   ALT U/L 72   AST U/L 31     Results from last 7 days   Lab Units 02/11/21  0446   POTASSIUM mmol/L 3 0*   CHLORIDE mmol/L 106   CO2 mmol/L 25   BUN mg/dL 22   CREATININE mg/dL 0 77   CALCIUM mg/dL 8 7           Medications:    Current Facility-Administered Medications:     acetaminophen (TYLENOL) tablet 650 mg, 650 mg, Oral, Q4H PRN, Simran Priest DO    amLODIPine (NORVASC) tablet 5 mg, 5 mg, Oral, Daily, Roxy Burks DO, 5 mg at 02/11/21 8173    aspirin chewable tablet 81 mg, 81 mg, Oral, Daily, Lacey Bland MD, 81 mg at 02/11/21 0849    atropine injection 0 4 mg, 0 4 mg, Intravenous, TID PRN, Lacey Bland MD    chlorhexidine (PERIDEX) 0 12 % oral rinse 15 mL, 15 mL, Swish & Spit, Once, Lacey Bland MD    heparin (porcine) subcutaneous injection 5,000 Units, 5,000 Units, Subcutaneous, Q8H [facility], 5,000 Units at 02/11/21 0516 **AND** Platelet count, , , Once, Taqueria Waite DO    multivitamin-minerals (CENTRUM) tablet 1 tablet, 1 tablet, Oral, Daily, Lacey Bland MD, 1 tablet at 02/11/21 0849    oxyCODONE-acetaminophen (PERCOCET) 5-325 mg per tablet 1 tablet, 1 tablet, Oral, Q8H PRN, Simran Priest DO    This note was completed in part utilizing [employer] Software  Grammatical errors, random word insertions, spelling mistakes, and incomplete sentences may be an occasional consequence of this system secondary to software limitations, ambient noise, and hardware issues  If you have any questions or concerns about the content, text, or information contained within the body of this dictation, please contact the provider for clarification

## 2021-02-11 NOTE — ASSESSMENT & PLAN NOTE
Noted by acute pulmonary edema on chest x-ray  Worsened compared to CXR a few days ago  In the setting of complete heart block requiring insertion of permanent pacemaker  Echo with normal EF but mild to moderate pulmonary hypertension  S/p 1 time dose of IV lasix 20 mg on 2/10/21  Additional 2 doses ordered by cardiology today - 1 tonight, 1 tomorrow  Repeat CXR, BNP, BMP for tomorrow

## 2021-02-11 NOTE — PROGRESS NOTES
Progress Note - Faina Payne 12/23/1930, 80 y o  female MRN: [id number]    Unit/Bed#: E4 -01 Encounter: [id number]    Primary Care Provider: Clarisse Ignacio DO   Date and time admitted to hospital: 2/9/2021 11:25 AM        * Complete heart block [facility])  Assessment & Plan  80year old female admitted after being sent from her PCP office   She was found to have bradycardia with HR in the 30s secondary to a complete heart block  Underwent insertion of dual chamber Medtronic permanent pacemaker 2/9/21 by Dr Arik Green  TSH within normal limits at 3 132  Currently ventricularly paced in the 70s    Hypertensive urgency  Assessment & Plan  Possibly secondary to complete heart block  BP was elevated in the 002Y systolically upon arrival   Has not been on any anti-hypertensives as an outpt  Started on oral amlodipine 5 mg daily by cards  Continue to monitor pressures - they are improving   Most recent BP is 156/76  Cardiology considering adding ACE or ARB if BP still elevated    Acute diastolic congestive heart failure ([address] )  Assessment & Plan  Noted by acute pulmonary edema on chest x-ray  Worsened compared to CXR a few days ago  In the setting of complete heart block requiring insertion of permanent pacemaker  Echo with normal EF but mild to moderate pulmonary hypertension  S/p 1 time dose of IV lasix 20 mg on 2/10/21  Additional 2 doses ordered by cardiology today - 1 tonight, 1 tomorrow  Repeat CXR, BNP, BMP for tomorrow  Elevated troponin level  Assessment & Plan    Recent Labs     02/09/21  2312 02/10/21  0312 02/10/21  0641   TROPONINI 0 22* 0 37* 0 36*   Secondary to complete heart block  No chest pain    Abdominal discomfort  Assessment & Plan  Chronic  Was in the process of obtaining referral for GI evaluation  She would like to defer GI consultation at this time until her primary issue has been addressed          VTE Pharmacologic Prophylaxis:   Pharmacologic: Heparin  Mechanical VTE Prophylaxis in Place: Yes    Discharge Plan: With need for continued inpatient hospital stay for IV diuresis  Discussions with Specialists or Other Care Team Provider: nursing    Education and Discussions with Family / Patient: patient, daughter via telephone - updated    Time Spent for Care: 20 minutes  More than 50% of total time spent on counseling and coordination of care as described above  Current Length of Stay: 2 day(s)  Current Patient Status: Inpatient   Code Status: Level 1 - Full Code    Subjective:     Seen and evaluated patient earlier during rounds  Complains of shortness of breath with short distances especially walking to the bathroom  Spoke with daughter via telephone -discharge was initially planned for today however patient will need continued IV diuresis  Daughter will provide transportation upon discharge  Objective:     Vitals:   Temp (24hrs), Av 9 °F (36 6 °C), Min:97 7 °F (36 5 °C), Max:98 3 °F (36 8 °C)    Temp:  [97 7 °F (36 5 °C)-98 3 °F (36 8 °C)] 98 3 °F (36 8 °C)  HR:  [64-76] 72  Resp:  [18-20] 20  BP: (153-186)/(72-98) 174/98  SpO2:  [91 %-96 %] 96 %  Body mass index is 24 24 kg/m²  Input and Output Summary (last 24 hours): Intake/Output Summary (Last 24 hours) at 2021 1426  Last data filed at 2/10/2021 2001  Gross per 24 hour   Intake 240 ml   Output 450 ml   Net -210 ml       Physical Exam:     Physical Exam  Vitals signs and nursing note reviewed  Constitutional:       General: She is not in acute distress  Appearance: Normal appearance  She is normal weight  She is not ill-appearing, toxic-appearing or diaphoretic  HENT:      Head: Normocephalic and atraumatic  Cardiovascular:      Rate and Rhythm: Normal rate and regular rhythm  Pulmonary:      Effort: Pulmonary effort is normal  No respiratory distress  Breath sounds: Normal breath sounds  No stridor  No wheezing or rhonchi     Abdominal:      General: Bowel sounds are normal  There is no distension  Palpations: Abdomen is soft  There is no mass  Tenderness: There is no abdominal tenderness  Hernia: No hernia is present  Musculoskeletal:         General: No swelling  Skin:     General: Skin is warm and dry  Neurological:      Mental Status: She is oriented to person, place, and time  Mental status is at baseline  Psychiatric:         Mood and Affect: Mood normal          Behavior: Behavior normal          Additional Data:     Labs:    Results from last 7 days   Lab Units 02/10/21  0323   WBC Thousand/uL 10 04   HEMOGLOBIN g/dL 12 1   HEMATOCRIT % 38 5   PLATELETS Thousands/uL 152   NEUTROS PCT % 82*   LYMPHS PCT % 10*   MONOS PCT % 8   EOS PCT % 0     Results from last 7 days   Lab Units 02/11/21  0446   POTASSIUM mmol/L 3 0*   CHLORIDE mmol/L 106   CO2 mmol/L 25   BUN mg/dL 22   CREATININE mg/dL 0 77   CALCIUM mg/dL 8 7   ALK PHOS U/L 67   ALT U/L 72   AST U/L 31     Results from last 7 days   Lab Units 02/09/21  1149   INR  1 21*       * I Have Reviewed All Lab Data Listed Above  * Additional Pertinent Lab Tests Reviewed:  [address] Admission Reviewed    Imaging:    Imaging Reports Reviewed Today Include:   Imaging Personally Reviewed by Myself Includes:      Recent Cultures (last 7 days):           Last 24 Hours Medication List:   Current Facility-Administered Medications   Medication Dose Route Frequency Provider Last Rate    acetaminophen  650 mg Oral Q4H PRN Simran Priest, DO      amLODIPine  5 mg Oral Daily Roxy Burks, DO      aspirin  81 mg Oral Daily Lacey Bland MD      atropine  0 4 mg Intravenous TID PRN Lacey Bland MD      chlorhexidine  15 mL Swish & Spit Once Lacey Bland MD      furosemide  20 mg Intravenous BID (diuretic) Lorenzo Curry DO      heparin (porcine)  5,000 Units Subcutaneous Q8H [facility] Taqueria Mueller,       magnesium oxide  400 mg Oral BID Lorenzo Bauman DO      multivitamin-minerals  1 tablet Oral Daily Lacey Bland MD      oxyCODONE-acetaminophen  1 tablet Oral Q8H PRN Dhara Graham DO      [START ON 2/12/2021] potassium chloride  20 mEq Oral Once Rujul Curry, DO      potassium chloride  40 mEq Oral Once Rujul Curry, DO      potassium chloride  40 mEq Oral Once Rujul Curry, DO          Today, Patient Was Seen By: Nidhi Restrepo PA-C    ** Please Note: This note has been constructed using a voice recognition system   **

## 2021-02-12 ENCOUNTER — APPOINTMENT (INPATIENT)
Dept: RADIOLOGY | Facility: HOSPITAL | Age: 86
DRG: 242 | End: 2021-02-12
Payer: MEDICARE

## 2021-02-12 VITALS
RESPIRATION RATE: 18 BRPM | BODY MASS INDEX: 24.18 KG/M2 | WEIGHT: 128.09 LBS | OXYGEN SATURATION: 98 % | SYSTOLIC BLOOD PRESSURE: 146 MMHG | HEIGHT: 61 IN | DIASTOLIC BLOOD PRESSURE: 79 MMHG | HEART RATE: 76 BPM | TEMPERATURE: 97.6 F

## 2021-02-12 LAB
ANION GAP SERPL CALCULATED.3IONS-SCNC: 10 MMOL/L (ref 4–13)
BUN SERPL-MCNC: 24 MG/DL (ref 5–25)
CALCIUM SERPL-MCNC: 8.7 MG/DL (ref 8.3–10.1)
CHLORIDE SERPL-SCNC: 106 MMOL/L (ref 100–108)
CO2 SERPL-SCNC: 22 MMOL/L (ref 21–32)
CREAT SERPL-MCNC: 0.62 MG/DL (ref 0.6–1.3)
GFR SERPL CREATININE-BSD FRML MDRD: 80 ML/MIN/1.73SQ M
GLUCOSE SERPL-MCNC: 83 MG/DL (ref 65–140)
NT-PROBNP SERPL-MCNC: 2442 PG/ML
POTASSIUM SERPL-SCNC: 4.2 MMOL/L (ref 3.5–5.3)
SODIUM SERPL-SCNC: 138 MMOL/L (ref 136–145)

## 2021-02-12 PROCEDURE — 83880 ASSAY OF NATRIURETIC PEPTIDE: CPT | Performed by: INTERNAL MEDICINE

## 2021-02-12 PROCEDURE — 97116 GAIT TRAINING THERAPY: CPT

## 2021-02-12 PROCEDURE — 80048 BASIC METABOLIC PNL TOTAL CA: CPT | Performed by: INTERNAL MEDICINE

## 2021-02-12 PROCEDURE — 71045 X-RAY EXAM CHEST 1 VIEW: CPT

## 2021-02-12 PROCEDURE — 97530 THERAPEUTIC ACTIVITIES: CPT

## 2021-02-12 PROCEDURE — 99239 HOSP IP/OBS DSCHRG MGMT >30: CPT | Performed by: HOSPITALIST

## 2021-02-12 RX ORDER — AMLODIPINE BESYLATE 5 MG/1
5 TABLET ORAL DAILY
Qty: 30 TABLET | Refills: 1 | Status: SHIPPED | OUTPATIENT
Start: 2021-02-13 | End: 2021-02-22

## 2021-02-12 RX ORDER — ACETAMINOPHEN 325 MG/1
650 TABLET ORAL EVERY 4 HOURS PRN
Qty: 30 TABLET | Refills: 0 | Status: SHIPPED | OUTPATIENT
Start: 2021-02-12

## 2021-02-12 RX ADMIN — FUROSEMIDE 20 MG: 10 INJECTION, SOLUTION INTRAMUSCULAR; INTRAVENOUS at 08:34

## 2021-02-12 RX ADMIN — AMLODIPINE BESYLATE 5 MG: 5 TABLET ORAL at 08:34

## 2021-02-12 RX ADMIN — Medication 1 TABLET: at 08:34

## 2021-02-12 RX ADMIN — MAGNESIUM OXIDE TAB 400 MG (241.3 MG ELEMENTAL MG) 400 MG: 400 (241.3 MG) TAB at 08:34

## 2021-02-12 RX ADMIN — ASPIRIN 81 MG: 81 TABLET, CHEWABLE ORAL at 08:34

## 2021-02-12 RX ADMIN — HEPARIN SODIUM 5000 UNITS: 5000 INJECTION INTRAVENOUS; SUBCUTANEOUS at 05:38

## 2021-02-12 RX ADMIN — POTASSIUM CHLORIDE 20 MEQ: 1500 TABLET, EXTENDED RELEASE ORAL at 05:38

## 2021-02-12 NOTE — PLAN OF CARE
Problem: PHYSICAL THERAPY ADULT  Goal: Performs mobility at highest level of function for planned discharge setting  See evaluation for individualized goals  Description: Treatment/Interventions: Functional transfer training, LE strengthening/ROM, Therapeutic exercise, Endurance training, Patient/family training, Bed mobility, Gait training, Spoke to nursing, OT  Equipment Recommended: Other (Comment)(monitor)       See flowsheet documentation for full assessment, interventions and recommendations  Outcome: Progressing  Note: Prognosis: Fair  Problem List: Decreased strength, Decreased endurance, Impaired balance, Decreased mobility, Decreased cognition, Decreased safety awareness  Assessment: Pt seen for PT per POC  Improved mobility & activity tolerance noted this tx session  Pt progressed to S for transfers & CGAx1 for amb w/o AD  See above levels of assistance required for all functional tasks  Gait deviations as above but no gross LOB noted  Improved amb tolerance compared to previous PT session  Pt refused to perform thera  ex as well as further amb as she wants to eat her lunch  Pt somewhat irritable during session but can be redirected or distracted  Pt asymptomatic t/o session  Nsg staff most recent vital signs as follows: /79 (BP Location: Right arm)   Pulse 76   Temp 97 6 °F (36 4 °C) (Tympanic)   Resp 18   Ht 5' 1" (1 549 m)   Wt 58 1 kg (128 lb 1 4 oz)   LMP  (LMP Unknown)   SpO2 98%   BMI 24 20 kg/m²   Will continue PT per POC  At end of session, pt OOB in chair in stable condition, call bell & phone in reach, chair alarm activated  Fall precautions reinforced w/ good understanding  The patient's AM-PAC Basic Mobility Inpatient Short Form Raw Score is 18, Standardized Score is 41 05  A standardized score less than 42 9 suggests the patient may benefit from discharge to home w/ home care services   Please also refer to the recommendation of the Physical Therapist for safe discharge planning  From PT standpoint, will recommend return to [facility] w/ PT when medically cleared  CM to follow  Nsg staff to continue to mobilized pt (OOB in chair for all meals & ambulate in room/unit) as tolerated to prevent decline in function  Nsg notified  Please refer to cardiology re: carlosing & CARMENCITA WBS  PT Discharge Recommendation: Home with skilled therapy, Return to previous environment with social support(return to [facility] w/ PT)          See flowsheet documentation for full assessment

## 2021-02-12 NOTE — PLAN OF CARE
Problem: Potential for Falls  Goal: Patient will remain free of falls  Description: INTERVENTIONS:  - Assess patient frequently for physical needs  -  Identify cognitive and physical deficits and behaviors that affect risk of falls    -  [place] fall precautions as indicated by assessment   - Educate patient/family on patient safety including physical limitations  - Instruct patient to call for assistance with activity based on assessment  - Modify environment to reduce risk of injury  - Consider OT/PT consult to assist with strengthening/mobility  Outcome: Progressing     Problem: PAIN - ADULT  Goal: Verbalizes/displays adequate comfort level or baseline comfort level  Description: Interventions:  - Encourage patient to monitor pain and request assistance  - Assess pain using appropriate pain scale  - Administer analgesics based on type and severity of pain and evaluate response  - Implement non-pharmacological measures as appropriate and evaluate response  - Consider cultural and social influences on pain and pain management  - Notify physician/advanced practitioner if interventions unsuccessful or patient reports new pain  Outcome: Progressing     Problem: INFECTION - ADULT  Goal: Absence or prevention of progression during hospitalization  Description: INTERVENTIONS:  - Assess and monitor for signs and symptoms of infection  - Monitor lab/diagnostic results  - Monitor all insertion sites, i e  indwelling lines, tubes, and drains  - Monitor endotracheal if appropriate and nasal secretions for changes in amount and color  - [place] appropriate cooling/warming therapies per order  - Administer medications as ordered  - Instruct and encourage patient and family to use good hand hygiene technique  - Identify and instruct in appropriate isolation precautions for identified infection/condition  Outcome: Progressing  Goal: Absence of fever/infection during neutropenic period  Description: INTERVENTIONS:  - Monitor WBC    Outcome: Progressing     Problem: SAFETY ADULT  Goal: Patient will remain free of falls  Description: INTERVENTIONS:  - Assess patient frequently for physical needs  -  Identify cognitive and physical deficits and behaviors that affect risk of falls    -  [place] fall precautions as indicated by assessment   - Educate patient/family on patient safety including physical limitations  - Instruct patient to call for assistance with activity based on assessment  - Modify environment to reduce risk of injury  - Consider OT/PT consult to assist with strengthening/mobility  Outcome: Progressing  Goal: Maintain or return to baseline ADL function  Description: INTERVENTIONS:  -  Assess patient's ability to carry out ADLs; assess patient's baseline for ADL function and identify physical deficits which impact ability to perform ADLs (bathing, care of mouth/teeth, toileting, grooming, dressing, etc )  - Assess/evaluate cause of self-care deficits   - Assess range of motion  - Assess patient's mobility; develop plan if impaired  - Assess patient's need for assistive devices and provide as appropriate  - Encourage maximum independence but intervene and supervise when necessary  - Involve family in performance of ADLs  - Assess for home care needs following discharge   - Consider OT consult to assist with ADL evaluation and planning for discharge  - Provide patient education as appropriate  Outcome: Progressing  Goal: Maintain or return mobility status to optimal level  Description: INTERVENTIONS:  - Assess patient's baseline mobility status (ambulation, transfers, stairs, etc )    - Identify cognitive and physical deficits and behaviors that affect mobility  - Identify mobility aids required to assist with transfers and/or ambulation (gait belt, sit-to-stand, lift, walker, cane, etc )  - [place] fall precautions as indicated by assessment  - Record patient progress and toleration of activity level on Mobility SBAR; progress patient to next Phase/Stage  - Instruct patient to call for assistance with activity based on assessment  - Consider rehabilitation consult to assist with strengthening/weightbearing, etc   Outcome: Progressing     Problem: DISCHARGE PLANNING  Goal: Discharge to home or other facility with appropriate resources  Description: INTERVENTIONS:  - Identify barriers to discharge w/patient and caregiver  - Arrange for needed discharge resources and transportation as appropriate  - Identify discharge learning needs (meds, wound care, etc )  - Arrange for interpretive services to assist at discharge as needed  - Refer to Case Management Department for coordinating discharge planning if the patient needs post-hospital services based on physician/advanced practitioner order or complex needs related to functional status, cognitive ability, or social support system  Outcome: Progressing     Problem: Knowledge Deficit  Goal: Patient/family/caregiver demonstrates understanding of disease process, treatment plan, medications, and discharge instructions  Description: Complete learning assessment and assess knowledge base    Interventions:  - Provide teaching at level of understanding  - Provide teaching via preferred learning methods  Outcome: Progressing     Problem: CARDIOVASCULAR - ADULT  Goal: Maintains optimal cardiac output and hemodynamic stability  Description: INTERVENTIONS:  - Monitor I/O, vital signs and rhythm  - Monitor for S/S and trends of decreased cardiac output  - Administer and titrate ordered vasoactive medications to optimize hemodynamic stability  - Assess quality of pulses, skin color and temperature  - Assess for signs of decreased coronary artery perfusion  - Instruct patient to report change in severity of symptoms  Outcome: Progressing  Goal: Absence of cardiac dysrhythmias or at baseline rhythm  Description: INTERVENTIONS:  - Continuous cardiac monitoring, vital signs, obtain 12 lead EKG if ordered  - Administer antiarrhythmic and heart rate control medications as ordered  - Monitor electrolytes and administer replacement therapy as ordered  Outcome: Progressing     Problem: Prexisting or High Potential for Compromised Skin Integrity  Goal: Skin integrity is maintained or improved  Description: INTERVENTIONS:  - Identify patients at risk for skin breakdown  - Assess and monitor skin integrity  - Assess and monitor nutrition and hydration status  - Monitor labs   - Assess for incontinence   - Turn and reposition patient  - Assist with mobility/ambulation  - Relieve pressure over bony prominences  - Avoid friction and shearing  - Provide appropriate hygiene as needed including keeping skin clean and dry  - Evaluate need for skin moisturizer/barrier cream  - Collaborate with interdisciplinary team   - Patient/family teaching  - Consider wound care consult   Outcome: Progressing

## 2021-02-12 NOTE — PLAN OF CARE
Problem: Potential for Falls  Goal: Patient will remain free of falls  Description: INTERVENTIONS:  - Assess patient frequently for physical needs  -  Identify cognitive and physical deficits and behaviors that affect risk of falls    -  [place] fall precautions as indicated by assessment   - Educate patient/family on patient safety including physical limitations  - Instruct patient to call for assistance with activity based on assessment  - Modify environment to reduce risk of injury  - Consider OT/PT consult to assist with strengthening/mobility  Outcome: Progressing     Problem: PAIN - ADULT  Goal: Verbalizes/displays adequate comfort level or baseline comfort level  Description: Interventions:  - Encourage patient to monitor pain and request assistance  - Assess pain using appropriate pain scale  - Administer analgesics based on type and severity of pain and evaluate response  - Implement non-pharmacological measures as appropriate and evaluate response  - Consider cultural and social influences on pain and pain management  - Notify physician/advanced practitioner if interventions unsuccessful or patient reports new pain  Outcome: Progressing     Problem: INFECTION - ADULT  Goal: Absence or prevention of progression during hospitalization  Description: INTERVENTIONS:  - Assess and monitor for signs and symptoms of infection  - Monitor lab/diagnostic results  - Monitor all insertion sites, i e  indwelling lines, tubes, and drains  - Monitor endotracheal if appropriate and nasal secretions for changes in amount and color  - [place] appropriate cooling/warming therapies per order  - Administer medications as ordered  - Instruct and encourage patient and family to use good hand hygiene technique  - Identify and instruct in appropriate isolation precautions for identified infection/condition  Outcome: Progressing  Goal: Absence of fever/infection during neutropenic period  Description: INTERVENTIONS:  - Monitor WBC    Outcome: Progressing     Problem: SAFETY ADULT  Goal: Patient will remain free of falls  Description: INTERVENTIONS:  - Assess patient frequently for physical needs  -  Identify cognitive and physical deficits and behaviors that affect risk of falls    -  [place] fall precautions as indicated by assessment   - Educate patient/family on patient safety including physical limitations  - Instruct patient to call for assistance with activity based on assessment  - Modify environment to reduce risk of injury  - Consider OT/PT consult to assist with strengthening/mobility  Outcome: Progressing  Goal: Maintain or return to baseline ADL function  Description: INTERVENTIONS:  -  Assess patient's ability to carry out ADLs; assess patient's baseline for ADL function and identify physical deficits which impact ability to perform ADLs (bathing, care of mouth/teeth, toileting, grooming, dressing, etc )  - Assess/evaluate cause of self-care deficits   - Assess range of motion  - Assess patient's mobility; develop plan if impaired  - Assess patient's need for assistive devices and provide as appropriate  - Encourage maximum independence but intervene and supervise when necessary  - Involve family in performance of ADLs  - Assess for home care needs following discharge   - Consider OT consult to assist with ADL evaluation and planning for discharge  - Provide patient education as appropriate  Outcome: Progressing  Goal: Maintain or return mobility status to optimal level  Description: INTERVENTIONS:  - Assess patient's baseline mobility status (ambulation, transfers, stairs, etc )    - Identify cognitive and physical deficits and behaviors that affect mobility  - Identify mobility aids required to assist with transfers and/or ambulation (gait belt, sit-to-stand, lift, walker, cane, etc )  - [place] fall precautions as indicated by assessment  - Record patient progress and toleration of activity level on Mobility SBAR; progress patient to next Phase/Stage  - Instruct patient to call for assistance with activity based on assessment  - Consider rehabilitation consult to assist with strengthening/weightbearing, etc   Outcome: Progressing     Problem: DISCHARGE PLANNING  Goal: Discharge to home or other facility with appropriate resources  Description: INTERVENTIONS:  - Identify barriers to discharge w/patient and caregiver  - Arrange for needed discharge resources and transportation as appropriate  - Identify discharge learning needs (meds, wound care, etc )  - Arrange for interpretive services to assist at discharge as needed  - Refer to Case Management Department for coordinating discharge planning if the patient needs post-hospital services based on physician/advanced practitioner order or complex needs related to functional status, cognitive ability, or social support system  Outcome: Progressing     Problem: Knowledge Deficit  Goal: Patient/family/caregiver demonstrates understanding of disease process, treatment plan, medications, and discharge instructions  Description: Complete learning assessment and assess knowledge base    Interventions:  - Provide teaching at level of understanding  - Provide teaching via preferred learning methods  Outcome: Progressing     Problem: CARDIOVASCULAR - ADULT  Goal: Maintains optimal cardiac output and hemodynamic stability  Description: INTERVENTIONS:  - Monitor I/O, vital signs and rhythm  - Monitor for S/S and trends of decreased cardiac output  - Administer and titrate ordered vasoactive medications to optimize hemodynamic stability  - Assess quality of pulses, skin color and temperature  - Assess for signs of decreased coronary artery perfusion  - Instruct patient to report change in severity of symptoms  Outcome: Progressing  Goal: Absence of cardiac dysrhythmias or at baseline rhythm  Description: INTERVENTIONS:  - Continuous cardiac monitoring, vital signs, obtain 12 lead EKG if ordered  - Administer antiarrhythmic and heart rate control medications as ordered  - Monitor electrolytes and administer replacement therapy as ordered  Outcome: Progressing     Problem: Prexisting or High Potential for Compromised Skin Integrity  Goal: Skin integrity is maintained or improved  Description: INTERVENTIONS:  - Identify patients at risk for skin breakdown  - Assess and monitor skin integrity  - Assess and monitor nutrition and hydration status  - Monitor labs   - Assess for incontinence   - Turn and reposition patient  - Assist with mobility/ambulation  - Relieve pressure over bony prominences  - Avoid friction and shearing  - Provide appropriate hygiene as needed including keeping skin clean and dry  - Evaluate need for skin moisturizer/barrier cream  - Collaborate with interdisciplinary team   - Patient/family teaching  - Consider wound care consult   Outcome: Progressing

## 2021-02-12 NOTE — DISCHARGE SUMMARY
Discharge- Nataly Cunningham 12/23/1930, 80 y o  female MRN: [id number]    Unit/Bed#: E4 -01 Encounter: [id number]    Primary Care Provider: Marcio Whitley DO   Date and time admitted to hospital: 2/9/2021 11:25 AM        * Complete heart block [facility])  Assessment & Plan  80year old female admitted after being sent from her PCP office   She was found to have bradycardia with HR in the 30s secondary to a complete heart block  Underwent insertion of dual chamber [employer]onic permanent pacemaker 2/9/21 by Dr Monroy Feeling  TSH within normal limits at 3 132  Currently ventricularly paced in the 70s    Hypertensive urgency  Assessment & Plan  Cardiology added norvasc          Discharging Physician / Practitioner: Herson Liang DO  PCP: Marcio Whitley DO  Admission Date:   Admission Orders (From admission, onward)     Ordered        02/09/21 1419  Inpatient Admission  Once                   Discharge Date: 02/12/21    Resolved Problems  Date Reviewed: 2/10/2021    None            Consultations During Hospital Stay:  · Cardiology      Procedures Performed:     · Pacemaker placement      Reason for Admission:  Symptomatic bradycardia      Hospital Course:     Nataly Cunningham is a 80 y o  female patient who originally presented to the hospital on 2/9/2021 due to symptomatic bradycardia  She was sent in from an outpatient appointment when she was noted noted to be bradycardic with a heart rate in the 30s  Patient was seen by Cardiology  They recommended pacemaker placement  They did well with that and she is able to go home with a normal heart rate now  She did have hypertensive urgency, so Cardiology recommended Norvasc which has controlled her blood pressure  Please see above list of diagnoses and related plan for additional information  Condition at Discharge: fair       Discharge Day Visit / Exam:     Subjective:  Doing well  No pain at pacer site  Walking well        Vitals: Blood Pressure: 146/79 (02/12/21 0700)  Pulse: 76 (02/12/21 0700)  Temperature: 97 6 °F (36 4 °C) (02/12/21 0700)  Temp Source: Tympanic (02/12/21 0700)  Respirations: 18 (02/12/21 0700)  Height: 5' 1" (154 9 cm) (02/09/21 2000)  Weight - Scale: 58 1 kg (128 lb 1 4 oz) (02/12/21 0600)  SpO2: 98 % (02/12/21 0700)    Exam:     Physical Exam  Vitals signs and nursing note reviewed  HENT:      Head: Normocephalic and atraumatic  Eyes:      Pupils: Pupils are equal, round, and reactive to light  Cardiovascular:      Rate and Rhythm: Normal rate and regular rhythm  Heart sounds: No murmur  No friction rub  No gallop  Pulmonary:      Effort: Pulmonary effort is normal       Breath sounds: Normal breath sounds  No wheezing or rales  Abdominal:      General: Bowel sounds are normal       Palpations: Abdomen is soft  Tenderness: There is no abdominal tenderness  Musculoskeletal:      Right lower leg: No edema  Left lower leg: No edema            Discharge instructions/Information to patient and family:   See after visit summary for information provided to patient and family  Provisions for Follow-Up Care:  See after visit summary for information related to follow-up care and any pertinent home health orders  Disposition:     Assisted Living Facility at   Discharge Statement:  I spent 41 minutes discharging the patient  This time was spent on the day of discharge  I had direct contact with the patient on the day of discharge  Greater than 50% of the total time was spent examining patient, answering all patient questions, arranging and discussing plan of care with patient as well as directly providing post-discharge instructions  Additional time then spent on discharge activities  Discharge Medications:  See after visit summary for reconciled discharge medications provided to patient and family        ** Please Note: This note has been constructed using a voice recognition system **

## 2021-02-12 NOTE — ASSESSMENT & PLAN NOTE
80year old female admitted after being sent from her PCP office   She was found to have bradycardia with HR in the 30s secondary to a complete heart block    Underwent insertion of dual chamber [employer] permanent pacemaker 2/9/21 by Dr Terrial Opitz  TSH within normal limits at 3 132  Currently ventricularly paced in the 70s

## 2021-02-12 NOTE — CASE MANAGEMENT
Cm reviewed pt care coordination rounds with Dr Juan Rose  Pt is medically cleared for d/c today back to [facility] [phone]/ Fx [phone]  Cm updated family and facility of above  Son Tran Ronquillo will  at

## 2021-02-12 NOTE — PLAN OF CARE
Problem: Potential for Falls  Goal: Patient will remain free of falls  Description: INTERVENTIONS:  - Assess patient frequently for physical needs  -  Identify cognitive and physical deficits and behaviors that affect risk of falls    -  [place] fall precautions as indicated by assessment   - Educate patient/family on patient safety including physical limitations  - Instruct patient to call for assistance with activity based on assessment  - Modify environment to reduce risk of injury  - Consider OT/PT consult to assist with strengthening/mobility  2/12/2021 1501 by Nemesio Hawthorne RN  Outcome: Adequate for Discharge  2/12/2021 0829 by Nemesio Hawthorne RN  Outcome: Progressing     Problem: PAIN - ADULT  Goal: Verbalizes/displays adequate comfort level or baseline comfort level  Description: Interventions:  - Encourage patient to monitor pain and request assistance  - Assess pain using appropriate pain scale  - Administer analgesics based on type and severity of pain and evaluate response  - Implement non-pharmacological measures as appropriate and evaluate response  - Consider cultural and social influences on pain and pain management  - Notify physician/advanced practitioner if interventions unsuccessful or patient reports new pain  2/12/2021 1501 by Nemesio Hawthorne RN  Outcome: Adequate for Discharge  2/12/2021 0829 by Nemesio Hawthorne RN  Outcome: Progressing     Problem: INFECTION - ADULT  Goal: Absence or prevention of progression during hospitalization  Description: INTERVENTIONS:  - Assess and monitor for signs and symptoms of infection  - Monitor lab/diagnostic results  - Monitor all insertion sites, i e  indwelling lines, tubes, and drains  - Monitor endotracheal if appropriate and nasal secretions for changes in amount and color  - [place] appropriate cooling/warming therapies per order  - Administer medications as ordered  - Instruct and encourage patient and family to use good hand hygiene technique  - Identify and instruct in appropriate isolation precautions for identified infection/condition  2/12/2021 1501 by Rowan Phillip RN  Outcome: Adequate for Discharge  2/12/2021 0829 by Rowan Phillip RN  Outcome: Progressing  Goal: Absence of fever/infection during neutropenic period  Description: INTERVENTIONS:  - Monitor WBC    2/12/2021 1501 by Rowan Phillip RN  Outcome: Adequate for Discharge  2/12/2021 0829 by Rowan Phillip RN  Outcome: Progressing     Problem: SAFETY ADULT  Goal: Patient will remain free of falls  Description: INTERVENTIONS:  - Assess patient frequently for physical needs  -  Identify cognitive and physical deficits and behaviors that affect risk of falls    -  [place] fall precautions as indicated by assessment   - Educate patient/family on patient safety including physical limitations  - Instruct patient to call for assistance with activity based on assessment  - Modify environment to reduce risk of injury  - Consider OT/PT consult to assist with strengthening/mobility  2/12/2021 1501 by Rowan Phillip RN  Outcome: Adequate for Discharge  2/12/2021 0829 by Rowan Phillip RN  Outcome: Progressing  Goal: Maintain or return to baseline ADL function  Description: INTERVENTIONS:  -  Assess patient's ability to carry out ADLs; assess patient's baseline for ADL function and identify physical deficits which impact ability to perform ADLs (bathing, care of mouth/teeth, toileting, grooming, dressing, etc )  - Assess/evaluate cause of self-care deficits   - Assess range of motion  - Assess patient's mobility; develop plan if impaired  - Assess patient's need for assistive devices and provide as appropriate  - Encourage maximum independence but intervene and supervise when necessary  - Involve family in performance of ADLs  - Assess for home care needs following discharge   - Consider OT consult to assist with ADL evaluation and planning for discharge  - Provide patient education as appropriate  2/12/2021 1501 by Veronica Mina RN  Outcome: Adequate for Discharge  2/12/2021 0829 by Veronica Mina RN  Outcome: Progressing  Goal: Maintain or return mobility status to optimal level  Description: INTERVENTIONS:  - Assess patient's baseline mobility status (ambulation, transfers, stairs, etc )    - Identify cognitive and physical deficits and behaviors that affect mobility  - Identify mobility aids required to assist with transfers and/or ambulation (gait belt, sit-to-stand, lift, walker, cane, etc )  - [place] fall precautions as indicated by assessment  - Record patient progress and toleration of activity level on Mobility SBAR; progress patient to next Phase/Stage  - Instruct patient to call for assistance with activity based on assessment  - Consider rehabilitation consult to assist with strengthening/weightbearing, etc   2/12/2021 1501 by Veronica Mina RN  Outcome: Adequate for Discharge  2/12/2021 0829 by Veronica Mina RN  Outcome: Progressing     Problem: DISCHARGE PLANNING  Goal: Discharge to home or other facility with appropriate resources  Description: INTERVENTIONS:  - Identify barriers to discharge w/patient and caregiver  - Arrange for needed discharge resources and transportation as appropriate  - Identify discharge learning needs (meds, wound care, etc )  - Arrange for interpretive services to assist at discharge as needed  - Refer to Case Management Department for coordinating discharge planning if the patient needs post-hospital services based on physician/advanced practitioner order or complex needs related to functional status, cognitive ability, or social support system  2/12/2021 1501 by Veronica Mina RN  Outcome: Adequate for Discharge  2/12/2021 0829 by Veronica Mina RN  Outcome: Progressing     Problem: Knowledge Deficit  Goal: Patient/family/caregiver demonstrates understanding of disease process, treatment plan, medications, and discharge instructions  Description: Complete learning assessment and assess knowledge base    Interventions:  - Provide teaching at level of understanding  - Provide teaching via preferred learning methods  2/12/2021 1501 by Luis Joyce RN  Outcome: Adequate for Discharge  2/12/2021 0829 by Luis Joyce RN  Outcome: Progressing     Problem: CARDIOVASCULAR - ADULT  Goal: Maintains optimal cardiac output and hemodynamic stability  Description: INTERVENTIONS:  - Monitor I/O, vital signs and rhythm  - Monitor for S/S and trends of decreased cardiac output  - Administer and titrate ordered vasoactive medications to optimize hemodynamic stability  - Assess quality of pulses, skin color and temperature  - Assess for signs of decreased coronary artery perfusion  - Instruct patient to report change in severity of symptoms  2/12/2021 1501 by Luis Joyce RN  Outcome: Adequate for Discharge  2/12/2021 0829 by Luis Joyce RN  Outcome: Progressing  Goal: Absence of cardiac dysrhythmias or at baseline rhythm  Description: INTERVENTIONS:  - Continuous cardiac monitoring, vital signs, obtain 12 lead EKG if ordered  - Administer antiarrhythmic and heart rate control medications as ordered  - Monitor electrolytes and administer replacement therapy as ordered  2/12/2021 1501 by Luis Joyce RN  Outcome: Adequate for Discharge  2/12/2021 0829 by Luis Joyce RN  Outcome: Progressing     Problem: Prexisting or High Potential for Compromised Skin Integrity  Goal: Skin integrity is maintained or improved  Description: INTERVENTIONS:  - Identify patients at risk for skin breakdown  - Assess and monitor skin integrity  - Assess and monitor nutrition and hydration status  - Monitor labs   - Assess for incontinence   - Turn and reposition patient  - Assist with mobility/ambulation  - Relieve pressure over bony prominences  - Avoid friction and shearing  - Provide appropriate hygiene as needed including keeping skin clean and dry  - Evaluate need for skin moisturizer/barrier cream  - Collaborate with interdisciplinary team   - Patient/family teaching  - Consider wound care consult   2/12/2021 1501 by Dari Barbosa RN  Outcome: Adequate for Discharge  2/12/2021 0829 by Dari Barbosa RN  Outcome: Progressing

## 2021-02-12 NOTE — PHYSICAL THERAPY NOTE
PT PROGRESS NOTE    Name: Imelda Tomlin  AGE: 80 y o  MRN: [id number]  LENGTH OF STAY: 3             02/12/21 1254   PT Last Visit   PT Visit Date 02/12/21   Note Type   Note Type Treatment   Pain Assessment   Pain Score No Pain   Restrictions/Precautions   Other Precautions Cognitive; Chair Alarm; Bed Alarm; Fall Risk  (LUE precautions s/p pacemaker placement)   General   Chart Reviewed Yes   Response to Previous Treatment Patient with no complaints from previous session  Family/Caregiver Present No   Cognition   Overall Cognitive Status Impaired   Arousal/Participation Alert   Attention Attends with cues to redirect   Orientation Level Oriented to person;Oriented to place;Oriented to time   Following Commands Follows one step commands without difficulty   Subjective   Subjective Pt somewhat irritable upon my arrival but willing participate in therapy  Bed Mobility   Supine to Sit Unable to assess   Sit to Supine Unable to assess   Additional Comments pt sitting at EOB upon my arrival & OOB in chair at post session   Transfers   Sit to Stand 5  Supervision   Additional items Increased time required;Verbal cues   Stand to Sit 5  Supervision   Additional items Increased time required;Verbal cues   Additional Comments cues for techniques & safety   Ambulation/Elevation   Gait pattern Wide LAILA; Decreased foot clearance; Excessively slow   Gait Assistance   (CGAx1)   Additional items Verbal cues; Tactile cues   Assistive Device None   Distance 40'x1  (pt refused to ambulate further)   Balance   Static Sitting Good   Dynamic Sitting Fair +   Static Standing Fair   Dynamic Standing Fair -   Ambulatory Fair -   Endurance Deficit   Endurance Deficit Yes   Endurance Deficit Description fatigue   Activity Tolerance   Activity Tolerance Patient limited by fatigue   Assessment   Prognosis Fair   Problem List Decreased strength;Decreased endurance; Impaired balance;Decreased mobility; Decreased cognition;Decreased safety awareness   Assessment Pt seen for PT per POC  Improved mobility & activity tolerance noted this tx session  Pt progressed to S for transfers & CGAx1 for amb w/o AD  See above levels of assistance required for all functional tasks  Gait deviations as above but no gross LOB noted  Improved amb tolerance compared to previous PT session  Pt refused to perform thera  ex as well as further amb as she wants to eat her lunch  Pt somewhat irritable during session but can be redirected or distracted  Pt asymptomatic t/o session  Nsg staff most recent vital signs as follows: /79 (BP Location: Right arm)   Pulse 76   Temp 97 6 °F (36 4 °C) (Tympanic)   Resp 18   Ht 5' 1" (1 549 m)   Wt 58 1 kg (128 lb 1 4 oz)   LMP  (LMP Unknown)   SpO2 98%   BMI 24 20 kg/m²   Will continue PT per POC  At end of session, pt OOB in chair in stable condition, call bell & phone in reach, chair alarm activated  Fall precautions reinforced w/ good understanding  The patient's AM-PAC Basic Mobility Inpatient Short Form Raw Score is 18, Standardized Score is 41 05  A standardized score less than 42 9 suggests the patient may benefit from discharge to home w/ home care services  Please also refer to the recommendation of the Physical Therapist for safe discharge planning  From PT standpoint, will recommend return to custodial w/ PT when medically cleared  CM to follow  Nsg staff to continue to mobilized pt (OOB in chair for all meals & ambulate in room/unit) as tolerated to prevent decline in function  Nsg notified  Please refer to cardiology re: sling & LUE WBS  Goals   Patient Goals to go home   STG Expiration Date 02/20/21   PT Treatment Day 1   Plan   Treatment/Interventions Functional transfer training;LE strengthening/ROM; Therapeutic exercise; Endurance training;Patient/family training;Bed mobility;Gait training;Spoke to nursing;OT;Spoke to case management   Progress Progressing toward goals   PT Frequency Other (Comment)  (3-5x/wk) Recommendation   PT Discharge Recommendation Home with skilled therapy; Return to previous environment with social support  (return to [facility] w/ PT)   AM-PAC Basic Mobility Inpatient   Turning in Bed Without Bedrails 3   Lying on Back to Sitting on Edge of Flat Bed 3   Moving Bed to Chair 3   Standing Up From Chair 3   Walk in Room 3   Climb 3-5 Stairs 3   Basic Mobility Inpatient Raw Score 18   Basic Mobility Standardized Score 41 05   Jerry Mazariegos, PT

## 2021-02-14 PROBLEM — I44.1 HEART BLOCK AV SECOND DEGREE: Status: RESOLVED | Noted: 2021-02-09 | Resolved: 2021-02-14

## 2021-02-14 PROBLEM — R79.89 ELEVATED TROPONIN LEVEL: Status: RESOLVED | Noted: 2021-02-09 | Resolved: 2021-02-14

## 2021-02-14 PROBLEM — I49.9 DYSRHYTHMIAS: Status: RESOLVED | Noted: 2021-02-09 | Resolved: 2021-02-14

## 2021-02-14 PROBLEM — I16.0 HYPERTENSIVE URGENCY: Status: RESOLVED | Noted: 2021-02-09 | Resolved: 2021-02-14

## 2021-02-14 PROBLEM — Z95.0 PACEMAKER: Status: ACTIVE | Noted: 2021-02-11

## 2021-02-14 PROBLEM — R77.8 ELEVATED TROPONIN LEVEL: Status: RESOLVED | Noted: 2021-02-09 | Resolved: 2021-02-14

## 2021-02-15 ENCOUNTER — TRANSITIONAL CARE MANAGEMENT (OUTPATIENT)
Dept: FAMILY MEDICINE CLINIC | Facility: CLINIC | Age: 86
End: 2021-02-15

## 2021-02-22 ENCOUNTER — OFFICE VISIT (OUTPATIENT)
Dept: FAMILY MEDICINE CLINIC | Facility: CLINIC | Age: 86
End: 2021-02-22
Payer: MEDICARE

## 2021-02-22 VITALS
BODY MASS INDEX: 23.03 KG/M2 | SYSTOLIC BLOOD PRESSURE: 146 MMHG | OXYGEN SATURATION: 97 % | HEIGHT: 61 IN | DIASTOLIC BLOOD PRESSURE: 78 MMHG | TEMPERATURE: 97.5 F | WEIGHT: 122 LBS | HEART RATE: 74 BPM

## 2021-02-22 DIAGNOSIS — F41.9 ANXIETY: ICD-10-CM

## 2021-02-22 DIAGNOSIS — K21.9 GASTROESOPHAGEAL REFLUX DISEASE WITHOUT ESOPHAGITIS: ICD-10-CM

## 2021-02-22 DIAGNOSIS — R68.89 FORGETFULNESS: ICD-10-CM

## 2021-02-22 DIAGNOSIS — I50.31 ACUTE DIASTOLIC CONGESTIVE HEART FAILURE (HCC): ICD-10-CM

## 2021-02-22 DIAGNOSIS — I44.2 COMPLETE HEART BLOCK (HCC): ICD-10-CM

## 2021-02-22 DIAGNOSIS — Z95.0 PACEMAKER: ICD-10-CM

## 2021-02-22 DIAGNOSIS — I44.2 COMPLETE HEART BLOCK (HCC): Primary | ICD-10-CM

## 2021-02-22 DIAGNOSIS — I10 SYSTOLIC HYPERTENSION: ICD-10-CM

## 2021-02-22 PROCEDURE — 99495 TRANSJ CARE MGMT MOD F2F 14D: CPT | Performed by: FAMILY MEDICINE

## 2021-02-22 RX ORDER — AMLODIPINE BESYLATE 5 MG/1
5 TABLET ORAL EVERY MORNING
Qty: 30 TABLET | Refills: 5 | Status: SHIPPED | OUTPATIENT
Start: 2021-02-22

## 2021-02-22 RX ORDER — FAMOTIDINE 20 MG/1
20 TABLET, FILM COATED ORAL 2 TIMES DAILY PRN
Qty: 30 TABLET | Refills: 1 | Status: SHIPPED | OUTPATIENT
Start: 2021-02-22

## 2021-02-22 NOTE — PROGRESS NOTES
FAMILY PRACTICE OFFICE VISIT  [facility]  [address] [facility] [address]  [place], [place], [facility]      NAME: Lurdes Diego  AGE: 80 y o  SEX: female  : 1930   MRN: [id number]    DATE: 2021  TIME: 12:51 PM    Assessment and Plan     Problem List Items Addressed This Visit        Cardiovascular and Mediastinum    Complete heart block ([address] ) -   PPM ( Medtronic) 2021 - Primary    Acute diastolic congestive heart failure (HCC)    Relevant Medications    amLODIPine (NORVASC) 5 mg tablet       Other    Forgetfulness    Anxiety    Pacemaker ( 2021 )      Other Visit Diagnoses     Systolic hypertension        Relevant Medications    amLODIPine (NORVASC) 5 mg tablet    Gastroesophageal reflux disease without esophagitis        Relevant Medications    famotidine (PEPCID) 20 mg tablet          Patient Instructions     We reviewed her hospital stay  through , received dual-chamber pacemaker  regarding complete heart block  She is doing well  Inpatient blood work showed BUN/ creatinine to be normal at 24/0 62 with potassium 4 2, glucose 83, LFT normal, hemoglobin 13 5 to 12 1, magnesium 2 4, TSH 3 132  Her last cholesterol was few years ago at 164 with HDL 80, LDL 74  Inpatient BNP 2442  She did receive diuresis  Wt today is 122 lbs -  Back to baseline wt from   Amlodipine 5 mg daily was added for blood pressure control, blood pressure today is 146/78  Continue as is  She will be seeing Cardiology in follow-up on   She has been walking 15 minutes twice daily, can continue as is, watch for falls  _________________________________________________     Does have history forgetfulness, previous mini-mental status testing   Inpatient she did have anxiety, previously intolerant sertraline    Plan redo MMSE at follow-up visit ( declined today)      History dysphagia with abdominal discomfort, had seen Gastroenterology in the past   Has acid reflux -   Can use Famotidine / Pepcid 20 mg twice a day x 1 week then as needed  She does have an appointment with us June 11th for an annual wellness check, she will keep that visit, call us sooner if needed  Chief Complaint     Chief Complaint   Patient presents with    Transition of Care Management     TCM Call (since 1/22/2021)     Date and time call was made  2/15/2021  8:31 AM    Patient was hospitialized at  [facility]        Date of Admission  02/09/21    Date of discharge  02/12/21    Diagnosis  Bradycardia    Disposition  Assisted Living    Were the patients medications reviewed and updated  Yes      TCM Call (since 1/22/2021)     Did you obtain your prescribed medications  Yes    Do you need help managing your prescriptions or medications  No    Is transportation to your appointment needed  No    I have advised the patient to call PCP with any new or worsening symptoms  Reece Flowers, [id number] care staff    The type of support provided  Emotional; Physical    Do you have social support  Yes, as much as I need          History of Present Illness   Chito Lutz is a 80y o -year-old female who   I had seen February 9th, she was sent directly to the emergency room, complete heart block, underwent placement dual-chamber pacemaker  She did well other than some agitation/ confusion /anxiety  Amlodipine 5 mg daily was added every morning  She is in today accompanied by 1 of her daughters, she relates she has some fatigue but overall is feeling fairly well, overall continues to do well at 80years of age  Continues to reside in facility with her  who has significant dementia, she does have the ability to oversee her own medications, she has been walking 15 minutes twice daily  She notes a vague imbalance but has not fallen      She has noted occasional acid reflux, long history dysphagia with GI issues, had seen Gastroenterology in the past       Review of Systems   Review of Systems   Constitutional: Positive for fatigue  Negative for appetite change, fever and unexpected weight change (Had been diuresed gently at hospital, weight is back to the same weight as November  )  HENT: Negative for sore throat and trouble swallowing  Eyes: Negative for visual disturbance  Respiratory: Negative for cough and shortness of breath  Cardiovascular: Negative for chest pain, palpitations and leg swelling  Gastrointestinal: Negative for abdominal pain, blood in stool, nausea and vomiting  See HPI      No change in bowel    Genitourinary: Negative for dysuria and hematuria  Neurological: Negative for dizziness, syncope, light-headedness and headaches  Psychiatric/Behavioral:        See hpi     146/78  Active Problem List     Patient Active Problem List   Diagnosis    Constipation    Discoid lupus erythematosus    Dysphagia - improved    Forgetfulness    Gastric erosion, -  resolved    Left shoulder tendonitis    Osteoarthritis of knee    Peripheral neuropathy    Anxiety    Complete heart block (HCC) -   PPM ( Medtronic) Feb 2021    Abdominal discomfort    Acute diastolic congestive heart failure ([address] )    Pacemaker ( Feb 2021 )       Past Medical History:  Reviewed    Past Surgical History:  Reviewed    Family History:  Reviewed    Social History:  Reviewed    Objective     Vitals:    02/22/21 1114   BP: 146/78   BP Location: Left arm   Patient Position: Sitting   Cuff Size: Standard   Pulse: 74   Temp: 97 5 °F (36 4 °C)   SpO2: 97%   Weight: 55 3 kg (122 lb)   Height: 5' 1" (1 549 m)     Body mass index is 23 05 kg/m²      BP Readings from Last 3 Encounters:   02/22/21 146/78   02/12/21 146/79   02/09/21 164/70       Wt Readings from Last 3 Encounters:   02/22/21 55 3 kg (122 lb)   02/12/21 58 1 kg (128 lb 1 4 oz)   02/09/21 58 5 kg (129 lb)       Physical Exam  Constitutional: General: She is not in acute distress  Appearance: Normal appearance  She is well-developed  She is not ill-appearing  Comments: Pleasant 80year-old seated in chair, appears as at baseline   Eyes:      General: No scleral icterus  Cardiovascular:      Rate and Rhythm: Normal rate and regular rhythm  Heart sounds: Normal heart sounds  No murmur  Pulmonary:      Effort: Pulmonary effort is normal  No respiratory distress  Breath sounds: Normal breath sounds  Abdominal:      Palpations: Abdomen is soft  Tenderness: There is no abdominal tenderness  There is no guarding  Musculoskeletal:      Right lower leg: No edema  Left lower leg: No edema  Skin:     Coloration: Skin is not jaundiced  Neurological:      Mental Status: She is alert and oriented to person, place, and time  Comments: She declined full MMSE here today   Psychiatric:         Mood and Affect: Mood normal          Behavior: Behavior normal          ALLERGIES:  Allergies   Allergen Reactions    Gabapentin      dreams    Sertraline GI Intolerance     ' felt like a zombie'       Current Medications     Current Outpatient Medications   Medication Sig Dispense Refill    amLODIPine (NORVASC) 5 mg tablet Take 1 tablet (5 mg total) by mouth every morning 30 tablet 5    aspirin 81 MG tablet Take 81 mg by mouth daily   Cholecalciferol (VITAMIN D) 2000 units CAPS Take 1 capsule (2,000 Units total) by mouth daily  0    docusate calcium (SURFAK) 240 mg capsule Take by mouth      fish oil 1,000 mg Take 1,000 mg by mouth daily   Multiple Vitamins-Minerals (PRESERVISION AREDS PO) Take by mouth        multivitamin (THERAGRAN) TABS Take 1 tablet by mouth daily      acetaminophen (TYLENOL) 325 mg tablet Take 2 tablets (650 mg total) by mouth every 4 (four) hours as needed for mild pain 30 tablet 0    famotidine (PEPCID) 20 mg tablet Take 1 tablet (20 mg total) by mouth 2 (two) times a day as needed for heartburn (( after using twice EVERY day for first week)) 30 tablet 1    polyethylene glycol (MIRALAX) powder Take by mouth       No current facility-administered medications for this visit  No orders of the defined types were placed in this encounter          Daniela Shanks DO

## 2021-02-22 NOTE — PATIENT INSTRUCTIONS
We reviewed her hospital stay February 9th through 12th, received dual-chamber pacemaker February 9th regarding complete heart block  She is doing well  Inpatient blood work showed BUN/ creatinine to be normal at 24/0 62 with potassium 4 2, glucose 83, LFT normal, hemoglobin 13 5 to 12 1, magnesium 2 4, TSH 3 132  Her last cholesterol was few years ago at 164 with HDL 80, LDL 74  Inpatient BNP 2442  She did receive diuresis  Wt today is 122 lbs -  Back to baseline wt from 2021  Amlodipine 5 mg daily was added for blood pressure control, blood pressure today is 146/78  Continue as is  She will be seeing Cardiology in follow-up on February 26  She has been walking 15 minutes twice daily, can continue as is, watch for falls  _________________________________________________     Does have history forgetfulness, previous mini-mental status testing 26/30  Inpatient she did have anxiety, previously intolerant sertraline  Plan redo MMSE at follow-up visit ( declined today)      History dysphagia with abdominal discomfort, had seen Gastroenterology in the past   Has acid reflux -   Can use Famotidine / Pepcid 20 mg twice a day x 1 week then as needed  She does have an appointment with us June 11th for an annual wellness check, she will keep that visit, call us sooner if needed

## 2021-02-26 ENCOUNTER — IN-CLINIC DEVICE VISIT (OUTPATIENT)
Dept: CARDIOLOGY CLINIC | Facility: CLINIC | Age: 86
End: 2021-02-26

## 2021-02-26 DIAGNOSIS — Z95.0 CARDIAC PACEMAKER: Primary | ICD-10-CM

## 2021-02-26 PROCEDURE — 99024 POSTOP FOLLOW-UP VISIT: CPT

## 2021-02-26 NOTE — PROGRESS NOTES
Results for orders placed or performed in visit on 02/26/21   Cardiac EP device report    Narrative    MDT [address] INTERROGATED IN THE [place] OFFICE  TEMP 99: WOUND CHECK: INCISION CLEAN AND DRY WITH EDGES APPROXIMATED; WOUND CARE AND RESTRICTIONS REVIEWED WITH PATIENT  BATTERY VOLTAGE ADEQUATE (9 4 YRS)  AP 17%  >99% (DEPENDENT/DDDR 50)  ATRIAL CAPTURE THRESHOLD REMAINS ELEVATED SINCE IMPLANT BUT STABLE W/ADEQUATE SAFETY  ALL OTHER LEAD PARAMETERS WITHIN NORMAL LIMITS  ALL OTHER TESTING WITHIN NORMAL LIMITS  NO HIGH RATE EPISODES  NO PROGRAMMING CHANGES MADE TO DEVICE PARAMETERS  HOME REMOTE MONITORING ACTIVE  PT TO ESTABLISH CARDIAC F/U CARE WITH DR Camden Bazan (3/23/21)  PACEMAKER FUNCTIONING APPROPRIATELY    EBS

## 2021-03-23 ENCOUNTER — OFFICE VISIT (OUTPATIENT)
Dept: CARDIOLOGY CLINIC | Facility: CLINIC | Age: 86
End: 2021-03-23

## 2021-03-23 VITALS
HEART RATE: 65 BPM | WEIGHT: 125 LBS | BODY MASS INDEX: 23.6 KG/M2 | HEIGHT: 61 IN | DIASTOLIC BLOOD PRESSURE: 62 MMHG | SYSTOLIC BLOOD PRESSURE: 115 MMHG

## 2021-03-23 DIAGNOSIS — Z95.0 PACEMAKER: ICD-10-CM

## 2021-03-23 DIAGNOSIS — I44.2 COMPLETE HEART BLOCK (HCC): ICD-10-CM

## 2021-03-23 DIAGNOSIS — I50.31 ACUTE DIASTOLIC CONGESTIVE HEART FAILURE (HCC): Primary | ICD-10-CM

## 2021-03-23 PROCEDURE — 99024 POSTOP FOLLOW-UP VISIT: CPT

## 2021-03-23 NOTE — PROGRESS NOTES
Cardiology   MD Ezra Choudhary MD Tresa Pippin, DO, [address] [facility] MD Mark Loving DO, Yanni Shepherd DO, [facility] [place]  -------------------------------------------------------------------  [facility]  [address] [place] [id number] [phone]  [phone]  03/23/21  Chlee Baez  YOB: 1930   MRN: [id number]      Referring Physician: Radha Bowling DO  [address]  [address] [facility],  [address]     HPI: Chele Baez is a 80 y o  female with   1  Complete heart block, with severe underlying conduction system disease  Status post dual chamber Medtronic pacemaker placed on February 9, 2021  2  Symptomatic bradycardia   3  Palpitations  4  Anxiety  5  Nonischemic nuclear stress in 2018     she presents today for hospital follow-up  She was recently seen for symptomatic bradycardia and found to be in complete heart block  A Medtronic pacemaker was placed  Post procedure she was noted to be volume overloaded and required IV Lasix for diuresis  She has a preserved ejection fraction by echo EF is 55%, left atrium is markedly dilated at 47 milliliters/meter squared, there was mild dilation of the ascending aorta 3 9 cm    She is doing well status post pacemaker placement  Denies any chest pain shortness of breath dizziness lightheadedness syncope or presyncope paroxysmal nocturnal dyspnea or diaphoresis    Review of Systems   Constitutional: Negative for chills and fever  HENT: Negative for facial swelling and sore throat  Eyes: Negative for visual disturbance  Respiratory: Negative for cough, chest tightness, shortness of breath and wheezing  Cardiovascular: Negative for chest pain, palpitations and leg swelling  Gastrointestinal: Negative for abdominal pain, blood in stool, constipation, diarrhea, nausea and vomiting  Endocrine: Negative for cold intolerance and heat intolerance  Genitourinary: Negative for decreased urine volume, difficulty urinating, dysuria and hematuria  Musculoskeletal: Negative for arthralgias, back pain and myalgias  Skin: Negative for rash  Neurological: Negative for dizziness, syncope, weakness and numbness  Psychiatric/Behavioral: Negative for agitation, behavioral problems and confusion  The patient is not nervous/anxious  OBJECTIVE  There were no vitals filed for this visit  Physical Exam   General appearance: alert and oriented, in no acute distress  Head: Normocephalic, without obvious abnormality, atraumatic  Eyes: conjunctivae/corneas clear  Anicteric  Neck: no adenopathy, no carotid bruit, no JVD  Lungs: clear to auscultation bilaterally  Heart: regular rate and rhythm, S1, S2 normal, no murmur, no click, rub or gallop  Abdomen:  soft, non-tender; bowel sounds normal; no masses,  no organomegaly  Extremities: extremities normal, warm and well-perfused; no cyanosis, clubbing, or edema  Skin: Skin color, texture, turgor normal  No rashes or lesions     EKG:  No results found for this visit on 03/23/21       IMPRESSION:  · Complete heart block, with severe underlying conduction system disease  Status post dual chamber Medtronic pacemaker placed on February 9, 2021  · Symptomatic bradycardia   · Palpitations  · Anxiety  · Nonischemic nuclear stress in 2018    DISCUSSION/RECOMMENDATIONS:    she continues to do well status post pacemaker placement   Blood pressure is stable today   Would continue current medications    First pacemaker interrogation revealed normal function device   She is asymptomatic at this time    Plan for follow-up in 6 months    Sami Hu DO, [facility] [place]  --------------------------------------------------------------------------------  TREADMILL STRESS  No results found for this or any previous visit    ----------------------------------------------------------------------------------------------  NUCLEAR STRESS TEST: No results found for this or any previous visit  Results for orders placed during the hospital encounter of 18   NM myocardial perfusion spect (rx stress and/or rest)    Aron Maldonado 48  Jordyn Rosa 35  [facility], [address]  [phone]    Rest/Stress Gated SPECT Myocardial Perfusion Imaging After Regadenoson    Patient: Michelle Ireland  MR number: [id number]  Account number: [de-identified]  : 1930  Age: 80 years  Gender: Female  Status: Outpatient  Location: Stress lab  Height: 61 in  Weight: 126 lb  BP: 132/ 72 mmHg    Allergies: NO KNOWN ALLERGIES    Diagnosis: 794 31 - ABNORM ELECTROCARDIOGRAM, R07 9 - Chest pain, unspecified    Primary Physician:  Castillo Tinajero DO  Technician:  Allie Eden  RN:  Cherrie Santana RN  Referring Physician:  Castillo Tinajero DO  Group:  [facility]  Report Prepared By[de-identified]  Allie Eden  Interpreting Physician:  Rosa Saldivar MD    INDICATIONS: Detection of Coronary artery disease  HISTORY: The patient is a 80year old  female  Chest pain status: chest pain  Coronary artery disease risk factors: family history of premature coronary artery disease and post-menopausal state  Cardiovascular history: none  significant  Previous test results: abnormal ECG and abnormal resting echocardiogram     PHYSICAL EXAM: Baseline physical exam screening: no wheezes audible  REST ECG: Normal sinus rhythm  Left anterior fasicular block  PROCEDURE: The study was performed in the stress lab  A regadenoson infusion pharmacologic stress test was performed  Gated SPECT myocardial perfusion imaging was performed after stress  Systolic blood pressure was 132 mmHg, at the start  of the study  Diastolic blood pressure was 72 mmHg, at the start of the study  The heart rate was 74 bpm, at the start of the study  IV double checked    Regadenoson protocol:  HR bpm SBP mmHg DBP mmHg Symptoms  Baseline 74 132 72 none  Immediate 97 130 70 [place]  2 min 93 168 80 none  4 min 92 144 80 none  No medications or fluids given  The patient also performed low level exercise  STRESS SUMMARY: Duration of pharmacologic stress was 3 min  Maximal heart rate during stress was 97 bpm ( 81 % of maximal predicted heart rate)  The rate-pressure product for the peak heart rate and blood pressure was 03016  There was no  chest pain during stress  The stress test was terminated due to protocol completion  Pre oxygen saturation: 97 %  Peak oxygen saturation: 97 %  The stress ECG was negative for ischemia and normal  Arrhythmia during stress: isolated  premature ventricular beats  ISOTOPE ADMINISTRATION:  Resting isotope administration Stress isotope administration  Agent Tetrofosmin Tetrofosmin  Dose 10 4 mCi 1430 mCi  Date 06/13/2018 06/13/2018  Injection time 12:06 13:15  Imaging time 12:41 13:49  Injection-image interval 35 min 34 min    The radiopharmaceutical was injected at the peak effect of pharmacologic stress  MYOCARDIAL PERFUSION IMAGING:  The image quality was good  Left ventricular size was normal  The TID ratio was 0 87  PERFUSION DEFECTS:  -  There were no perfusion defects  GATED SPECT:  The calculated left ventricular ejection fraction was 68 %  Left ventricular ejection fraction was within normal limits by visual estimate  There was no left ventricular regional abnormality  SUMMARY:  -  Stress results: Target heart rate was not achieved  There was no chest pain during stress  -  ECG conclusions: The stress ECG was negative for ischemia and normal   -  Perfusion imaging: There were no perfusion defects   -  Gated SPECT: The calculated left ventricular ejection fraction was 68 %  Left ventricular ejection fraction was within normal limits by visual estimate  There was no left ventricular regional abnormality  IMPRESSIONS: Normal study after pharmacologic vasodilation   Myocardial perfusion imaging was normal at rest and with stress  Prepared and signed by    Sheryl Evans MD  Signed 2018 15:34:56         --------------------------------------------------------------------------------  CATH:  No results found for this or any previous visit   --------------------------------------------------------------------------------  ECHO:   Results for orders placed during the hospital encounter of 21   Echo complete with contrast if indicated    Narrative [address] [facility] [place]  Jodryn Rosa [address]  [facility] [address] [facility]  [phone]    Transthoracic Echocardiogram  Limited 2D, M-mode, Doppler, and Color Doppler    Study date:  2021    Patient: Bari Merritt  MR number: [id number]  Account number: [de-identified]  : 23-Dec-1930  Age: 80 years  Gender: Female  Status: Inpatient  Location: Emergency room  Height: 61 in  Weight: 128 7 lb  BP: 205/ 91 mmHg    Indications: Complete heart block/pre-pacemaker insertion    Diagnoses: I44 2 - Atrioventricular block, complete    Sonographer:  Shabbir Nix RDCS  Primary Physician:  Brendon Duron DO  Referring Physician:  Nitesh Cotto PA-C  Group:  [facility]  Interpreting Physician:  JAMESON Meza     SUMMARY    LEFT VENTRICLE:  Systolic function was normal by visual assessment  Ejection fraction was estimated to be 55 %  There were no regional wall motion abnormalities  Wall thickness was mildly increased  There was mild concentric hypertrophy  LEFT ATRIUM:  The atrium was markedly dilated  LA Volume index 47 mL/m2  RIGHT ATRIUM:  The atrium was moderately to markedly dilated  MITRAL VALVE:  There was trace regurgitation  AORTIC VALVE:  There was mild regurgitation  TRICUSPID VALVE:  There was moderate regurgitation  AORTA:  The root exhibited mild dilatation  There was mild dilatation of the ascending aorta to 3 9 cm      SUMMARY MEASUREMENTS  2D measurements:  Unspecified Anatomy: %FS was 46 5 %  Ao Diam was 3 6 cm   EDV(Teich) was 125 4 ml   EF(Teich) was 77 6 %  ESV(Teich) was 28 1 ml   HR_4Ch_Q was 32 4 BPM   IVSd was 1 cm  LA Diam was 3 3 cm  LAAs A2C was 21 8 cm2  LAAs A4C was 23 7  cm2  LAESV A-L A2C was 66 9 ml  LAESV A-L A4C was 81 2 ml  LAESV Index (A-L) was 47 5 ml/m2  LAESV MOD A2C was 62 5 ml  LAESV MOD A4C was 76 5 ml  LAESV(A-L) was 74 5 ml  LAESV(MOD BP) was 69 7 ml  LAESVInd MOD BP was 44 4 ml/m2  LALs A2C was 6 cm  LALs A4C was 5 9 cm  LVCO_4Ch_Q was 1 9 L/min  LVEF_4Ch_Q was 58 3 %  LVIDd was 5 1 cm  LVIDs was 2 7 cm  LVLd_4Ch_Q was 7 6 cm  LVLs_4Ch_Q was 6 cm  LVPWd was 1 1 cm  LVSV_4Ch_Q was 60 1 ml   LVVED_4Ch_Q was  103 1 ml   LVVES_4Ch_Q was 43 ml  RAAs A4C was 18 8 cm2  RAESV A-L was 55 9 ml   RAESV MOD was 53 8 ml  RALs was 5 4 cm  RVIDd was 3 9 cm   SV(Teich) was 97 3 ml   CW measurements:  Unspecified Anatomy:   AR Dec [place] was 1 4 m/s2  AR Dec Time was 3519 3 ms  AR PHT was 1020 6 ms  AR Vmax was 5 1 m/s  AR maxPG was 102 9 mmHg  AV Vmax was 1 9 m/s  AV maxPG was 14 6 mmHg  TR Vmax was 3 5 m/s   TR maxPG was 49 7  mmHg  MM measurements:  Unspecified Anatomy:   TAPSE was 3 cm  PW measurements:  Unspecified Anatomy:   LVOT Vmax was 1 5 m/s  LVOT maxPG was 8 6 mmHg  MV A Riaz was 1 1 m/s  MV Dec [place] was 5 3 m/s2  MV DecT was 191 3 ms   MV E Riaz was 1 m/s  MV E/A Ratio was 0 9   MV PHT was 55 5 ms  MVA By PHT was 4 cm2  HISTORY: PRIOR HISTORY: Second-degree AV block, HTN    PROCEDURE: The procedure was performed in the emergency room  This was a routine study  The transthoracic approach was used  The study included limited 2D imaging, M-mode, limited spectral Doppler, and color Doppler  Image quality was  adequate  LEFT VENTRICLE: Size was normal  Systolic function was normal by visual assessment  Ejection fraction was estimated to be 55 %  There were no regional wall motion abnormalities   Wall thickness was mildly increased  There was mild  concentric hypertrophy  DOPPLER: There was an increased relative contribution of atrial contraction to ventricular filling  RIGHT VENTRICLE: The size was normal  Systolic function was normal  Wall thickness was normal     LEFT ATRIUM: The atrium was markedly dilated  LA Volume index 47 mL/m2  RIGHT ATRIUM: The atrium was moderately to markedly dilated  MITRAL VALVE: Valve structure was normal  There was mild-moderate calcification of the anterior leaflet  There was normal leaflet separation  DOPPLER: The transmitral velocity was within the normal range  There was no evidence for  stenosis  There was trace regurgitation  AORTIC VALVE: The valve was trileaflet  Leaflets exhibited normal thickness, mild calcification, and normal cuspal separation  DOPPLER: Transaortic velocity was within the normal range  There was no evidence for stenosis  There was mild  regurgitation  TRICUSPID VALVE: The valve structure was normal  There was normal leaflet separation  DOPPLER: The transtricuspid velocity was within the normal range  There was no evidence for stenosis  There was moderate regurgitation  Estimated peak PA  pressure was 53 mmHg  The findings suggest mild to moderate pulmonary hypertension  PULMONIC VALVE: Leaflets exhibited normal thickness, no calcification, and normal cuspal separation  DOPPLER: The transpulmonic velocity was within the normal range  There was no regurgitation  PERICARDIUM: There was no pericardial effusion  The pericardium was normal in appearance  AORTA: The root exhibited mild dilatation  There was mild dilatation of the ascending aorta to 3 9 cm  SYSTEMIC VEINS: IVC: The inferior vena cava was normal in size and course   Respirophasic changes were normal     SYSTEM MEASUREMENT TABLES    2D  %FS: 46 5 %  Ao Diam: 3 6 cm  EDV(Teich): 125 4 ml  EF(Teich): 77 6 %  ESV(Teich): 28 1 ml  HR_4Ch_Q: 32 4 BPM  IVSd: 1 cm  LA Diam: 3 3 cm  LAAs A2C: 21 8 cm2  LAAs A4C: 23 7 cm2  LAESV A-L A2C: 66 9 ml  LAESV A-L A4C: 81 2 ml  LAESV Index (A-L): 47 5 ml/m2  LAESV MOD A2C: 62 5 ml  LAESV MOD A4C: 76 5 ml  LAESV(A-L): 74 5 ml  LAESV(MOD BP): 69 7 ml  LAESVInd MOD BP: 44 4 ml/m2  LALs A2C: 6 cm  LALs A4C: 5 9 cm  LVCO_4Ch_Q: 1 9 L/min  LVEF_4Ch_Q: 58 3 %  LVIDd: 5 1 cm  LVIDs: 2 7 cm  LVLd_4Ch_Q: 7 6 cm  LVLs_4Ch_Q: 6 cm  LVPWd: 1 1 cm  LVSV_4Ch_Q: 60 1 ml  LVVED_4Ch_Q: 103 1 ml  LVVES_4Ch_Q: 43 ml  RAAs A4C: 18 8 cm2  RAESV A-L: 55 9 ml  RAESV MOD: 53 8 ml  RALs: 5 4 cm  RVIDd: 3 9 cm  SV(Teich): 97 3 ml    CW  AR Dec [place]: 1 4 m/s2  AR Dec Time: 3519 3 ms  AR PHT: 1020 6 ms  AR Vmax: 5 1 m/s  AR maxP 9 mmHg  AV Vmax: 1 9 m/s  AV maxP 6 mmHg  TR Vmax: 3 5 m/s  TR maxP 7 mmHg    MM  TAPSE: 3 cm    PW  LVOT Vmax: 1 5 m/s  LVOT maxP 6 mmHg  MV A Riaz: 1 1 m/s  MV Dec [place]: 5 3 m/s2  MV DecT: 191 3 ms  MV E Riaz: 1 m/s  MV E/A Ratio: 0 9  MV PHT: 55 5 ms  MVA By PHT: 4 cm2    Inters\[facility] [place] [facility]\"" Commission Accredited Echocardiography Laboratory    Prepared and electronically signed by    JAMESON Finch  Signed 2021 16:19:18       No results found for this or any previous visit   --------------------------------------------------------------------------------  HOLTER  No results found for this or any previous visit    No results found for this or any previous visit   --------------------------------------------------------------------------------  CAROTIDS  No results found for this or any previous visit    --------------------------------------------------------------------------------  There are no diagnoses linked to this encounter    ======================================================    Past Medical History:   Diagnosis Date    Aortic regurgitation     Constipation     Discoid lupus erythematosus     Dysphagia     Esophagitis     Forgetfulness     Heart block AV second degree 2021    Transient elevated blood pressure     last assessed: 5/16/2017    Weight loss      Past Surgical History:   Procedure Laterality Date    APPENDECTOMY      CATARACT EXTRACTION      CHOLECYSTECTOMY      ESOPHAGOGASTRODUODENOSCOPY  06/2013    diagnostic- neg id have dilatation    EYE SURGERY      HYSTERECTOMY      PA ESOPHAGOGASTRODUODENOSCOPY TRANSORAL DIAGNOSTIC N/A 9/16/2016    Procedure: ESOPHAGOGASTRODUODENOSCOPY (EGD); Surgeon: Clotilde Navas MD;  Location: [facility]; Service: Gastroenterology    REPLACEMENT TOTAL KNEE Left 01/2007         Medications  Current Outpatient Medications   Medication Sig Dispense Refill    acetaminophen (TYLENOL) 325 mg tablet Take 2 tablets (650 mg total) by mouth every 4 (four) hours as needed for mild pain 30 tablet 0    amLODIPine (NORVASC) 5 mg tablet Take 1 tablet (5 mg total) by mouth every morning 30 tablet 5    aspirin 81 MG tablet Take 81 mg by mouth daily   Cholecalciferol (VITAMIN D) 2000 units CAPS Take 1 capsule (2,000 Units total) by mouth daily  0    docusate calcium (SURFAK) 240 mg capsule Take by mouth      famotidine (PEPCID) 20 mg tablet Take 1 tablet (20 mg total) by mouth 2 (two) times a day as needed for heartburn (( after using twice EVERY day for first week)) 30 tablet 1    fish oil 1,000 mg Take 1,000 mg by mouth daily   Multiple Vitamins-Minerals (PRESERVISION AREDS PO) Take by mouth   multivitamin (THERAGRAN) TABS Take 1 tablet by mouth daily      polyethylene glycol (MIRALAX) powder Take by mouth       No current facility-administered medications for this visit           Allergies   Allergen Reactions    Gabapentin      dreams    Sertraline GI Intolerance     ' felt like a zombie'       Social History     Socioeconomic History    Marital status: /Civil Union     Spouse name: Not on file    Number of children: Not on file    Years of education: Not on file    Highest education level: Not on file   Occupational History    Not on file   Social Needs    Financial resource strain: Not on file    Food insecurity     Worry: Not on file     Inability: Not on file    Transportation needs     Medical: Not on file     Non-medical: Not on file   Tobacco Use    Smoking status: Never Smoker    Smokeless tobacco: Never Used   Substance and Sexual Activity    Alcohol use: No    Drug use: No    Sexual activity: Not on file   Lifestyle    Physical activity     Days per week: Not on file     Minutes per session: Not on file    Stress: Not on file   Relationships    Social connections     Talks on phone: Not on file     Gets together: Not on file     Attends Zoroastrian service: Not on file     Active member of club or organization: Not on file     Attends meetings of clubs or organizations: Not on file     Relationship status: Not on file    Intimate partner violence     Fear of current or ex partner: Not on file     Emotionally abused: Not on file     Physically abused: Not on file     Forced sexual activity: Not on file   Other Topics Concern    Not on file   Social History Narrative    Not on file        Family History   Problem Relation Age of Onset    Diabetes Mother     Coronary artery disease Father        Lab Results   Component Value Date    WBC 10 04 02/10/2021    HGB 12 1 02/10/2021    HCT 38 5 02/10/2021    MCV 95 02/10/2021     02/10/2021      Lab Results   Component Value Date    SODIUM 138 02/12/2021    K 4 2 02/12/2021     02/12/2021    CO2 22 02/12/2021    BUN 24 02/12/2021    CREATININE 0 62 02/12/2021    GLUC 83 02/12/2021    CALCIUM 8 7 02/12/2021      No results found for: HGBA1C   Lab Results   Component Value Date    CHOL 159 11/26/2013     Lab Results   Component Value Date    HDL 80 (H) 07/11/2017    HDL 69 11/26/2013     Lab Results   Component Value Date    LDLCALC 74 07/11/2017    LDLCALC 78 11/26/2013     Lab Results   Component Value Date    TRIG 48 07/11/2017    TRIG 61 11/26/2013     No results found for: CHOLHDL   Lab Results   Component Value Date    INR 1 21 (H) 02/09/2021    PROTIME 15 1 (H) 02/09/2021          Patient Active Problem List    Diagnosis Date Noted    Pacemaker ( Feb 2021 ) 02/11/2021    Acute diastolic congestive heart failure ([address] ) 02/10/2021    Complete heart block (HCC) -   PPM ( Medtronic) Feb 2021 02/09/2021    Abdominal discomfort 02/09/2021    Anxiety 08/14/2018    Gastric erosion, -  resolved 09/16/2016    Peripheral neuropathy 08/29/2016    Constipation 06/21/2016    Forgetfulness 06/21/2016    Left shoulder tendonitis 06/21/2016    Dysphagia - improved 06/16/2015    Discoid lupus erythematosus 06/01/2012    Osteoarthritis of knee 06/01/2012       Portions of the record may have been created with voice recognition software  Occasional wrong word or "sound a like" substitutions may have occurred due to the inherent limitations of voice recognition software  Read the chart carefully and recognize, using context, where substitutions have occurred          Judi Angeles DO, [facility]  3/23/2021 1:03 PM

## 2021-04-27 ENCOUNTER — TELEPHONE (OUTPATIENT)
Dept: FAMILY MEDICINE CLINIC | Facility: CLINIC | Age: 86
End: 2021-04-27

## 2021-05-28 ENCOUNTER — IN-CLINIC DEVICE VISIT (OUTPATIENT)
Dept: CARDIOLOGY CLINIC | Facility: CLINIC | Age: 86
End: 2021-05-28
Payer: MEDICARE

## 2021-05-28 DIAGNOSIS — Z95.0 PRESENCE OF CARDIAC PACEMAKER: Primary | ICD-10-CM

## 2021-05-28 PROCEDURE — 93280 PM DEVICE PROGR EVAL DUAL: CPT

## 2021-05-28 NOTE — PROGRESS NOTES
Results for orders placed or performed in visit on 05/28/21   Cardiac EP device report    Narrative    MD[facility] - [address] [place] [address]  INTERROGATED IN THE [facility] OFFICE  BATTERY VOLTAGE ADEQUATE (12 1 YRS)  AP: 22 4%  : 99 9% (>40%~CHB)  ALL LEAD PARAMETERS WITHIN NORMAL LIMITS  1 VT MONITORED EPISODE W/ EGRM SHOWING NSVT 9 BEATS @ 171 BPM  PT TAKES ASA 81MG, NO BB  EF: 55% (ECHO 02/09/21)  NO PROGRAMMING CHANGES MADE TO DEVICE PARAMETERS  PACEMAKER FUNCTIONING APPROPRIATELY    [address] [place] [address]

## 2021-07-20 ENCOUNTER — TELEPHONE (OUTPATIENT)
Dept: FAMILY MEDICINE CLINIC | Facility: CLINIC | Age: 86
End: 2021-07-20

## 2021-08-10 ENCOUNTER — OFFICE VISIT (OUTPATIENT)
Dept: FAMILY MEDICINE CLINIC | Facility: CLINIC | Age: 86
End: 2021-08-10
Payer: MEDICARE

## 2021-08-10 VITALS
HEIGHT: 61 IN | BODY MASS INDEX: 25.49 KG/M2 | DIASTOLIC BLOOD PRESSURE: 70 MMHG | HEART RATE: 68 BPM | OXYGEN SATURATION: 97 % | WEIGHT: 135 LBS | SYSTOLIC BLOOD PRESSURE: 130 MMHG | TEMPERATURE: 97.7 F

## 2021-08-10 DIAGNOSIS — Z95.0 PACEMAKER: ICD-10-CM

## 2021-08-10 DIAGNOSIS — I44.2 COMPLETE HEART BLOCK (HCC): ICD-10-CM

## 2021-08-10 DIAGNOSIS — Z00.00 MEDICARE ANNUAL WELLNESS VISIT, SUBSEQUENT: Primary | ICD-10-CM

## 2021-08-10 DIAGNOSIS — M25.512 CHRONIC LEFT SHOULDER PAIN: ICD-10-CM

## 2021-08-10 DIAGNOSIS — L93.0 DISCOID LUPUS ERYTHEMATOSUS: ICD-10-CM

## 2021-08-10 DIAGNOSIS — M67.912 ROTATOR CUFF DISORDER, LEFT: ICD-10-CM

## 2021-08-10 DIAGNOSIS — G89.29 CHRONIC LEFT SHOULDER PAIN: ICD-10-CM

## 2021-08-10 DIAGNOSIS — R32 URINARY INCONTINENCE, UNSPECIFIED TYPE: ICD-10-CM

## 2021-08-10 DIAGNOSIS — K59.00 CONSTIPATION, UNSPECIFIED CONSTIPATION TYPE: ICD-10-CM

## 2021-08-10 PROBLEM — I50.31 ACUTE DIASTOLIC CONGESTIVE HEART FAILURE (HCC): Status: RESOLVED | Noted: 2021-02-10 | Resolved: 2021-08-10

## 2021-08-10 PROBLEM — R10.9 ABDOMINAL DISCOMFORT: Status: RESOLVED | Noted: 2021-02-09 | Resolved: 2021-08-10

## 2021-08-10 LAB
BILIRUB UR QL STRIP: NEGATIVE
CLARITY UR: CLEAR
COLOR UR: NORMAL
GLUCOSE UR STRIP-MCNC: NEGATIVE MG/DL
HGB UR QL STRIP.AUTO: NEGATIVE
KETONES UR STRIP-MCNC: NEGATIVE MG/DL
LEUKOCYTE ESTERASE UR QL STRIP: NEGATIVE
NITRITE UR QL STRIP: NEGATIVE
PH UR STRIP.AUTO: 6 [PH]
PROT UR STRIP-MCNC: NEGATIVE MG/DL
SP GR UR STRIP.AUTO: 1.02 (ref 1–1.03)
UROBILINOGEN UR QL STRIP.AUTO: 0.2 E.U./DL

## 2021-08-10 PROCEDURE — 81003 URINALYSIS AUTO W/O SCOPE: CPT | Performed by: FAMILY MEDICINE

## 2021-08-10 PROCEDURE — 1123F ACP DISCUSS/DSCN MKR DOCD: CPT | Performed by: FAMILY MEDICINE

## 2021-08-10 PROCEDURE — G0439 PPPS, SUBSEQ VISIT: HCPCS | Performed by: FAMILY MEDICINE

## 2021-08-10 PROCEDURE — 87086 URINE CULTURE/COLONY COUNT: CPT | Performed by: FAMILY MEDICINE

## 2021-08-10 PROCEDURE — 99214 OFFICE O/P EST MOD 30 MIN: CPT | Performed by: FAMILY MEDICINE

## 2021-08-10 NOTE — PROGRESS NOTES
BMI Counseling: Body mass index is 25 51 kg/m²  The BMI is above normal  Nutrition recommendations include encouraging healthy choices of fruits and vegetables  [place] HODA  [facility]  [address]  [address]  [facility], [address]     MEDICARE SUBSEQUENT VISIT NOTE ( part 1 )      NAME: Olivia Crowley  AGE: 80 y o  SEX: female  : 1930   MRN: [id number]    DATE: 8/10/2021  TIME: 4:50 PM    Assessment and Plan     Problem List Items Addressed This Visit        Cardiovascular and Mediastinum    Complete heart block ([address] ) -   PPM ( Medtronic) 2021       Musculoskeletal and Integument    Discoid lupus erythematosus    Left shoulder tendonitis       Other    Constipation    Pacemaker ( 2021 )    Urinary incontinence    Relevant Orders    Urinalysis with microscopic    Urine culture    UA (URINE) with reflex to Scope      Other Visit Diagnoses     Medicare annual wellness visit, subsequent    -  Primary    Chronic left shoulder pain w/ arthritis        Relevant Orders    Ambulatory referral to Orthopedic Surgery    BMI 25 0-25 9,adult              Medicare Wellness Counseling/ Discussion    Patient Instructions     Reviewed health history along with medications  Overall healthy 80year-old  She has noted significant left shoulder pain, atraumatic  Had noted improvement with 4 weeks of physical therapy but now with very significant restriction range of motion along with pain, check x-ray left shoulder, I would like her to re-evaluate with her orthopedic group, sees [facility]RIS  She is status post pacemaker back in February, doing well in that regard, no lightheadedness  She will continue to see Cardiology, has gained about 10 lb, that does not appear to be fluid, she feels it is from dietary changes  BMI 25, try not to gain further weight  Continue with amlodipine 5 mg daily, aspirin 81 mg daily as is      She does continue to see Dermatology yearly regarding discoid lupus  Does have chronic constipation, can continue with stool softener, also can use Metamucil daily  Uses Pepcid rarely  Does have urinary incontinence, I would like her to start with a clean-catch urinalysis along with culture  If that is negative we could try medication such as Myrbetriq or VESIcare, we would need to watch for constipation, dry mouth  Back in February CBC, CMP, TSH were fine, we will plan to do CMP along with lipids plus CBC at follow-up  We will see her again in 6 months, sooner if needed      Immunization History   Administered Date(s) Administered    INFLUENZA 11/01/2005, 10/03/2012, 09/20/2017, 10/25/2018    Influenza Split High Dose Preservative Free IM 09/18/2014, 09/01/2016    Influenza, high dose seasonal 0 7 mL 09/20/2019    Influenza, seasonal, injectable 11/12/2008, 10/01/2012, 09/18/2013    Pneumococcal Polysaccharide PPV23 04/29/1991, 07/26/2001, 06/17/2014    SARS-CoV-2 / COVID-19 mRNA IM ([facility]) 01/18/2021, 02/15/2021    Tdap 09/16/2010       Discussed Vaccines,   She does do yearly Flu shot  Tdap/tetanus shot will be done at a future date  (done every 10 yrs for superficial cuts, every 5 yrs for deep wounds)   Can also look into coverage for new shingles shot, Shingrix  Can do that at pharmacy  Was never a smoker     We discussed end of life planning, she does have a  "LIVING WILL"     Glaucoma screening is up-to-date    We will see her back in 6 months, sooner as needed  Chief Complaint     Chief Complaint   Patient presents with    Medicare Wellness Visit       History of Present Illness     Unknown Dai is a 80y o -year-old female who is in today for an annual wellness check/regular follow-up    Overall she is feeling about the same as at baseline other than she has increased left shoulder pain again, had done about 4 weeks of therapy with some benefit but has significant pain with marked decrease range of motion left shoulder  No new fall, no trauma  She did undergo pacemaker back in February, she has had no lightheadedness, chest pain or increased shortness of breath  She does note urinary incontinence at times, no burning  Does have chronic constipation, does use stool softener, also uses Metamucil every day but does not want me to place this on her med list as her facility will not allow her to self administer, she is very competent to self administer her own medication  She continues to deal with her 's long-term dementia    Review of Systems     Review of Systems   Constitutional: Positive for fatigue (Chronic stable)  Negative for appetite change, fever and unexpected weight change  HENT: Negative for sore throat and trouble swallowing  Respiratory: Negative for cough, chest tightness and shortness of breath  Cardiovascular: Negative for chest pain, palpitations and leg swelling  Gastrointestinal: Positive for constipation  Negative for abdominal pain, blood in stool, nausea and vomiting  No acid reflux     No change in bowel   Genitourinary: Positive for frequency  Negative for dysuria and hematuria  Neurological: Negative for dizziness, syncope, light-headedness and headaches  Psychiatric/Behavioral: Negative for behavioral problems and confusion         Active Problem List     Patient Active Problem List   Diagnosis    Constipation    Discoid lupus erythematosus    Dysphagia - improved    Forgetfulness    Gastric erosion, -  resolved    Left shoulder tendonitis    Osteoarthritis of knee    Peripheral neuropathy    Anxiety    Complete heart block (HCC) -   PPM ( Medtronic) Feb 2021    Pacemaker ( Feb 2021 )    Urinary incontinence       Past Medical History:  Past Medical History:   Diagnosis Date    Aortic regurgitation     Constipation     Discoid lupus erythematosus     Dysphagia     Esophagitis     Forgetfulness     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    Transient elevated blood pressure     last assessed: 5/16/2017    Weight loss        Past Surgical History:  Past Surgical History:   Procedure Laterality Date    APPENDECTOMY      CATARACT EXTRACTION      CHOLECYSTECTOMY      ESOPHAGOGASTRODUODENOSCOPY  06/2013    diagnostic- neg id have dilatation    EYE SURGERY      HYSTERECTOMY      TX ESOPHAGOGASTRODUODENOSCOPY TRANSORAL DIAGNOSTIC N/A 9/16/2016    Procedure: ESOPHAGOGASTRODUODENOSCOPY (EGD); Surgeon: Garret Ozuna MD;  Location: [facility]; Service: Gastroenterology    REPLACEMENT TOTAL KNEE Left 01/2007       Family History:  Family History   Problem Relation Age of Onset    Diabetes Mother     Coronary artery disease Father        Social History:  Social History     Tobacco Use    Smoking status: Never Smoker    Smokeless tobacco: Never Used   Substance Use Topics    Alcohol use: No       Objective     Vitals:    08/10/21 0956   BP: 130/70   BP Location: Right arm   Patient Position: Sitting   Cuff Size: Standard   Pulse: 68   Temp: 97 7 °F (36 5 °C)   SpO2: 97%   Weight: 61 2 kg (135 lb)   Height: 5' 1" (1 549 m)     Body mass index is 25 51 kg/m²  BP Readings from Last 3 Encounters:   08/10/21 130/70   03/23/21 115/62   02/22/21 146/78       Wt Readings from Last 3 Encounters:   08/10/21 61 2 kg (135 lb)   03/23/21 56 7 kg (125 lb)   02/22/21 55 3 kg (122 lb)       Physical Exam  Constitutional:       Appearance: Normal appearance  She is well-developed  Comments: Very healthy sharp 19-year-old, does have significant decrease range of motion left shoulder all planes due to pain  Eyes:      General: No scleral icterus  Cardiovascular:      Rate and Rhythm: Normal rate and regular rhythm  Heart sounds: Normal heart sounds  Pulmonary:      Effort: Pulmonary effort is normal  No respiratory distress  Breath sounds: Normal breath sounds  No wheezing, rhonchi or rales     Abdominal:      Palpations: Abdomen is soft  Tenderness: There is no abdominal tenderness  Musculoskeletal:      Right lower leg: No edema  Left lower leg: No edema  Lymphadenopathy:      Cervical: No cervical adenopathy  Skin:     Coloration: Skin is not jaundiced  Neurological:      Mental Status: She is alert and oriented to person, place, and time  Psychiatric:         Mood and Affect: Mood normal          Behavior: Behavior normal          ALLERGIES:  Allergies   Allergen Reactions    Gabapentin      dreams    Sertraline GI Intolerance     ' felt like a zombie'       Current Medications     Current Outpatient Medications   Medication Sig Dispense Refill    acetaminophen (TYLENOL) 325 mg tablet Take 2 tablets (650 mg total) by mouth every 4 (four) hours as needed for mild pain 30 tablet 0    amLODIPine (NORVASC) 5 mg tablet Take 1 tablet (5 mg total) by mouth every morning 30 tablet 5    aspirin 81 MG tablet Take 81 mg by mouth daily   Cholecalciferol (VITAMIN D) 2000 units CAPS Take 1 capsule (2,000 Units total) by mouth daily  0    docusate calcium (SURFAK) 240 mg capsule Take by mouth      famotidine (PEPCID) 20 mg tablet Take 1 tablet (20 mg total) by mouth 2 (two) times a day as needed for heartburn (( after using twice EVERY day for first week)) (Patient taking differently: Take 20 mg by mouth 2 (two) times a day as needed for heartburn ) 30 tablet 1    fish oil 1,000 mg Take 1,000 mg by mouth daily   Multiple Vitamins-Minerals (PRESERVISION AREDS PO) Take by mouth   multivitamin (THERAGRAN) TABS Take 1 tablet by mouth daily      psyllium (METAMUCIL) 58 6 % powder Take 1 packet by mouth 3 (three) times a day       No current facility-administered medications for this visit           Health Maintenance     See other note today re clinical AWV info             Most recent labs available from [address] [place] [address]   ( others may be available in [facility] / Media sections)  Lab Results Component Value Date    WBC 10 04 02/10/2021    HGB 12 1 02/10/2021    HCT 38 5 02/10/2021     02/10/2021    CHOL 159 11/26/2013    TRIG 48 07/11/2017    HDL 80 (H) 07/11/2017    ALT 72 02/11/2021    AST 31 02/11/2021     12/22/2015    K 4 2 02/12/2021     02/12/2021    CREATININE 0 62 02/12/2021    BUN 24 02/12/2021    CO2 22 02/12/2021    INR 1 21 (H) 02/09/2021    GLUF 93 06/18/2019       Orders Placed This Encounter   Procedures    Urine culture    Urinalysis with microscopic    UA (URINE) with reflex to Scope    Ambulatory referral to [facility], DO

## 2021-08-10 NOTE — PATIENT INSTRUCTIONS
Reviewed health history along with medications  Overall healthy 80year-old  She has noted significant left shoulder pain, atraumatic  Had noted improvement with 4 weeks of physical therapy but now with very significant restriction range of motion along with pain, check x-ray left shoulder, I would like her to re-evaluate with her orthopedic group, sees [facility]  She is status post pacemaker back in February, doing well in that regard, no lightheadedness  She will continue to see Cardiology, has gained about 10 lb, that does not appear to be fluid, she feels it is from dietary changes  BMI 25, try not to gain further weight  Continue with amlodipine 5 mg daily, aspirin 81 mg daily as is  She does continue to see Dermatology yearly regarding discoid lupus  Does have chronic constipation, can continue with stool softener, also can use Metamucil daily  Uses Pepcid rarely  Does have urinary incontinence, I would like her to start with a clean-catch urinalysis along with culture  If that is negative we could try medication such as Myrbetriq or VESIcare, we would need to watch for constipation, dry mouth  Back in February CBC, CMP, TSH were fine, we will plan to do CMP along with lipids plus CBC at follow-up  We will see her again in 6 months, sooner if needed      Immunization History   Administered Date(s) Administered    INFLUENZA 11/01/2005, 10/03/2012, 09/20/2017, 10/25/2018    Influenza Split High Dose Preservative Free IM 09/18/2014, 09/01/2016    Influenza, high dose seasonal 0 7 mL 09/20/2019    Influenza, seasonal, injectable 11/12/2008, 10/01/2012, 09/18/2013    Pneumococcal Polysaccharide PPV23 04/29/1991, 07/26/2001, 06/17/2014    SARS-CoV-2 / COVID-19 mRNA IM ([facility]) 01/18/2021, 02/15/2021    Tdap 09/16/2010       Discussed Vaccines,   She does do yearly Flu shot     Tdap/tetanus shot will be done at a future date  (done every 10 yrs for superficial cuts, every 5 yrs for deep wounds)   Can also look into coverage for new shingles shot, Shingrix  Can do that at pharmacy  Was never a smoker     We discussed end of life planning, she does have a  "LIVING WILL"     Glaucoma screening is up-to-date    We will see her back in 6 months, sooner as needed

## 2021-08-10 NOTE — PROGRESS NOTES
Assessment and Plan:     Problem List Items Addressed This Visit        Cardiovascular and Mediastinum    Complete heart block (HCC) -   PPM ( Medtronic) Feb 2021       Musculoskeletal and Integument    Discoid lupus erythematosus    Left shoulder tendonitis       Other    Constipation    Pacemaker ( Feb 2021 )    Urinary incontinence    Relevant Orders    Urinalysis with microscopic    Urine culture    UA (URINE) with reflex to Scope      Other Visit Diagnoses     Medicare annual wellness visit, subsequent    -  Primary    Chronic left shoulder pain w/ arthritis        Relevant Orders    Ambulatory referral to Orthopedic Surgery    BMI 25 0-25 9,adult               Preventive health issues were discussed with patient, and age appropriate screening tests were ordered as noted in patient's After Visit Summary  Personalized health advice and appropriate referrals for health education or preventive services given if needed, as noted in patient's After Visit Summary      See other note today regarding provider information       History of Present Illness:     Patient presents for Medicare Annual Wellness visit    Patient Care Team:  Rina Bonilla DO as PCP - ASHWINI Molina MD     Problem List:     Patient Active Problem List   Diagnosis    Constipation    Discoid lupus erythematosus    Dysphagia - improved    Forgetfulness    Gastric erosion, -  resolved    Left shoulder tendonitis    Osteoarthritis of knee    Peripheral neuropathy    Anxiety    Complete heart block (HCC) -   PPM ( Medtronic) Feb 2021    Pacemaker ( Feb 2021 )    Urinary incontinence      Past Medical and Surgical History:     Past Medical History:   Diagnosis Date    Aortic regurgitation     Constipation     Discoid lupus erythematosus     Dysphagia     Esophagitis     Forgetfulness     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    Transient elevated blood pressure     last assessed: 5/16/2017    Weight loss Past Surgical History:   Procedure Laterality Date    APPENDECTOMY      CATARACT EXTRACTION      CHOLECYSTECTOMY      ESOPHAGOGASTRODUODENOSCOPY  06/2013    diagnostic- neg id have dilatation    EYE SURGERY      HYSTERECTOMY      AK ESOPHAGOGASTRODUODENOSCOPY TRANSORAL DIAGNOSTIC N/A 9/16/2016    Procedure: ESOPHAGOGASTRODUODENOSCOPY (EGD); Surgeon: Juan Miguel Bright MD;  Location:  GI LAB; Service: Gastroenterology    REPLACEMENT TOTAL KNEE Left 01/2007      Family History:     Family History   Problem Relation Age of Onset    Diabetes Mother     Coronary artery disease Father       Social History:     Social History     Socioeconomic History    Marital status: /Civil Union     Spouse name: None    Number of children: None    Years of education: None    Highest education level: None   Occupational History    None   Tobacco Use    Smoking status: Never Smoker    Smokeless tobacco: Never Used   Substance and Sexual Activity    Alcohol use: No    Drug use: No    Sexual activity: None   Other Topics Concern    None   Social History Narrative    None     Social Determinants of Health     Financial Resource Strain:     Difficulty of Paying Living Expenses:    Food Insecurity:     Worried About Running Out of Food in the Last Year:     Ran Out of Food in the Last Year:    Transportation Needs:     Lack of Transportation (Medical):      Lack of Transportation (Non-Medical):    Physical Activity:     Days of Exercise per Week:     Minutes of Exercise per Session:    Stress:     Feeling of Stress :    Social Connections:     Frequency of Communication with Friends and Family:     Frequency of Social Gatherings with Friends and Family:     Attends Hinduism Services:     Active Member of Clubs or Organizations:     Attends Club or Organization Meetings:     Marital Status:    Intimate Partner Violence:     Fear of Current or Ex-Partner:     Emotionally Abused:     Physically Abused:     Sexually Abused:       Medications and Allergies:     Current Outpatient Medications   Medication Sig Dispense Refill    acetaminophen (TYLENOL) 325 mg tablet Take 2 tablets (650 mg total) by mouth every 4 (four) hours as needed for mild pain 30 tablet 0    amLODIPine (NORVASC) 5 mg tablet Take 1 tablet (5 mg total) by mouth every morning 30 tablet 5    aspirin 81 MG tablet Take 81 mg by mouth daily   Cholecalciferol (VITAMIN D) 2000 units CAPS Take 1 capsule (2,000 Units total) by mouth daily  0    docusate calcium (SURFAK) 240 mg capsule Take by mouth      famotidine (PEPCID) 20 mg tablet Take 1 tablet (20 mg total) by mouth 2 (two) times a day as needed for heartburn (( after using twice EVERY day for first week)) (Patient taking differently: Take 20 mg by mouth 2 (two) times a day as needed for heartburn ) 30 tablet 1    fish oil 1,000 mg Take 1,000 mg by mouth daily   Multiple Vitamins-Minerals (PRESERVISION AREDS PO) Take by mouth   multivitamin (THERAGRAN) TABS Take 1 tablet by mouth daily      psyllium (METAMUCIL) 58 6 % powder Take 1 packet by mouth 3 (three) times a day       No current facility-administered medications for this visit  Allergies   Allergen Reactions    Gabapentin      dreams    Sertraline GI Intolerance     ' felt like a zombie'      Immunizations:     Immunization History   Administered Date(s) Administered    INFLUENZA 11/01/2005, 10/03/2012, 09/20/2017, 10/25/2018    Influenza Split High Dose Preservative Free IM 09/18/2014, 09/01/2016    Influenza, high dose seasonal 0 7 mL 09/20/2019    Influenza, seasonal, injectable 11/12/2008, 10/01/2012, 09/18/2013    Pneumococcal Polysaccharide PPV23 04/29/1991, 07/26/2001, 06/17/2014    SARS-CoV-2 / COVID-19 mRNA IM ([employer]) 01/18/2021, 02/15/2021    Tdap 09/16/2010      Health Maintenance: There are no preventive care reminders to display for this patient        Topic Date Due    DTaP,Tdap,and Td Vaccines (2 - Td or Tdap) 09/16/2020    Influenza Vaccine (1) 09/01/2021      Medicare Health Risk Assessment:     /70 (BP Location: Right arm, Patient Position: Sitting, Cuff Size: Standard)   Pulse 68   Temp 97 7 °F (36 5 °C)   Ht 5' 1" (1 549 m)   Wt 61 2 kg (135 lb)   SpO2 97%   BMI 25 51 kg/m²      Francisco Watson is here for her Subsequent Wellness visit  Health Risk Assessment:   Patient rates overall health as very good  Patient feels that their physical health rating is same  Patient is satisfied with their life  Eyesight was rated as same  Hearing was rated as same  Patient feels that their emotional and mental health rating is same  Patients states they are sometimes angry  Patient states they are never, rarely unusually tired/fatigued  Pain experienced in the last 7 days has been none  Patient states that she has experienced weight loss or gain in last 6 months  Depression Screening:   PHQ-2 Score: 1      Fall Risk Screening: In the past year, patient has experienced: no history of falling in past year      Urinary Incontinence Screening:   Patient has leaked urine accidently in the last six months  Home Safety:  Patient does not have trouble with stairs inside or outside of their home  and has working carbon monoxide detector  Home safety hazards include: none  Nutrition:   Current diet is Regular  Medications:   Patient is currently taking over-the-counter supplements  OTC medications include: see medication list  Patient is able to manage medications  Activities of Daily Living (ADLs)/Instrumental Activities of Daily Living (IADLs):   Walk and transfer into and out of bed and chair?: Yes  Dress and groom yourself?: Yes    Bathe or shower yourself?: Yes    Feed yourself?  Yes  Do your laundry/housekeeping?: Yes  Manage your money, pay your bills and track your expenses?: Yes  Make your own meals?: Yes    Do your own shopping?: Yes    Previous Hospitalizations:   Any hospitalizations or ED visits within the last 12 months?: Yes    How many hospitalizations have you had in the last year?: 1-2    Advance Care Planning:   Living will: Yes    Durable POA for healthcare: Yes    Advanced directive: Yes      PREVENTIVE SCREENINGS      Cardiovascular Screening:    General: Screening Current      Diabetes Screening:     General: Screening Current      Colorectal Cancer Screening:     General: Screening Not Indicated      Cervical Cancer Screening:    General: Screening Not Indicated      Lung Cancer Screening:     General: Screening Not Indicated    Screening, Brief Intervention, and Referral to Treatment (SBIRT)    Screening  Typical number of drinks in a day: 0  Typical number of drinks in a week: 0  Interpretation: Low risk drinking behavior      Single Item Drug Screening:  How often have you used an illegal drug (including marijuana) or a prescription medication for non-medical reasons in the past year? never    Single Item Drug Screen Score: 0  Interpretation: Negative screen for possible drug use disorder      Horace Nick,

## 2021-08-11 LAB — BACTERIA UR CULT: NORMAL

## 2021-08-16 ENCOUNTER — TELEPHONE (OUTPATIENT)
Dept: FAMILY MEDICINE CLINIC | Facility: CLINIC | Age: 86
End: 2021-08-16

## 2021-08-16 NOTE — TELEPHONE ENCOUNTER
She has no absolute contraindications to donating blood if she wishes to do that although at 80years of age it may worsen her fatigue and I do not necessarily recommend she do that    Her last hemoglobin was back in February, 12 1

## 2021-08-16 NOTE — TELEPHONE ENCOUNTER
Pt called and wanted to know is she able to give blood just one last time before anything happens to her please

## 2021-08-31 ENCOUNTER — REMOTE DEVICE CLINIC VISIT (OUTPATIENT)
Dept: CARDIOLOGY CLINIC | Facility: CLINIC | Age: 86
End: 2021-08-31
Payer: MEDICARE

## 2021-08-31 DIAGNOSIS — Z95.0 PRESENCE OF CARDIAC PACEMAKER: Primary | ICD-10-CM

## 2021-08-31 PROCEDURE — 93294 REM INTERROG EVL PM/LDLS PM: CPT

## 2021-08-31 PROCEDURE — 93296 REM INTERROG EVL PM/IDS: CPT

## 2021-08-31 NOTE — PROGRESS NOTES
Results for orders placed or performed in visit on 08/31/21   Cardiac EP device report    Narrative    MDT DC PPM - ACTIVE SYSTEM IS MRI CONDITIONAL  CARELINK TRANSMISSION: BATTERY VOLTAGE ADEQUATE (12 4 YRS)  AP: 39 9%  : 99 8% (>40%~MVP-OFF/CHB)  ALL AVAILABLE LEAD PARAMETERS WITHIN NORMAL LIMITS  1 VT MONITORED EPISODE W/ EGRM SHOWING NSVT 10 BEATS @ 160 BPM  PT DOES NOT TAKE ANY BB  PT TAKES ASA 81MG  EF: 55% (ECHO 2/9/21)  PACEMAKER FUNCTIONING APPROPRIATELY    [facility]

## 2021-09-02 ENCOUNTER — TELEPHONE (OUTPATIENT)
Dept: FAMILY MEDICINE CLINIC | Facility: CLINIC | Age: 86
End: 2021-09-02

## 2021-09-02 NOTE — TELEPHONE ENCOUNTER
Patient's daughter called asking for results of a Shoulder XR that would have been received by fax  Has this been received/read? In the meantime, I can call Cristina Even for a re-fax

## 2021-09-03 NOTE — TELEPHONE ENCOUNTER
Called Pop Mc; received phone number for XR Shoulder report (p) [phone]    Fax received  [de-identified] Mobile [facility] had in-office fax number from our old location  L/m for  to update fax number to secure fax [facility]ST[facility]) and to call our office (new office phone number given) for Meol Allred with any questions

## 2021-11-15 ENCOUNTER — TELEPHONE (OUTPATIENT)
Dept: FAMILY MEDICINE CLINIC | Facility: CLINIC | Age: 86
End: 2021-11-15

## 2021-11-30 ENCOUNTER — REMOTE DEVICE CLINIC VISIT (OUTPATIENT)
Dept: CARDIOLOGY CLINIC | Facility: CLINIC | Age: 86
End: 2021-11-30
Payer: MEDICARE

## 2021-11-30 DIAGNOSIS — Z95.0 PRESENCE OF PERMANENT CARDIAC PACEMAKER: Primary | ICD-10-CM

## 2021-11-30 PROCEDURE — 93296 REM INTERROG EVL PM/IDS: CPT

## 2021-11-30 PROCEDURE — 93294 REM INTERROG EVL PM/LDLS PM: CPT

## 2022-01-01 NOTE — PATIENT INSTRUCTIONS
Outpatient Pediatric Speech Therapy Treatment Note    Date: 2022    Patient Name: Asaf Sellers  MRN: [id number]  Therapy Diagnosis:   Encounter Diagnoses   Name Primary?    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  Adjusted Age: 3w    Visit # / Visits Authorized:     Date of Evaluation: 2022    Plan of Care Expiration Date: 2022 -2023   Authorization Date: 2022   Extended POC: n/a      Time In: 9:30AM  Time Out: 10:15 AM  Total Billable Time: 45 min     Precautions: Universal, Child Safety, and Aspiration    Subjective:   Pt's caregiver reports: Pt has been consuming 4oz regularly, no issues reported with feedings. Every 3 hours, typically finishing 4oz, taking up to 40 minutes utilizing the Transition level nipple. Today is first session since initial evaluation.   She was compliant to home exercise program.   Response to previous treatment: increased coordination and reduced overt s/sx of aspiration and airway threat    Mother brought Asaf to therapy today.  Pain: Asaf was unable to rate pain on a numeric scale, but no pain behaviors were noted in today's session.  Objective:   UNTIMED  Procedure Min.   Dysphagia Therapy    45   Total Untimed Units: 3  Charges Billed/# of units: 1    Short Term Goals: (3 months) Current Progress:   1.Demonstrate rhythmical organized NNS with pacifier or gloved finger for 30 seconds over three consecutive sessions.    Progressing/ Not Met 2022  2x with gloved finger, improved from previous provided minimum assistance      2. Improve durational jaw strength via 10-15 vertical movements following downward pressure to posterior gums over three consecutive  We reviewed medication, she relates her bowels have been okay, she wishes to stop Metamucil, stop Biotin  Regarding anxiety she will start sertraline 25 mg once every morning, we did discuss this may take 3 to 6 weeks to take effect, I would like to re-evaluate in 3 months  Call sooner if needed  Regarding forgetfulness, she inquires if she should use Aricept which her  uses, we can consider adding that at follow-up, hold off for now- MMSE  26/30 earlier in year  Regarding blood work, back in June CBC, CMP were unremarkable  She does continue with headaches, fortunately CT of head was unremarkable back in December  Last year vitamin-D 27, stay on vitamin-D as is  B12 was normal as was TSH in 2018  Continue with routine walking/exercise as tolerated  sessions.    Progressing/ Not Met 2022  4-6x, requires cueing       3. Consume 2-3oz of thin liquids via extra slow flow nipple in 30 minutes or less without demonstrating s/sx of aspiration, airway threat, or distress over three consecutive sessions.    Progressing/ Not Met 2022  Consumed ~3oz via transition level nipple in 27 minutes provided upright positioning, monitoring of stress cues for rest breaks, and moderate external pacing. Pt with no overt s/sx of aspiration or airway threat at this date    4. Demonstrate 5-10 sucks per burst during consumption of thin liquids provided min intervention without overt s/sx of aspiration or distress across three consecutive sessions.    Progressing/ Not Met 2022   Intermittently able to demonstrated 5-10 suck bursts independently, provided moderate external pacing increased       5. Caregivers will demonstrate understanding and implementation of all SLP recommendations.    Progressing/ Not Met 2022   Ongoing, mother demonstrates understanding of all recommendations at this time        Short Term Goals Met (2022 -4/7/2023 ): TBD    Long Term Objectives: 6 months   Asaf will:  1. Maintain adequate nutrition and hydration via PO intake without clinical signs/symptoms of aspiration or airway threat.   2. Caregiver will understand and use strategies independently to facilitate proper feeding techniques to provide pt with adequate nutrition and hydration.  3. Demonstrate developmentally appropriate oral motor skills.      4. Monitor for need for formal evaluation of oral and pharyngeal swallow.     Patient Education/Response:   Therapist discussed patient's goals and progress with mother. Different strategies were introduced to work on expanding Asaf's oral motor and feeding skills.  These strategies will help facilitate carry over of targeted goals outside of therapy sessions. ST discussed need to monitor length of feedings due to reported 30-45  minutes to complete bottle. Continue NNS training, continue reflux precautions. Advised to provide external pacing, rested pacing, upright/sidelying positioning. Advised to continue supervised tummy time. SLP demonstrated all exercises recommended for the HEP and provided opportunity for caregivers to demonstrate and practice exercises. Caregivers verbalized understanding of all discussed.     Recommendations: Standard aspiration precautions, upright/elevated sidelying, rest breaks, external pacing, slow flow nipple      Written Home Exercises Provided: Patient instructed to cont prior HEP.  Strategies / Exercises were reviewed and Asaf was able to demonstrate them prior to the end of the session.  Asaf's caregiver demonstrated good  understanding of the education provided.     See EMR under Patient Instructions for exercises provided prior visit  Assessment:   Asaf is progressing toward her goals. Pt continues to present with chronic pediatric feeding disorder and oropharyngeal dysphagia secondary to prematurity and dx of laryngomalacia. She demonstrates deficits in all 4 domains (psychosocial, feeding skill, nutrition, and medical) impacting her ability to maintain adequate hydration and nutrition at this time. At this date, pt consumed ~3oz via slow flow bottle provided upright positioning, external pacing, and rest break decreasing overt s/sx of aspiration and airway threat. Pt demonstrated improved coordination of SSB and suck bursts compared to previous session provided moderate assistance. Pt with increased NNS to gloved finger and reduced jaw durational exercises provided cueing. Current goals remain appropriate. Goals will be added and re-assessed as needed.      Pt prognosis is Good. Pt will continue to benefit from skilled outpatient speech and language therapy to address the deficits listed in the problem list on initial evaluation, provide pt/family education and to maximize pt's level of independence  in the home and community environment.     Medical necessity is demonstrated by the following IMPAIRMENTS:  Decreased ability to maintain adequate hydration and nutrition via PO intake  Barriers to Therapy: complex medical history   Pt's spiritual, cultural and educational needs considered and pt agreeable to plan of care and goals.  Plan:   Outpatient speech therapy 1x/week for 6 months for ongoing assessment and remediation of chronic pediatric feeding disorder and oropharyngeal dysphagia   Continue home exercise program   Attend HRNB and ENT appointment as scheduled     Abbe Kimbrough M.A., CCC-SLP, CLC  Speech Language Pathologist   2022

## 2022-03-01 ENCOUNTER — REMOTE DEVICE CLINIC VISIT (OUTPATIENT)
Dept: CARDIOLOGY CLINIC | Facility: CLINIC | Age: 87
End: 2022-03-01
Payer: MEDICARE

## 2022-03-01 DIAGNOSIS — Z95.0 PRESENCE OF CARDIAC PACEMAKER: Primary | ICD-10-CM

## 2022-03-01 PROCEDURE — 93296 REM INTERROG EVL PM/IDS: CPT | Performed by: INTERNAL MEDICINE

## 2022-03-01 PROCEDURE — 93294 REM INTERROG EVL PM/LDLS PM: CPT | Performed by: INTERNAL MEDICINE

## 2022-03-01 NOTE — PROGRESS NOTES
Results for orders placed or performed in visit on 03/01/22   Cardiac EP device report    Narrative    MDT DC PPM - ACTIVE SYSTEM IS MRI CONDITIONAL  CARELINK TRANSMISSION: BATTERY VOLTAGE ADEQUATE (11 6 YRS)  AP: 23 5%  : 99 4% (>40%~AVB~MVP-OFF)  ALL AVAILABLE LEAD PARAMETERS WITHIN NORMAL LIMITS  NO SIGNIFICANT HIGH RATE EPISODES  PACEMAKER FUNCTIONING APPROPRIATELY    [address] [place] [address]

## 2022-06-02 ENCOUNTER — REMOTE DEVICE CLINIC VISIT (OUTPATIENT)
Dept: CARDIOLOGY CLINIC | Facility: CLINIC | Age: 87
End: 2022-06-02
Payer: MEDICARE

## 2022-06-02 DIAGNOSIS — Z95.0 PRESENCE OF PERMANENT CARDIAC PACEMAKER: Primary | ICD-10-CM

## 2022-06-02 PROCEDURE — 93296 REM INTERROG EVL PM/IDS: CPT | Performed by: INTERNAL MEDICINE

## 2022-06-02 PROCEDURE — 93294 REM INTERROG EVL PM/LDLS PM: CPT | Performed by: INTERNAL MEDICINE

## 2022-06-02 NOTE — PROGRESS NOTES
MDT DC PPM - ACTIVE SYSTEM IS MRI CONDITIONAL   CARELINK TRANSMISSION:  BATTERY VOLTAGE ADEQUATE (11 4 YR )   AP 27 9%  99 6% (>40%/CHB)   ALL LEAD PARAMETERS WITHIN NORMAL LIMITS   NO SIGNIFICANT HIGH RATE EPISODES   NORMAL DEVICE FUNCTION   RG

## 2022-07-08 ENCOUNTER — IN-CLINIC DEVICE VISIT (OUTPATIENT)
Dept: CARDIOLOGY CLINIC | Facility: CLINIC | Age: 87
End: 2022-07-08
Payer: MEDICARE

## 2022-07-08 DIAGNOSIS — Z95.0 PRESENCE OF PERMANENT CARDIAC PACEMAKER: Primary | ICD-10-CM

## 2022-07-08 PROCEDURE — 93280 PM DEVICE PROGR EVAL DUAL: CPT

## 2022-07-08 NOTE — PROGRESS NOTES
Results for orders placed or performed in visit on 07/08/22   Cardiac EP device report    Narrative    MDT DC [facility] - [address] [place] [address]  INTERROGATED IN THE [place] OFFICE  BATTERY VOLTAGE ADEQUATE (11 3 YRS)  AP 30 4%  99 5% (>40%/AVB -DEPENDENT/DDDR 50)  ALL LEAD PARAMETERS WITHIN NORMAL LIMITS  NO UNREPORTED HIGH RATE EPISODES  NO PROGRAMMING CHANGES MADE TO DEVICE PARAMETERS  PACEMAKER FUNCTIONING APPROPRIATELY    ES

## 2022-07-22 DIAGNOSIS — I44.2 COMPLETE HEART BLOCK (HCC): ICD-10-CM

## 2022-07-22 RX ORDER — ACETAMINOPHEN 325 MG/1
650 TABLET ORAL EVERY 4 HOURS PRN
Qty: 30 TABLET | Refills: 0 | Status: CANCELLED | OUTPATIENT
Start: 2022-07-22

## 2022-10-06 ENCOUNTER — OFFICE VISIT (OUTPATIENT)
Dept: CARDIOLOGY CLINIC | Facility: CLINIC | Age: 87
End: 2022-10-06
Payer: MEDICARE

## 2022-10-06 VITALS
BODY MASS INDEX: 25.28 KG/M2 | DIASTOLIC BLOOD PRESSURE: 78 MMHG | WEIGHT: 137.4 LBS | HEART RATE: 66 BPM | HEIGHT: 62 IN | SYSTOLIC BLOOD PRESSURE: 124 MMHG

## 2022-10-06 DIAGNOSIS — I44.2 COMPLETE HEART BLOCK (HCC): Primary | ICD-10-CM

## 2022-10-06 DIAGNOSIS — Z95.0 PACEMAKER: ICD-10-CM

## 2022-10-06 DIAGNOSIS — F41.9 ANXIETY: ICD-10-CM

## 2022-10-06 PROCEDURE — 99214 OFFICE O/P EST MOD 30 MIN: CPT

## 2022-10-06 PROCEDURE — 93000 ELECTROCARDIOGRAM COMPLETE: CPT

## 2022-10-06 RX ORDER — POLYVINYL ALCOHOL 14 MG/ML
1 SOLUTION/ DROPS OPHTHALMIC AS NEEDED
COMMUNITY

## 2022-10-06 RX ORDER — TROSPIUM CHLORIDE 20 MG/1
20 TABLET, FILM COATED ORAL DAILY
COMMUNITY

## 2022-10-06 RX ORDER — CHOLECALCIFEROL (VITAMIN D3) 125 MCG
500 CAPSULE ORAL DAILY
COMMUNITY

## 2022-10-06 RX ORDER — SENNA AND DOCUSATE SODIUM 50; 8.6 MG/1; MG/1
1 TABLET, FILM COATED ORAL DAILY
COMMUNITY

## 2022-10-06 RX ORDER — CLOTRIMAZOLE 1 %
CREAM (GRAM) TOPICAL 2 TIMES DAILY
COMMUNITY

## 2022-10-06 NOTE — PROGRESS NOTES
Cardiology   MD Daniel Paredes MD Derl Bow, DO, [facility] FACP Ather Mansoor, MD Zachery Bourgeois, DO, Frances Matthews DO, [facility]  -------------------------------------------------------------------  [facility], [facility] [address] [id number] [phone]  [phone]  10/06/22  Biagio Closs  YOB: 1930   MRN: [id number]      Referring Physician: No referring provider defined for this encounter  HPI: Biagio Closs is a 80 y o  female with:   Complete heart block, with severe underlying conduction system disease  Status post dual chamber Medtronic pacemaker placed on February 9, 2021  Symptomatic bradycardia   Palpitations  Anxiety  Nonischemic nuclear stress in 2018    She presents today for follow-up  She states she is doing well, denies any shortness of breath chest pain palpitations or syncope  She states she is very active, she walks around outside on her own with no assistance 20 minutes twice a day  Her device interrogations have been unremarkable    Review of Systems   Constitutional: Negative for chills and fever  HENT: Negative for facial swelling and sore throat  Eyes: Negative for visual disturbance  Respiratory: Negative for cough, chest tightness, shortness of breath and wheezing  Cardiovascular: Negative for chest pain, palpitations and leg swelling  Gastrointestinal: Negative for abdominal pain, blood in stool, constipation, diarrhea, nausea and vomiting  Endocrine: Negative for cold intolerance and heat intolerance  Genitourinary: Negative for decreased urine volume, difficulty urinating, dysuria and hematuria  Musculoskeletal: Negative for arthralgias, back pain and myalgias  Skin: Negative for rash  Neurological: Negative for dizziness, syncope, weakness and numbness  Psychiatric/Behavioral: Negative for agitation, behavioral problems and confusion   The patient is not nervous/anxious  OBJECTIVE  Vitals:    10/06/22 1015   BP: 124/78   Pulse: 66       Physical Exam  Constitutional: awake, alert and oriented, in no acute distress, no obvious deformities  Head: Normocephalic, without obvious abnormality, atraumatic  Eyes: conjunctivae clear and moist  Sclera anicteric  No xanthelasmas  Pupils equal bilaterally  Extraocular motions are full  Ear nose mouth and throat: ears are symmetrical bilaterally, hearing appears to be equal bilaterally, no nasal discharge or epistaxis, oropharynx is clear with moist mucous membranes  Neck:  Trachea is midline, neck is supple, no thyromegaly or significant lymphadenopathy, there is full range of motion  Lungs: clear to auscultation bilaterally, no wheezes, no rales, no rhonchi, no accessory muscle use, breathing is nonlabored  Heart: regular rate and rhythm, S1, S2 normal, no murmur, no click, no rub and no gallop, no lower extremity edema  Abdomen: soft, non-tender; bowel sounds normal; no masses,  no organomegaly  Psychiatric:  Patient is oriented to time, place, person, mood/affect is negative for depression, anxiety, agitation, appears to have appropriate insight  Skin: Skin is warm, dry, intact  No obvious rashes or lesions on exposed extremities  Nail beds are pink with no cyanosis or clubbing      EKG:  Results for orders placed or performed in visit on 10/06/22   POCT ECG    Impression    Atrial sensed ventricular paced rhythm at 66 beats per minute        IMPRESSION:  Complete heart block, with severe underlying conduction system disease  Status post dual chamber Medtronic pacemaker placed on February 9, 2021  Symptomatic bradycardia   Palpitations  Anxiety  Nonischemic nuclear stress in 2018    DISCUSSION/RECOMMENDATIONS:  Her device shows normal function  Her EKG today shows atrial sensed ventricular paced rhythm  She is very active is asymptomatic with this level of activity, she is able to walk on her own with no assistance for 20 minutes at a time twice 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   Her blood pressure is stable today, on occasions she does have some mildly elevated blood pressures  I had discussed keeping an eye on her sodium intake with her today   Continue current dose of amlodipine  Blood pressure is uncontrolled despite low sodium intake, could always increase amlodipine to 10 mg however would hold off on this for now   Otherwise she is stable from cardiovascular standpoint  Would continue current medications at current doses and plan for follow-up 6 months    Ash Rick DO, [facility]  --------------------------------------------------------------------------------  TREADMILL STRESS  No results found for this or any previous visit      ----------------------------------------------------------------------------------------------  NUCLEAR STRESS TEST: No results found for this or any previous visit  Results for orders placed during the hospital encounter of 18    NM myocardial perfusion spect (rx stress and/or rest)    [address], [address] [place] [address]  [phone]    Rest/Stress Gated SPECT Myocardial Perfusion Imaging After Regadenoson    Patient: Ebenezer Giles  MR number: [id number]  Account number: [de-identified]  : 1930  Age: 80 years  Gender: Female  Status: Outpatient  Location: Stress lab  Height: 61 in  Weight: 126 lb  BP: 132/ 72 mmHg    Allergies: NO KNOWN ALLERGIES    Diagnosis: 794 31 - ABNORM ELECTROCARDIOGRAM, R07 9 - Chest pain, unspecified    Primary Physician:  Ynes Guzman DO  Technician:  Claudine Mark  RN:  Saturnino Marr RN  Referring Physician:  Ynes Guzman DO  Group:  [facility]  Report Prepared By[de-identified]  Claudine Mark  Interpreting Physician:  Kp Leslie MD    INDICATIONS: Detection of Coronary artery disease  HISTORY: The patient is a 80year old  female   Chest pain status: chest pain  Coronary artery disease risk factors: family history of premature coronary artery disease and post-menopausal state  Cardiovascular history: none  significant  Previous test results: abnormal ECG and abnormal resting echocardiogram     PHYSICAL EXAM: Baseline physical exam screening: no wheezes audible  REST ECG: Normal sinus rhythm  Left anterior fasicular block  PROCEDURE: The study was performed in the stress lab  A regadenoson infusion pharmacologic stress test was performed  Gated SPECT myocardial perfusion imaging was performed after stress  Systolic blood pressure was 132 mmHg, at the start  of the study  Diastolic blood pressure was 72 mmHg, at the start of the study  The heart rate was 74 bpm, at the start of the study  IV double checked  Regadenoson protocol:  HR bpm SBP mmHg DBP mmHg Symptoms  Baseline 74 132 72 none  Immediate 97 130 70 flushing  2 min 93 168 80 none  4 min 92 144 80 none  No medications or fluids given  The patient also performed low level exercise  STRESS SUMMARY: Duration of pharmacologic stress was 3 min  Maximal heart rate during stress was 97 bpm ( 81 % of maximal predicted heart rate)  The rate-pressure product for the peak heart rate and blood pressure was 98639  There was no  chest pain during stress  The stress test was terminated due to protocol completion  Pre oxygen saturation: 97 %  Peak oxygen saturation: 97 %  The stress ECG was negative for ischemia and normal  Arrhythmia during stress: isolated  premature ventricular beats  ISOTOPE ADMINISTRATION:  Resting isotope administration Stress isotope administration  Agent Tetrofosmin Tetrofosmin  Dose 10 4 mCi 1430 mCi  Date 06/13/2018 06/13/2018  Injection time 12:06 13:15  Imaging time 12:41 13:49  Injection-image interval 35 min 34 min    The radiopharmaceutical was injected at the peak effect of pharmacologic stress  MYOCARDIAL PERFUSION IMAGING:  The image quality was good   Left ventricular size was normal  The TID ratio was 0 87  PERFUSION DEFECTS:  -  There were no perfusion defects  GATED SPECT:  The calculated left ventricular ejection fraction was 68 %  Left ventricular ejection fraction was within normal limits by visual estimate  There was no left ventricular regional abnormality  SUMMARY:  -  Stress results: Target heart rate was not achieved  There was no chest pain during stress  -  ECG conclusions: The stress ECG was negative for ischemia and normal   -  Perfusion imaging: There were no perfusion defects   -  Gated SPECT: The calculated left ventricular ejection fraction was 68 %  Left ventricular ejection fraction was within normal limits by visual estimate  There was no left ventricular regional abnormality  IMPRESSIONS: Normal study after pharmacologic vasodilation  Myocardial perfusion imaging was normal at rest and with stress  Prepared and signed by    Puja Reagan MD  Signed 2018 15:34:56      --------------------------------------------------------------------------------  CATH:  No results found for this or any previous visit     --------------------------------------------------------------------------------  ECHO:   Results for orders placed during the hospital encounter of 21    Echo complete with contrast if indicated    Aron Maldonado 48  Jordyn Rosa 35  [facility], [address]  [phone]    Transthoracic Echocardiogram  Limited 2D, M-mode, Doppler, and Color Doppler    Study date:  2021    Patient: Anne Marie Grady  MR number: [id number]  Account number: [de-identified]  : 23-Dec-1930  Age: 80 years  Gender: Female  Status: Inpatient  Location: Emergency room  Height: 61 in  Weight: 128 7 lb  BP: 205/ 91 mmHg    Indications: Complete heart block/pre-pacemaker insertion    Diagnoses: I44 2 - Atrioventricular block, complete    Sonographer:  Yosef Corcoran [facility]  Primary Physician:  Eusebio Taylor  Virginia King DO  Referring Physician:  Onofre Cotto PA-C  Group:  [facility]  Interpreting Physician:  JAMESON Rodríguez     SUMMARY    LEFT VENTRICLE:  Systolic function was normal by visual assessment  Ejection fraction was estimated to be 55 %  There were no regional wall motion abnormalities  Wall thickness was mildly increased  There was mild concentric hypertrophy  LEFT ATRIUM:  The atrium was markedly dilated  LA Volume index 47 mL/m2  RIGHT ATRIUM:  The atrium was moderately to markedly dilated  MITRAL VALVE:  There was trace regurgitation  AORTIC VALVE:  There was mild regurgitation  TRICUSPID VALVE:  There was moderate regurgitation  AORTA:  The root exhibited mild dilatation  There was mild dilatation of the ascending aorta to 3 9 cm  SUMMARY MEASUREMENTS  2D measurements:  Unspecified Anatomy:   %FS was 46 5 %  Ao Diam was 3 6 cm   EDV(Teich) was 125 4 ml   EF(Teich) was 77 6 %  ESV(Teich) was 28 1 ml   HR_4Ch_Q was 32 4 BPM   IVSd was 1 cm  LA Diam was 3 3 cm  LAAs A2C was 21 8 cm2  LAAs A4C was 23 7  cm2  LAESV A-L A2C was 66 9 ml  LAESV A-L A4C was 81 2 ml  LAESV Index (A-L) was 47 5 ml/m2  LAESV MOD A2C was 62 5 ml  LAESV MOD A4C was 76 5 ml  LAESV(A-L) was 74 5 ml  LAESV(MOD BP) was 69 7 ml  LAESVInd MOD BP was 44 4 ml/m2  LALs A2C was 6 cm  LALs A4C was 5 9 cm  LVCO_4Ch_Q was 1 9 L/min  LVEF_4Ch_Q was 58 3 %  LVIDd was 5 1 cm  LVIDs was 2 7 cm  LVLd_4Ch_Q was 7 6 cm  LVLs_4Ch_Q was 6 cm  LVPWd was 1 1 cm  LVSV_4Ch_Q was 60 1 ml   LVVED_4Ch_Q was  103 1 ml   LVVES_4Ch_Q was 43 ml  RAAs A4C was 18 8 cm2  RAESV A-L was 55 9 ml   RAESV MOD was 53 8 ml  RALs was 5 4 cm  RVIDd was 3 9 cm   SV(Teich) was 97 3 ml   CW measurements:  Unspecified Anatomy:   AR Dec Phillips was 1 4 m/s2  AR Dec Time was 3519 3 ms  AR PHT was 1020 6 ms  AR Vmax was 5 1 m/s  AR maxPG was 102 9 mmHg  AV Vmax was 1 9 m/s  AV maxPG was 14 6 mmHg    TR Vmax was 3 5 m/s   TR maxPG was 49 7  mmHg  MM measurements:  Unspecified Anatomy:   TAPSE was 3 cm  PW measurements:  Unspecified Anatomy:   LVOT Vmax was 1 5 m/s  LVOT maxPG was 8 6 mmHg  MV A Riaz was 1 1 m/s  MV Dec Lake was 5 3 m/s2  MV DecT was 191 3 ms   MV E Riaz was 1 m/s  MV E/A Ratio was 0 9   MV PHT was 55 5 ms  MVA By PHT was 4 cm2  HISTORY: PRIOR HISTORY: Second-degree AV block, HTN    PROCEDURE: The procedure was performed in the emergency room  This was a routine study  The transthoracic approach was used  The study included limited 2D imaging, M-mode, limited spectral Doppler, and color Doppler  Image quality was  adequate  LEFT VENTRICLE: Size was normal  Systolic function was normal by visual assessment  Ejection fraction was estimated to be 55 %  There were no regional wall motion abnormalities  Wall thickness was mildly increased  There was mild  concentric hypertrophy  DOPPLER: There was an increased relative contribution of atrial contraction to ventricular filling  RIGHT VENTRICLE: The size was normal  Systolic function was normal  Wall thickness was normal     LEFT ATRIUM: The atrium was markedly dilated  LA Volume index 47 mL/m2  RIGHT ATRIUM: The atrium was moderately to markedly dilated  MITRAL VALVE: Valve structure was normal  There was mild-moderate calcification of the anterior leaflet  There was normal leaflet separation  DOPPLER: The transmitral velocity was within the normal range  There was no evidence for  stenosis  There was trace regurgitation  AORTIC VALVE: The valve was trileaflet  Leaflets exhibited normal thickness, mild calcification, and normal cuspal separation  DOPPLER: Transaortic velocity was within the normal range  There was no evidence for stenosis  There was mild  regurgitation  TRICUSPID VALVE: The valve structure was normal  There was normal leaflet separation  DOPPLER: The transtricuspid velocity was within the normal range   There was no evidence for stenosis  There was moderate regurgitation  Estimated peak PA  pressure was 53 mmHg  The findings suggest mild to moderate pulmonary hypertension  PULMONIC VALVE: Leaflets exhibited normal thickness, no calcification, and normal cuspal separation  DOPPLER: The transpulmonic velocity was within the normal range  There was no regurgitation  PERICARDIUM: There was no pericardial effusion  The pericardium was normal in appearance  AORTA: The root exhibited mild dilatation  There was mild dilatation of the ascending aorta to 3 9 cm  SYSTEMIC VEINS: IVC: The inferior vena cava was normal in size and course   Respirophasic changes were normal     SYSTEM MEASUREMENT TABLES    2D  %FS: 46 5 %  Ao Diam: 3 6 cm  EDV(Teich): 125 4 ml  EF(Teich): 77 6 %  ESV(Teich): 28 1 ml  HR_4Ch_Q: 32 4 BPM  IVSd: 1 cm  LA Diam: 3 3 cm  LAAs A2C: 21 8 cm2  LAAs A4C: 23 7 cm2  LAESV A-L A2C: 66 9 ml  LAESV A-L A4C: 81 2 ml  LAESV Index (A-L): 47 5 ml/m2  LAESV MOD A2C: 62 5 ml  LAESV MOD A4C: 76 5 ml  LAESV(A-L): 74 5 ml  LAESV(MOD BP): 69 7 ml  LAESVInd MOD BP: 44 4 ml/m2  LALs A2C: 6 cm  LALs A4C: 5 9 cm  LVCO_4Ch_Q: 1 9 L/min  LVEF_4Ch_Q: 58 3 %  LVIDd: 5 1 cm  LVIDs: 2 7 cm  LVLd_4Ch_Q: 7 6 cm  LVLs_4Ch_Q: 6 cm  LVPWd: 1 1 cm  LVSV_4Ch_Q: 60 1 ml  LVVED_4Ch_Q: 103 1 ml  LVVES_4Ch_Q: 43 ml  RAAs A4C: 18 8 cm2  RAESV A-L: 55 9 ml  RAESV MOD: 53 8 ml  RALs: 5 4 cm  RVIDd: 3 9 cm  SV(Teich): 97 3 ml    CW  AR Dec Wolfe: 1 4 m/s2  AR Dec Time: 3519 3 ms  AR PHT: 1020 6 ms  AR Vmax: 5 1 m/s  AR maxP 9 mmHg  AV Vmax: 1 9 m/s  AV maxP 6 mmHg  TR Vmax: 3 5 m/s  TR maxP 7 mmHg    MM  TAPSE: 3 cm    PW  LVOT Vmax: 1 5 m/s  LVOT maxP 6 mmHg  MV A Riaz: 1 1 m/s  MV Dec Wolfe: 5 3 m/s2  MV DecT: 191 3 ms  MV E Riaz: 1 m/s  MV E/A Ratio: 0 9  MV PHT: 55 5 ms  MVA By PHT: 4 cm2    [facility] Accredited Echocardiography [facility]    Prepared and electronically signed by    Elijah Connell, JAMESON GONZALEZ   Signed 09-Feb-2021 16:19:18    No results found for this or any previous visit     --------------------------------------------------------------------------------  HOLTER  No results found for this or any previous visit  No results found for this or any previous visit     --------------------------------------------------------------------------------  CAROTIDS  No results found for this or any previous visit      --------------------------------------------------------------------------------  Diagnoses and all orders for this visit:    Complete heart block ([address] ) -   PPM ( Medtronic) Feb 2021  -     POCT ECG    Anxiety    Pacemaker ( Feb 2021 )    Other orders  -     polyvinyl alcohol (LIQUIFILM TEARS) 1 4 % ophthalmic solution; 1 drop as needed for dry eyes  -     clotrimazole (LOTRIMIN) 1 % cream; Apply topically 2 (two) times a day  -     senna-docusate sodium (SENOKOT-S) 8 6-50 mg per tablet; Take 1 tablet by mouth daily  -     trospium chloride (SANCTURA) 20 mg tablet; Take 20 mg by mouth in the morning  -     vitamin B-12 (VITAMIN B-12) 500 mcg tablet; Take 500 mcg by mouth daily     ======================================================    Past Medical History:   Diagnosis Date    Aortic regurgitation     Constipation     Discoid lupus erythematosus     Dysphagia     Esophagitis     Forgetfulness     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    Transient elevated blood pressure     last assessed: 5/16/2017    Weight loss      Past Surgical History:   Procedure Laterality Date    APPENDECTOMY      CATARACT EXTRACTION      CHOLECYSTECTOMY      ESOPHAGOGASTRODUODENOSCOPY  06/2013    diagnostic- neg id have dilatation    EYE SURGERY      HYSTERECTOMY      RI ESOPHAGOGASTRODUODENOSCOPY TRANSORAL DIAGNOSTIC N/A 9/16/2016    Procedure: ESOPHAGOGASTRODUODENOSCOPY (EGD); Surgeon: Jermain Cherry MD;  [facility]: [facility];   Service: Gastroenterology    REPLACEMENT TOTAL KNEE Left 01/2007 Medications  Current Outpatient Medications   Medication Sig Dispense Refill    acetaminophen (TYLENOL) 325 mg tablet Take 2 tablets (650 mg total) by mouth every 4 (four) hours as needed for mild pain 30 tablet 0    amLODIPine (NORVASC) 5 mg tablet Take 1 tablet (5 mg total) by mouth every morning 30 tablet 5    Cholecalciferol (VITAMIN D) 2000 units CAPS Take 1 capsule (2,000 Units total) by mouth daily  0    clotrimazole (LOTRIMIN) 1 % cream Apply topically 2 (two) times a day      fish oil 1,000 mg Take 1,000 mg by mouth daily   Multiple Vitamins-Minerals (PRESERVISION AREDS PO) Take by mouth   multivitamin (THERAGRAN) TABS Take 1 tablet by mouth daily      polyvinyl alcohol (LIQUIFILM TEARS) 1 4 % ophthalmic solution 1 drop as needed for dry eyes      senna-docusate sodium (SENOKOT-S) 8 6-50 mg per tablet Take 1 tablet by mouth daily      trospium chloride (SANCTURA) 20 mg tablet Take 20 mg by mouth in the morning      vitamin B-12 (VITAMIN B-12) 500 mcg tablet Take 500 mcg by mouth daily      docusate calcium (SURFAK) 240 mg capsule Take by mouth (Patient not taking: No sig reported)      famotidine (PEPCID) 20 mg tablet Take 1 tablet (20 mg total) by mouth 2 (two) times a day as needed for heartburn (( after using twice EVERY day for first week)) (Patient not taking: No sig reported) 30 tablet 1    psyllium (METAMUCIL) 58 6 % powder Take 1 packet by mouth 3 (three) times a day (Patient not taking: No sig reported)       No current facility-administered medications for this visit          Allergies   Allergen Reactions    Gabapentin      dreams    Sertraline GI Intolerance     ' felt like a zombie'       Social History     Socioeconomic History    Marital status: /Civil Union     Spouse name: Not on file    Number of children: Not on file    Years of education: Not on file    Highest education level: Not on file   Occupational History    Not on file   Tobacco Use    Smoking status: Never Smoker    Smokeless tobacco: Never Used   Substance and Sexual Activity    Alcohol use: No    Drug use: No    Sexual activity: Not on file   Other Topics Concern    Not on file   Social History Narrative    Not on file     Social Determinants of Health     Financial Resource Strain: Not on file   Food Insecurity: Not on file   Transportation Needs: Not on file   Physical Activity: Not on file   Stress: Not on file   Social Connections: Not on file   Intimate Partner Violence: Not on file   Housing Stability: Not on file        Family History   Problem Relation Age of Onset    Diabetes Mother     Coronary artery disease Father        Lab Results   Component Value Date    WBC 10 04 02/10/2021    HGB 12 1 02/10/2021    HCT 38 5 02/10/2021    MCV 95 02/10/2021     02/10/2021      Lab Results   Component Value Date    SODIUM 138 02/12/2021    K 4 2 02/12/2021     02/12/2021    CO2 22 02/12/2021    BUN 24 02/12/2021    CREATININE 0 62 02/12/2021    GLUC 83 02/12/2021    CALCIUM 8 7 02/12/2021      Lab Results   Component Value Date    HGBA1C 5 9 (H) 05/19/2022      Lab Results   Component Value Date    CHOL 159 11/26/2013     Lab Results   Component Value Date    HDL 80 (H) 07/11/2017    HDL 69 11/26/2013     Lab Results   Component Value Date    LDLCALC 74 07/11/2017    LDLCALC 78 11/26/2013     Lab Results   Component Value Date    TRIG 48 07/11/2017    TRIG 61 11/26/2013     No results found for: [place], [place]   Lab Results   Component Value Date    INR 1 21 (H) 02/09/2021    PROTIME 15 1 (H) 02/09/2021          Patient Active Problem List    Diagnosis Date Noted    Urinary incontinence 08/10/2021    Pacemaker ( Feb 2021 ) 02/11/2021    Complete heart block (HCC) -   PPM ( Medtronic) Feb 2021 02/09/2021    Anxiety 08/14/2018    Gastric erosion, -  resolved 09/16/2016    Peripheral neuropathy 08/29/2016    Constipation 06/21/2016    Forgetfulness 06/21/2016    Left shoulder tendonitis 06/21/2016    Dysphagia - improved 06/16/2015    Discoid lupus erythematosus 06/01/2012    Osteoarthritis of knee 06/01/2012       Portions of the record may have been created with voice recognition software  Occasional wrong word or "sound a like" substitutions may have occurred due to the inherent limitations of voice recognition software  Read the chart carefully and recognize, using context, where substitutions have occurred      Dereck Jarquin DO, [facility]  10/6/2022 11:08 AM

## 2022-10-10 DIAGNOSIS — I48.92 ATRIAL FLUTTER, UNSPECIFIED TYPE (HCC): Primary | ICD-10-CM

## 2022-10-10 NOTE — PROGRESS NOTES
We will try to get eliquis covered under her insurance, however if this is too expensive will need to switch to coumadin

## 2022-10-10 NOTE — PROGRESS NOTES
Called patient this morning 3 X, was unable to reach her, busy signal each time    I spoke with her daughter about the device interrogation findings of atrial flutter and the recommendation for anticoagulation  Kortney Perez     She will speak with her mother and call us back at the office

## 2022-10-11 ENCOUNTER — TELEPHONE (OUTPATIENT)
Dept: CARDIOLOGY CLINIC | Facility: CLINIC | Age: 87
End: 2022-10-11

## 2022-10-11 DIAGNOSIS — I48.3 TYPICAL ATRIAL FLUTTER (HCC): Primary | ICD-10-CM

## 2022-10-11 NOTE — TELEPHONE ENCOUNTER
Daughter is calling stating the Eliquis is too expensive her and her mother are open to the choice of warfarin  Please advise dosing and would like the directions and information to go to [address]   Phone  [phone]  Fax [phone] and let coumadin girls know the plan thanks

## 2022-10-13 ENCOUNTER — REMOTE DEVICE CLINIC VISIT (OUTPATIENT)
Dept: CARDIOLOGY CLINIC | Facility: CLINIC | Age: 87
End: 2022-10-13
Payer: MEDICARE

## 2022-10-13 DIAGNOSIS — Z95.0 CARDIAC PACEMAKER IN SITU: Primary | ICD-10-CM

## 2022-10-13 PROCEDURE — 93296 REM INTERROG EVL PM/IDS: CPT | Performed by: INTERNAL MEDICINE

## 2022-10-13 PROCEDURE — 93294 REM INTERROG EVL PM/LDLS PM: CPT | Performed by: INTERNAL MEDICINE

## 2022-10-13 NOTE — PROGRESS NOTES
Results for orders placed or performed in visit on 10/13/22   Cardiac EP device report    Narrative    MDT DC PPM - ACTIVE SYSTEM IS MRI CONDITIONAL  CARELINK TRANSMISSION: BATTERY VOLTAGE ADEQUATE (11 1 YRS)  AP-22%, -100% (>40% Quinten@[employer] OFF)  ALL AVAILABLE LEAD PARAMETERS WITHIN NORMAL LIMITS  1 AF EPISODE FROM LAST REMOTE TRANSMISSION LASTING 4 HRS  PT WAS STARTED ON ELIQUIS  NORMAL DEVICE FUNCTION   GV

## 2022-10-17 NOTE — TELEPHONE ENCOUNTER
Soheila Church called, states [facility] has not received script for Coumadin      Advised waiting for instructions from Dr Markos Garrett    Please advise

## 2022-10-18 DIAGNOSIS — I48.92 ATRIAL FLUTTER, UNSPECIFIED TYPE (HCC): Primary | ICD-10-CM

## 2022-10-18 RX ORDER — WARFARIN SODIUM 5 MG/1
5 TABLET ORAL
Qty: 30 TABLET | Refills: 1 | Status: SHIPPED | OUTPATIENT
Start: 2022-10-18 | End: 2022-10-18 | Stop reason: SDUPTHER

## 2022-10-18 RX ORDER — WARFARIN SODIUM 5 MG/1
TABLET ORAL
Qty: 30 TABLET | Refills: 1 | Status: SHIPPED | OUTPATIENT
Start: 2022-10-18

## 2022-10-18 NOTE — TELEPHONE ENCOUNTER
Dr Kenisha Watkins placed order for Coumadin 5 mg, to take 5 mg daily  Spoke to Evelin Grace advised will most likely be starting tomorrow  Attempted to call [facility] to see when they do blood draws  Left message  Will need to fax over order

## 2022-10-19 ENCOUNTER — ANTICOAG VISIT (OUTPATIENT)
Dept: CARDIOLOGY CLINIC | Facility: CLINIC | Age: 87
End: 2022-10-19

## 2022-10-19 NOTE — TELEPHONE ENCOUNTER
No response from [facility], faxed order for coumadin 5 mg daily, will check INR 10/24/22  Standing INR script also faxed

## 2022-10-24 ENCOUNTER — ANTICOAG VISIT (OUTPATIENT)
Dept: CARDIOLOGY CLINIC | Facility: CLINIC | Age: 87
End: 2022-10-24

## 2022-10-24 LAB — INR PPP: 2.4 (ref 0.84–1.19)

## 2022-10-24 NOTE — PROGRESS NOTES
Spoke with Anya Steiner, RN supervisor, INR 2 4 today  Advised to continue 5 mg daily and will recheck 10/27/22  Physician order faxed

## 2022-10-28 ENCOUNTER — ANTICOAG VISIT (OUTPATIENT)
Dept: CARDIOLOGY CLINIC | Facility: CLINIC | Age: 87
End: 2022-10-28

## 2022-10-28 LAB — INR PPP: 2.5 (ref 0.84–1.19)

## 2022-10-28 NOTE — PROGRESS NOTES
10/28/22 0951~spoke with Deangelo Guerrero, advised above     Re faxed physician order to [phone] as requested

## 2022-10-28 NOTE — PROGRESS NOTES
Attempted to contact [facility] at [facility], left message to call office  INR good, will continue 5 mg daily and recheck 11/3/22  Physician order faxed

## 2022-11-02 ENCOUNTER — TELEPHONE (OUTPATIENT)
Dept: VASCULAR SURGERY | Facility: CLINIC | Age: 87
End: 2022-11-02

## 2022-11-04 ENCOUNTER — ANTICOAG VISIT (OUTPATIENT)
Dept: CARDIOLOGY CLINIC | Facility: CLINIC | Age: 87
End: 2022-11-04

## 2022-11-04 LAB — INR PPP: 3.2 (ref 0.84–1.19)

## 2022-11-04 NOTE — PROGRESS NOTES
Left message for Barbra Benoit RN at [facility], advised INR slightly elevated, will have patient take 2 5 mg Fri, 5 mg all other days, will recheck next week 11/10/22  Advised to call with questions or concerns  Physician order faxed

## 2022-11-14 ENCOUNTER — ANTICOAG VISIT (OUTPATIENT)
Dept: CARDIOLOGY CLINIC | Facility: CLINIC | Age: 87
End: 2022-11-14

## 2022-11-14 LAB — INR PPP: 1.5 (ref 0.84–1.19)

## 2022-11-14 NOTE — PROGRESS NOTES
Spoke with Jadyn HINOJOSA at [facility], states patients INR was missed on 11/10/22, she was not in on Friday, so she had INR done this morning  States patient did not have Coumadin this weekend because they did not have an order  She is unsure if patient had dose Fri     Advised to have patient take 7 5 mg tonight, then 5 mg daily, will recheck 11/17/22    Physician order faxed

## 2022-11-15 ENCOUNTER — ANTICOAG VISIT (OUTPATIENT)
Dept: CARDIOLOGY CLINIC | Facility: CLINIC | Age: 87
End: 2022-11-15

## 2022-11-15 LAB — INR PPP: 1.5 (ref 0.84–1.19)

## 2022-11-21 ENCOUNTER — ANTICOAG VISIT (OUTPATIENT)
Dept: CARDIOLOGY CLINIC | Facility: CLINIC | Age: 87
End: 2022-11-21

## 2022-11-21 LAB — INR PPP: 1.2 (ref 0.84–1.19)

## 2022-11-21 NOTE — PROGRESS NOTES
Never received until today INR from 11/17  So ordered 7 5 today and tomorrow INR STAT draw for 11/23  Orders faxed and spoke to Daniel Larson about same

## 2022-11-23 ENCOUNTER — ANTICOAG VISIT (OUTPATIENT)
Dept: CARDIOLOGY CLINIC | Facility: CLINIC | Age: 87
End: 2022-11-23

## 2022-11-23 LAB — INR PPP: 1.1 (ref 0.84–1.19)

## 2022-11-23 NOTE — PROGRESS NOTES
Spoke with Vinay Welch at [facility], advised INR still low, advised to have patient take 2 5 mg Sat Wed, 5 mg all other days, will recheck 12/1/22    Physician order faxed

## 2022-12-01 ENCOUNTER — ANTICOAG VISIT (OUTPATIENT)
Dept: CARDIOLOGY CLINIC | Facility: CLINIC | Age: 87
End: 2022-12-01

## 2022-12-01 LAB — INR PPP: 2.5 (ref 0.84–1.19)

## 2022-12-01 NOTE — PROGRESS NOTES
Left message for Lam Tian at [facility], advised INR good, continue 2 5 mg Wed, 5 mg all other days, will recheck next week 12/8/22  Advised to call with questions or concerns      Physician order faxed

## 2022-12-08 ENCOUNTER — ANTICOAG VISIT (OUTPATIENT)
Dept: CARDIOLOGY CLINIC | Facility: CLINIC | Age: 87
End: 2022-12-08

## 2022-12-08 LAB — INR PPP: 2.1 (ref 0.84–1.19)

## 2022-12-08 NOTE — PROGRESS NOTES
Spoke with Susana Ozuna RN, advised INR good, continue 2 5 mg Wed, 5 mg all other days, will recheck next week 12/15/22    Physician order faxed

## 2022-12-15 ENCOUNTER — ANTICOAG VISIT (OUTPATIENT)
Dept: CARDIOLOGY CLINIC | Facility: CLINIC | Age: 87
End: 2022-12-15

## 2022-12-15 LAB
INR PPP: 2.4 (ref 0.84–1.19)
INR PPP: 2.4 (ref 0.84–1.19)

## 2022-12-15 NOTE — PROGRESS NOTES
Left message for Kassandra Cheek, advised INR good, continue 2 5 mg Wed, 5 mg all other days, will recheck in 2 weeks 12/29/22  Advised to call with questions or concerns    Physician order faxed to [facility] and [place]

## 2022-12-29 ENCOUNTER — ANTICOAG VISIT (OUTPATIENT)
Dept: CARDIOLOGY CLINIC | Facility: CLINIC | Age: 87
End: 2022-12-29

## 2022-12-29 LAB — INR PPP: 2.9 (ref 0.84–1.19)

## 2022-12-29 NOTE — PROGRESS NOTES
Spoke with [facility] NUÑEZ RN, no changes in medications or diet, advised INR at top of goal, advised to have patient take 2 5 mg Wed Fri, 5 mg all other days, will recheck in 2 weeks 1/12/23    Physician order faxed to Delvis Crowe and [facility]

## 2023-01-12 ENCOUNTER — REMOTE DEVICE CLINIC VISIT (OUTPATIENT)
Dept: CARDIOLOGY CLINIC | Facility: CLINIC | Age: 88
End: 2023-01-12

## 2023-01-12 ENCOUNTER — ANTICOAG VISIT (OUTPATIENT)
Dept: CARDIOLOGY CLINIC | Facility: CLINIC | Age: 88
End: 2023-01-12

## 2023-01-12 DIAGNOSIS — Z95.0 PRESENCE OF PERMANENT CARDIAC PACEMAKER: Primary | ICD-10-CM

## 2023-01-12 LAB — INR PPP: 2.9 (ref 0.84–1.19)

## 2023-01-12 NOTE — PROGRESS NOTES
MDT DC PPM - ACTIVE SYSTEM IS MRI CONDITIONAL   CARELINK TRANSMISSION: BATTERY VOLTAGE ADEQUATE (10 8 YRS)  AP 23 6%  99 8% (>40%/CHB) ALL AVAILABLE LEAD PARAMETERS WITHIN NORMAL LIMITS  ONE AT/AF EPISODE LASTING FOR 44 MINS  AT/AF BURDEN <0 1%  PT TAKES WARFARIN  NORMAL DEVICE FUNCTION   AM/RG

## 2023-01-12 NOTE — PROGRESS NOTES
Same faxed to [address] and [facility] pharmacy  Called to jessica graham/nurse verified received

## 2023-01-13 ENCOUNTER — OFFICE VISIT (OUTPATIENT)
Dept: GASTROENTEROLOGY | Facility: MEDICAL CENTER | Age: 88
End: 2023-01-13

## 2023-01-13 VITALS
DIASTOLIC BLOOD PRESSURE: 75 MMHG | BODY MASS INDEX: 24.55 KG/M2 | SYSTOLIC BLOOD PRESSURE: 116 MMHG | WEIGHT: 134.2 LBS | TEMPERATURE: 97.8 F | HEART RATE: 75 BPM

## 2023-01-13 DIAGNOSIS — R13.19 ESOPHAGEAL DYSPHAGIA: Primary | ICD-10-CM

## 2023-01-13 NOTE — PROGRESS NOTES
[facility] - Outpatient Consultation  Biagio Closs 80 y o  female MRN: [id number]  Encounter: [id number]          ASSESSMENT AND PLAN:      1  Esophageal dysphagia  It was a chronic condition but symptoms recurred again, most likely secondary to motility issue related with presbyesophagus  Repeat barium swallow  Patient is very reluctant to undergo any invasive test which is understandable  - FL barium swallow ROUTINE esophagus; Future    Follow up in 1 month after barium swallow test and discuss further evaluation based on barium swallow test results and patient's symptoms  ______________________________________________________________________    HPI:    81 yo F with hx of lupus was seen by me between 2016 and 2017  She initially presented with dysphagia with solid food was was evaluated with barium swallow, it was suspected she might had achalasia then and she underwent EGD and manometry, manometry showed non specific motility of the esophagus but not consistent with achalasia  Her symptoms subsided since and she lost follow up with us  She now presented with symptoms of dysphagia again  Patient was accompanied by daughter and part of the history was provided by daughter  Her weight has been stable  She has intermittent constipation  REVIEW OF SYSTEMS:    CONSTITUTIONAL: Denies any fever, chills, rigors, and weight loss  HEENT: No earache or tinnitus  Denies hearing loss or visual disturbances  CARDIOVASCULAR: No chest pain or palpitations  RESPIRATORY: Denies any cough, hemoptysis, shortness of breath or dyspnea on exertion  GASTROINTESTINAL: As noted in the History of Present Illness  GENITOURINARY: No problems with urination  Denies any hematuria or dysuria  NEUROLOGIC: No dizziness or vertigo, denies headaches  MUSCULOSKELETAL: Denies any muscle or joint pain  SKIN: Denies skin rashes or itching     ENDOCRINE: Denies excessive thirst  Denies intolerance to heat or cold  PSYCHOSOCIAL: Denies depression or anxiety  Denies any recent memory loss  Historical Information   Past Medical History:   Diagnosis Date   • Aortic regurgitation    • Constipation    • Discoid lupus erythematosus    • Dysphagia    • Esophagitis    • Forgetfulness    • 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   • Transient elevated blood pressure     last assessed: 5/16/2017   • Weight loss      Past Surgical History:   Procedure Laterality Date   • APPENDECTOMY     • CATARACT EXTRACTION     • CHOLECYSTECTOMY     • ESOPHAGOGASTRODUODENOSCOPY  06/2013    diagnostic- neg id have dilatation   • EYE SURGERY     • HYSTERECTOMY     • MS ESOPHAGOGASTRODUODENOSCOPY TRANSORAL DIAGNOSTIC N/A 9/16/2016    Procedure: ESOPHAGOGASTRODUODENOSCOPY (EGD); Surgeon: Ten Scanlon MD;  Location:  GI LAB;   Service: Gastroenterology   • REPLACEMENT TOTAL KNEE Left 01/2007     Social History   Social History     Substance and Sexual Activity   Alcohol Use No     Social History     Substance and Sexual Activity   Drug Use No     Social History     Tobacco Use   Smoking Status Never   Smokeless Tobacco Never     Family History   Problem Relation Age of Onset   • Diabetes Mother    • Coronary artery disease Father        Meds/Allergies       Current Outpatient Medications:   •  acetaminophen (TYLENOL) 325 mg tablet  •  amLODIPine (NORVASC) 5 mg tablet  •  Cholecalciferol (VITAMIN D) 2000 units CAPS  •  clotrimazole (LOTRIMIN) 1 % cream  •  fish oil 1,000 mg  •  Multiple Vitamins-Minerals (PRESERVISION AREDS PO)  •  multivitamin (THERAGRAN) TABS  •  polyvinyl alcohol (LIQUIFILM TEARS) 1 4 % ophthalmic solution  •  psyllium (METAMUCIL) 58 6 % powder  •  senna-docusate sodium (SENOKOT-S) 8 6-50 mg per tablet  •  trospium chloride (SANCTURA) 20 mg tablet  •  vitamin B-12 (VITAMIN B-12) 500 mcg tablet  •  warfarin (Coumadin) 5 mg tablet  •  docusate calcium (SURFAK) 240 mg capsule  •  famotidine (PEPCID) 20 mg tablet    Allergies   Allergen Reactions   • Gabapentin      dreams   • Sertraline GI Intolerance     ' felt like a zombie'           Objective     Blood pressure 116/75, pulse 75, temperature 97 8 °F (36 6 °C), temperature source Tympanic, weight 60 9 kg (134 lb 3 2 oz)  Body mass index is 24 55 kg/m²  PHYSICAL EXAM:      General Appearance:   Alert, cooperative, no distress   HEENT:   Normocephalic, atraumatic, anicteric      Neck:  Supple, symmetrical, trachea midline   Lungs:   Clear to auscultation bilaterally; no rales, rhonchi or wheezing; respirations unlabored    Heart[de-identified]   Regular rate and rhythm; no murmur, rub, or gallop  Abdomen:   Soft, non-tender, non-distended; normal bowel sounds; no masses, no organomegaly    Genitalia:   Deferred    Rectal:   Deferred    Extremities:  No cyanosis, clubbing or edema    Pulses:  2+ and symmetric    Skin:  No jaundice, rashes, or lesions    Lymph nodes:  No palpable cervical lymphadenopathy        Lab Results:   No visits with results within 1 Day(s) from this visit  Latest known visit with results is:   [address] visit on 01/12/2023   Component Date Value   • INR 01/12/2023 2 90 (A)          Radiology Results:   Cardiac EP device report    Result Date: 1/11/2023  Narrative: MDT DC PPM - ACTIVE SYSTEM IS MRI CONDITIONAL CARELINK TRANSMISSION: BATTERY VOLTAGE ADEQUATE (10 8 YRS)  AP 23 6%  99 8% (>40%/CHB) ALL AVAILABLE LEAD PARAMETERS WITHIN NORMAL LIMITS  ONE AT/AF EPISODE LASTING FOR 44 MINS  AT/AF BURDEN <0 1%  PT TAKES WARFARIN  NORMAL DEVICE FUNCTION   AM/RG

## 2023-01-16 ENCOUNTER — DOCUMENTATION (OUTPATIENT)
Dept: CARDIOLOGY CLINIC | Facility: CLINIC | Age: 88
End: 2023-01-16

## 2023-01-24 ENCOUNTER — HOSPITAL ENCOUNTER (OUTPATIENT)
Dept: RADIOLOGY | Facility: HOSPITAL | Age: 88
Discharge: HOME | End: 2023-01-24
Attending: INTERNAL MEDICINE

## 2023-01-24 DIAGNOSIS — R13.19 ESOPHAGEAL DYSPHAGIA: ICD-10-CM

## 2023-01-26 ENCOUNTER — ANTICOAG VISIT (OUTPATIENT)
Dept: CARDIOLOGY CLINIC | Facility: CLINIC | Age: 88
End: 2023-01-26

## 2023-01-26 LAB — INR PPP: 3.2 (ref 0.84–1.19)

## 2023-01-26 NOTE — PROGRESS NOTES
Spoke with Bailey Ascencio at [facility] [employer] for [place] with instructions  Advised to have her call with questions or concerns      physician order faxed to [facility] and [place]

## 2023-02-15 ENCOUNTER — ANTICOAG VISIT (OUTPATIENT)
Dept: CARDIOLOGY CLINIC | Facility: CLINIC | Age: 88
End: 2023-02-15

## 2023-02-15 LAB — INR PPP: 1.1 (ref 0.84–1.19)

## 2023-02-15 NOTE — PROGRESS NOTES
Left message for Jadyn to call office to discuss dosing      Will have patient take 5 mg tonight, then 2 5 mg Mon Wed Fri, 5 mg all other days, will recheck 2/23/23    Physician order faxed to Ellen Aceves and [facility]

## 2023-02-16 ENCOUNTER — TELEPHONE (OUTPATIENT)
Dept: CARDIOLOGY CLINIC | Facility: CLINIC | Age: 88
End: 2023-02-16

## 2023-02-16 NOTE — TELEPHONE ENCOUNTER
Dr Claudetta Brasher, just wanted to make you aware:  Patient living at [facility], patient was started on Coumadin 10/22 due to inability  to afford Eliquis  Twice in Nov 2022 [facility] missed getting INR as instructed and patient missed doses of Coumadin  We were doing good until last week, they missed drawing  INR, so patient did not have Coumadin for 5 days  The nursing supervisor assures me this will not happen again  I also spoke with Eva Jc NP from [facility]

## 2023-02-22 DIAGNOSIS — I10 SYSTOLIC HYPERTENSION: ICD-10-CM

## 2023-02-22 RX ORDER — AMLODIPINE BESYLATE 5 MG/1
5 TABLET ORAL EVERY MORNING
Qty: 30 TABLET | Refills: 5 | Status: SHIPPED | OUTPATIENT
Start: 2023-02-22

## 2023-02-23 ENCOUNTER — ANTICOAG VISIT (OUTPATIENT)
Dept: CARDIOLOGY CLINIC | Facility: CLINIC | Age: 88
End: 2023-02-23

## 2023-02-23 LAB — INR PPP: 1.9 (ref 0.84–1.19)

## 2023-02-23 NOTE — PROGRESS NOTES
Left message for Brendon Funez at [facility], advised INR a little low, advised will have patient take 7 5 mg tonight only, then 2 5 mg Mon Wed Fri, 5 mg all other days, will recheck in 2 weeks 3/9/23, advised to call with questions or concerns      Physician order faxed for Brendon Funez and [place]

## 2023-03-09 ENCOUNTER — ANTICOAG VISIT (OUTPATIENT)
Dept: CARDIOLOGY CLINIC | Facility: CLINIC | Age: 88
End: 2023-03-09

## 2023-03-09 LAB — INR PPP: 2.1 (ref 0.84–1.19)

## 2023-03-09 NOTE — PROGRESS NOTES
Spoke with Lelia Killian, left message for Arsh Shafer, advised INR good, continue 2 5 mg Mon Wed Fri, 5 mg all other days, will recheck in 2 weeks 3/23/23    Physician order faxed to Lelia Franklin

## 2023-03-23 ENCOUNTER — ANTICOAG VISIT (OUTPATIENT)
Dept: CARDIOLOGY CLINIC | Facility: CLINIC | Age: 88
End: 2023-03-23

## 2023-03-23 LAB — INR PPP: 2.7 (ref 0.84–1.19)

## 2023-03-23 NOTE — PROGRESS NOTES
Spoke with Damari Kearns at [facility], advised INR good, continue 2 5 mg Mon Wed Fri, 5 mg all other days, will recheck in 2 weeks 4/6/23    Physician order faxed to [facility] and [place]

## 2023-04-06 ENCOUNTER — ANTICOAG VISIT (OUTPATIENT)
Dept: CARDIOLOGY CLINIC | Facility: CLINIC | Age: 88
End: 2023-04-06

## 2023-04-06 LAB — INR PPP: 2.8 (ref 0.84–1.19)

## 2023-04-06 NOTE — PROGRESS NOTES
Spoke with Priyanka Manriquez at [facility], advised Jadyn received fax with instructions    Advised to have her call with questions or concerns    Physician order faxed to [facility] and [place]

## 2023-05-04 ENCOUNTER — ANTICOAG VISIT (OUTPATIENT)
Dept: CARDIOLOGY CLINIC | Facility: CLINIC | Age: 88
End: 2023-05-04

## 2023-05-04 LAB — INR PPP: 2.5 (ref 0.84–1.19)

## 2023-05-04 NOTE — PROGRESS NOTES
INR good, will continue 5 mg Mon Wed Fri, 2 5 mg all other days, will recheck in 3 weeks 5/25/23    Physician order faxed to [facility]

## 2023-05-25 ENCOUNTER — ANTICOAG VISIT (OUTPATIENT)
Dept: CARDIOLOGY CLINIC | Facility: CLINIC | Age: 88
End: 2023-05-25

## 2023-05-25 LAB — INR PPP: 2.1 (ref 0.84–1.19)

## 2023-05-25 NOTE — PROGRESS NOTES
Spoke with Brinda Jefferson, advised INR good but has been coming down, advised to have patient take 2 5 mg Mon Wed Fri, 5 mg all other days, will recheck in 3 weeks 6/15/23    Order faxed to Nila Barrios and [place]

## 2023-06-16 ENCOUNTER — ANTICOAG VISIT (OUTPATIENT)
Dept: CARDIOLOGY CLINIC | Facility: CLINIC | Age: 88
End: 2023-06-16

## 2023-06-16 LAB — INR PPP: 2.1 (ref 0.84–1.19)

## 2023-06-16 NOTE — PROGRESS NOTES
Spoke with Maddy Juares at [facility], advised INR good, continue 2 5 mg Mon Wed Fri, 5 mg all other days, will recheck in 3 weeks 7/6/23    Physician order faxed to [facility] and [facility]

## 2023-07-07 ENCOUNTER — ANTICOAG VISIT (OUTPATIENT)
Dept: CARDIOLOGY CLINIC | Facility: CLINIC | Age: 88
End: 2023-07-07

## 2023-07-07 LAB — INR PPP: 2.4 (ref 0.84–1.19)

## 2023-07-07 NOTE — PROGRESS NOTES
Spoke with Jadyn, INR good, continue 2.5 mg Mon Wed Fri, 5 mg all other days, will recheck in 3 weeks 7/27/23    Physician order faxed to Mya Bull and [facility]

## 2023-07-27 ENCOUNTER — ANTICOAG VISIT (OUTPATIENT)
Dept: CARDIOLOGY CLINIC | Facility: CLINIC | Age: 88
End: 2023-07-27

## 2023-07-27 LAB — INR PPP: 2.3 (ref 0.84–1.19)

## 2023-07-27 NOTE — PROGRESS NOTES
Spoke with Darlene Glover at [facility], advised INR good, will continue 2.5 mg Mon Wed Fri, 5 mg all other days, will recheck in 4 weeks 8/24/23    Physician order faxed to [facility] and [address]

## 2023-08-09 ENCOUNTER — IN-CLINIC DEVICE VISIT (OUTPATIENT)
Dept: CARDIOLOGY CLINIC | Facility: CLINIC | Age: 88
End: 2023-08-09
Payer: MEDICARE

## 2023-08-09 DIAGNOSIS — Z95.0 CARDIAC PACEMAKER IN SITU: Primary | ICD-10-CM

## 2023-08-09 PROCEDURE — 93280 PM DEVICE PROGR EVAL DUAL: CPT

## 2023-08-09 NOTE — PROGRESS NOTES
Results for orders placed or performed in visit on 08/09/23   Cardiac EP device report    Narrative    MDT DC PPM - [place] INTERROGATED IN THE [place] OFFICE: BATTERY VOLTAGE ADEQUATE-10 YRS. AP 22%  100%. ALL AVAILABLE LEAD PARAMETERS WITHIN NORMAL LIMITS. 40 AT/AF NOTED; 2% BURDEN; PT ON WARFARIN. EGRAMS SHOWED PAF/AF. NO PROGRAMMING CHANGES MADE TO DEVICE PARAMETERS. NORMAL DEVICE FUNCTION.  [place]

## 2023-08-23 NOTE — ADDENDUM NOTE
Addended by: Tika Jay on: 10/18/2022 04:07 PM     Modules accepted: Orders Caller: Sonja     Doctor: Marcos Iniguez     Reason for call: Needs to cancel her surgery with Dr. Marcos Iniguez     Call back#: [phone]

## 2023-08-24 ENCOUNTER — ANTICOAG VISIT (OUTPATIENT)
Dept: CARDIOLOGY CLINIC | Facility: CLINIC | Age: 88
End: 2023-08-24

## 2023-08-24 LAB — INR PPP: 2.3 (ref 0.84–1.19)

## 2023-08-24 NOTE — PROGRESS NOTES
Left message for Isabella Horan, will be faxing order. Continue 2.5 mg Mon Wed Fri, 5 mg all other days, will recheck in 4 weeks 9/21/23    Order faxed to Jonnie Rodriguez and [address]

## 2023-09-21 ENCOUNTER — ANTICOAG VISIT (OUTPATIENT)
Dept: CARDIOLOGY CLINIC | Facility: CLINIC | Age: 88
End: 2023-09-21

## 2023-09-21 LAB — INR PPP: 1.9 (ref 0.84–1.19)

## 2023-09-21 NOTE — PROGRESS NOTES
Left message for Jaiden Pulse, advised INR a little low, advised to have patient take 7.5 mg tonight only, then go back to 2.5 mg Mon Wed Fri, 5 mg all other days, will recheck in 2 weeks 10/5/23  Advised to call with any missed doses or changes in health.     Physician order faxed to Jered Mullins and [facility]

## 2023-10-05 ENCOUNTER — ANTICOAG VISIT (OUTPATIENT)
Dept: CARDIOLOGY CLINIC | Facility: CLINIC | Age: 88
End: 2023-10-05

## 2023-10-05 LAB — INR PPP: 2.5 (ref 0.84–1.19)

## 2023-10-05 NOTE — PROGRESS NOTES
Spoke with Jennifer Hart, advised INR good, continue 2.5 mg Mon Wed Fri, 5 mg all other days, will recheck in 4 weeks 11/2/23    Physician order faxed to Jon Jeffers and [address] [place] [address]

## 2023-10-23 ENCOUNTER — HOSPITAL ENCOUNTER (OUTPATIENT)
Dept: NON INVASIVE DIAGNOSTICS | Facility: HOSPITAL | Age: 88
Discharge: HOME | End: 2023-10-23
Payer: MEDICARE

## 2023-10-23 VITALS
DIASTOLIC BLOOD PRESSURE: 70 MMHG | WEIGHT: 133 LBS | SYSTOLIC BLOOD PRESSURE: 122 MMHG | HEART RATE: 70 BPM | HEIGHT: 62 IN | BODY MASS INDEX: 24.48 KG/M2

## 2023-10-23 DIAGNOSIS — I10 SYSTOLIC HYPERTENSION: ICD-10-CM

## 2023-10-23 DIAGNOSIS — I48.92 ATRIAL FLUTTER, UNSPECIFIED TYPE (HCC): ICD-10-CM

## 2023-10-23 LAB
AORTIC ROOT: 3.4 CM
APICAL FOUR CHAMBER EJECTION FRACTION: 36 %
E WAVE DECELERATION TIME: 139 MS
E/A RATIO: 1.42
FRACTIONAL SHORTENING: 24 (ref 28–44)
INTERVENTRICULAR SEPTUM IN DIASTOLE (PARASTERNAL SHORT AXIS VIEW): 1.2 CM
INTERVENTRICULAR SEPTUM: 1.2 CM (ref 0.6–1.1)
LAAS-AP2: 10 CM2
LAAS-AP4: 24.8 CM2
LEFT ATRIUM AREA SYSTOLE SINGLE PLANE A4C: 26 CM2
LEFT ATRIUM SIZE: 2.4 CM
LEFT ATRIUM VOLUME (MOD BIPLANE): 51 ML
LEFT ATRIUM VOLUME INDEX (MOD BIPLANE): 31.7 ML/M2
LEFT INTERNAL DIMENSION IN SYSTOLE: 2.8 CM (ref 2.1–4)
LEFT VENTRICULAR INTERNAL DIMENSION IN DIASTOLE: 3.7 CM (ref 3.5–6)
LEFT VENTRICULAR POSTERIOR WALL IN END DIASTOLE: 1.2 CM
LEFT VENTRICULAR STROKE VOLUME: 29 ML
LVSV (TEICH): 29 ML
MV E'TISSUE VEL-SEP: 4 CM/S
MV PEAK A VEL: 0.66 M/S
MV PEAK E VEL: 94 CM/S
MV STENOSIS PRESSURE HALF TIME: 40 MS
MV VALVE AREA P 1/2 METHOD: 5.5
RA PRESSURE ESTIMATED: 12 MMHG
RIGHT ATRIUM AREA SYSTOLE A4C: 17.8 CM2
RIGHT VENTRICLE ID DIMENSION: 4 CM
RV PSP: 35 MMHG
SL CV LEFT ATRIUM LENGTH A2C: 3.9 CM
SL CV LV EF: 50
SL CV PED ECHO LEFT VENTRICLE DIASTOLIC VOLUME (MOD BIPLANE) 2D: 58 ML
SL CV PED ECHO LEFT VENTRICLE SYSTOLIC VOLUME (MOD BIPLANE) 2D: 29 ML
TR MAX PG: 23 MMHG
TR PEAK VELOCITY: 2.4 M/S
TRICUSPID ANNULAR PLANE SYSTOLIC EXCURSION: 1.7 CM
TRICUSPID VALVE PEAK REGURGITATION VELOCITY: 2.41 M/S

## 2023-10-23 PROCEDURE — 93306 TTE W/DOPPLER COMPLETE: CPT | Performed by: STUDENT IN AN ORGANIZED HEALTH CARE EDUCATION/TRAINING PROGRAM

## 2023-10-23 PROCEDURE — 93306 TTE W/DOPPLER COMPLETE: CPT

## 2023-10-31 ENCOUNTER — HOSPITAL ENCOUNTER (EMERGENCY)
Facility: HOSPITAL | Age: 88
Discharge: HOME | DRG: 330 | End: 2023-10-31
Attending: EMERGENCY MEDICINE
Payer: MEDICARE

## 2023-10-31 VITALS
DIASTOLIC BLOOD PRESSURE: 67 MMHG | BODY MASS INDEX: 24.19 KG/M2 | HEART RATE: 73 BPM | SYSTOLIC BLOOD PRESSURE: 144 MMHG | TEMPERATURE: 97.8 F | RESPIRATION RATE: 16 BRPM | WEIGHT: 132.28 LBS | OXYGEN SATURATION: 95 %

## 2023-10-31 DIAGNOSIS — R31.0 GROSS HEMATURIA: Primary | ICD-10-CM

## 2023-10-31 DIAGNOSIS — R79.1 ELEVATED INR: ICD-10-CM

## 2023-10-31 LAB
ANION GAP SERPL CALCULATED.3IONS-SCNC: 10 MMOL/L
APTT PPP: 72 SECONDS (ref 23–37)
BACTERIA UR QL AUTO: ABNORMAL /HPF
BASOPHILS # BLD AUTO: 0.03 THOUSANDS/ÂΜL (ref 0–0.1)
BASOPHILS NFR BLD AUTO: 1 % (ref 0–1)
BILIRUB UR QL STRIP: NEGATIVE
BUN SERPL-MCNC: 21 MG/DL (ref 5–25)
CALCIUM SERPL-MCNC: 9.2 MG/DL (ref 8.4–10.2)
CHLORIDE SERPL-SCNC: 107 MMOL/L (ref 96–108)
CLARITY UR: ABNORMAL
CO2 SERPL-SCNC: 24 MMOL/L (ref 21–32)
COLOR UR: ABNORMAL
CREAT SERPL-MCNC: 0.76 MG/DL (ref 0.6–1.3)
EOSINOPHIL # BLD AUTO: 0.08 THOUSAND/ÂΜL (ref 0–0.61)
EOSINOPHIL NFR BLD AUTO: 1 % (ref 0–6)
ERYTHROCYTE [DISTWIDTH] IN BLOOD BY AUTOMATED COUNT: 14 % (ref 11.6–15.1)
GFR SERPL CREATININE-BSD FRML MDRD: 68 ML/MIN/1.73SQ M
GLUCOSE SERPL-MCNC: 105 MG/DL (ref 65–140)
GLUCOSE UR STRIP-MCNC: NEGATIVE MG/DL
HCT VFR BLD AUTO: 38.1 % (ref 34.8–46.1)
HGB BLD-MCNC: 11.7 G/DL (ref 11.5–15.4)
HGB UR QL STRIP.AUTO: ABNORMAL
IMM GRANULOCYTES # BLD AUTO: 0.02 THOUSAND/UL (ref 0–0.2)
IMM GRANULOCYTES NFR BLD AUTO: 0 % (ref 0–2)
INR PPP: 7.39 (ref 0.84–1.19)
LEUKOCYTE ESTERASE UR QL STRIP: ABNORMAL
LYMPHOCYTES # BLD AUTO: 0.94 THOUSANDS/ÂΜL (ref 0.6–4.47)
LYMPHOCYTES NFR BLD AUTO: 15 % (ref 14–44)
MCH RBC QN AUTO: 29 PG (ref 26.8–34.3)
MCHC RBC AUTO-ENTMCNC: 30.7 G/DL (ref 31.4–37.4)
MCV RBC AUTO: 94 FL (ref 82–98)
MONOCYTES # BLD AUTO: 0.57 THOUSAND/ÂΜL (ref 0.17–1.22)
MONOCYTES NFR BLD AUTO: 9 % (ref 4–12)
NEUTROPHILS # BLD AUTO: 4.64 THOUSANDS/ÂΜL (ref 1.85–7.62)
NEUTS SEG NFR BLD AUTO: 74 % (ref 43–75)
NITRITE UR QL STRIP: POSITIVE
NON-SQ EPI CELLS URNS QL MICRO: ABNORMAL /HPF
NRBC BLD AUTO-RTO: 0 /100 WBCS
PH UR STRIP.AUTO: 6.5 [PH]
PLATELET # BLD AUTO: 175 THOUSANDS/UL (ref 149–390)
PMV BLD AUTO: 9.9 FL (ref 8.9–12.7)
POTASSIUM SERPL-SCNC: 3.9 MMOL/L (ref 3.5–5.3)
PROT UR STRIP-MCNC: ABNORMAL MG/DL
PROTHROMBIN TIME: 62.2 SECONDS (ref 11.6–14.5)
RBC # BLD AUTO: 4.04 MILLION/UL (ref 3.81–5.12)
RBC #/AREA URNS AUTO: ABNORMAL /HPF
SODIUM SERPL-SCNC: 141 MMOL/L (ref 135–147)
SP GR UR STRIP.AUTO: 1.01 (ref 1–1.03)
UROBILINOGEN UR QL STRIP.AUTO: 4 E.U./DL
WBC # BLD AUTO: 6.28 THOUSAND/UL (ref 4.31–10.16)
WBC #/AREA URNS AUTO: ABNORMAL /HPF

## 2023-10-31 PROCEDURE — 87086 URINE CULTURE/COLONY COUNT: CPT | Performed by: EMERGENCY MEDICINE

## 2023-10-31 PROCEDURE — 82272 OCCULT BLD FECES 1-3 TESTS: CPT

## 2023-10-31 PROCEDURE — 36415 COLL VENOUS BLD VENIPUNCTURE: CPT | Performed by: EMERGENCY MEDICINE

## 2023-10-31 PROCEDURE — 85730 THROMBOPLASTIN TIME PARTIAL: CPT | Performed by: EMERGENCY MEDICINE

## 2023-10-31 PROCEDURE — 85610 PROTHROMBIN TIME: CPT | Performed by: EMERGENCY MEDICINE

## 2023-10-31 PROCEDURE — 80048 BASIC METABOLIC PNL TOTAL CA: CPT | Performed by: EMERGENCY MEDICINE

## 2023-10-31 PROCEDURE — 81001 URINALYSIS AUTO W/SCOPE: CPT | Performed by: EMERGENCY MEDICINE

## 2023-10-31 PROCEDURE — 99284 EMERGENCY DEPT VISIT MOD MDM: CPT

## 2023-10-31 PROCEDURE — 99284 EMERGENCY DEPT VISIT MOD MDM: CPT | Performed by: EMERGENCY MEDICINE

## 2023-10-31 PROCEDURE — 85025 COMPLETE CBC W/AUTO DIFF WBC: CPT | Performed by: EMERGENCY MEDICINE

## 2023-10-31 RX ORDER — PHYTONADIONE 5 MG/1
5 TABLET ORAL ONCE
Status: COMPLETED | OUTPATIENT
Start: 2023-10-31 | End: 2023-10-31

## 2023-10-31 RX ADMIN — PHYTONADIONE 5 MG: 5 TABLET ORAL at 11:27

## 2023-10-31 NOTE — DISCHARGE INSTRUCTIONS
Hold Coumadin for 2 days and recheck INR in 2 days.   Return to the emergency department with ongoing bleeding or any new symptoms

## 2023-10-31 NOTE — ED PROVIDER NOTES
History  Chief Complaint   Patient presents with   • Rectal Bleeding     Pt has had bright red rectal bleeding for the past x 4 days. Pt also has abnormal lab per facility. Pt denies cp/sob/n/v or fevers     42-year-old female from nursing home with history of dementia, paroxysmal atrial fibrillation on Coumadin, pacemaker, third-degree heart block presents to the emergency department with what she states is bright red blood in the toilet over the last 4 days. Other: Reports INR 8. Had INR last checked 10/5 and it was therapeutic at around 2.5. No adjustments in the Coumadin were made. She states every time she goes to the bathroom she sees blood in the toilet. She is unsure if it is with her bowel movements or with urination. She denies any clots. No abdominal pain. No nausea or vomiting. No lightheadedness. Daughter states that she has been feeling weak lately. Prior history of GI bleeding. History provided by:  Patient  History limited by:  Dementia   used: No    Rectal Bleeding - Minor  Quality:  Bright red  Amount:  Unable to specify  Duration:  4 days  Timing:  Intermittent  Chronicity:  New  Context comment:  "Anytime I go to the bathroom"  Similar prior episodes: no    Relieved by:  None tried  Worsened by:  Nothing  Ineffective treatments:  None tried  Associated symptoms: no abdominal pain, no dizziness, no fever, no hematemesis, no light-headedness, no loss of consciousness, no recent illness and no vomiting    Risk factors: anticoagulant use        Prior to Admission Medications   Prescriptions Last Dose Informant Patient Reported? Taking? Cholecalciferol (VITAMIN D) 2000 units CAPS  Outside Facility (Specify) No No   Sig: Take 1 capsule (2,000 Units total) by mouth daily   Multiple Vitamins-Minerals (PRESERVISION AREDS PO)  Outside Facility (Specify) Yes No   Sig: Take by mouth.    Turmeric 500 MG CAPS  Outside Facility (Specify) Yes No   Sig: Take 500 mg by mouth in the morning   acetaminophen (TYLENOL) 325 mg tablet  Outside Facility (Specify) No Yes   Sig: Take 2 tablets (650 mg total) by mouth every 4 (four) hours as needed for mild pain   amLODIPine (NORVASC) 5 mg tablet   No No   Sig: Take 1 tablet (5 mg total) by mouth every morning   clotrimazole (LOTRIMIN) 1 % cream  Outside Facility (Specify) Yes No   Sig: Apply topically 2 (two) times a day   docusate calcium (SURFAK) 240 mg capsule  Outside Facility (Specify) Yes No   Sig: Take by mouth   Patient not taking: Reported on 10/6/2022   famotidine (PEPCID) 20 mg tablet  Outside Facility (Specify) No No   Sig: Take 1 tablet (20 mg total) by mouth 2 (two) times a day as needed for heartburn (( after using twice EVERY day for first week))   Patient not taking: Reported on 10/6/2022   fish oil 1,000 mg  Outside Facility (Specify) Yes No   Sig: Take 1,000 mg by mouth daily.    fluocinolone acetonide (DERMOTIC) 0.01 % otic oil  Outside Facility (Specify) Yes No   Sig: Administer 1 drop into both ears 3 (three) times a week   multivitamin (THERAGRAN) TABS  Outside Facility (Specify) Yes No   Sig: Take 1 tablet by mouth daily   polyvinyl alcohol (LIQUIFILM TEARS) 1.4 % ophthalmic solution  Outside Facility (Specify) Yes No   Si drop as needed for dry eyes   psyllium (METAMUCIL) 58.6 % powder  Outside Facility (Specify) Yes No   Sig: Take 1 packet by mouth 3 (three) times a day   Patient not taking: Reported on 4/10/2023   senna-docusate sodium (SENOKOT-S) 8.6-50 mg per tablet  Outside Facility (Specify) Yes No   Sig: Take 1 tablet by mouth daily   trospium chloride (SANCTURA) 20 mg tablet  Outside Facility (Specify) Yes No   Sig: Take 20 mg by mouth in the morning   vitamin B-12 (VITAMIN B-12) 500 mcg tablet  Outside Facility (Specify) Yes No   Sig: Take 500 mcg by mouth daily   warfarin (COUMADIN) 2.5 mg tablet  Outside Facility (Specify) Yes No   warfarin (Coumadin) 5 mg tablet  Outside Facility (Specify) No No   Sig: Take 1 tablet daily or as directed by MD      Facility-Administered Medications: None       Past Medical History:   Diagnosis Date   • Aortic regurgitation    • Constipation    • Discoid lupus erythematosus    • Dysphagia    • Esophagitis    • Forgetfulness    • 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   • Transient elevated blood pressure     last assessed: 5/16/2017   • Weight loss        Past Surgical History:   Procedure Laterality Date   • APPENDECTOMY     • CATARACT EXTRACTION     • CHOLECYSTECTOMY     • ESOPHAGOGASTRODUODENOSCOPY  06/2013    diagnostic- neg id have dilatation   • EYE SURGERY     • HYSTERECTOMY     • SC ESOPHAGOGASTRODUODENOSCOPY TRANSORAL DIAGNOSTIC N/A 9/16/2016    Procedure: ESOPHAGOGASTRODUODENOSCOPY (EGD); Surgeon: Elton Montgomery MD;  Location: [facility]; Service: Gastroenterology   • REPLACEMENT TOTAL KNEE Left 01/2007       Family History   Problem Relation Age of Onset   • Diabetes Mother    • Coronary artery disease Father      I have reviewed and agree with the history as documented. E-Cigarette/Vaping   • E-Cigarette Use Never User      E-Cigarette/Vaping Substances     Social History     Tobacco Use   • Smoking status: Never   • Smokeless tobacco: Never   Vaping Use   • Vaping Use: Never used   Substance Use Topics   • Alcohol use: No   • Drug use: No       Review of Systems   Constitutional:  Positive for activity change. Negative for appetite change, chills, diaphoresis, fatigue and fever. HENT: Negative. Negative for congestion, rhinorrhea and sore throat. Eyes: Negative. Negative for discharge, redness and itching. Respiratory: Negative. Negative for apnea, cough, chest tightness, shortness of breath and wheezing. Cardiovascular:  Negative for chest pain, palpitations and leg swelling. Gastrointestinal:  Positive for blood in stool, constipation and hematochezia. Negative for abdominal distention, abdominal pain, diarrhea, hematemesis, nausea and vomiting.    Endocrine: Negative. Genitourinary: Negative. Negative for dysuria, flank pain, frequency and urgency. Musculoskeletal: Negative. Negative for back pain. Skin: Negative. Allergic/Immunologic: Negative. Neurological: Negative. Negative for dizziness, loss of consciousness, syncope, weakness, light-headedness, numbness and headaches. Hematological: Negative. All other systems reviewed and are negative. Physical Exam  Physical Exam  Vitals and nursing note reviewed. Constitutional:       General: She is awake. She is not in acute distress. Appearance: Normal appearance. She is well-developed and normal weight. She is not ill-appearing, toxic-appearing or diaphoretic. HENT:      Head: Normocephalic and atraumatic. Right Ear: External ear normal.      Left Ear: External ear normal.      Nose: Nose normal.      Mouth/Throat:      Mouth: Mucous membranes are moist.      Pharynx: No oropharyngeal exudate. Eyes:      General: No scleral icterus. Right eye: No discharge. Left eye: No discharge. Conjunctiva/sclera: Conjunctivae normal.   Neck:      Thyroid: No thyromegaly. Vascular: No JVD. Trachea: No tracheal deviation. Cardiovascular:      Rate and Rhythm: Normal rate. Rhythm irregular. Heart sounds: Normal heart sounds. No murmur heard. No friction rub. No gallop. Pulmonary:      Effort: Pulmonary effort is normal. No respiratory distress. Breath sounds: Normal breath sounds. No stridor. No wheezing, rhonchi or rales. Chest:      Chest wall: No tenderness. Abdominal:      General: Bowel sounds are normal. There is no distension. Palpations: Abdomen is soft. There is no mass. Tenderness: There is no abdominal tenderness. Hernia: No hernia is present. Genitourinary:     Rectum: Normal. Guaiac result negative (Brown stool). No external hemorrhoid. Skin:     General: Skin is warm and dry.       Coloration: Skin is not jaundiced or pale. Findings: No bruising, erythema, lesion or rash. Neurological:      General: No focal deficit present. Mental Status: She is alert. Mental status is at baseline. She is disoriented. Motor: No weakness or abnormal muscle tone. Deep Tendon Reflexes: Reflexes are normal and symmetric. Psychiatric:         Mood and Affect: Mood normal.         Behavior: Behavior is cooperative. Vital Signs  ED Triage Vitals [10/31/23 0935]   Temperature Pulse Respirations Blood Pressure SpO2   97.8 °F (36.6 °C) 77 16 137/64 95 %      Temp Source Heart Rate Source Patient Position - Orthostatic VS BP Location FiO2 (%)   Oral Monitor Lying Right arm --      Pain Score       No Pain           Vitals:    10/31/23 0935   BP: 137/64   Pulse: 77   Patient Position - Orthostatic VS: Lying         Visual Acuity      ED Medications  Medications - No data to display    Diagnostic Studies  Results Reviewed       Procedure Component Value Units Date/Time    CBC and differential [[id number]] Collected: 10/31/23 0952    Lab Status: No result Specimen: Blood from Hand, Left     Protime-INR [[id number]] Collected: 10/31/23 0952    Lab Status: No result Specimen: Blood from Arm, Left     APTT [[id number]] Collected: 10/31/23 0952    Lab Status: No result Specimen: Blood from Arm, Left     Basic metabolic panel [[id number]] Collected: 10/31/23 0952    Lab Status: No result Specimen: Blood from Hand, Left                    No orders to display              Procedures  Procedures         ED Course  ED Course as of 10/31/23 1623   Tue Oct 31, 2023   1003 Hemoglobin: 11.7  12.1, 2/10/2021   1029 Normal BMP   1114 POCT INR(!!): 7.39   1144 Updated patient's daughter regarding results of INR. Vitamin K given for INR of 7.39. She states that they have an appointment at 130 and was wondering if they could leave. Awaiting urine to rule out hematuria. Patient states she can provide urine.   Ultimately since patient has been stable here no further episodes of bleeding, will hold Coumadin for 2 days and have the INR checked at her facility. 1217 Gross hematuria noted when patient urinated                                             Medical Decision Making  80-year-old female presents to the emergency department with bright red blood per rectum, however patient is unsure if it is coming from urination or defecation. She states "every time I use the bathroom" is when she notices blood. No clots. No abdominal pain, nausea or vomiting. No dysuria. No fevers or chills. She is anticoagulated. No prior history of GI bleeding. Patient's daughter states she has been weaker than usual.  On exam she is alert no acute distress. She has no abdominal tenderness on exam.  Rectal exam reveals brown heme-negative stool. We will check basic labs, coags. Will obtain urine to see if the source of blood is hematuria. Amount and/or Complexity of Data Reviewed  Labs: ordered. Decision-making details documented in ED Course. Risk  Prescription drug management. Disposition  Final diagnoses:   None     ED Disposition       None          Follow-up Information    None         Patient's Medications   Discharge Prescriptions    No medications on file       No discharge procedures on file.     PDMP Review       None            ED Provider  Electronically Signed by             Wiley Juan DO  10/31/23 9601

## 2023-11-01 LAB — BACTERIA UR CULT: NORMAL

## 2023-11-02 ENCOUNTER — ANTICOAG VISIT (OUTPATIENT)
Dept: CARDIOLOGY CLINIC | Facility: CLINIC | Age: 88
End: 2023-11-02

## 2023-11-02 ENCOUNTER — OFFICE VISIT (OUTPATIENT)
Dept: GASTROENTEROLOGY | Facility: MEDICAL CENTER | Age: 88
End: 2023-11-02
Payer: MEDICARE

## 2023-11-02 VITALS
BODY MASS INDEX: 23.81 KG/M2 | DIASTOLIC BLOOD PRESSURE: 74 MMHG | TEMPERATURE: 97.2 F | HEART RATE: 80 BPM | WEIGHT: 130.2 LBS | SYSTOLIC BLOOD PRESSURE: 117 MMHG

## 2023-11-02 DIAGNOSIS — R13.19 ESOPHAGEAL DYSPHAGIA: Primary | ICD-10-CM

## 2023-11-02 DIAGNOSIS — K59.04 CHRONIC IDIOPATHIC CONSTIPATION: ICD-10-CM

## 2023-11-02 DIAGNOSIS — R10.13 DYSPEPSIA: ICD-10-CM

## 2023-11-02 LAB — INR PPP: 1.7 (ref 0.84–1.19)

## 2023-11-02 PROCEDURE — 99213 OFFICE O/P EST LOW 20 MIN: CPT | Performed by: INTERNAL MEDICINE

## 2023-11-02 NOTE — PROGRESS NOTES
Spoke with Lebron Ontiveros at [facility], states patient was complaining of blood in her urine, they did stat INR on Mon, was 8.1, they sent her to ER Tues 10/31, INR was 7.39, they gave her Vit K and told her to hold Coumadin for 2 days. Current dose verified. No changes in medications, diet or health.  Advised to have patient go back to 2.5 mg Mon Wed Fri, 5 mg all other days, will recheck next week 11/9/23    Physician order faxed to [facility] and [address] [place] [address]

## 2023-11-02 NOTE — PROGRESS NOTES
[facility] - Outpatient Follow-up Note  Praveen Lucedale 80 y.o. female MRN: [id number]  Encounter: [id number]          ASSESSMENT AND PLAN:      1. Esophageal dysphagia  Dysphagia likely to be functional or secondary to previously esophagus. She has no weight loss. Patient is encouraged to have mechanical soft diet. Chew well and eat slowly. 2. Dyspepsia  She reported intermittent bloating after eating. Abdominal ultrasound to rule out any gallstone disease.  - US abdomen complete; Future    3. Idiopathic constipation. Continue laxative on as-needed basis. Increase water and fiber intake. Patient's daughter was counseled on importance of increased fiber intake to improve symptoms. Follow-up on as-needed basis. ______________________________________________________________________    SUBJECTIVE:  79 yo F with hx of lupus was seen by me between 2016 and 2017. She initially presented with dysphagia with solid food was was evaluated with barium swallow, it was suspected she might had achalasia then and she underwent EGD and manometry, manometry showed non specific motility of the esophagus but not consistent with achalasia. Her symptoms subsided since and she lost follow up with us. She now presented with symptoms of dysphagia in Jan 2023. She does not want to undergo any invasive procedure due to her age. Barium swallow showed Mildly dilated esophagus with mild diffuse tertiary contractions, but otherwise normal motility and normal prompt emptying of contrast from the esophagus. Patient was accompanied by daughter and part of the history was provided by daughter  Daughter reported patient had 1 episode of dysphagia and symptom lasted for 10 days in the past months. Her weight has been stable  She also complained of intermittent constipation. REVIEW OF SYSTEMS IS OTHERWISE NEGATIVE.       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    Transient elevated blood pressure     last assessed: 5/16/2017    Weight loss      Past Surgical History:   Procedure Laterality Date    APPENDECTOMY      CATARACT EXTRACTION      CHOLECYSTECTOMY      ESOPHAGOGASTRODUODENOSCOPY  06/2013    diagnostic- neg id have dilatation    EYE SURGERY      HYSTERECTOMY      HI ESOPHAGOGASTRODUODENOSCOPY TRANSORAL DIAGNOSTIC N/A 9/16/2016    Procedure: ESOPHAGOGASTRODUODENOSCOPY (EGD); Surgeon: Rommel Warner MD;  Location: [facility]; Service: Gastroenterology    REPLACEMENT TOTAL KNEE Left 01/2007     Social History   Social History     Substance and Sexual Activity   Alcohol Use No     Social History     Substance and Sexual Activity   Drug Use No     Social History     Tobacco Use   Smoking Status Never   Smokeless Tobacco Never     Family History   Problem Relation Age of Onset    Diabetes Mother     Coronary artery disease Father        Meds/Allergies       Current Outpatient Medications:     acetaminophen (TYLENOL) 325 mg tablet    amLODIPine (NORVASC) 5 mg tablet    Cholecalciferol (VITAMIN D) 2000 units CAPS    fish oil 1,000 mg    Multiple Vitamins-Minerals (PRESERVISION AREDS PO)    multivitamin (THERAGRAN) TABS    senna-docusate sodium (SENOKOT-S) 8.6-50 mg per tablet    trospium chloride (SANCTURA) 20 mg tablet    Turmeric 500 MG CAPS    vitamin B-12 (VITAMIN B-12) 500 mcg tablet    warfarin (COUMADIN) 2.5 mg tablet    warfarin (Coumadin) 5 mg tablet    polyvinyl alcohol (LIQUIFILM TEARS) 1.4 % ophthalmic solution    Allergies   Allergen Reactions    Gabapentin      dreams    Sertraline GI Intolerance     ' felt like a zombie'           Objective     Blood pressure 117/74, pulse 80, temperature (!) 97.2 °F (36.2 °C), weight 59.1 kg (130 lb 3.2 oz). Body mass index is 23.81 kg/m².       PHYSICAL EXAM:      General Appearance:   Alert, cooperative, no distress   HEENT:   Normocephalic, atraumatic, anicteric. Neck:  Supple, symmetrical, trachea midline   Lungs:   Clear to auscultation bilaterally; no rales, rhonchi or wheezing; respirations unlabored    Heart[de-identified]   Regular rate and rhythm; no murmur, rub, or gallop. Abdomen:   Soft, non-tender, non-distended; normal bowel sounds; no masses, no organomegaly    Genitalia:   Deferred    Rectal:   Deferred    Extremities:  No cyanosis, clubbing or edema    Pulses:  2+ and symmetric    Skin:  No jaundice, rashes, or lesions    Lymph nodes:  No palpable cervical lymphadenopathy        Lab Results:   Anticoag visit on 11/02/2023   Component Date Value    INR 11/02/2023 1.70 (A)          Radiology Results:   Echo complete w/ contrast if indicated    Result Date: 10/23/2023  Narrative:   Left Ventricle: Left ventricular cavity size is normal. Wall thickness is mildly increased. There is concentric remodeling. The left ventricular ejection fraction is 50%. Systolic function is low normal. Wall motion is normal.   Left Atrium: The atrium is moderately dilated. Right Atrium: The atrium is dilated. Aortic Valve: There is mild regurgitation. There is aortic valve sclerosis. Mitral Valve: There is mild annular calcification. There is mild regurgitation. Tricuspid Valve: There is mild regurgitation. The right ventricular systolic pressure is mildly elevated. The estimated right ventricular systolic pressure is 31.27 mmHg. Pulmonic Valve: There is mild regurgitation. Aorta: The aortic root is mildly dilated (2.1 cm.m2 when indexed to BSA). The ascending aorta is mildly dilated (2.5 cm/m2 when indexed to BSA).

## 2023-11-03 ENCOUNTER — HOSPITAL ENCOUNTER (INPATIENT)
Facility: HOSPITAL | Age: 88
LOS: 5 days | Discharge: NURSING, REHAB OR LONG-TERM CARE | DRG: 330 | End: 2023-11-08
Attending: EMERGENCY MEDICINE | Admitting: SURGERY
Payer: MEDICARE

## 2023-11-03 ENCOUNTER — ANESTHESIA (INPATIENT)
Dept: PERIOP | Facility: HOSPITAL | Age: 88
End: 2023-11-03
Payer: MEDICARE

## 2023-11-03 ENCOUNTER — ANESTHESIA EVENT (INPATIENT)
Dept: PERIOP | Facility: HOSPITAL | Age: 88
End: 2023-11-03
Payer: MEDICARE

## 2023-11-03 ENCOUNTER — APPOINTMENT (EMERGENCY)
Dept: CT IMAGING | Facility: HOSPITAL | Age: 88
DRG: 330 | End: 2023-11-03
Payer: MEDICARE

## 2023-11-03 DIAGNOSIS — K56.609 SMALL BOWEL OBSTRUCTION (HCC): Primary | ICD-10-CM

## 2023-11-03 LAB
ABO GROUP BLD BPU: NORMAL
ABO GROUP BLD BPU: NORMAL
ABO GROUP BLD: NORMAL
ABO GROUP BLD: NORMAL
ALBUMIN SERPL BCP-MCNC: 4.2 G/DL (ref 3.5–5)
ALP SERPL-CCNC: 60 U/L (ref 34–104)
ALT SERPL W P-5'-P-CCNC: 19 U/L (ref 7–52)
ANION GAP SERPL CALCULATED.3IONS-SCNC: 11 MMOL/L
ANION GAP SERPL CALCULATED.3IONS-SCNC: 9 MMOL/L
AST SERPL W P-5'-P-CCNC: 18 U/L (ref 13–39)
ATRIAL RATE: 119 BPM
BACTERIA UR QL AUTO: ABNORMAL /HPF
BASOPHILS # BLD AUTO: 0.01 THOUSANDS/ÂΜL (ref 0–0.1)
BASOPHILS # BLD MANUAL: 0 THOUSAND/UL (ref 0–0.1)
BASOPHILS NFR BLD AUTO: 0 % (ref 0–1)
BASOPHILS NFR MAR MANUAL: 0 % (ref 0–1)
BILIRUB SERPL-MCNC: 1.45 MG/DL (ref 0.2–1)
BILIRUB UR QL STRIP: NEGATIVE
BLD GP AB SCN SERPL QL: NEGATIVE
BPU ID: NORMAL
BPU ID: NORMAL
BUN SERPL-MCNC: 19 MG/DL (ref 5–25)
BUN SERPL-MCNC: 25 MG/DL (ref 5–25)
CALCIUM SERPL-MCNC: 8.1 MG/DL (ref 8.4–10.2)
CALCIUM SERPL-MCNC: 9.3 MG/DL (ref 8.4–10.2)
CHLORIDE SERPL-SCNC: 103 MMOL/L (ref 96–108)
CHLORIDE SERPL-SCNC: 106 MMOL/L (ref 96–108)
CLARITY UR: CLEAR
CO2 SERPL-SCNC: 22 MMOL/L (ref 21–32)
CO2 SERPL-SCNC: 24 MMOL/L (ref 21–32)
COLOR UR: YELLOW
CREAT SERPL-MCNC: 0.58 MG/DL (ref 0.6–1.3)
CREAT SERPL-MCNC: 0.65 MG/DL (ref 0.6–1.3)
CROSSMATCH: NORMAL
CROSSMATCH: NORMAL
EOSINOPHIL # BLD AUTO: 0.01 THOUSAND/ÂΜL (ref 0–0.61)
EOSINOPHIL # BLD MANUAL: 0 THOUSAND/UL (ref 0–0.4)
EOSINOPHIL NFR BLD AUTO: 0 % (ref 0–6)
EOSINOPHIL NFR BLD MANUAL: 0 % (ref 0–6)
ERYTHROCYTE [DISTWIDTH] IN BLOOD BY AUTOMATED COUNT: 14 % (ref 11.6–15.1)
ERYTHROCYTE [DISTWIDTH] IN BLOOD BY AUTOMATED COUNT: 14.1 % (ref 11.6–15.1)
GFR SERPL CREATININE-BSD FRML MDRD: 77 ML/MIN/1.73SQ M
GFR SERPL CREATININE-BSD FRML MDRD: 80 ML/MIN/1.73SQ M
GLUCOSE SERPL-MCNC: 122 MG/DL (ref 65–140)
GLUCOSE SERPL-MCNC: 189 MG/DL (ref 65–140)
GLUCOSE UR STRIP-MCNC: ABNORMAL MG/DL
HCT VFR BLD AUTO: 34.5 % (ref 34.8–46.1)
HCT VFR BLD AUTO: 39.8 % (ref 34.8–46.1)
HGB BLD-MCNC: 11 G/DL (ref 11.5–15.4)
HGB BLD-MCNC: 12.5 G/DL (ref 11.5–15.4)
HGB UR QL STRIP.AUTO: ABNORMAL
HYALINE CASTS #/AREA URNS LPF: ABNORMAL /LPF
IMM GRANULOCYTES # BLD AUTO: 0.06 THOUSAND/UL (ref 0–0.2)
IMM GRANULOCYTES NFR BLD AUTO: 1 % (ref 0–2)
INR PPP: 1.61 (ref 0.84–1.19)
KETONES UR STRIP-MCNC: ABNORMAL MG/DL
LACTATE SERPL-SCNC: 1.1 MMOL/L (ref 0.5–2)
LACTATE SERPL-SCNC: 1.6 MMOL/L (ref 0.5–2)
LEUKOCYTE ESTERASE UR QL STRIP: ABNORMAL
LIPASE SERPL-CCNC: 7 U/L (ref 11–82)
LYMPHOCYTES # BLD AUTO: 0.27 THOUSAND/UL (ref 0.6–4.47)
LYMPHOCYTES # BLD AUTO: 0.57 THOUSANDS/ÂΜL (ref 0.6–4.47)
LYMPHOCYTES # BLD AUTO: 2 % (ref 14–44)
LYMPHOCYTES NFR BLD AUTO: 5 % (ref 14–44)
MCH RBC QN AUTO: 29.6 PG (ref 26.8–34.3)
MCH RBC QN AUTO: 29.6 PG (ref 26.8–34.3)
MCHC RBC AUTO-ENTMCNC: 31.4 G/DL (ref 31.4–37.4)
MCHC RBC AUTO-ENTMCNC: 31.9 G/DL (ref 31.4–37.4)
MCV RBC AUTO: 93 FL (ref 82–98)
MCV RBC AUTO: 94 FL (ref 82–98)
MONOCYTES # BLD AUTO: 0.41 THOUSAND/UL (ref 0–1.22)
MONOCYTES # BLD AUTO: 0.83 THOUSAND/ÂΜL (ref 0.17–1.22)
MONOCYTES NFR BLD AUTO: 7 % (ref 4–12)
MONOCYTES NFR BLD: 3 % (ref 4–12)
MUCOUS THREADS UR QL AUTO: ABNORMAL
NEUTROPHILS # BLD AUTO: 9.68 THOUSANDS/ÂΜL (ref 1.85–7.62)
NEUTROPHILS # BLD MANUAL: 12.88 THOUSAND/UL (ref 1.85–7.62)
NEUTS SEG NFR BLD AUTO: 87 % (ref 43–75)
NEUTS SEG NFR BLD AUTO: 95 % (ref 43–75)
NITRITE UR QL STRIP: NEGATIVE
NON-SQ EPI CELLS URNS QL MICRO: ABNORMAL /HPF
NRBC BLD AUTO-RTO: 0 /100 WBCS
PH UR STRIP.AUTO: 5.5 [PH]
PLATELET # BLD AUTO: 172 THOUSANDS/UL (ref 149–390)
PLATELET # BLD AUTO: 200 THOUSANDS/UL (ref 149–390)
PLATELET BLD QL SMEAR: ADEQUATE
PMV BLD AUTO: 10.3 FL (ref 8.9–12.7)
PMV BLD AUTO: 9.8 FL (ref 8.9–12.7)
POTASSIUM SERPL-SCNC: 3.4 MMOL/L (ref 3.5–5.3)
POTASSIUM SERPL-SCNC: 3.6 MMOL/L (ref 3.5–5.3)
PROT SERPL-MCNC: 7 G/DL (ref 6.4–8.4)
PROT UR STRIP-MCNC: ABNORMAL MG/DL
PROTHROMBIN TIME: 19.3 SECONDS (ref 11.6–14.5)
QRS AXIS: -67 DEGREES
QRSD INTERVAL: 106 MS
QT INTERVAL: 432 MS
QTC INTERVAL: 504 MS
RBC # BLD AUTO: 3.72 MILLION/UL (ref 3.81–5.12)
RBC # BLD AUTO: 4.23 MILLION/UL (ref 3.81–5.12)
RBC #/AREA URNS AUTO: ABNORMAL /HPF
RBC MORPH BLD: NORMAL
RH BLD: POSITIVE
RH BLD: POSITIVE
SODIUM SERPL-SCNC: 137 MMOL/L (ref 135–147)
SODIUM SERPL-SCNC: 138 MMOL/L (ref 135–147)
SP GR UR STRIP.AUTO: 1.03 (ref 1–1.03)
SPECIMEN EXPIRATION DATE: NORMAL
T WAVE AXIS: -82 DEGREES
UNIT DISPENSE STATUS: NORMAL
UNIT DISPENSE STATUS: NORMAL
UNIT PRODUCT CODE: NORMAL
UNIT PRODUCT CODE: NORMAL
UNIT PRODUCT VOLUME: 350 ML
UNIT PRODUCT VOLUME: 350 ML
UNIT RH: NORMAL
UNIT RH: NORMAL
UROBILINOGEN UR STRIP-ACNC: <2 MG/DL
VENTRICULAR RATE: 82 BPM
WBC # BLD AUTO: 11.16 THOUSAND/UL (ref 4.31–10.16)
WBC # BLD AUTO: 13.56 THOUSAND/UL (ref 4.31–10.16)
WBC #/AREA URNS AUTO: ABNORMAL /HPF

## 2023-11-03 PROCEDURE — 83605 ASSAY OF LACTIC ACID: CPT | Performed by: EMERGENCY MEDICINE

## 2023-11-03 PROCEDURE — 85027 COMPLETE CBC AUTOMATED: CPT | Performed by: EMERGENCY MEDICINE

## 2023-11-03 PROCEDURE — 99223 1ST HOSP IP/OBS HIGH 75: CPT | Performed by: SURGERY

## 2023-11-03 PROCEDURE — 86901 BLOOD TYPING SEROLOGIC RH(D): CPT | Performed by: STUDENT IN AN ORGANIZED HEALTH CARE EDUCATION/TRAINING PROGRAM

## 2023-11-03 PROCEDURE — 80048 BASIC METABOLIC PNL TOTAL CA: CPT | Performed by: NURSE PRACTITIONER

## 2023-11-03 PROCEDURE — 87086 URINE CULTURE/COLONY COUNT: CPT | Performed by: EMERGENCY MEDICINE

## 2023-11-03 PROCEDURE — 85610 PROTHROMBIN TIME: CPT | Performed by: EMERGENCY MEDICINE

## 2023-11-03 PROCEDURE — G1004 CDSM NDSC: HCPCS

## 2023-11-03 PROCEDURE — 99222 1ST HOSP IP/OBS MODERATE 55: CPT

## 2023-11-03 PROCEDURE — 86900 BLOOD TYPING SEROLOGIC ABO: CPT | Performed by: STUDENT IN AN ORGANIZED HEALTH CARE EDUCATION/TRAINING PROGRAM

## 2023-11-03 PROCEDURE — 44160 REMOVAL OF COLON: CPT | Performed by: SURGERY

## 2023-11-03 PROCEDURE — 83605 ASSAY OF LACTIC ACID: CPT | Performed by: NURSE PRACTITIONER

## 2023-11-03 PROCEDURE — 99285 EMERGENCY DEPT VISIT HI MDM: CPT

## 2023-11-03 PROCEDURE — 88307 TISSUE EXAM BY PATHOLOGIST: CPT | Performed by: SPECIALIST

## 2023-11-03 PROCEDURE — 83690 ASSAY OF LIPASE: CPT | Performed by: EMERGENCY MEDICINE

## 2023-11-03 PROCEDURE — 85007 BL SMEAR W/DIFF WBC COUNT: CPT | Performed by: EMERGENCY MEDICINE

## 2023-11-03 PROCEDURE — 87081 CULTURE SCREEN ONLY: CPT

## 2023-11-03 PROCEDURE — 85025 COMPLETE CBC W/AUTO DIFF WBC: CPT | Performed by: NURSE PRACTITIONER

## 2023-11-03 PROCEDURE — 86850 RBC ANTIBODY SCREEN: CPT | Performed by: STUDENT IN AN ORGANIZED HEALTH CARE EDUCATION/TRAINING PROGRAM

## 2023-11-03 PROCEDURE — 93005 ELECTROCARDIOGRAM TRACING: CPT

## 2023-11-03 PROCEDURE — 0DTH0ZZ RESECTION OF CECUM, OPEN APPROACH: ICD-10-PCS | Performed by: SURGERY

## 2023-11-03 PROCEDURE — 36415 COLL VENOUS BLD VENIPUNCTURE: CPT | Performed by: EMERGENCY MEDICINE

## 2023-11-03 PROCEDURE — 86920 COMPATIBILITY TEST SPIN: CPT

## 2023-11-03 PROCEDURE — 80053 COMPREHEN METABOLIC PANEL: CPT | Performed by: EMERGENCY MEDICINE

## 2023-11-03 PROCEDURE — C9290 INJ, BUPIVACAINE LIPOSOME: HCPCS | Performed by: ANESTHESIOLOGY

## 2023-11-03 PROCEDURE — 93010 ELECTROCARDIOGRAM REPORT: CPT | Performed by: STUDENT IN AN ORGANIZED HEALTH CARE EDUCATION/TRAINING PROGRAM

## 2023-11-03 PROCEDURE — 81001 URINALYSIS AUTO W/SCOPE: CPT | Performed by: EMERGENCY MEDICINE

## 2023-11-03 PROCEDURE — 74177 CT ABD & PELVIS W/CONTRAST: CPT

## 2023-11-03 RX ORDER — POTASSIUM CHLORIDE 14.9 MG/ML
20 INJECTION INTRAVENOUS ONCE
Status: COMPLETED | OUTPATIENT
Start: 2023-11-03 | End: 2023-11-04

## 2023-11-03 RX ORDER — EPHEDRINE SULFATE 50 MG/ML
INJECTION INTRAVENOUS AS NEEDED
Status: DISCONTINUED | OUTPATIENT
Start: 2023-11-03 | End: 2023-11-03

## 2023-11-03 RX ORDER — BUPIVACAINE HYDROCHLORIDE 5 MG/ML
INJECTION, SOLUTION EPIDURAL; INTRACAUDAL
Status: DISCONTINUED | OUTPATIENT
Start: 2023-11-03 | End: 2023-11-03

## 2023-11-03 RX ORDER — LIDOCAINE HYDROCHLORIDE 10 MG/ML
INJECTION, SOLUTION EPIDURAL; INFILTRATION; INTRACAUDAL; PERINEURAL AS NEEDED
Status: DISCONTINUED | OUTPATIENT
Start: 2023-11-03 | End: 2023-11-03

## 2023-11-03 RX ORDER — ONDANSETRON 2 MG/ML
INJECTION INTRAMUSCULAR; INTRAVENOUS AS NEEDED
Status: DISCONTINUED | OUTPATIENT
Start: 2023-11-03 | End: 2023-11-03

## 2023-11-03 RX ORDER — POTASSIUM CHLORIDE 14.9 MG/ML
20 INJECTION INTRAVENOUS ONCE
Status: COMPLETED | OUTPATIENT
Start: 2023-11-03 | End: 2023-11-03

## 2023-11-03 RX ORDER — CEFAZOLIN SODIUM 2 G/50ML
2000 SOLUTION INTRAVENOUS EVERY 8 HOURS
Status: DISCONTINUED | OUTPATIENT
Start: 2023-11-03 | End: 2023-11-03

## 2023-11-03 RX ORDER — SODIUM CHLORIDE 9 MG/ML
INJECTION, SOLUTION INTRAVENOUS CONTINUOUS PRN
Status: DISCONTINUED | OUTPATIENT
Start: 2023-11-03 | End: 2023-11-03

## 2023-11-03 RX ORDER — ACETAMINOPHEN 10 MG/ML
1000 INJECTION, SOLUTION INTRAVENOUS ONCE
Status: COMPLETED | OUTPATIENT
Start: 2023-11-03 | End: 2023-11-03

## 2023-11-03 RX ORDER — ONDANSETRON 2 MG/ML
4 INJECTION INTRAMUSCULAR; INTRAVENOUS EVERY 6 HOURS PRN
Status: DISCONTINUED | OUTPATIENT
Start: 2023-11-03 | End: 2023-11-08 | Stop reason: HOSPADM

## 2023-11-03 RX ORDER — LIDOCAINE HYDROCHLORIDE 20 MG/ML
1 JELLY TOPICAL ONCE
Status: COMPLETED | OUTPATIENT
Start: 2023-11-03 | End: 2023-11-03

## 2023-11-03 RX ORDER — SUCCINYLCHOLINE/SOD CL,ISO/PF 100 MG/5ML
SYRINGE (ML) INTRAVENOUS AS NEEDED
Status: DISCONTINUED | OUTPATIENT
Start: 2023-11-03 | End: 2023-11-03

## 2023-11-03 RX ORDER — FENTANYL CITRATE 50 UG/ML
INJECTION, SOLUTION INTRAMUSCULAR; INTRAVENOUS AS NEEDED
Status: DISCONTINUED | OUTPATIENT
Start: 2023-11-03 | End: 2023-11-03

## 2023-11-03 RX ORDER — FENTANYL CITRATE/PF 50 MCG/ML
25 SYRINGE (ML) INJECTION
Status: DISCONTINUED | OUTPATIENT
Start: 2023-11-03 | End: 2023-11-03 | Stop reason: HOSPADM

## 2023-11-03 RX ORDER — METRONIDAZOLE 500 MG/100ML
500 INJECTION, SOLUTION INTRAVENOUS EVERY 8 HOURS
Status: COMPLETED | OUTPATIENT
Start: 2023-11-03 | End: 2023-11-04

## 2023-11-03 RX ORDER — SODIUM CHLORIDE, SODIUM LACTATE, POTASSIUM CHLORIDE, CALCIUM CHLORIDE 600; 310; 30; 20 MG/100ML; MG/100ML; MG/100ML; MG/100ML
INJECTION, SOLUTION INTRAVENOUS CONTINUOUS PRN
Status: DISCONTINUED | OUTPATIENT
Start: 2023-11-03 | End: 2023-11-03

## 2023-11-03 RX ORDER — HEPARIN SODIUM 5000 [USP'U]/ML
5000 INJECTION, SOLUTION INTRAVENOUS; SUBCUTANEOUS EVERY 8 HOURS SCHEDULED
Status: DISCONTINUED | OUTPATIENT
Start: 2023-11-04 | End: 2023-11-08 | Stop reason: HOSPADM

## 2023-11-03 RX ORDER — CALCIUM GLUCONATE 20 MG/ML
2 INJECTION, SOLUTION INTRAVENOUS ONCE
Status: COMPLETED | OUTPATIENT
Start: 2023-11-03 | End: 2023-11-03

## 2023-11-03 RX ORDER — HYDROMORPHONE HCL/PF 1 MG/ML
0.5 SYRINGE (ML) INJECTION
Status: DISCONTINUED | OUTPATIENT
Start: 2023-11-03 | End: 2023-11-03 | Stop reason: HOSPADM

## 2023-11-03 RX ORDER — ROCURONIUM BROMIDE 10 MG/ML
INJECTION, SOLUTION INTRAVENOUS AS NEEDED
Status: DISCONTINUED | OUTPATIENT
Start: 2023-11-03 | End: 2023-11-03

## 2023-11-03 RX ORDER — MAGNESIUM SULFATE HEPTAHYDRATE 40 MG/ML
2 INJECTION, SOLUTION INTRAVENOUS ONCE
Status: COMPLETED | OUTPATIENT
Start: 2023-11-03 | End: 2023-11-03

## 2023-11-03 RX ORDER — CEFAZOLIN SODIUM 2 G/50ML
2000 SOLUTION INTRAVENOUS EVERY 8 HOURS
Status: COMPLETED | OUTPATIENT
Start: 2023-11-03 | End: 2023-11-04

## 2023-11-03 RX ORDER — SODIUM CHLORIDE, SODIUM LACTATE, POTASSIUM CHLORIDE, CALCIUM CHLORIDE 600; 310; 30; 20 MG/100ML; MG/100ML; MG/100ML; MG/100ML
100 INJECTION, SOLUTION INTRAVENOUS CONTINUOUS
Status: DISCONTINUED | OUTPATIENT
Start: 2023-11-03 | End: 2023-11-03

## 2023-11-03 RX ORDER — PROPOFOL 10 MG/ML
INJECTION, EMULSION INTRAVENOUS AS NEEDED
Status: DISCONTINUED | OUTPATIENT
Start: 2023-11-03 | End: 2023-11-03

## 2023-11-03 RX ORDER — METRONIDAZOLE 500 MG/100ML
500 INJECTION, SOLUTION INTRAVENOUS EVERY 8 HOURS
Status: DISCONTINUED | OUTPATIENT
Start: 2023-11-03 | End: 2023-11-03

## 2023-11-03 RX ORDER — SODIUM CHLORIDE, SODIUM GLUCONATE, SODIUM ACETATE, POTASSIUM CHLORIDE, MAGNESIUM CHLORIDE, SODIUM PHOSPHATE, DIBASIC, AND POTASSIUM PHOSPHATE .53; .5; .37; .037; .03; .012; .00082 G/100ML; G/100ML; G/100ML; G/100ML; G/100ML; G/100ML; G/100ML
100 INJECTION, SOLUTION INTRAVENOUS CONTINUOUS
Status: DISCONTINUED | OUTPATIENT
Start: 2023-11-03 | End: 2023-11-04

## 2023-11-03 RX ORDER — ONDANSETRON 2 MG/ML
4 INJECTION INTRAMUSCULAR; INTRAVENOUS ONCE AS NEEDED
Status: DISCONTINUED | OUTPATIENT
Start: 2023-11-03 | End: 2023-11-03 | Stop reason: HOSPADM

## 2023-11-03 RX ORDER — CHLORHEXIDINE GLUCONATE ORAL RINSE 1.2 MG/ML
15 SOLUTION DENTAL EVERY 12 HOURS SCHEDULED
Status: DISCONTINUED | OUTPATIENT
Start: 2023-11-03 | End: 2023-11-07

## 2023-11-03 RX ADMIN — CEFAZOLIN SODIUM 2000 MG: 2 SOLUTION INTRAVENOUS at 14:47

## 2023-11-03 RX ADMIN — IOHEXOL 85 ML: 350 INJECTION, SOLUTION INTRAVENOUS at 10:40

## 2023-11-03 RX ADMIN — EPHEDRINE SULFATE 5 MG: 50 INJECTION, SOLUTION INTRAVENOUS at 15:08

## 2023-11-03 RX ADMIN — SODIUM CHLORIDE: 9 INJECTION, SOLUTION INTRAVENOUS at 15:10

## 2023-11-03 RX ADMIN — POTASSIUM CHLORIDE 20 MEQ: 14.9 INJECTION, SOLUTION INTRAVENOUS at 19:15

## 2023-11-03 RX ADMIN — BUPIVACAINE HYDROCHLORIDE 20 ML: 5 INJECTION, SOLUTION EPIDURAL; INTRACAUDAL at 17:30

## 2023-11-03 RX ADMIN — PROPOFOL 40 MG: 10 INJECTION, EMULSION INTRAVENOUS at 17:18

## 2023-11-03 RX ADMIN — MAGNESIUM SULFATE HEPTAHYDRATE 2 G: 40 INJECTION, SOLUTION INTRAVENOUS at 19:22

## 2023-11-03 RX ADMIN — TOPICAL ANESTHETIC 2 SPRAY: 200 SPRAY DENTAL; PERIODONTAL at 14:04

## 2023-11-03 RX ADMIN — Medication 100 MG: at 15:03

## 2023-11-03 RX ADMIN — SODIUM CHLORIDE, SODIUM LACTATE, POTASSIUM CHLORIDE, AND CALCIUM CHLORIDE 100 ML/HR: .6; .31; .03; .02 INJECTION, SOLUTION INTRAVENOUS at 12:17

## 2023-11-03 RX ADMIN — BUPIVACAINE 20 ML: 13.3 INJECTION, SUSPENSION, LIPOSOMAL INFILTRATION at 17:00

## 2023-11-03 RX ADMIN — LIDOCAINE HYDROCHLORIDE 1 APPLICATION: 20 JELLY TOPICAL at 14:09

## 2023-11-03 RX ADMIN — Medication 1500 UNITS: at 13:50

## 2023-11-03 RX ADMIN — FENTANYL CITRATE 50 MCG: 50 INJECTION INTRAMUSCULAR; INTRAVENOUS at 15:34

## 2023-11-03 RX ADMIN — ONDANSETRON 4 MG: 2 INJECTION INTRAMUSCULAR; INTRAVENOUS at 16:34

## 2023-11-03 RX ADMIN — SODIUM CHLORIDE, SODIUM LACTATE, POTASSIUM CHLORIDE, AND CALCIUM CHLORIDE: .6; .31; .03; .02 INJECTION, SOLUTION INTRAVENOUS at 16:49

## 2023-11-03 RX ADMIN — FENTANYL CITRATE 50 MCG: 50 INJECTION INTRAMUSCULAR; INTRAVENOUS at 15:42

## 2023-11-03 RX ADMIN — CHLORHEXIDINE GLUCONATE 15 ML: 1.2 RINSE ORAL at 21:47

## 2023-11-03 RX ADMIN — SODIUM CHLORIDE, SODIUM GLUCONATE, SODIUM ACETATE, POTASSIUM CHLORIDE, MAGNESIUM CHLORIDE, SODIUM PHOSPHATE, DIBASIC, AND POTASSIUM PHOSPHATE 100 ML/HR: .53; .5; .37; .037; .03; .012; .00082 INJECTION, SOLUTION INTRAVENOUS at 19:08

## 2023-11-03 RX ADMIN — ROCURONIUM BROMIDE 45 MG: 10 INJECTION, SOLUTION INTRAVENOUS at 15:08

## 2023-11-03 RX ADMIN — LIDOCAINE HYDROCHLORIDE 80 MG: 10 INJECTION, SOLUTION EPIDURAL; INFILTRATION; INTRACAUDAL; PERINEURAL at 15:03

## 2023-11-03 RX ADMIN — SUGAMMADEX 200 MG: 100 INJECTION, SOLUTION INTRAVENOUS at 17:21

## 2023-11-03 RX ADMIN — POTASSIUM CHLORIDE 20 MEQ: 14.9 INJECTION, SOLUTION INTRAVENOUS at 21:46

## 2023-11-03 RX ADMIN — ROCURONIUM BROMIDE 10 MG: 10 INJECTION, SOLUTION INTRAVENOUS at 15:54

## 2023-11-03 RX ADMIN — PHYTONADIONE 10 MG: 10 INJECTION, EMULSION INTRAMUSCULAR; INTRAVENOUS; SUBCUTANEOUS at 14:20

## 2023-11-03 RX ADMIN — EPHEDRINE SULFATE 5 MG: 50 INJECTION, SOLUTION INTRAVENOUS at 15:34

## 2023-11-03 RX ADMIN — ACETAMINOPHEN 1000 MG: 10 INJECTION INTRAVENOUS at 16:32

## 2023-11-03 RX ADMIN — CEFAZOLIN SODIUM 2000 MG: 2 SOLUTION INTRAVENOUS at 22:33

## 2023-11-03 RX ADMIN — POTASSIUM CHLORIDE 20 MEQ: 14.9 INJECTION, SOLUTION INTRAVENOUS at 23:50

## 2023-11-03 RX ADMIN — FENTANYL CITRATE 25 MCG: 50 INJECTION INTRAMUSCULAR; INTRAVENOUS at 15:52

## 2023-11-03 RX ADMIN — METRONIDAZOLE 500 MG: 500 INJECTION, SOLUTION INTRAVENOUS at 23:11

## 2023-11-03 RX ADMIN — SODIUM CHLORIDE, SODIUM LACTATE, POTASSIUM CHLORIDE, AND CALCIUM CHLORIDE: .6; .31; .03; .02 INJECTION, SOLUTION INTRAVENOUS at 15:06

## 2023-11-03 RX ADMIN — CALCIUM GLUCONATE 2 G: 20 INJECTION, SOLUTION INTRAVENOUS at 19:21

## 2023-11-03 RX ADMIN — ROCURONIUM BROMIDE 5 MG: 10 INJECTION, SOLUTION INTRAVENOUS at 15:03

## 2023-11-03 RX ADMIN — METRONIDAZOLE: 500 INJECTION, SOLUTION INTRAVENOUS at 14:55

## 2023-11-03 RX ADMIN — PROPOFOL 100 MG: 10 INJECTION, EMULSION INTRAVENOUS at 15:03

## 2023-11-03 NOTE — ED NOTES
Pt ambulatory to bathroom with help from ED tech, Merry De La Cruz.      Loni Alicea RN  11/03/23 3337

## 2023-11-03 NOTE — LETTER
November 7, 2023       No Recipients    Patient: Praveen Portillo   YOB: 1930   Date of Visit: 11/3/2023       Dear Dr. Kimberly Iqbal Recipients: Thank you for referring Kerry Bill to me for evaluation. Below are my notes for this consultation. If you have questions, please do not hesitate to call me. I look forward to following your patient along with you. Sincerely,        No name on file        CC:   No Recipients    Bear Weiner MD  11/7/2023  7:58 AM  Cosign Needed  Progress Note - General Surgery  Praveen Portillo 80 y.o. female MRN: [id number]  Unit/Bed#: E4 -01 Encounter: [id number]      Assessment:  80 y.o. 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 Improving. Afebrile, HDS  WBC 7.45 from 9.34  Hgb 10.1 from 9.8  Cr: 0.58 from 0.53    Plan:  - Advance to soft diet  - Discontinue fluids  - PRN analgesia and antiemetics  - Encourage OOB and ambulation  - Holding home warfarin  - PT/OT  - DVT ppx with lovenox  - Disposition planning. Possible DC today pending clinical progression. Subjective: Patient with delirium overnight, given low dose seroquel which appears to have helped. Patient with numerous bowel movements overnight. Afebrile, HDS. Denies nausea, vomiting, fevers, chills. Tolerating liquid diet.  Drowsy this morning      Objective:   Temp:  [98.4 °F (36.9 °C)-98.9 °F (37.2 °C)] 98.5 °F (36.9 °C)  HR:  [50-62] 62  Resp:  [18] 18  BP: (129-152)/(65-83) 152/74    Physical Exam:  General: No acute distress, alert, tired appearing  CV: Well perfused, regular rate and rhythm  Lungs: Normal work of breathing, no increased respiratory effort  Abdomen: Soft, appropriately-tender, non-distended. Incision(s) clean, dry and intact. Extremities: No edema, clubbing or cyanosis  Skin: Warm, dry      I/O         11/05 0701 11/06 0700 11/06 0701 11/07 0700 11/07 0701 11/08 0700    P. O. 130      I.V. (mL/kg) 1422.1 (22.2) 2183.7 (34.6)     IV Piggyback 350      Total Intake(mL/kg) 1902.1 (29.7) 2183.7 (34.6)     Urine (mL/kg/hr) 1140 (0.7) 550 (0.4)     Stool  0     Total Output 1140 550     Net +762.1 +1633.7            Unmeasured Stool Occurrence  6 x             Lab, Imaging and other studies: I have personally reviewed pertinent reports.   , CBC with diff:   Lab Results   Component Value Date    WBC 7.45 11/07/2023    HGB 10.1 (L) 11/07/2023    HCT 33.9 (L) 11/07/2023    MCV 99 (H) 11/07/2023     11/07/2023    RBC 3.43 (L) 11/07/2023    MCH 29.4 11/07/2023    MCHC 29.8 (L) 11/07/2023    RDW 13.9 11/07/2023    MPV 9.5 11/07/2023    NRBC 0 11/07/2023   , BMP/CMP:   Lab Results   Component Value Date    SODIUM 139 11/07/2023    K 3.7 11/07/2023     (H) 11/07/2023    CO2 23 11/07/2023    BUN 8 11/07/2023    CREATININE 0.58 (L) 11/07/2023    CALCIUM 8.2 (L) 11/07/2023    EGFR 80 11/07/2023           Odilia Daniels MD  11/7/2023 7:52 AM            Jennifer Hilario MD  11/6/2023  8:19 AM  Attested Addendum  Progress Note - General Surgery   Jaspal Bill 80 y.o. female MRN: [id number]  Unit/Bed#: E4 -01 Encounter: [id number]    Assessment:  19-year-old female with past medical history of aortic regurgitation, lupus, esophagitis, heart block this post dual-chamber pacemaker (Medtronic), afib on coumadin, appendectomy and cholecystectomy who presents with closed-loop SBO and bowel ischemia 2/2 adhesions, now status post exploratory laparotomy with ileocecectomy on 11/3      Bradycardia in the 40s, other vitals normal on room air  Urine output 640cc     WBC 9.34 (from 11.59)  Hb 9.8 (from 9.3)  Cr pending (from 0.61)    Plan:  Advance to clear liquids with toast and crackers, await return of bowel function  [email or web address]  D/C Farris  PRN analgesia  Continue to hold home warfarin, discuss permanent discontinuation giving high risk of falls  OOB/ambulate  Delirium precaution, frequent re-orientation  PT/OT    Subjective/Objective    Subjective: Patient had a fall with positive head strike overnight so sent for stat CT head which was negative, tolerating clear liquids without nausea or vomiting, denies flatus or BM yet, pain controlled    Objective:     Blood pressure 134/69, pulse 55, temperature 97.7 °F (36.5 °C), temperature source Temporal, resp. rate 18, height 5' 2" (1.575 m), weight 64.1 kg (141 lb 4.8 oz), SpO2 92 %. ,Body mass index is 25.84 kg/m². Intake/Output Summary (Last 24 hours) at 11/6/2023 0816  Last data filed at 11/6/2023 1913  Gross per 24 hour   Intake 1012.93 ml   Output 840 ml   Net 172.93 ml       Invasive Devices       Peripheral Intravenous Line  Duration             Peripheral IV 11/03/23 Left Hand 2 days    Peripheral IV 11/03/23 Right;Ventral (anterior) Forearm 2 days              Line  Duration             IABP 2 days              Drain  Duration             Urethral Catheter Non-latex 16 Fr. 2 days                    Physical Exam:   Gen:    NAD  CV:      warm, well-perfused  Lungs: No respiratory distress  Abd:     soft, appropriately tender, very minimally distended, incision clean dry and intact  Ext:      no CCE  Neuro: A&Ox3     Attestation signed by Asad Sandhu MD at 11/6/2023  5:13 PM:    Standard Teaching Supervising Statement    I have reviewed the note performed and documented by the Resident's. I personally performed the required components/examined the patient. I agree with the Resident's findings and plan of care with the following additions/exceptions:     She is out of bed, looks well. No reported bowel function but no nausea and tolerating clears. UOP improved. She denies any headache after fall last evening. Will continue to hold [facility], discuss with cardiology regarding resumption given recent fall and surgery. Possible diet advancement tomorrow if she has some GI recovery. MD Reno Lawrence MD  11/5/2023  8:02 AM  Attested  Progress Note - General Surgery   Junior Madsen 80 y.o. female MRN: [id number]  Unit/Bed#: ICU 06 Encounter: [id number]    Assessment:  20-year-old female with past medical history of aortic regurgitation, lupus, esophagitis, heart block this post dual-chamber pacemaker (Medtronic), afib on coumadin, appendectomy and cholecystectomy who presents with closed-loop SBO and bowel ischemia 2/2 adhesions, now status post exploratory laparotomy with ileocecectomy on 11/3     Bradycardia in the 40s, intermittently tachypneic 24-33, other vitals normal on room air  Urine output 399cc  NG 25cc bilious    WBC 11.59 (from 12.94)  Hb 9.3 (from 10.1)  Cr 0.61 (from 0.60)    Plan:  D/C NG tube, start clear liquid diet  Increase to maintenance IV fluids at 75 cc/h, monitor urine output, has been low but creatinine stable  Maintain bianchi catheter for one more day given low urine output  Continue antibiotics for 4 days postoperatively, Ancef/flagyl, ends 11/7  PRN analgesia  DVT PPX, continue to hold home coumadin, will discuss permanent discontinuation, if to continue will require heparin bridge  OOB/ambulate  Delirium precautions, frequent re-orientation  PT/OT    Subjective/Objective    Subjective: No acute events overnight, remains n.p.o. with NG tube in place, denies flatus or BM yet, pain controlled    Objective:     Blood pressure 116/61, pulse (!) 48, temperature 100 °F (37.8 °C), temperature source Axillary, resp. rate 21, height 5' 2" (1.575 m), weight 65.5 kg (144 lb 6.4 oz), SpO2 95 %. ,Body mass index is 26.41 kg/m².       Intake/Output Summary (Last 24 hours) at 11/5/2023 0801  Last data filed at 11/5/2023 0701  Gross per 24 hour   Intake 810.33 ml   Output 699 ml   Net 111.33 ml       Invasive Devices       Peripheral Intravenous Line  Duration             Peripheral IV 11/03/23 Left Antecubital 2 days    Peripheral IV 11/03/23 Left Hand 1 day    Peripheral IV 11/03/23 Right;Ventral (anterior) Forearm 1 day              Line  Duration             IABP 1 day              Drain  Duration             NG/OG/Enteral Tube 16 Fr Left nare 1 day    Urethral Catheter Non-latex 16 Fr. 1 day                    Physical Exam:  Gen:    NAD  CV:      warm, well-perfused  Lungs: No respiratory distress  Abd:     soft, appropriately tender, very minimally distended but improved, incision clean dry and intact  Ext:      no CCE  Neuro: A&Ox3     Attestation signed by Lena Miranda MD at 11/5/2023 11:42 AM:    Standard Teaching Supervising Statement    I have reviewed the note performed and documented by the Resident's. I personally performed the required components/examined the patient. I agree with the Resident's findings and plan of care with the following additions/exceptions:     She looks well, reports no significant abdominal pain. NG tube putting out minimal cyst was removed this morning. She denies any flatus or bowel movement but also denies nausea. Advance to clear liquids. Urine output has been low so we will maintain the Farris today. Will need to be cautious with IV fluids as not to fluid overload her.     MD Giacomo Mckeon MD  11/4/2023  7:21 AM  Attested  Progress Note - General Surgery   Dorene Rodriguez 80 y.o. female MRN: [id number]  Unit/Bed#: ICU 13 Encounter: [id number]    Assessment:  80-year-old female with past medical history of aortic regurgitation, lupus, esophagitis, heart block this post dual-chamber pacemaker (Medtronic), afib on coumadin, appendectomy and cholecystectomy who presents with closed-loop SBO and bowel ischemia 2/2 adhesions, now status post exploratory laparotomy with ileocecectomy on 11/3    Bradycardia postoperatively yesterday 40s, other vitals normal on room air  Urine output 335cc recorded    WBC 12.94 (from 11.16)  Hb 10.1 (from 11.0)  Cr pending (from 0.58)    Plan:  Maintain NPO/NGT, await return of bowel function  Will obtain PICC and start TPN  [email or web address], 500cc bolus of Isolyte this morning for low urine output  Maintain bianchi for accurate I&Os given low urine output  Discontinue arterial line  Continue antibiotics for 4 days postoperatively, Ancef/flagyl  PRN analgesia  DVT PPX, continue to hold home coumadin  OOB/ambulate  Delirium precautions, frequent re-orientation  PT/OT  Downgrade to SD2    Subjective/Objective    Subjective: Patient with some delirium overnight, remains n.p.o. with NG tube in place, denies flatus or BM yet, pain controlled    Objective:     Blood pressure 114/58, pulse (!) 50, temperature 98.5 °F (36.9 °C), temperature source Oral, resp. rate 20, height 5' 2" (1.575 m), weight 65.5 kg (144 lb 6.4 oz), SpO2 96 %. ,Body mass index is 26.41 kg/m².       Intake/Output Summary (Last 24 hours) at 11/4/2023 0721  Last data filed at 11/4/2023 5050  Gross per 24 hour   Intake 3521.66 ml   Output 855 ml   Net 2666.66 ml       Invasive Devices       Peripheral Intravenous Line  Duration             Peripheral IV 11/03/23 Left Antecubital <1 day    Peripheral IV 11/03/23 Left Hand <1 day    Peripheral IV 11/03/23 Right;Ventral (anterior) Forearm <1 day              Line  Duration             IABP <1 day              Drain  Duration             NG/OG/Enteral Tube 16 Fr Left nare <1 day    Urethral Catheter Non-latex 16 Fr. <1 day                    Physical Exam:  Gen:    NAD  CV:      warm, well-perfused  Lungs: No respiratory distress  Abd:     soft, appropriately tender, mildly distended, incision cleanly dressed  Ext:      no CCE  Neuro: A&Ox3     Attestation signed by Jon Dueñas MD at 11/4/2023  2:07 PM:    Standard Teaching Supervising Statement    I have reviewed the note performed and documented by the Resident's. I personally performed the required components/examined the patient. I agree with the Resident's findings and plan of care with the following additions/exceptions:     She looks well, denies any pain. Labs appropriate. NG with bilious output. Abdomen soft and mildly distended. Would keep NG at least another 24 hours. Delirium precautions. MD Yelitza Flores CRNP  11/4/2023  4:42 AM  Attested  [address]  Progress Note: Critical Care  Name: Melody Ngo 80 y.o. female I MRN: [id number]  Unit/Bed#: ICU 15 I Date of Admission: 11/3/2023   Date of Service: 11/4/2023 I Hospital Day: 1      ----------------------------------------------------------------------------------------  HPI:  " Melody Ngo is a 80 y.o. female with afib/flutter on coumadin, heart block s/p PPM implant 2021, discoid lupus, dysphagia who presents today from [facility] with hematuria noted this morning after urinating. She is a fair historian due to cognitive decline, her daughter Stephie Mckeon is present to assist in providing history. She was recently seen in the ED earlier this week on Tuesday 10/31 for hematuria and has been holding her coumadin since then. She restarted coumadin yesterday evening and noted blood in the toilet bowl along with brown this morning. She does not usually have fecal incontinence but does have chronic constipation which she takes miralax, metamucil and a stool softener for. On CT scan in the ED a closed loop small bowel obstruction was noted with concern for ischemic bowel. She denies abdominal pain at this time and feels in her usual state of health. She is unsure if she is passing flatus or when her last bowel movement was. She has hiccupping and belching along with nausea but also reports this is not abnormal for her and she gets this "occasionally". Abdominal surgical history pertinent for hysterectomy, cholecystectomy, appendectomy.   " Recent Events:  11/3 OR for exlap, ileocecectomy. Found dense band of scar tissue containing internal hernia closed-loop obstruction of distal ileum, 20 cm of distal ileum with significant hemorrhagic changes and nonviable, ileocecectomy performed  Admit to ICU overnight for postop monitoring      Overnight no acute events  ---------------------------------------------------------------------------------------    Assessment/Plan    Neuro:      History of "cognitive decline"  Patient slightly confused and pulling out lines in the postoperative setting,      No pain currently        CV:      Heart block status post pacemaker  Patient is currently bradycardic in the 40s to 50s no associated hypotension  Consider pacemaker interrogation as needed      A-fib/flutter  Patient currently in normal sinus rhythm  Holding home Coumadin in the postop setting, restart patient on heparin drip when cleared by surgery     Hypertension  On amlodipine, holding in the postoperative setting  Restart when needed     Pulm:     No acute issues     GI:      Closed Loop Bowel Obstruction  11/3 OR for exlap, ileocecectomy. Found dense band of scar tissue containing internal hernia closed-loop obstruction of distal ileum, 20 cm of distal ileum with significant hemorrhagic changes and nonviable, ileocecectomy performed  Admit to ICU overnight for postop monitoring  NPO/NG tube to low intermittent suction await return of bowel function  Postoperative labs unremarkable, negative lactic     :      No acute issues  Consider discontinue Farris today         F/E/N:      On maintenance fluids in the postoperative setting/n.p.o.   Replete electrolytes as needed  N.p.o. NG tube to suction        Heme/Onc:      No active issues   DVT prophylaxis        Endo:      Hx of Discoid lupus erythematous  No active issues     ID     Post operative antibiotics per surgery     MSK/Skin:      No active issues  When turning and repositioning    Disposition: Med Surg    ICU Core Measures     A: Assess, Prevent, and Manage Pain Has pain been assessed? Yes  Need for changes to pain regimen? No   B: Both SAT/SAT  N/A   C: Choice of Sedation RASS Goal: 0 Alert and Calm or N/A patient not on sedation  Need for changes to sedation or analgesia regimen? NA   D: Delirium CAM-ICU: Negative   E: Early Mobility  Plan for early mobility? Yes   F: Family Engagement Plan for family engagement today? Yes       Antibiotic Review: Post op requirements     Review of Invasive Devices: Farris Plan:  Consider removal       Prophylaxis:  VTE VTE covered by:  heparin (porcine), Subcutaneous       Stress Ulcer  not ordered          Subjective    ROS limited d/t AMS/cognitive function  Pt not complaining of any pain      Objective                           Vitals I/O      Most Recent Min/Max in 24hrs   Temp 97.8 °F (36.6 °C) Temp  Min: 97.3 °F (36.3 °C)  Max: 97.8 °F (36.6 °C)   Pulse (!) 48 Pulse  Min: 48  Max: 69   Resp 18 Resp  Min: 6  Max: 20   /67 BP  Min: 113/61  Max: 148/71   O2 Sat 98 % SpO2  Min: 95 %  Max: 100 %      Intake/Output Summary (Last 24 hours) at 11/4/2023 0046  Last data filed at 11/3/2023 2350  Gross per 24 hour   Intake 2498.33 ml   Output 615 ml   Net 1883.33 ml         Diet NPO     Invasive Monitoring Physical exam    Physical Exam  Eyes:      General: Gaze aligned appropriately. Skin:     General: Skin is warm. Capillary Refill: Capillary refill takes less than 2 seconds. HENT:      Head: Normocephalic and atraumatic. Cardiovascular:      Rate and Rhythm: Regular rhythm. Bradycardia present. Musculoskeletal:      Cervical back: Normal range of motion. Right lower leg: No edema. Left lower leg: No edema. Abdominal:      General: Abdomen is flat. There is no distension. Palpations: Abdomen is soft. Comments: Midline abdominal incision with dressing clean dry intact    Constitutional:       General: She is not in acute distress. Appearance: She is not toxic-appearing. Pulmonary:      Effort: Pulmonary effort is normal. No tachypnea or respiratory distress. Breath sounds: Normal breath sounds. Neurological:      General: No focal deficit present. Mental Status: She is easily aroused. Genitourinary/Anorectal:     Comments: Farris   Vitals reviewed. Diagnostic Studies      EKG: reviewed   Imaging: I have personally reviewed pertinent reports. Medications:  Scheduled PRN   cefazolin, 2,000 mg, Q8H  chlorhexidine, 15 mL, Q12H JACOB  heparin (porcine), 5,000 Units, Q8H [facility]  metroNIDAZOLE, 500 mg, Q8H  potassium chloride, 20 mEq, Once      lactated ringers, 1,000 mL, Once PRN   And  lactated ringers, 1,000 mL, Once PRN  ondansetron, 4 mg, Q6H PRN  sodium chloride, 1,000 mL, Once PRN   And  sodium chloride, 1,000 mL, Once PRN       Continuous    multi-electrolyte, 100 mL/hr, Last Rate: 100 mL/hr (11/03/23 2019)         Labs:    CBC    Recent Labs     11/03/23 0939 11/03/23  1746   WBC 13.56* 11.16*   HGB 12.5 11.0*   HCT 39.8 34.5*    172     BMP    Recent Labs     11/03/23 0939 11/03/23  1746   SODIUM 138 137   K 3.6 3.4*    106   CO2 24 22   AGAP 11 9   BUN 25 19   CREATININE 0.65 0.58*   CALCIUM 9.3 8.1*       Coags    Recent Labs     11/03/23  0939   INR 1.61*        Additional Electrolytes  No recent results       Blood Gas    No recent results  No recent results LFTs  Recent Labs     11/03/23  0939   ALT 19   AST 18   ALKPHOS 60   ALB 4.2   TBILI 1.45*       Infectious  No recent results  Glucose  Recent Labs     11/03/23  0939 11/03/23  1746   GLUC 189* [facility]  Attestation signed by Julienne Alexander MD at 11/4/2023  1:23 PM:    I have personally seen and examined the patient on 11/4/23.  I discussed the patient with the Advanced Practitioner (KAYLENE or PA-C)  including, but not limited to, verifying findings; reviewing labs and x-rays; discussing with consultants; developing the plan of care with the bedside nurse; and discussing treatment plan with patient or surrogate. I have reviewed the note and assessment performed by the Advanced Practitioner (CRNP or ASHWINI) , and agree with the documented findings and plan of care with the following additions/exceptions. Please see my following comments for details and adjustments. Critical care time, excluding procedures, teaching, family meetings, and excludes any prior time recorded by providers other than myself, (0) minutes. 80year old woman presents with abdominal pain and rectal bleeding. Had ex-lap for SBO and bowel ischemia due to adhesions. She had ileocecectomy. POD #1 now. She was admitted to ICU after surgery- was extubated prior to arrival.  No acute events.    She is on post-op antibiotics per surgery    I/O +2.66 liters.  +3.24 L for the admission    VS afebrile, bradycardic (paced rhythym)  Exam  GEN No distress  HEENT No scleral icterus, NG tube in place  NECK No JVD  CV Regular rate  Pulm clear  ABD soft, incision w/ C/D/I dressing, minimally tender- appropriate for recent surgery  EXT No edema      Laboratory and Diagnostics  Results from last 7 days   Lab Units 11/04/23 0512 11/03/23  1746 11/03/23 0939 10/31/23  0952   WBC Thousand/uL 12.94* 11.16* 13.56* 6.28   HEMOGLOBIN g/dL 10.1* 11.0* 12.5 11.7   HEMATOCRIT % 32.0* 34.5* 39.8 38.1   PLATELETS Thousands/uL 153 172 200 175   NEUTROS PCT % 88* 87*  --  74   MONOS PCT % 7 7  --  9   MONO PCT %  --   --  3*  --    EOS PCT % 0 0 0 1     Results from last 7 days   Lab Units 11/04/23  0512 11/03/23  1746 11/03/23  0939 10/31/23  0952   SODIUM mmol/L 138 137 138 141   POTASSIUM mmol/L 4.4 3.4* 3.6 3.9   CHLORIDE mmol/L 106 106 103 107   CO2 mmol/L 23 22 24 24   ANION GAP mmol/L 9 9 11 10   BUN mg/dL 17 19 25 21   CREATININE mg/dL 0.60 0.58* 0.65 0.76   CALCIUM mg/dL 7.5* 8.1* 9.3 9.2   GLUCOSE RANDOM mg/dL 114 122 189* 105   ALT U/L  --   --  19  --    AST U/L --   --  18  --    ALK PHOS U/L  --   --  60  --    ALBUMIN g/dL  --   --  4.2  --    TOTAL BILIRUBIN mg/dL  --   --  1.45*  --      Results from last 7 days   Lab Units 11/04/23  0512   MAGNESIUM mg/dL 2.5   PHOSPHORUS mg/dL 3.2      Results from last 7 days   Lab Units 11/03/23  0939 11/02/23  0000 10/31/23  0952   INR  1.61* 1.70* 7.39*   PTT seconds  --   --  72*          Results from last 7 days   Lab Units 11/03/23  1746 11/03/23  1159   LACTIC ACID mmol/L 1.1 1.6                           ABG:           MICRO  MRSA culture pending nares  Urine culture 11/3- contaminant    RADIOGRAPHS  CT A/P 11/3/23  1. Closed-loop small bowel obstruction with findings concerning for small bowel ischemia. Emergent surgical consultation is advised. 2.  1 cm pancreatic cyst.    Assessment & Plan  SBO with bowel ischemia due to adhesions (prior appendectomy and cholecystectomy) POD#1 ex-lap with ileocecectomy  A fib s/p PPM  Bradycardia- paced rhythm rate of 50    Doing well post-operatively. Defer management to surgery. Patient is stable for downgrade to [facility] 2 surgical service.             Alyssa Estrada MD 11/04/23

## 2023-11-03 NOTE — Clinical Note
Case was discussed with surgery and the patient's admission status was agreed to be Admission Status: inpatient status to the service of Dr. Thomason

## 2023-11-03 NOTE — PROGRESS NOTES
Progress Note - General Surgery   Melody Ngo 80 y.o. female MRN: [id number]  Unit/Bed#: ICU 13 Encounter: [id number]    Assessment:  80-year-old female with past medical history of aortic regurgitation, lupus, esophagitis, heart block this post dual-chamber pacemaker ([employer]tr[employer]), afib on coumadin, appendectomy and cholecystectomy who presents with closed-loop SBO and bowel ischemia 2/2 adhesions, now status post exploratory laparotomy with ileocecectomy on 11/3    Bradycardia postoperatively yesterday 40s, other vitals normal on room air  Urine output 335cc recorded    WBC 12.94 (from 11.16)  Hb 10.1 (from 11.0)  Cr pending (from 0.58)    Plan:  Maintain NPO/NGT, await return of bowel function  Will obtain PICC and start TPN  Art@[email or web address], 500cc bolus of Isolyte this morning for low urine output  Maintain bianchi for accurate I&Os given low urine output  Discontinue arterial line  Continue antibiotics for 4 days postoperatively, Ancef/flagyl  PRN analgesia  DVT PPX, continue to hold home coumadin  OOB/ambulate  Delirium precautions, frequent re-orientation  PT/OT  Downgrade to SD2    Subjective/Objective     Subjective: Patient with some delirium overnight, remains n.p.o. with NG tube in place, denies flatus or BM yet, pain controlled    Objective:     Blood pressure 114/58, pulse (!) 50, temperature 98.5 °F (36.9 °C), temperature source Oral, resp. rate 20, height 5' 2" (1.575 m), weight 65.5 kg (144 lb 6.4 oz), SpO2 96 %. ,Body mass index is 26.41 kg/m².       Intake/Output Summary (Last 24 hours) at 11/4/2023 0721  Last data filed at 11/4/2023 8601  Gross per 24 hour   Intake 3521.66 ml   Output 855 ml   Net 2666.66 ml       Invasive Devices       Peripheral Intravenous Line  Duration             Peripheral IV 11/03/23 Left Antecubital <1 day    Peripheral IV 11/03/23 Left Hand <1 day    Peripheral IV 11/03/23 Right;Ventral (anterior) Forearm <1 day              Line  Duration             IABP <1 day Drain  Duration             NG/OG/Enteral Tube 16 Fr Left nare <1 day    Urethral Catheter Non-latex 16 Fr. <1 day                    Physical Exam:  Gen:    NAD  CV:      warm, well-perfused  Lungs: No respiratory distress  Abd:     soft, appropriately tender, mildly distended, incision cleanly dressed  Ext:      no CCE  Neuro: A&Ox3

## 2023-11-03 NOTE — ANESTHESIA PREPROCEDURE EVALUATION
Procedure:  LAPAROTOMY EXPLORATORY psb ostomy (Abdomen)    Relevant Problems   CARDIO   (+) Complete heart block (HCC) -   PPM ( Medtronic) Feb 2021      GI/HEPATIC   (+) Dysphagia - improved   (+) Gastric erosion, -  resolved   (+) Intestinal obstruction (HCC)      MUSCULOSKELETAL   (+) Osteoarthritis of knee      NEURO/PSYCH   (+) Anxiety        Physical Exam    Airway    Mallampati score: II  TM Distance: >3 FB       Dental   No notable dental hx     Cardiovascular  Rhythm: irregular, Rate: abnormal    Pulmonary   Breath sounds clear to auscultation    Other Findings        Anesthesia Plan  ASA Score- 4 Emergent    Anesthesia Type- general with ASA Monitors. Additional Monitors: arterial line. Airway Plan: ETT. Plan Factors-Exercise tolerance (METS): <4 METS. Chart reviewed. EKG reviewed. Existing labs reviewed. Patient summary reviewed. Patient is not a current smoker. Patient not instructed to abstain from smoking on day of procedure. Induction- intravenous. Postoperative Plan-     Informed Consent- Anesthetic plan and risks discussed with patient.

## 2023-11-03 NOTE — ANESTHESIA PROCEDURE NOTES
Peripheral Block    Patient location during procedure: OR  Start time: 11/3/2023 5:30 PM  Reason for block: at surgeon's request and post-op pain management  Staffing  Performed by: Flavio Lockett DO  Authorized by: Flavio Lockett DO    Preanesthetic Checklist  Completed: patient identified, IV checked, site marked, risks and benefits discussed, surgical consent, monitors and equipment checked, pre-op evaluation and timeout performed  Peripheral Block  Patient position: supine  Prep: ChloraPrep  Patient monitoring: continuous pulse oximetry, frequent blood pressure checks and heart rate  Block type: TAP  Laterality: bilateral  Injection technique: single-shot  Procedures: ultrasound guided, Ultrasound guidance required for the procedure to increase accuracy and safety of medication placement and decrease risk of complications.   Ultrasound permanent image savedbupivacaine liposomal (EXPAREL) 1.3 % injection 20 mL - Perineural   20 mL - 11/3/2023 5:00:00 PM  bupivacaine (PF) (MARCAINE) 0.5 % injection 20 mL - Perineural   20 mL - 11/3/2023 5:30:00 PM  Needle  Needle type: Stimuplex   Needle gauge: 20 G  Assessment  Injection assessment: frequent aspiration, injected with ease, incremental injection, needle tip visualized at all times, negative for heart rate change and no symptoms of intraneural/intravenous injection  Paresthesia pain: none  Post-procedure:  site cleaned  patient tolerated the procedure well with no immediate complications

## 2023-11-03 NOTE — PROGRESS NOTES
Gave Pt's daughter Naina Salas a call and there was no answer.   No message left, there was no option

## 2023-11-03 NOTE — CONSULTS
[facility]  Consult: Critical Care  Name: Balwinder Tam 80 y.o. female I MRN: [id number]  Unit/Bed#: ICU 15 I Date of Admission: 11/3/2023   Date of Service: 11/3/2023 I Hospital Day: 0      Consults    ----------------------------------------------------------------------------------------  HPI:  " Balwinder Tam is a 80 y.o. female with afib/flutter on coumadin, heart block s/p PPM implant 2021, discoid lupus, dysphagia who presents today from [facility] with hematuria noted this morning after urinating. She is a fair historian due to cognitive decline, her daughter Nancy Merlos is present to assist in providing history. She was recently seen in the ED earlier this week on Tuesday 10/31 for hematuria and has been holding her coumadin since then. She restarted coumadin yesterday evening and noted blood in the toilet bowl along with brown this morning. She does not usually have fecal incontinence but does have chronic constipation which she takes miralax, metamucil and a stool softener for. On CT scan in the ED a closed loop small bowel obstruction was noted with concern for ischemic bowel. She denies abdominal pain at this time and feels in her usual state of health. She is unsure if she is passing flatus or when her last bowel movement was. She has hiccupping and belching along with nausea but also reports this is not abnormal for her and she gets this "occasionally". Abdominal surgical history pertinent for hysterectomy, cholecystectomy, appendectomy.  "    Recent Events:  11/3 OR for exlap, ileocecectomy.   Found dense band of scar tissue containing internal hernia closed-loop obstruction of distal ileum, 20 cm of distal ileum with significant hemorrhagic changes and nonviable, ileocecectomy performed  Admit to ICU overnight for postop monitoring      ---------------------------------------------------------------------------------------    Assessment/Plan     Neuro:     History of cognitive decline  Patient confused and pulling out lines in the postoperative setting, requiring mitts    No pain currently      CV:     Heart block status post pacemaker  Patient is currently bradycardic in the 40s to 50s no associated hypotension  Consider pacemaker interrogation as needed    A-fib/flutter  Patient currently in normal sinus rhythm  Holding home Coumadin in the postop setting, restart patient on heparin drip when cleared by surgery    Hypertension  On amlodipine, holding in the postoperative setting  reStart when needed    Pulm:    No acute issues    GI:     Closed Loop Bowel Obstruction  11/3 OR for exlap, ileocecectomy. Found dense band of scar tissue containing internal hernia closed-loop obstruction of distal ileum, 20 cm of distal ileum with significant hemorrhagic changes and nonviable, ileocecectomy performed  Admit to ICU overnight for postop monitoring  N.p.o./NG tube to low intermittent suction await return of bowel function  Postoperative labs unremarkable, negative lactic    :     No acute issues  Consider discontinue Farris postop day 1      F/E/N:     On maintenance fluids in the postoperative setting/n.p.o. Replete electrolytes as needed  N.p.o. NG tube to suction      Heme/Onc:     No active issues   DVT prophylaxis      Endo:     Hx of Discoid lupus erythematous  No active issues    ID    Post operative antibiotics per surgery    MSK/Skin:     No active issues  When turning and repositioning    Disposition: Critical care     History of Present Illness     HPI: See above    History obtained from chart review. 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  No date: Dysphagia  No date: Esophagitis  No date: Forgetfulness  2/9/2021: Heart block AV second degree  No date: Transient elevated blood pressure      Comment:  last assessed: 5/16/2017  No date: Weight loss Past Surgical History:  No date: APPENDECTOMY  No date: CATARACT EXTRACTION  No date: CHOLECYSTECTOMY  06/2013: ESOPHAGOGASTRODUODENOSCOPY      Comment:  diagnostic- neg id have dilatation  No date: EYE SURGERY  No date: HYSTERECTOMY  9/16/2016: LA ESOPHAGOGASTRODUODENOSCOPY TRANSORAL DIAGNOSTIC; N/A      Comment:  Procedure: ESOPHAGOGASTRODUODENOSCOPY (EGD); Surgeon:                Kamilah Jain MD;  Location: [facility]; Service:                Gastroenterology  01/2007: REPLACEMENT TOTAL KNEE; Left   Current Outpatient Medications   Medication Instructions    acetaminophen (TYLENOL) 650 mg, Oral, Every 4 hours PRN    amLODIPine (NORVASC) 5 mg, Oral, Every morning    fish oil 1,000 mg, Oral, Daily    Multiple Vitamins-Minerals (PRESERVISION AREDS PO) Oral    multivitamin (THERAGRAN) TABS 1 tablet, Oral, Daily    polyvinyl alcohol (LIQUIFILM TEARS) 1.4 % ophthalmic solution 1 drop, As needed    senna-docusate sodium (SENOKOT-S) 8.6-50 mg per tablet 1 tablet, Oral, Daily    trospium chloride (SANCTURA) 20 mg, Oral, Daily    Turmeric 500 mg, Oral, Daily    vitamin B-12 (VITAMIN B-12) 500 mcg, Oral, Daily    Vitamin D 2,000 Units, Oral, Daily    warfarin (COUMADIN) 2.5 mg tablet No dose, route, or frequency recorded.     warfarin (Coumadin) 5 mg tablet Take 1 tablet daily or as directed by MD    Allergies   Allergen Reactions    Gabapentin      dreams    Sertraline GI Intolerance     ' felt like a zombie'      Social History     Tobacco Use    Smoking status: Never    Smokeless tobacco: Never   Vaping Use    Vaping Use: Never used   Substance Use Topics    Alcohol use: No    Drug use: No    Family History   Problem Relation Age of Onset    Diabetes Mother     Coronary artery disease Father           Objective                            Vitals I/O      Most Recent Min/Max in 24hrs   Temp 97.8 °F (36.6 °C) Temp  Min: 97.3 °F (36.3 °C)  Max: 97.8 °F (36.6 °C)   Pulse (!) 48 Pulse  Min: 48  Max: 69   Resp (!) 6 Resp  Min: 6  Max: 20   /71 BP  Min: 120/67  Max: 148/71   O2 Sat 100 % SpO2  Min: 95 %  Max: 100 %      Intake/Output Summary (Last 24 hours) at 11/3/2023 1927  Last data filed at 11/3/2023 1811  Gross per 24 hour   Intake 2100 ml   Output 495 ml   Net 1605 ml         Diet NPO     Invasive Monitoring Physical exam   Arterial Line  Mikaela /54  Arterial Line BP  Min: 138/50  Max: 156/54   MAP 86 mmHg  Arterial Line MAP (mmHg)  Min: 76 mmHg  Max: 86 mmHg    Physical Exam  Eyes:      General: Gaze aligned appropriately. Skin:     General: Skin is warm. HENT:      Head: Normocephalic and atraumatic. Cardiovascular:      Rate and Rhythm: Regular rhythm. Bradycardia present. Musculoskeletal:      Cervical back: Normal range of motion. Right lower leg: No edema. Left lower leg: No edema. Abdominal:      General: Abdomen is flat. There is no distension. Palpations: Abdomen is soft. Tenderness: There is no abdominal tenderness. Comments: Midline abdominal incision dressing clean dry and intact   Constitutional:       General: She is awake. She is not in acute distress. Appearance: She is not ill-appearing or toxic-appearing. Pulmonary:      Effort: Pulmonary effort is normal. No tachypnea. Breath sounds: Normal breath sounds. Neurological:      General: No focal deficit present. Mental Status: She is alert. Genitourinary/Anorectal:     Comments: bianchi  Vitals reviewed. Diagnostic Studies      EKG: Reviewed  Imaging: I have personally reviewed pertinent reports.        Medications:  Scheduled PRN   calcium gluconate, 2 g, Once  cefazolin, 2,000 mg, Q8H  chlorhexidine, 15 mL, Q12H JACOB  magnesium sulfate, 2 g, Once  metroNIDAZOLE, 500 mg, Q8H  potassium chloride, 20 mEq, Once   Followed by  potassium chloride, 20 mEq, Once   Followed by  potassium chloride, 20 mEq, Once      lactated ringers, 1,000 mL, Once PRN   And  lactated ringers, 1,000 mL, Once PRN  ondansetron, 4 mg, Q6H PRN  sodium chloride, 1,000 mL, Once PRN   And  sodium chloride, 1,000 mL, Once PRN       Continuous    multi-electrolyte, 100 mL/hr, Last Rate: 100 mL/hr (11/03/23 1908)         Labs:    CBC    Recent Labs     11/03/23  0939 11/03/23  1746   WBC 13.56* 11.16*   HGB 12.5 11.0*   HCT 39.8 34.5*    172     BMP    Recent Labs     11/03/23  0939 11/03/23  1746   SODIUM 138 137   K 3.6 3.4*    106   CO2 24 22   AGAP 11 9   BUN 25 19   CREATININE 0.65 0.58*   CALCIUM 9.3 8.1*       Coags    Recent Labs     11/02/23  0000 11/03/23  0939   INR 1.70* 1.61*        Additional Electrolytes  No recent results       Blood Gas    No recent results  No recent results LFTs  Recent Labs     11/03/23  0939   ALT 19   AST 18   ALKPHOS 60   ALB 4.2   TBILI 1.45*       Infectious  No recent results  Glucose  Recent Labs     11/03/23  0939 11/03/23  1746   GLUC 189* [address] Dara Das

## 2023-11-03 NOTE — PLAN OF CARE
Problem: MOBILITY - ADULT  Goal: Maintain or return to baseline ADL function  Description: INTERVENTIONS:  -  Assess patient's ability to carry out ADLs; assess patient's baseline for ADL function and identify physical deficits which impact ability to perform ADLs (bathing, care of mouth/teeth, toileting, grooming, dressing, etc.)  - Assess/evaluate cause of self-care deficits   - Assess range of motion  - Assess patient's mobility; develop plan if impaired  - Assess patient's need for assistive devices and provide as appropriate  - Encourage maximum independence but intervene and supervise when necessary  - Involve family in performance of ADLs  - Assess for home care needs following discharge   - Consider OT consult to assist with ADL evaluation and planning for discharge  - Provide patient education as appropriate  Outcome: Progressing     Problem: PAIN - ADULT  Goal: Verbalizes/displays adequate comfort level or baseline comfort level  Description: Interventions:  - Encourage patient to monitor pain and request assistance  - Assess pain using appropriate pain scale  - Administer analgesics based on type and severity of pain and evaluate response  - Implement non-pharmacological measures as appropriate and evaluate response  - Consider cultural and social influences on pain and pain management  - Notify physician/advanced practitioner if interventions unsuccessful or patient reports new pain  Outcome: Progressing     Problem: INFECTION - ADULT  Goal: Absence or prevention of progression during hospitalization  Description: INTERVENTIONS:  - Assess and monitor for signs and symptoms of infection  - Monitor lab/diagnostic results  - Monitor all insertion sites, i.e. indwelling lines, tubes, and drains  - Monitor endotracheal if appropriate and nasal secretions for changes in amount and color  - [place] appropriate cooling/warming therapies per order  - Administer medications as ordered  - Instruct and encourage patient and family to use good hand hygiene technique  - Identify and instruct in appropriate isolation precautions for identified infection/condition  Outcome: Progressing     Problem: SAFETY ADULT  Goal: Maintain or return to baseline ADL function  Description: INTERVENTIONS:  -  Assess patient's ability to carry out ADLs; assess patient's baseline for ADL function and identify physical deficits which impact ability to perform ADLs (bathing, care of mouth/teeth, toileting, grooming, dressing, etc.)  - Assess/evaluate cause of self-care deficits   - Assess range of motion  - Assess patient's mobility; develop plan if impaired  - Assess patient's need for assistive devices and provide as appropriate  - Encourage maximum independence but intervene and supervise when necessary  - Involve family in performance of ADLs  - Assess for home care needs following discharge   - Consider OT consult to assist with ADL evaluation and planning for discharge  - Provide patient education as appropriate  Outcome: Progressing

## 2023-11-03 NOTE — ED PROVIDER NOTES
History  Chief Complaint   Patient presents with    Rectal Bleeding     Pt reports going to the bathroom this morning and there being blood in the toilet bowl. Pt only c/o R and L arm pain. 79 yo F h/o pAfib on coumadin, 3rd degree heart block s/p pacemaker, dementia; presenting for evaluation of hematuria. Pt states she used bathroom this morning and noticed blood in toilet. She was recently seen in ED 4 days ago with supratherapeutic INR and hematuria, given Vit K and held Coumadin for 2 days, restarted last night. Denies fevers, chills, abdominal pain, BRBPR, melena                            Per independent review of external records:   -  GI:dysphagia- mechanical soft diet   Dyspepsia- US abdomen    - INR: (10/31: 7.39),  (1.70)    - 10/31 ED visit- BRBPR x 4 days  Hgb 11.7   Brown heme negative stool   Innumerable RBCs in urine  INR 7.39- given 5mg vitamin K & instructed to hold for 2 days        Prior to Admission Medications   Prescriptions Last Dose Informant Patient Reported? Taking? Cholecalciferol (VITAMIN D) 2000 units CAPS  Outside Facility (Specify) No No   Sig: Take 1 capsule (2,000 Units total) by mouth daily   Multiple Vitamins-Minerals (PRESERVISION AREDS PO)  Outside Facility (Specify) Yes No   Sig: Take by mouth. Turmeric 500 MG CAPS  Outside Facility (Specify) Yes No   Sig: Take 500 mg by mouth in the morning   acetaminophen (TYLENOL) 325 mg tablet  Outside Facility (Specify) No No   Sig: Take 2 tablets (650 mg total) by mouth every 4 (four) hours as needed for mild pain   amLODIPine (NORVASC) 5 mg tablet   No No   Sig: Take 1 tablet (5 mg total) by mouth every morning   fish oil 1,000 mg  Outside Facility (Specify) Yes No   Sig: Take 1,000 mg by mouth daily.    multivitamin (THERAGRAN) TABS  Outside Facility (Specify) Yes No   Sig: Take 1 tablet by mouth daily   polyvinyl alcohol (LIQUIFILM TEARS) 1.4 % ophthalmic solution  Outside Facility (Specify) Yes No   Si drop as needed for dry eyes   Patient not taking: Reported on 11/2/2023   senna-docusate sodium (SENOKOT-S) 8.6-50 mg per tablet  Outside Facility (Specify) Yes No   Sig: Take 1 tablet by mouth daily   trospium chloride (SANCTURA) 20 mg tablet  Outside Facility (Specify) Yes No   Sig: Take 20 mg by mouth in the morning   vitamin B-12 (VITAMIN B-12) 500 mcg tablet  Outside Facility (Specify) Yes No   Sig: Take 500 mcg by mouth daily   warfarin (COUMADIN) 2.5 mg tablet  Outside Facility (Specify) Yes No   warfarin (Coumadin) 5 mg tablet  Outside Facility (Specify) No No   Sig: Take 1 tablet daily or as directed by MD      Facility-Administered Medications: None       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    Transient elevated blood pressure     last assessed: 5/16/2017    Weight loss        Past Surgical History:   Procedure Laterality Date    APPENDECTOMY      CATARACT EXTRACTION      CHOLECYSTECTOMY      ESOPHAGOGASTRODUODENOSCOPY  06/2013    diagnostic- neg id have dilatation    EYE SURGERY      HYSTERECTOMY      IL ESOPHAGOGASTRODUODENOSCOPY TRANSORAL DIAGNOSTIC N/A 9/16/2016    Procedure: ESOPHAGOGASTRODUODENOSCOPY (EGD); Surgeon: Casa Sharp MD;  Location: [facility]; Service: Gastroenterology    REPLACEMENT TOTAL KNEE Left 01/2007       Family History   Problem Relation Age of Onset    Diabetes Mother     Coronary artery disease Father      I have reviewed and agree with the history as documented. E-Cigarette/Vaping    E-Cigarette Use Never User      E-Cigarette/Vaping Substances     Social History     Tobacco Use    Smoking status: Never    Smokeless tobacco: Never   Vaping Use    Vaping Use: Never used   Substance Use Topics    Alcohol use: No    Drug use: No       Review of Systems    Physical Exam  Physical Exam  Vitals and nursing note reviewed. Constitutional:       General: She is not in acute distress. Appearance: Normal appearance. She is well-developed. HENT:      Head: Normocephalic and atraumatic. Nose: Nose normal.   Eyes:      Extraocular Movements: Extraocular movements intact. Conjunctiva/sclera: Conjunctivae normal.   Cardiovascular:      Rate and Rhythm: Normal rate and regular rhythm. Heart sounds: Normal heart sounds. Pulmonary:      Effort: Pulmonary effort is normal. No respiratory distress. Breath sounds: Normal breath sounds. No stridor. No wheezing or rales. Chest:      Chest wall: No tenderness. Abdominal:      General: There is no distension. Palpations: Abdomen is soft. Tenderness: There is no abdominal tenderness. There is no guarding or rebound. Musculoskeletal:         General: No swelling, tenderness or deformity. Cervical back: Normal range of motion and neck supple. Skin:     General: Skin is warm and dry. Findings: No rash. Neurological:      General: No focal deficit present. Mental Status: She is alert and oriented to person, place, and time. Motor: No abnormal muscle tone. Coordination: Coordination normal.   Psychiatric:         Thought Content:  Thought content normal.         Judgment: Judgment normal.         Vital Signs  ED Triage Vitals   Temperature Pulse Respirations Blood Pressure SpO2   11/03/23 0916 11/03/23 0853 11/03/23 0853 11/03/23 0853 11/03/23 0853   97.6 °F (36.4 °C) 69 18 140/75 98 %      Temp Source Heart Rate Source Patient Position - Orthostatic VS BP Location FiO2 (%)   11/03/23 0853 11/03/23 0853 11/03/23 0853 11/03/23 0853 --   Oral Monitor Sitting Right arm       Pain Score       11/03/23 0853       9           Vitals:    11/03/23 0853 11/03/23 1000 11/03/23 1213 11/03/23 1300   BP: 140/75 134/60 145/76 145/70   Pulse: 69 66 67 66   Patient Position - Orthostatic VS: Sitting Sitting Lying Sitting         Visual Acuity      ED Medications  Medications   lactated ringers infusion (100 mL/hr Intravenous Handoff 11/3/23 1453)   multi-electrolyte (PLASMALYTE-A/ISOLYTE-S PH 7.4) IV solution (has no administration in time range)   ondansetron (ZOFRAN) injection 4 mg (has no administration in time range)   ceFAZolin (ANCEF) IVPB (premix in dextrose) 2,000 mg 50 mL (2,000 mg Intravenous Given 11/3/23 1447)   metroNIDAZOLE (FLAGYL) IVPB (premix) 500 mg 100 mL ( Intravenous Stopped 11/3/23 1506)   acetaminophen (Ofirmev) injection 1,000 mg (has no administration in time range)   iohexol (OMNIPAQUE) 350 MG/ML injection (SINGLE-DOSE) 85 mL (85 mL Intravenous Given 11/3/23 1040)   phytonadione (AQUA-MEPHYTON) 10 mg/mL 10 mg in sodium chloride 0.9 % 50 mL IVPB (10 mg Intravenous New Bag 11/3/23 1420)   prothrombin complex concentrate (human) (Kcentra) 1,500 Units (1,500 Units Intravenous Given 11/3/23 1350)   lidocaine (URO-JET) 2 % urethral/mucosal gel 1 Application (1 Application Urethral Given 11/3/23 1409)   benzocaine (HURRICAINE) 20 % mucosal spray 2 spray (2 sprays Mucosal Given 11/3/23 1404)       Diagnostic Studies  Results Reviewed       Procedure Component Value Units Date/Time    Lactic acid, plasma (w/reflex if result > 2.0) [[id number]]  (Normal) Collected: 11/03/23 1159    Lab Status: Final result Specimen: Blood from Arm, Left Updated: 11/03/23 1223     LACTIC ACID 1.6 mmol/L     Narrative:      Result may be elevated if tourniquet was used during collection. Urine culture [[id number]] Collected: 11/03/23 1117    Lab Status:  In process Specimen: Urine, Clean Catch Updated: 11/03/23 1139    Protime-INR [[id number]]  (Abnormal) Collected: 11/03/23 0939    Lab Status: Final result Specimen: Blood from Arm, Left Updated: 11/03/23 1042     Protime 19.3 seconds      INR 1.61    Urine Microscopic [[id number]]  (Abnormal) Collected: 11/03/23 0941    Lab Status: Final result Specimen: Urine, Clean Catch Updated: 11/03/23 1042     RBC, UA 10-20 /hpf      WBC, UA 4-10 /hpf      Epithelial Cells Occasional /hpf      Bacteria, UA Occasional /hpf      MUCUS THREADS Innumerable     Hyaline Casts, UA 3-5 /lpf     Manual Differential(PHLEBS Do Not Order) [[id number]]  (Abnormal) Collected: 11/03/23 0939    Lab Status: Final result Specimen: Blood from Arm, Left Updated: 11/03/23 1037     Segmented % 95 %      Lymphocytes % 2 %      Monocytes % 3 %      Eosinophils, % 0 %      Basophils % 0 %      Absolute Neutrophils 12.88 Thousand/uL      Lymphocytes Absolute 0.27 Thousand/uL      Monocytes Absolute 0.41 Thousand/uL      Eosinophils Absolute 0.00 Thousand/uL      Basophils Absolute 0.00 Thousand/uL      Total Counted --     RBC Morphology Normal     Platelet Estimate Adequate    Comprehensive metabolic panel [[id number]]  (Abnormal) Collected: 11/03/23 0939    Lab Status: Final result Specimen: Blood from Arm, Left Updated: 11/03/23 1010     Sodium 138 mmol/L      Potassium 3.6 mmol/L      Chloride 103 mmol/L      CO2 24 mmol/L      ANION GAP 11 mmol/L      BUN 25 mg/dL      Creatinine 0.65 mg/dL      Glucose 189 mg/dL      Calcium 9.3 mg/dL      AST 18 U/L      ALT 19 U/L      Alkaline Phosphatase 60 U/L      Total Protein 7.0 g/dL      Albumin 4.2 g/dL      Total Bilirubin 1.45 mg/dL      eGFR 77 ml/min/1.73sq m     Narrative:      [facility] guidelines for Chronic Kidney Disease (CKD):     Stage 1 with normal or high GFR (GFR > 90 mL/min/1.73 square meters)    Stage 2 Mild CKD (GFR = 60-89 mL/min/1.73 square meters)    Stage 3A Moderate CKD (GFR = 45-59 mL/min/1.73 square meters)    Stage 3B Moderate CKD (GFR = 30-44 mL/min/1.73 square meters)    Stage 4 Severe CKD (GFR = 15-29 mL/min/1.73 square meters)    Stage 5 End Stage CKD (GFR <15 mL/min/1.73 square meters)  Note: GFR calculation is accurate only with a steady state creatinine    Lipase [[id number]]  (Abnormal) Collected: 11/03/23 0939    Lab Status: Final result Specimen: Blood from Arm, Left Updated: 11/03/23 1010     Lipase 7 u/L     UA w Reflex to Microscopic w Reflex to Culture [[id number]]  (Abnormal) Collected: 11/03/23 0941    Lab Status: Final result Specimen: Urine, Clean Catch Updated: 11/03/23 1004     Color, UA Yellow     Clarity, UA Clear     Specific Gravity, UA 1.029     pH, UA 5.5     Leukocytes, UA Trace     Nitrite, UA Negative     Protein, UA 50 (1+) mg/dl      Glucose, UA 70 (7/100%) mg/dl      Ketones, UA 20 (1+) mg/dl      Urobilinogen, UA <2.0 mg/dl      Bilirubin, UA Negative     Occult Blood, UA Small    CBC and differential [[id number]]  (Abnormal) Collected: 11/03/23 0939    Lab Status: Final result Specimen: Blood from Arm, Left Updated: 11/03/23 0953     WBC 13.56 Thousand/uL      RBC 4.23 Million/uL      Hemoglobin 12.5 g/dL      Hematocrit 39.8 %      MCV 94 fL      MCH 29.6 pg      MCHC 31.4 g/dL      RDW 14.0 %      MPV 10.3 fL      Platelets 814 Thousands/uL     Narrative: This is an appended report. These results have been appended to a previously verified report. CT abdomen pelvis with contrast   ED Interpretation by Piedad Carr DO (11/03 1155)   IMPRESSION:     1. Closed-loop small bowel obstruction with findings concerning for small bowel ischemia. Emergent surgical consultation is advised. 2.  1 cm pancreatic cyst.        Final Result by Carlitos Plunkett MD (11/03 1153)      1. Closed-loop small bowel obstruction with findings concerning for small bowel ischemia. Emergent surgical consultation is advised.    2.  1 cm pancreatic cyst.         I personally discussed this study with Valentin Jason on 11/3/2023 11:52 AM.            Workstation performed: [id number]                    Procedures  CriticalCare Time    Date/Time: 11/3/2023 4:04 PM    Performed by: Piedad Carr DO  Authorized by: Piedad Carr DO    Critical care provider statement:     Critical care time (minutes):  31    Critical care time was exclusive of:  Separately billable procedures and treating other patients and teaching time    Critical care was necessary to treat or prevent imminent or life-threatening deterioration of the following conditions: SBO. Critical care was time spent personally by me on the following activities:  Obtaining history from patient or surrogate, development of treatment plan with patient or surrogate, discussions with consultants, evaluation of patient's response to treatment, examination of patient, interpretation of cardiac output measurements, ordering and performing treatments and interventions, ordering and review of laboratory studies, ordering and review of radiographic studies, re-evaluation of patient's condition and review of old charts  Comments:      SBO with concern for bowel ischemia taken emergently to OR           ED Course  ED Course as of 11/03/23 1604   Fri Nov 03, 2023   1002 WBC(!): 13.56   1004 Blood, UA(!): Small   1008 PVR 10 ml   1048 POCT INR(!): 1.61   1048 RBC, UA(!): 10-20   1202 Pt and daughter at bedside updated regarding results, surgery team aware   1218 EKG independently interpreted by myself:  Afib @ 82 bpm, LAD, qtc 504, no sig st cahnges   1259 Surgery at bedside                                              Medical Decision Making  81 yo F presenting for evaluation of hematuria- will recheck INR, CBC to assess for leukocytosis/anemia, CMP to assess for elevated LFTs/YEISON/electrolyte abn, lipase to r/o pancreatitis, CT A/P to assess for renal/bladder mass/stone/etc     CT showing closed SBO with concern for bowel ischemia  Surgery took pt emergently to OR    Problems Addressed:  Small bowel obstruction ([address]): acute illness or injury    Amount and/or Complexity of Data Reviewed  External Data Reviewed: labs, radiology, ECG and notes. Labs: ordered. Decision-making details documented in ED Course. Radiology: ordered and independent interpretation performed.  Decision-making details documented in ED Course. ECG/medicine tests: ordered and independent interpretation performed. Decision-making details documented in ED Course. Risk  Prescription drug management. Decision regarding hospitalization. Disposition  Final diagnoses:   Small bowel obstruction ([address])     Time reflects when diagnosis was documented in both MDM as applicable and the Disposition within this note       Time User Action Codes Description Comment    11/3/2023 12:18 PM Chet DRUMMOND Add [K56.609] Small bowel obstruction ([address])     11/3/2023  4:03 PM Lesly Sotomayor Modify [K56.609] Small bowel obstruction [facility])           ED Disposition       ED Disposition   Send to OR    Condition   --    Date/Time   Fri Nov 3, 2023  1:40 PM    Comment   Case was discussed with surgery and the patient's admission status was agreed to be Admission Status: inpatient status to the service of Dr. Katelin Holbrook    None         Current Discharge Medication List        CONTINUE these medications which have NOT CHANGED    Details   acetaminophen (TYLENOL) 325 mg tablet Take 2 tablets (650 mg total) by mouth every 4 (four) hours as needed for mild pain  Qty: 30 tablet, Refills: 0    Associated Diagnoses: Complete heart block (HCC)      amLODIPine (NORVASC) 5 mg tablet Take 1 tablet (5 mg total) by mouth every morning  Qty: 90 tablet, Refills: 2    Associated Diagnoses: Systolic hypertension      Cholecalciferol (VITAMIN D) 2000 units CAPS Take 1 capsule (2,000 Units total) by mouth daily  Refills: 0    Associated Diagnoses: Vitamin D deficiency      fish oil 1,000 mg Take 1,000 mg by mouth daily.       Multiple Vitamins-Minerals (PRESERVISION AREDS PO) Take by mouth.      multivitamin (THERAGRAN) TABS Take 1 tablet by mouth daily      polyvinyl alcohol (LIQUIFILM TEARS) 1.4 % ophthalmic solution 1 drop as needed for dry eyes      senna-docusate sodium (SENOKOT-S) 8.6-50 mg per tablet Take 1 tablet by mouth daily trospium chloride (SANCTURA) 20 mg tablet Take 20 mg by mouth in the morning      Turmeric 500 MG CAPS Take 500 mg by mouth in the morning      vitamin B-12 (VITAMIN B-12) 500 mcg tablet Take 500 mcg by mouth daily      !! warfarin (COUMADIN) 2.5 mg tablet       !! warfarin (Coumadin) 5 mg tablet Take 1 tablet daily or as directed by MD  Qty: 30 tablet, Refills: 1    Associated Diagnoses: Typical atrial flutter ([address])       ! ! - Potential duplicate medications found. Please discuss with provider. No discharge procedures on file.     PDMP Review       None            ED Provider  Electronically Signed by             Benjamin Hooper DO  11/03/23 0341

## 2023-11-03 NOTE — ANESTHESIA POSTPROCEDURE EVALUATION
Post-Op Assessment Note    CV Status:  Stable  Pain Score: 2    Pain management: adequate     Mental Status:  Alert and awake   Hydration Status:  Euvolemic and stable   PONV Controlled:  Controlled   Airway Patency:  Patent      Post Op Vitals Reviewed: Yes      Staff: Anesthesiologist         No notable events documented.     BP      Temp      Pulse    Resp      SpO2      /67   Pulse (!) 49 Comment: Dr Samantha Haro aware  Temp 97.7 °F (36.5 °C)   Resp 19   LMP  (LMP Unknown)   SpO2 100%

## 2023-11-03 NOTE — ANESTHESIA PROCEDURE NOTES
Arterial Line Insertion    Performed by: Roni Ray CRNA  Authorized by: Margaret Gonzales DO  Consent: The procedure was performed in an emergent situation. Written consent obtained. Risks and benefits: risks, benefits and alternatives were discussed  Consent given by: patient  Required items: required blood products, implants, devices, and special equipment available  Patient identity confirmed: anonymous protocol, patient vented/unresponsive  Time out: Immediately prior to procedure a "time out" was called to verify the correct patient, procedure, equipment, support staff and site/side marked as required. Preparation: Patient was prepped and draped in the usual sterile fashion.   Indications: hemodynamic monitoring  Orientation:  Left  Location: radial artery  Sedation:  Patient sedated: GA.    Procedure Details:  Justino's test normal: yes  Number of attempts: 1    Post-procedure:  Post-procedure: dressing applied  Waveform: good waveform  Patient tolerance: patient tolerated the procedure well with no immediate complications

## 2023-11-03 NOTE — OP NOTE
OPERATIVE REPORT  PATIENT NAME: Balwinder Tam    :  1930  MRN: [id number]  Pt Location: [facility] OR ROOM 05    SURGERY DATE: 11/3/2023    Surgeon(s) and Role:     * True Morin MD - Primary     * Domingo Hilton DO - Assisting    Preop Diagnosis:  Small bowel obstruction ([address]) [Y43.638]    Post-Op Diagnosis Codes:     * Small bowel obstruction ([address]) [C09.353]    Procedure(s):  LAPAROTOMY EXPLORATORY. ILEOCECECTOMY    Specimen(s):  ID Type Source Tests Collected by Time Destination   1 : ileocecectomy Tissue Small Bowel, NOS TISSUE EXAM True Morin MD 11/3/2023 1602        Estimated Blood Loss:   20 mL    Drains:  NG/OG/Enteral Tube 16 Fr Left nare (Active)   Placement Reverification Auscultation 23 1420   Site Assessment Clean 23 1420   Status Suction-low intermittent 23 1420   Output (mL) 300 mL 23 1431   Number of days: 0       Urethral Catheter Non-latex 16 Fr. (Active)   Number of days: 0       Anesthesia Type:   General    Operative Indications:  Small bowel obstruction ([address]) [K56.609]      Operative Findings:  Dense band of scar tissue causing internal hernia and closed loop obstruction of distal ileum. 20 cm of distal ileum with significant hemorrhagic changes and therefore nonviable. Ileocecectomy  performed given proximity to ileocecal valve. Stapled side to side functional end to end enterocolonic anastomosis performed. Imbrication of staple line with 3-0 silk suture. Complications:   None    Procedure and Technique:  The patient was brought to the operating room and identified verbally and via wristband. She was transferred to the operating room table and positioned supine. General endotracheal anesthesia was induced successfully. The patient's abdomen was prepped and draped in the usual sterile fashion. Pre-operative antibiotics were administered. A time out was performed confirming the patient, procedure, and site.   All parties were in agreement. Attention was turned to the abdomen where a midline laparotomy incision was made with a 10 blade scalpel. Dissection was deepened through the subcutaneous tissue down to the level of the fascia with Bovie electrocautery. The fascia was grasped with 2 Kocher clamps and elevated. The fascia was incised with Metzenbaums scissors and the peritoneum was entered. The fascial and peritoneal incision was incised the length of the incision with Bovie electrocautery. The small bowel was eviscerated and run from the ligament of Treitz to the terminal ileum. A 20 cm segment of terminal ileum was looped within a dense fibrotic band. This fibrotic band was clamped with 2 Ana clamps and incised with Metzenbaums scissors, and ligated with 2-0 Vicryl suture. Once this band was freed, the terminal ileum was able to be eviscerated. This 20 cm segment of terminal ileum was hemorrhagic, congested, and nonviable. Hemorrhagic changes extended distally to the cecum. The right colon was mobilized by incising the white line of Toldt with Bovie electrocautery. An ileocecectomy was performed with a JUS 75 mm stapler and the specimen was handed off. The mesentery was incised with the Enseal device. The limbs of ileum and colon were approximated adjacent to each other so that the mesentery was not twisted. A side-to-side functional end-to-end stapled enterocolonic  anastomosis was performed with the JUS 75 mm stapler. The staple line was imbricated with 3-0 silk suture in a Lembert fashion. The mesenteric defect was closed with 2-0 Vicryl suture in a running fashion. The abdomen was then irrigated with 1 L of warmed normal saline. Hemostasis was achieved with Bovie electrocautery and suture ligature. NG tube placement was confirmed within the stomach. The remainder of the small bowel was returned to the normal configuration, taking care not to twist the mesentery.   The abdominal fascia was closed with 1 looped PDS x2. The skin was closed with staples. The incision was covered with a sterile dressing. The patient was then allowed to awaken, extubated, and transferred to the PACU having tolerated the procedure well. All instrument, needle, and sponge counts were correct at the end of the case. Radiofrequency detection was negative.     Dr. Juan Carlos Mckeon was present for the entire procedure      Patient Disposition:  PACU     This procedure was not performed to treat colon cancer through resection      SIGNATURE: Domingo Hilton DO  DATE: November 3, 2023  TIME: 5:02 PM

## 2023-11-03 NOTE — QUICK NOTE
Post-Op Check    Assessment: 80 y.o. F s/p exploratory laparotomy, ileocecectomy    Bradycardia 40's, other vitals normal on 2L NC    WBC 11.16  Hb 11.0  Cr 0.58  LA 1.1    Plan:  Maintain NPO/NGT, await return of bowel function  [email or web address]  Maintain bianchi, monitor urine output  Maintain arterial line  Continue antibiotics for 4 days postoperatively, Ancef/flagyl  PRN analgesia  DVT PPX, continue to hold home coumadin  OOB/ambulate  PT/OT    Subjective:  No acute events since surgery, remains NPO with NGT in place, denies flatus or BM yet, pain controlled    Vitals:    11/03/23 1900   BP: 148/71   Pulse: (!) 48   Resp: (!) 6   Temp:    SpO2:        PE:  Gen: NAD, AAOx3  CV: RRR  Pulm: no resp distress  Abd: Soft, mildly distended, appropriately tender, incision cleanly dressed    Simon Sigala MD  General Surgery, PGY4

## 2023-11-03 NOTE — H&P
H&P - General Surgery   Luis Patino 80 y.o. female MRN: [id number]  Unit/Bed#: ED-27 Encounter: [id number]        Assessment/ Plan:    Closed loop small bowel obstruction  Discussed with patient, daughter, Dr. Chris Dhaliwal and attending surgeon Dr. Doreen Lombard urgency of this finding on her CT scan and potential for significant and fast deterioration   Patient and daughter are agreeable to surgery. Will plan for exploratory laparotomy with possible bowel resection, possible ostomy   NPO   IVF  [facility] for INR reversal   NGT for decompression   Patient will likely need ICU admission postoperatively    ____________________________________________________________________  Chief Complaint: Closed loop bowel obstruction on CT     HPI: Luis Patino is a 80 y.o. female with afib/flutter on coumadin, heart block s/p PPM implant 2021, discoid lupus, dysphagia who presents today from [facility] with hematuria noted this morning after urinating. She is a fair historian due to cognitive decline, her daughter Robbie Laboy is present to assist in providing history. She was recently seen in the ED earlier this week on Tuesday 10/31 for hematuria and has been holding her coumadin since then. She restarted coumadin yesterday evening and noted blood in the toilet bowl along with brown this morning. She does not usually have fecal incontinence but does have chronic constipation which she takes miralax, metamucil and a stool softener for. On CT scan in the ED a closed loop small bowel obstruction was noted with concern for ischemic bowel. She denies abdominal pain at this time and feels in her usual state of health. She is unsure if she is passing flatus or when her last bowel movement was. She has hiccupping and belching along with nausea but also reports this is not abnormal for her and she gets this "occasionally". Abdominal surgical history pertinent for hysterectomy, cholecystectomy, appendectomy.      Review of Systems:  General: negative  Cardiovascular: no chest pain or dyspnea on exertion  positive for - pacemaker  Respiratory: no cough, shortness of breath, or wheezing  Gastrointestinal: positive for - abdominal pain, constipation, nausea/vomiting, and stool incontinence  negative for - diarrhea  Genitourinary: positive for - hematuria  Musculoskeletal: negative  Neurological: no TIA or stroke symptoms  Hematological and Lymphatic: negative   Dermatological: negative  Psychological: negative  Ophthalmic: negative  ENT: negative    Past Medical History:  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    Transient elevated blood pressure     last assessed: 5/16/2017    Weight loss        Past Surgical History:  Past Surgical History:   Procedure Laterality Date    APPENDECTOMY      CATARACT EXTRACTION      CHOLECYSTECTOMY      ESOPHAGOGASTRODUODENOSCOPY  06/2013    diagnostic- neg id have dilatation    EYE SURGERY      HYSTERECTOMY      FL ESOPHAGOGASTRODUODENOSCOPY TRANSORAL DIAGNOSTIC N/A 9/16/2016    Procedure: ESOPHAGOGASTRODUODENOSCOPY (EGD); Surgeon: Leonid Rosa MD;  Location: [facility]; Service: Gastroenterology    REPLACEMENT TOTAL KNEE Left 01/2007       Social History:  Social History     Substance and Sexual Activity   Alcohol Use No     Social History     Substance and Sexual Activity   Drug Use No     Social History     Tobacco Use   Smoking Status Never   Smokeless Tobacco Never       Family History:  Family History   Problem Relation Age of Onset    Diabetes Mother     Coronary artery disease Father        Allergies: Allergies   Allergen Reactions    Gabapentin      dreams    Sertraline GI Intolerance     ' felt like a zombie'       Medications:  Home meds:   Prior to Admission medications    Medication Sig Start Date End Date Taking?  Authorizing Provider   acetaminophen (TYLENOL) 325 mg tablet Take 2 tablets (650 mg total) by mouth every 4 (four) hours as needed for mild pain 2/12/21   Dallas Cones Prechtel, DO   amLODIPine (NORVASC) 5 mg tablet Take 1 tablet (5 mg total) by mouth every morning 4/17/23   Karoline Sotomayor DO   Cholecalciferol (VITAMIN D) 2000 units CAPS Take 1 capsule (2,000 Units total) by mouth daily 8/15/18   Cisneros Pronto,    fish oil 1,000 mg Take 1,000 mg by mouth daily. Historical Provider, MD   Multiple Vitamins-Minerals (PRESERVISION AREDS PO) Take by mouth. Historical Provider, MD   multivitamin (THERAGRAN) TABS Take 1 tablet by mouth daily    Historical Provider, MD   polyvinyl alcohol (LIQUIFILM TEARS) 1.4 % ophthalmic solution 1 drop as needed for dry eyes  Patient not taking: Reported on 11/2/2023    Historical Provider, MD   senna-docusate sodium (SENOKOT-S) 8.6-50 mg per tablet Take 1 tablet by mouth daily    Historical Provider, MD   trospium chloride (SANCTURA) 20 mg tablet Take 20 mg by mouth in the morning    Historical Provider, MD   Turmeric 500 MG CAPS Take 500 mg by mouth in the morning    Historical Provider, MD   vitamin B-12 (VITAMIN B-12) 500 mcg tablet Take 500 mcg by mouth daily    Historical Provider, MD   warfarin (COUMADIN) 2.5 mg tablet  4/6/23   Historical Provider, MD   warfarin (Coumadin) 5 mg tablet Take 1 tablet daily or as directed by MD 10/18/22   Karoline Sotomayor DO         Vitals:  /70 (BP Location: Right arm)   Pulse 66   Temp 97.6 °F (36.4 °C)   Resp 20   LMP  (LMP Unknown)   SpO2 97%   BMI 23.81        Physical Exam  Constitutional:       Appearance: Normal appearance. HENT:      Head: Normocephalic and atraumatic. Nose: Nose normal.   Eyes:      Conjunctiva/sclera: Conjunctivae normal.   Cardiovascular:      Rate and Rhythm: Normal rate and regular rhythm. Pulses: Normal pulses.    Pulmonary:      Effort: Pulmonary effort is normal.      Breath sounds: Normal breath sounds. Abdominal:      General: Abdomen is flat. There is no distension. Palpations: Abdomen is soft. There is no mass. Tenderness: There is abdominal tenderness. There is guarding (suprapubic tenderness and voluntary guarding). Musculoskeletal:      Cervical back: Normal range of motion. Skin:     General: Skin is warm and dry. Neurological:      Mental Status: She is alert and oriented to person, place, and time. Psychiatric:         Mood and Affect: Mood normal.         Behavior: Behavior normal.         Thought Content:  Thought content normal.         Lab Results and Cultures:   CBC with diff:   Lab Results   Component Value Date    WBC 13.56 (H) 11/03/2023    HGB 12.5 11/03/2023    HCT 39.8 11/03/2023    MCV 94 11/03/2023     11/03/2023    RBC 4.23 11/03/2023    MCH 29.6 11/03/2023    MCHC 31.4 11/03/2023    RDW 14.0 11/03/2023    MPV 10.3 11/03/2023    NRBC 0 10/31/2023      BMP/CMP:  Lab Results   Component Value Date     12/22/2015    K 3.6 11/03/2023    K 3.7 12/22/2015     11/03/2023     12/22/2015    CO2 24 11/03/2023    CO2 29 12/22/2015    ANIONGAP 8 12/22/2015    BUN 25 11/03/2023    BUN 17 12/22/2015    CREATININE 0.65 11/03/2023    CREATININE 0.73 12/22/2015    GLUCOSE 102 12/22/2015    CALCIUM 9.3 11/03/2023    CALCIUM 9.2 12/22/2015    AST 18 11/03/2023    AST 20 12/22/2015    ALT 19 11/03/2023    ALT 26 12/22/2015    ALKPHOS 60 11/03/2023    ALKPHOS 61 12/22/2015    PROT 7.7 12/22/2015    BILITOT 1.14 (H) 12/22/2015    EGFR 77 11/03/2023   ,     Coags:   Lab Results   Component Value Date    PTT 72 (H) 10/31/2023    INR 1.61 (H) 11/03/2023   ,   Results from last 7 days   Lab Units 11/03/23  0939 11/02/23  0000 10/31/23  0952   PTT seconds  --   --  72*   INR  1.61* 1.70* 7.39*        Lipid Panel:   Lab Results   Component Value Date    CHOL 159 11/26/2013     Lab Results   Component Value Date    HDL 80 (H) 07/11/2017     Lab Results   Component Value Date    HDL 80 (H) 2017     Lab Results   Component Value Date    LDLCALC 74 2017     Lab Results   Component Value Date    TRIG 48 2017       HgbA1c:   Lab Results   Component Value Date    HGBA1C 5.9 (H) 2022       Urinalysis:   Lab Results   Component Value Date    COLORU Yellow 2023    CLARITYU Clear 2023    SPECGRAV 1.029 2023    PHUR 5.5 2023    LEUKOCYTESUR Trace (A) 2023    NITRITE Negative 2023    GLUCOSEU 70 (7/100%) (A) 2023    KETONESU 20 (1+) (A) 2023    BILIRUBINUR Negative 2023    BLOODU Small (A) 2023   ,   Urine Culture:   Lab Results   Component Value Date    URINECX No Growth <1000 cfu/mL 10/31/2023       Imagin23 CT ap with contrast:   IMPRESSION:  1. Closed-loop small bowel obstruction with findings concerning for small bowel ischemia. Emergent surgical consultation is advised. 2.  1 cm pancreatic cyst.    EKG, Pathology, and Other Studies: I have personally reviewed pertinent reports.    and I have personally reviewed pertinent films in PACS  VTE Prophylaxis: Reason for no pharmacologic prophylaxis on coumadin, plan for OR today, reversing INR     Code Status: Prior  Advance Directive and Living Will: Yes    Power of :  Daughter Kary Tellez  11/3/2023

## 2023-11-04 LAB
ANION GAP SERPL CALCULATED.3IONS-SCNC: 9 MMOL/L
BACTERIA UR CULT: NORMAL
BASOPHILS # BLD AUTO: 0.03 THOUSANDS/ÂΜL (ref 0–0.1)
BASOPHILS NFR BLD AUTO: 0 % (ref 0–1)
BUN SERPL-MCNC: 17 MG/DL (ref 5–25)
CA-I BLD-SCNC: 1 MMOL/L (ref 1.12–1.32)
CALCIUM SERPL-MCNC: 7.5 MG/DL (ref 8.4–10.2)
CHLORIDE SERPL-SCNC: 106 MMOL/L (ref 96–108)
CO2 SERPL-SCNC: 23 MMOL/L (ref 21–32)
CREAT SERPL-MCNC: 0.6 MG/DL (ref 0.6–1.3)
EOSINOPHIL # BLD AUTO: 0 THOUSAND/ÂΜL (ref 0–0.61)
EOSINOPHIL NFR BLD AUTO: 0 % (ref 0–6)
ERYTHROCYTE [DISTWIDTH] IN BLOOD BY AUTOMATED COUNT: 14.4 % (ref 11.6–15.1)
GFR SERPL CREATININE-BSD FRML MDRD: 79 ML/MIN/1.73SQ M
GLUCOSE SERPL-MCNC: 114 MG/DL (ref 65–140)
HCT VFR BLD AUTO: 32 % (ref 34.8–46.1)
HGB BLD-MCNC: 10.1 G/DL (ref 11.5–15.4)
IMM GRANULOCYTES # BLD AUTO: 0.05 THOUSAND/UL (ref 0–0.2)
IMM GRANULOCYTES NFR BLD AUTO: 0 % (ref 0–2)
LYMPHOCYTES # BLD AUTO: 0.58 THOUSANDS/ÂΜL (ref 0.6–4.47)
LYMPHOCYTES NFR BLD AUTO: 5 % (ref 14–44)
MAGNESIUM SERPL-MCNC: 2.5 MG/DL (ref 1.9–2.7)
MCH RBC QN AUTO: 29.8 PG (ref 26.8–34.3)
MCHC RBC AUTO-ENTMCNC: 31.6 G/DL (ref 31.4–37.4)
MCV RBC AUTO: 94 FL (ref 82–98)
MONOCYTES # BLD AUTO: 0.89 THOUSAND/ÂΜL (ref 0.17–1.22)
MONOCYTES NFR BLD AUTO: 7 % (ref 4–12)
NEUTROPHILS # BLD AUTO: 11.39 THOUSANDS/ÂΜL (ref 1.85–7.62)
NEUTS SEG NFR BLD AUTO: 88 % (ref 43–75)
NRBC BLD AUTO-RTO: 0 /100 WBCS
PHOSPHATE SERPL-MCNC: 3.2 MG/DL (ref 2.3–4.1)
PLATELET # BLD AUTO: 153 THOUSANDS/UL (ref 149–390)
PMV BLD AUTO: 10 FL (ref 8.9–12.7)
POTASSIUM SERPL-SCNC: 4.4 MMOL/L (ref 3.5–5.3)
RBC # BLD AUTO: 3.39 MILLION/UL (ref 3.81–5.12)
SODIUM SERPL-SCNC: 138 MMOL/L (ref 135–147)
WBC # BLD AUTO: 12.94 THOUSAND/UL (ref 4.31–10.16)

## 2023-11-04 PROCEDURE — 85025 COMPLETE CBC W/AUTO DIFF WBC: CPT | Performed by: STUDENT IN AN ORGANIZED HEALTH CARE EDUCATION/TRAINING PROGRAM

## 2023-11-04 PROCEDURE — 84100 ASSAY OF PHOSPHORUS: CPT | Performed by: STUDENT IN AN ORGANIZED HEALTH CARE EDUCATION/TRAINING PROGRAM

## 2023-11-04 PROCEDURE — 99024 POSTOP FOLLOW-UP VISIT: CPT | Performed by: SURGERY

## 2023-11-04 PROCEDURE — 80048 BASIC METABOLIC PNL TOTAL CA: CPT | Performed by: STUDENT IN AN ORGANIZED HEALTH CARE EDUCATION/TRAINING PROGRAM

## 2023-11-04 PROCEDURE — 99232 SBSQ HOSP IP/OBS MODERATE 35: CPT | Performed by: INTERNAL MEDICINE

## 2023-11-04 PROCEDURE — 82330 ASSAY OF CALCIUM: CPT

## 2023-11-04 PROCEDURE — 83735 ASSAY OF MAGNESIUM: CPT | Performed by: STUDENT IN AN ORGANIZED HEALTH CARE EDUCATION/TRAINING PROGRAM

## 2023-11-04 RX ORDER — CALCIUM GLUCONATE 20 MG/ML
2 INJECTION, SOLUTION INTRAVENOUS ONCE
Status: COMPLETED | OUTPATIENT
Start: 2023-11-04 | End: 2023-11-04

## 2023-11-04 RX ORDER — SODIUM CHLORIDE, SODIUM GLUCONATE, SODIUM ACETATE, POTASSIUM CHLORIDE, MAGNESIUM CHLORIDE, SODIUM PHOSPHATE, DIBASIC, AND POTASSIUM PHOSPHATE .53; .5; .37; .037; .03; .012; .00082 G/100ML; G/100ML; G/100ML; G/100ML; G/100ML; G/100ML; G/100ML
500 INJECTION, SOLUTION INTRAVENOUS ONCE
Status: COMPLETED | OUTPATIENT
Start: 2023-11-04 | End: 2023-11-04

## 2023-11-04 RX ORDER — SODIUM CHLORIDE, SODIUM GLUCONATE, SODIUM ACETATE, POTASSIUM CHLORIDE, MAGNESIUM CHLORIDE, SODIUM PHOSPHATE, DIBASIC, AND POTASSIUM PHOSPHATE .53; .5; .37; .037; .03; .012; .00082 G/100ML; G/100ML; G/100ML; G/100ML; G/100ML; G/100ML; G/100ML
50 INJECTION, SOLUTION INTRAVENOUS CONTINUOUS
Status: DISCONTINUED | OUTPATIENT
Start: 2023-11-04 | End: 2023-11-05

## 2023-11-04 RX ORDER — HYDROMORPHONE HCL IN WATER/PF 6 MG/30 ML
0.2 PATIENT CONTROLLED ANALGESIA SYRINGE INTRAVENOUS
Status: DISCONTINUED | OUTPATIENT
Start: 2023-11-04 | End: 2023-11-06

## 2023-11-04 RX ORDER — HYDROMORPHONE HCL/PF 1 MG/ML
0.5 SYRINGE (ML) INJECTION
Status: DISCONTINUED | OUTPATIENT
Start: 2023-11-04 | End: 2023-11-07

## 2023-11-04 RX ADMIN — HEPARIN SODIUM 5000 UNITS: 5000 INJECTION INTRAVENOUS; SUBCUTANEOUS at 06:12

## 2023-11-04 RX ADMIN — CALCIUM GLUCONATE 2 G: 20 INJECTION, SOLUTION INTRAVENOUS at 06:09

## 2023-11-04 RX ADMIN — METRONIDAZOLE 500 MG: 500 INJECTION, SOLUTION INTRAVENOUS at 06:12

## 2023-11-04 RX ADMIN — CHLORHEXIDINE GLUCONATE 15 ML: 1.2 RINSE ORAL at 08:40

## 2023-11-04 RX ADMIN — CEFAZOLIN SODIUM 2000 MG: 2 SOLUTION INTRAVENOUS at 08:40

## 2023-11-04 RX ADMIN — HEPARIN SODIUM 5000 UNITS: 5000 INJECTION INTRAVENOUS; SUBCUTANEOUS at 14:50

## 2023-11-04 RX ADMIN — CHLORHEXIDINE GLUCONATE 15 ML: 1.2 RINSE ORAL at 21:58

## 2023-11-04 RX ADMIN — HEPARIN SODIUM 5000 UNITS: 5000 INJECTION INTRAVENOUS; SUBCUTANEOUS at 21:58

## 2023-11-04 RX ADMIN — SODIUM CHLORIDE, SODIUM GLUCONATE, SODIUM ACETATE, POTASSIUM CHLORIDE, MAGNESIUM CHLORIDE, SODIUM PHOSPHATE, DIBASIC, AND POTASSIUM PHOSPHATE 500 ML: .53; .5; .37; .037; .03; .012; .00082 INJECTION, SOLUTION INTRAVENOUS at 08:40

## 2023-11-04 NOTE — CASE MANAGEMENT
Case Management Discharge Planning Note    Patient name Shyam Herrera  Location ICU 13/ICU 13 MRN [id number]  : 1930 Date 2023       Current Admission Date: 11/3/2023  Current Admission Diagnosis:Intestinal obstruction [facility])   Patient Active Problem List    Diagnosis Date Noted    Intestinal obstruction ([address]) 2023    Urinary incontinence 08/10/2021    Pacemaker ( 2021 ) 2021    Complete heart block ([address]) -   PPM ( [employer]) 2021    Anxiety 2018    Gastric erosion, -  resolved 2016    Peripheral neuropathy 2016    Constipation 2016    Forgetfulness 2016    Left shoulder tendonitis 2016    Dysphagia - improved 2015    Discoid lupus erythematosus 2012    Osteoarthritis of knee 2012      LOS (days): 1  Geometric Mean LOS (GMLOS) (days): 3.10  Days to GMLOS:2.4     OBJECTIVE:  Risk of Unplanned Readmission Score: 16.91         Current admission status: Inpatient   Preferred Pharmacy:   [address], [address] [place] [address]  Phone: [phone] Fax: [facility] [facility], [address] [facility]  [address] [place] [address]  Phone: [phone] Fax: [address] [place] [address]  Phone: [phone] Fax: [phone]    [facility]  [address]  [address] [place] [address]  Phone: [phone] Fax: [phone]    Primary Care Provider: No primary care provider on file.     Primary Insurance: MEDICARE  Secondary Insurance: BLUE CROSS    DISCHARGE DETAILS:                                          Other Referral/Resources/Interventions Provided:  Interventions: Assisted Living (Pt from [facility])

## 2023-11-04 NOTE — PLAN OF CARE
Problem: Potential for Falls  Goal: Patient will remain free of falls  Description: INTERVENTIONS:  - Educate patient/family on patient safety including physical limitations  - Instruct patient to call for assistance with activity   - Consult OT/PT to assist with strengthening/mobility   - Keep Call bell within reach  - Keep bed low and locked with side rails adjusted as appropriate  - Keep care items and personal belongings within reach  - Initiate and maintain comfort rounds  - Make Fall Risk Sign visible to staff  - Offer Toileting every 2 Hours, in advance of need  - Initiate/Maintain bed alarm  - Obtain necessary fall risk management equipment: yellow sign, yellow bracelet  - Apply yellow socks and bracelet for high fall risk patients  - Consider moving patient to room near nurses station  Outcome: Progressing     Problem: MOBILITY - ADULT  Goal: Maintain or return to baseline ADL function  Description: INTERVENTIONS:  -  Assess patient's ability to carry out ADLs; assess patient's baseline for ADL function and identify physical deficits which impact ability to perform ADLs (bathing, care of mouth/teeth, toileting, grooming, dressing, etc.)  - Assess/evaluate cause of self-care deficits   - Assess range of motion  - Assess patient's mobility; develop plan if impaired  - Assess patient's need for assistive devices and provide as appropriate  - Encourage maximum independence but intervene and supervise when necessary  - Involve family in performance of ADLs  - Assess for home care needs following discharge   - Consider OT consult to assist with ADL evaluation and planning for discharge  - Provide patient education as appropriate  Outcome: Progressing  Goal: Maintains/Returns to pre admission functional level  Description: INTERVENTIONS:  - Perform BMAT or MOVE assessment daily.   - Set and communicate daily mobility goal to care team and patient/family/caregiver.    - Collaborate with rehabilitation services on mobility goals if consulted  - Perform Range of Motion 6 times a day. - Reposition patient every 2 hours.   - Dangle patient 4 times a day  - Stand patient 3 times a day  - Ambulate patient 3 times a day  - Out of bed to chair 3 times a day   - Out of bed for meals 3 times a day  - Out of bed for toileting  - Record patient progress and toleration of activity level   Outcome: Progressing     Problem: PAIN - ADULT  Goal: Verbalizes/displays adequate comfort level or baseline comfort level  Description: Interventions:  - Encourage patient to monitor pain and request assistance  - Assess pain using appropriate pain scale  - Administer analgesics based on type and severity of pain and evaluate response  - Implement non-pharmacological measures as appropriate and evaluate response  - Consider cultural and social influences on pain and pain management  - Notify physician/advanced practitioner if interventions unsuccessful or patient reports new pain  Outcome: Progressing     Problem: INFECTION - ADULT  Goal: Absence or prevention of progression during hospitalization  Description: INTERVENTIONS:  - Assess and monitor for signs and symptoms of infection  - Monitor lab/diagnostic results  - Monitor all insertion sites, i.e. indwelling lines, tubes, and drains  - Monitor endotracheal if appropriate and nasal secretions for changes in amount and color  - [place] appropriate cooling/warming therapies per order  - Administer medications as ordered  - Instruct and encourage patient and family to use good hand hygiene technique  - Identify and instruct in appropriate isolation precautions for identified infection/condition  Outcome: Progressing  Goal: Absence of fever/infection during neutropenic period  Description: INTERVENTIONS:  - Monitor WBC    Outcome: Progressing     Problem: SAFETY ADULT  Goal: Patient will remain free of falls  Description: INTERVENTIONS:  - Educate patient/family on patient safety including physical limitations  - Instruct patient to call for assistance with activity   - Consult OT/PT to assist with strengthening/mobility   - Keep Call bell within reach  - Keep bed low and locked with side rails adjusted as appropriate  - Keep care items and personal belongings within reach  - Initiate and maintain comfort rounds  - Make Fall Risk Sign visible to staff  - Offer Toileting every 2 Hours, in advance of need  - Initiate/Maintain bed alarm  - Obtain necessary fall risk management equipment: yellow sign, yellow bracelet  - Apply yellow socks and bracelet for high fall risk patients  - Consider moving patient to room near nurses station  Outcome: Progressing  Goal: Maintain or return to baseline ADL function  Description: INTERVENTIONS:  -  Assess patient's ability to carry out ADLs; assess patient's baseline for ADL function and identify physical deficits which impact ability to perform ADLs (bathing, care of mouth/teeth, toileting, grooming, dressing, etc.)  - Assess/evaluate cause of self-care deficits   - Assess range of motion  - Assess patient's mobility; develop plan if impaired  - Assess patient's need for assistive devices and provide as appropriate  - Encourage maximum independence but intervene and supervise when necessary  - Involve family in performance of ADLs  - Assess for home care needs following discharge   - Consider OT consult to assist with ADL evaluation and planning for discharge  - Provide patient education as appropriate  Outcome: Progressing  Goal: Maintains/Returns to pre admission functional level  Description: INTERVENTIONS:  - Perform BMAT or MOVE assessment daily.   - Set and communicate daily mobility goal to care team and patient/family/caregiver. - Collaborate with rehabilitation services on mobility goals if consulted  - Perform Range of Motion 6 times a day. - Reposition patient every 2 hours.   - Dangle patient 3 times a day  - Stand patient 3 times a day  - Ambulate patient 3 times a day  - Out of bed to chair 3 times a day   - Out of bed for meals 3 times a day  - Out of bed for toileting  - Record patient progress and toleration of activity level   Outcome: Progressing     Problem: DISCHARGE PLANNING  Goal: Discharge to home or other facility with appropriate resources  Description: INTERVENTIONS:  - Identify barriers to discharge w/patient and caregiver  - Arrange for needed discharge resources and transportation as appropriate  - Identify discharge learning needs (meds, wound care, etc.)  - Arrange for interpretive services to assist at discharge as needed  - Refer to Case Management Department for coordinating discharge planning if the patient needs post-hospital services based on physician/advanced practitioner order or complex needs related to functional status, cognitive ability, or social support system  Outcome: Progressing     Problem: Knowledge Deficit  Goal: Patient/family/caregiver demonstrates understanding of disease process, treatment plan, medications, and discharge instructions  Description: Complete learning assessment and assess knowledge base.   Interventions:  - Provide teaching at level of understanding  - Provide teaching via preferred learning methods  Outcome: Progressing     Problem: SAFETY,RESTRAINT: NV/NON-SELF DESTRUCTIVE BEHAVIOR  Goal: Remains free of harm/injury (restraint for non violent/non self-detsructive behavior)  Description: INTERVENTIONS:  - Instruct patient/family regarding restraint use   - Assess and monitor physiologic and psychological status   - Provide interventions and comfort measures to meet assessed patient needs   - Identify and implement measures to help patient regain control  - Assess readiness for release of restraint   Outcome: Progressing  Goal: Returns to optimal restraint-free functioning  Description: INTERVENTIONS:  - Assess the patient's behavior and symptoms that indicate continued need for restraint  - Identify and implement measures to help patient regain control  - Assess readiness for release of restraint   Outcome: Progressing

## 2023-11-04 NOTE — PROGRESS NOTES
[facility]  Progress Note: Critical Care  Name: Jaspal Bill 80 y.o. female I MRN: [id number]  Unit/Bed#: ICU 15 I Date of Admission: 11/3/2023   Date of Service: 11/4/2023 I Hospital Day: 1      ----------------------------------------------------------------------------------------  HPI:  " Jaspal Bill is a 80 y.o. female with afib/flutter on coumadin, heart block s/p PPM implant 2021, discoid lupus, dysphagia who presents today from [facility] with hematuria noted this morning after urinating. She is a fair historian due to cognitive decline, her daughter Gennaro Grant is present to assist in providing history. She was recently seen in the ED earlier this week on Tuesday 10/31 for hematuria and has been holding her coumadin since then. She restarted coumadin yesterday evening and noted blood in the toilet bowl along with brown this morning. She does not usually have fecal incontinence but does have chronic constipation which she takes miralax, metamucil and a stool softener for. On CT scan in the ED a closed loop small bowel obstruction was noted with concern for ischemic bowel. She denies abdominal pain at this time and feels in her usual state of health. She is unsure if she is passing flatus or when her last bowel movement was. She has hiccupping and belching along with nausea but also reports this is not abnormal for her and she gets this "occasionally". Abdominal surgical history pertinent for hysterectomy, cholecystectomy, appendectomy.  "     Recent Events:  11/3 OR for exlap, ileocecectomy.   Found dense band of scar tissue containing internal hernia closed-loop obstruction of distal ileum, 20 cm of distal ileum with significant hemorrhagic changes and nonviable, ileocecectomy performed  Admit to ICU overnight for postop monitoring      Overnight no acute events  ---------------------------------------------------------------------------------------    Assessment/Plan Neuro: History of "cognitive decline"  Patient slightly confused and pulling out lines in the postoperative setting,      No pain currently        CV:      Heart block status post pacemaker  Patient is currently bradycardic in the 40s to 50s no associated hypotension  Consider pacemaker interrogation as needed      A-fib/flutter  Patient currently in normal sinus rhythm  Holding home Coumadin in the postop setting, restart patient on heparin drip when cleared by surgery     Hypertension  On amlodipine, holding in the postoperative setting  Restart when needed     Pulm:     No acute issues     GI:      Closed Loop Bowel Obstruction  11/3 OR for exlap, ileocecectomy. Found dense band of scar tissue containing internal hernia closed-loop obstruction of distal ileum, 20 cm of distal ileum with significant hemorrhagic changes and nonviable, ileocecectomy performed  Admit to ICU overnight for postop monitoring  NPO/NG tube to low intermittent suction await return of bowel function  Postoperative labs unremarkable, negative lactic     :      No acute issues  Consider discontinue Farris today         F/E/N:      On maintenance fluids in the postoperative setting/n.p.o. Replete electrolytes as needed  N.p.o. NG tube to suction        Heme/Onc:      No active issues   DVT prophylaxis        Endo:      Hx of Discoid lupus erythematous  No active issues     ID     Post operative antibiotics per surgery     MSK/Skin:      No active issues  When turning and repositioning    Disposition: Med Surg    ICU Core Measures     A: Assess, Prevent, and Manage Pain Has pain been assessed? Yes  Need for changes to pain regimen? No   B: Both SAT/SAT  N/A   C: Choice of Sedation RASS Goal: 0 Alert and Calm or N/A patient not on sedation  Need for changes to sedation or analgesia regimen? NA   D: Delirium CAM-ICU: Negative   E: Early Mobility  Plan for early mobility? Yes   F: Family Engagement Plan for family engagement today?  Yes Antibiotic Review: Post op requirements     Review of Invasive Devices: Farris Plan:  Consider removal       Prophylaxis:  VTE VTE covered by:  heparin (porcine), Subcutaneous       Stress Ulcer  not ordered          Subjective     ROS limited d/t AMS/cognitive function  Pt not complaining of any pain      Objective                            Vitals I/O      Most Recent Min/Max in 24hrs   Temp 97.8 °F (36.6 °C) Temp  Min: 97.3 °F (36.3 °C)  Max: 97.8 °F (36.6 °C)   Pulse (!) 48 Pulse  Min: 48  Max: 69   Resp 18 Resp  Min: 6  Max: 20   /67 BP  Min: 113/61  Max: 148/71   O2 Sat 98 % SpO2  Min: 95 %  Max: 100 %      Intake/Output Summary (Last 24 hours) at 11/4/2023 0046  Last data filed at 11/3/2023 2350  Gross per 24 hour   Intake 2498.33 ml   Output 615 ml   Net 1883.33 ml         Diet NPO     Invasive Monitoring Physical exam    Physical Exam  Eyes:      General: Gaze aligned appropriately. Skin:     General: Skin is warm. Capillary Refill: Capillary refill takes less than 2 seconds. HENT:      Head: Normocephalic and atraumatic. Cardiovascular:      Rate and Rhythm: Regular rhythm. Bradycardia present. Musculoskeletal:      Cervical back: Normal range of motion. Right lower leg: No edema. Left lower leg: No edema. Abdominal:      General: Abdomen is flat. There is no distension. Palpations: Abdomen is soft. Comments: Midline abdominal incision with dressing clean dry intact    Constitutional:       General: She is not in acute distress. Appearance: She is not toxic-appearing. Pulmonary:      Effort: Pulmonary effort is normal. No tachypnea or respiratory distress. Breath sounds: Normal breath sounds. Neurological:      General: No focal deficit present. Mental Status: She is easily aroused. Genitourinary/Anorectal:     Comments: Farris   Vitals reviewed.            Diagnostic Studies      EKG: reviewed   Imaging: I have personally reviewed pertinent reports.        Medications:  Scheduled PRN   cefazolin, 2,000 mg, Q8H  chlorhexidine, 15 mL, Q12H JACOB  heparin (porcine), 5,000 Units, Q8H [address]  metroNIDAZOLE, 500 mg, Q8H  potassium chloride, 20 mEq, Once      lactated ringers, 1,000 mL, Once PRN   And  lactated ringers, 1,000 mL, Once PRN  ondansetron, 4 mg, Q6H PRN  sodium chloride, 1,000 mL, Once PRN   And  sodium chloride, 1,000 mL, Once PRN       Continuous    multi-electrolyte, 100 mL/hr, Last Rate: 100 mL/hr (11/03/23 2019)         Labs:    CBC    Recent Labs     11/03/23  0939 11/03/23  1746   WBC 13.56* 11.16*   HGB 12.5 11.0*   HCT 39.8 34.5*    172     BMP    Recent Labs     11/03/23  0939 11/03/23  1746   SODIUM 138 137   K 3.6 3.4*    106   CO2 24 22   AGAP 11 9   BUN 25 19   CREATININE 0.65 0.58*   CALCIUM 9.3 8.1*       Coags    Recent Labs     11/03/23  0939   INR 1.61*        Additional Electrolytes  No recent results       Blood Gas    No recent results  No recent results LFTs  Recent Labs     11/03/23  0939   ALT 19   AST 18   ALKPHOS 60   ALB 4.2   TBILI 1.45*       Infectious  No recent results  Glucose  Recent Labs     11/03/23  0939 11/03/23  1746   GLUC 189* [place] Dawit Wilkinson stated

## 2023-11-04 NOTE — PROGRESS NOTES
Progress Note - General Surgery   Isaías Bullard 80 y.o. female MRN: [id number]  Unit/Bed#: ICU 06 Encounter: [id number]    Assessment:  80-year-old female with past medical history of aortic regurgitation, lupus, esophagitis, heart block this post dual-chamber pacemaker ([employer]), afib on coumadin, appendectomy and cholecystectomy who presents with closed-loop SBO and bowel ischemia 2/2 adhesions, now status post exploratory laparotomy with ileocecectomy on 11/3     Bradycardia in the 40s, intermittently tachypneic 24-33, other vitals normal on room air  Urine output 399cc  NG 25cc bilious    WBC 11.59 (from 12.94)  Hb 9.3 (from 10.1)  Cr 0.61 (from 0.60)    Plan:  D/C NG tube, start clear liquid diet  Increase to maintenance IV fluids at 75 cc/h, monitor urine output, has been low but creatinine stable  Maintain bianchi catheter for one more day given low urine output  Continue antibiotics for 4 days postoperatively, Ancef/flagyl, ends 11/7  PRN analgesia  DVT PPX, continue to hold home coumadin, will discuss permanent discontinuation, if to continue will require heparin bridge  OOB/ambulate  Delirium precautions, frequent re-orientation  PT/OT    Subjective/Objective     Subjective: No acute events overnight, remains n.p.o. with NG tube in place, denies flatus or BM yet, pain controlled    Objective:     Blood pressure 116/61, pulse (!) 48, temperature 100 °F (37.8 °C), temperature source Axillary, resp. rate 21, height 5' 2" (1.575 m), weight 65.5 kg (144 lb 6.4 oz), SpO2 95 %. ,Body mass index is 26.41 kg/m².       Intake/Output Summary (Last 24 hours) at 11/5/2023 0801  Last data filed at 11/5/2023 0701  Gross per 24 hour   Intake 810.33 ml   Output 699 ml   Net 111.33 ml       Invasive Devices       Peripheral Intravenous Line  Duration             Peripheral IV 11/03/23 Left Antecubital 2 days    Peripheral IV 11/03/23 Left Hand 1 day    Peripheral IV 11/03/23 Right;Ventral (anterior) Forearm 1 day Line  Duration             IABP 1 day              Drain  Duration             NG/OG/Enteral Tube 16 Fr Left nare 1 day    Urethral Catheter Non-latex 16 Fr. 1 day                    Physical Exam:  Gen:    NAD  CV:      warm, well-perfused  Lungs: No respiratory distress  Abd:     soft, appropriately tender, very minimally distended but improved, incision clean dry and intact  Ext:      no CCE  Neuro: A&Ox3

## 2023-11-04 NOTE — PLAN OF CARE
Problem: Potential for Falls  Goal: Patient will remain free of falls  Description: INTERVENTIONS:  - Educate patient/family on patient safety including physical limitations  - Instruct patient to call for assistance with activity   - Consult OT/PT to assist with strengthening/mobility   - Keep Call bell within reach  - Keep bed low and locked with side rails adjusted as appropriate  - Keep care items and personal belongings within reach  - Initiate and maintain comfort rounds  - Make Fall Risk Sign visible to staff  - Offer Toileting every *** Hours, in advance of need  - Initiate/Maintain ***alarm  - Obtain necessary fall risk management equipment: ***  - Apply yellow socks and bracelet for high fall risk patients  - Consider moving patient to room near nurses station  Outcome: Progressing     Problem: MOBILITY - ADULT  Goal: Maintain or return to baseline ADL function  Description: INTERVENTIONS:  -  Assess patient's ability to carry out ADLs; assess patient's baseline for ADL function and identify physical deficits which impact ability to perform ADLs (bathing, care of mouth/teeth, toileting, grooming, dressing, etc.)  - Assess/evaluate cause of self-care deficits   - Assess range of motion  - Assess patient's mobility; develop plan if impaired  - Assess patient's need for assistive devices and provide as appropriate  - Encourage maximum independence but intervene and supervise when necessary  - Involve family in performance of ADLs  - Assess for home care needs following discharge   - Consider OT consult to assist with ADL evaluation and planning for discharge  - Provide patient education as appropriate  Outcome: Progressing     Problem: PAIN - ADULT  Goal: Verbalizes/displays adequate comfort level or baseline comfort level  Description: Interventions:  - Encourage patient to monitor pain and request assistance  - Assess pain using appropriate pain scale  - Administer analgesics based on type and severity of pain and evaluate response  - Implement non-pharmacological measures as appropriate and evaluate response  - Consider cultural and social influences on pain and pain management  - Notify physician/advanced practitioner if interventions unsuccessful or patient reports new pain  Outcome: Progressing     Problem: SAFETY ADULT  Goal: Patient will remain free of falls  Description: INTERVENTIONS:  - Educate patient/family on patient safety including physical limitations  - Instruct patient to call for assistance with activity   - Consult OT/PT to assist with strengthening/mobility   - Keep Call bell within reach  - Keep bed low and locked with side rails adjusted as appropriate  - Keep care items and personal belongings within reach  - Initiate and maintain comfort rounds  - Make Fall Risk Sign visible to staff  - Offer Toileting every *** Hours, in advance of need  - Initiate/Maintain ***alarm  - Obtain necessary fall risk management equipment: ***  - Apply yellow socks and bracelet for high fall risk patients  - Consider moving patient to room near nurses station  Outcome: Progressing  Goal: Maintain or return to baseline ADL function  Description: INTERVENTIONS:  -  Assess patient's ability to carry out ADLs; assess patient's baseline for ADL function and identify physical deficits which impact ability to perform ADLs (bathing, care of mouth/teeth, toileting, grooming, dressing, etc.)  - Assess/evaluate cause of self-care deficits   - Assess range of motion  - Assess patient's mobility; develop plan if impaired  - Assess patient's need for assistive devices and provide as appropriate  - Encourage maximum independence but intervene and supervise when necessary  - Involve family in performance of ADLs  - Assess for home care needs following discharge   - Consider OT consult to assist with ADL evaluation and planning for discharge  - Provide patient education as appropriate  Outcome: Progressing     Problem: Knowledge Deficit  Goal: Patient/family/caregiver demonstrates understanding of disease process, treatment plan, medications, and discharge instructions  Description: Complete learning assessment and assess knowledge base.   Interventions:  - Provide teaching at level of understanding  - Provide teaching via preferred learning methods  Outcome: Progressing     Problem: SAFETY,RESTRAINT: NV/NON-SELF DESTRUCTIVE BEHAVIOR  Goal: Remains free of harm/injury (restraint for non violent/non self-detsructive behavior)  Description: INTERVENTIONS:  - Instruct patient/family regarding restraint use   - Assess and monitor physiologic and psychological status   - Provide interventions and comfort measures to meet assessed patient needs   - Identify and implement measures to help patient regain control  - Assess readiness for release of restraint   Outcome: Completed  Goal: Returns to optimal restraint-free functioning  Description: INTERVENTIONS:  - Assess the patient's behavior and symptoms that indicate continued need for restraint  - Identify and implement measures to help patient regain control  - Assess readiness for release of restraint   Outcome: Completed

## 2023-11-05 ENCOUNTER — APPOINTMENT (INPATIENT)
Dept: CT IMAGING | Facility: HOSPITAL | Age: 88
DRG: 330 | End: 2023-11-05
Payer: MEDICARE

## 2023-11-05 LAB
ANION GAP SERPL CALCULATED.3IONS-SCNC: 6 MMOL/L
BASOPHILS # BLD AUTO: 0.02 THOUSANDS/ÂΜL (ref 0–0.1)
BASOPHILS NFR BLD AUTO: 0 % (ref 0–1)
BUN SERPL-MCNC: 17 MG/DL (ref 5–25)
CALCIUM SERPL-MCNC: 7.5 MG/DL (ref 8.4–10.2)
CHLORIDE SERPL-SCNC: 107 MMOL/L (ref 96–108)
CO2 SERPL-SCNC: 25 MMOL/L (ref 21–32)
CREAT SERPL-MCNC: 0.61 MG/DL (ref 0.6–1.3)
EOSINOPHIL # BLD AUTO: 0.02 THOUSAND/ÂΜL (ref 0–0.61)
EOSINOPHIL NFR BLD AUTO: 0 % (ref 0–6)
ERYTHROCYTE [DISTWIDTH] IN BLOOD BY AUTOMATED COUNT: 14.5 % (ref 11.6–15.1)
GFR SERPL CREATININE-BSD FRML MDRD: 78 ML/MIN/1.73SQ M
GLUCOSE SERPL-MCNC: 88 MG/DL (ref 65–140)
HCT VFR BLD AUTO: 30.4 % (ref 34.8–46.1)
HGB BLD-MCNC: 9.3 G/DL (ref 11.5–15.4)
IMM GRANULOCYTES # BLD AUTO: 0.05 THOUSAND/UL (ref 0–0.2)
IMM GRANULOCYTES NFR BLD AUTO: 0 % (ref 0–2)
LYMPHOCYTES # BLD AUTO: 0.79 THOUSANDS/ÂΜL (ref 0.6–4.47)
LYMPHOCYTES NFR BLD AUTO: 7 % (ref 14–44)
MAGNESIUM SERPL-MCNC: 2.2 MG/DL (ref 1.9–2.7)
MCH RBC QN AUTO: 29 PG (ref 26.8–34.3)
MCHC RBC AUTO-ENTMCNC: 30.6 G/DL (ref 31.4–37.4)
MCV RBC AUTO: 95 FL (ref 82–98)
MONOCYTES # BLD AUTO: 1.01 THOUSAND/ÂΜL (ref 0.17–1.22)
MONOCYTES NFR BLD AUTO: 9 % (ref 4–12)
MRSA NOSE QL CULT: NORMAL
NEUTROPHILS # BLD AUTO: 9.7 THOUSANDS/ÂΜL (ref 1.85–7.62)
NEUTS SEG NFR BLD AUTO: 84 % (ref 43–75)
NRBC BLD AUTO-RTO: 0 /100 WBCS
PHOSPHATE SERPL-MCNC: 2.4 MG/DL (ref 2.3–4.1)
PLATELET # BLD AUTO: 151 THOUSANDS/UL (ref 149–390)
PMV BLD AUTO: 9.5 FL (ref 8.9–12.7)
POTASSIUM SERPL-SCNC: 3.8 MMOL/L (ref 3.5–5.3)
RBC # BLD AUTO: 3.21 MILLION/UL (ref 3.81–5.12)
SODIUM SERPL-SCNC: 138 MMOL/L (ref 135–147)
WBC # BLD AUTO: 11.59 THOUSAND/UL (ref 4.31–10.16)

## 2023-11-05 PROCEDURE — 80048 BASIC METABOLIC PNL TOTAL CA: CPT | Performed by: SURGERY

## 2023-11-05 PROCEDURE — 85025 COMPLETE CBC W/AUTO DIFF WBC: CPT | Performed by: SURGERY

## 2023-11-05 PROCEDURE — 83735 ASSAY OF MAGNESIUM: CPT | Performed by: SURGERY

## 2023-11-05 PROCEDURE — 99024 POSTOP FOLLOW-UP VISIT: CPT | Performed by: SURGERY

## 2023-11-05 PROCEDURE — 84100 ASSAY OF PHOSPHORUS: CPT | Performed by: SURGERY

## 2023-11-05 PROCEDURE — 70450 CT HEAD/BRAIN W/O DYE: CPT

## 2023-11-05 PROCEDURE — G1004 CDSM NDSC: HCPCS

## 2023-11-05 PROCEDURE — NC001 PR NO CHARGE: Performed by: NURSE PRACTITIONER

## 2023-11-05 RX ORDER — DEXTROSE MONOHYDRATE, SODIUM CHLORIDE, AND POTASSIUM CHLORIDE 50; 1.49; 4.5 G/1000ML; G/1000ML; G/1000ML
50 INJECTION, SOLUTION INTRAVENOUS CONTINUOUS
Status: DISCONTINUED | OUTPATIENT
Start: 2023-11-05 | End: 2023-11-07

## 2023-11-05 RX ORDER — POTASSIUM CHLORIDE 14.9 MG/ML
20 INJECTION INTRAVENOUS ONCE
Status: COMPLETED | OUTPATIENT
Start: 2023-11-05 | End: 2023-11-05

## 2023-11-05 RX ADMIN — SODIUM PHOSPHATE, MONOBASIC, MONOHYDRATE AND SODIUM PHOSPHATE, DIBASIC, ANHYDROUS 21 MMOL: 142; 276 INJECTION, SOLUTION INTRAVENOUS at 10:44

## 2023-11-05 RX ADMIN — POTASSIUM CHLORIDE, DEXTROSE MONOHYDRATE AND SODIUM CHLORIDE 75 ML/HR: 150; 5; 450 INJECTION, SOLUTION INTRAVENOUS at 10:44

## 2023-11-05 RX ADMIN — CHLORHEXIDINE GLUCONATE 15 ML: 1.2 RINSE ORAL at 08:09

## 2023-11-05 RX ADMIN — HEPARIN SODIUM 5000 UNITS: 5000 INJECTION INTRAVENOUS; SUBCUTANEOUS at 14:03

## 2023-11-05 RX ADMIN — HYDROMORPHONE HYDROCHLORIDE 0.2 MG: 0.2 INJECTION, SOLUTION INTRAMUSCULAR; INTRAVENOUS; SUBCUTANEOUS at 17:05

## 2023-11-05 RX ADMIN — HEPARIN SODIUM 5000 UNITS: 5000 INJECTION INTRAVENOUS; SUBCUTANEOUS at 21:23

## 2023-11-05 RX ADMIN — POTASSIUM CHLORIDE 20 MEQ: 14.9 INJECTION, SOLUTION INTRAVENOUS at 08:09

## 2023-11-05 RX ADMIN — HEPARIN SODIUM 5000 UNITS: 5000 INJECTION INTRAVENOUS; SUBCUTANEOUS at 08:09

## 2023-11-05 RX ADMIN — CHLORHEXIDINE GLUCONATE 15 ML: 1.2 RINSE ORAL at 21:23

## 2023-11-05 NOTE — PLAN OF CARE
Problem: Potential for Falls  Goal: Patient will remain free of falls  Description: INTERVENTIONS:  - Educate patient/family on patient safety including physical limitations  - Instruct patient to call for assistance with activity   - Consult OT/PT to assist with strengthening/mobility   - Keep Call bell within reach  - Keep bed low and locked with side rails adjusted as appropriate  - Keep care items and personal belongings within reach  - Initiate and maintain comfort rounds  - Make Fall Risk Sign visible to staff  - Initiate/Maintain bed alarm  - Apply yellow socks and bracelet for high fall risk patients  - Consider moving patient to room near nurses station  Outcome: Progressing     Problem: MOBILITY - ADULT  Goal: Maintain or return to baseline ADL function  Description: INTERVENTIONS:  -  Assess patient's ability to carry out ADLs; assess patient's baseline for ADL function and identify physical deficits which impact ability to perform ADLs (bathing, care of mouth/teeth, toileting, grooming, dressing, etc.)  - Assess/evaluate cause of self-care deficits   - Assess range of motion  - Assess patient's mobility; develop plan if impaired  - Assess patient's need for assistive devices and provide as appropriate  - Encourage maximum independence but intervene and supervise when necessary  - Involve family in performance of ADLs  - Assess for home care needs following discharge   - Consider OT consult to assist with ADL evaluation and planning for discharge  - Provide patient education as appropriate  Outcome: Progressing  Goal: Maintains/Returns to pre admission functional level  Description: INTERVENTIONS:  - Perform BMAT or MOVE assessment daily.   - Set and communicate daily mobility goal to care team and patient/family/caregiver. - Collaborate with rehabilitation services on mobility goals if consulted  - Reposition patient every 2 hours.   - Dangle patient 2 times a day  - Stand patient 2 times a day  - Ambulate patient 2 times a day  - Out of bed to chair 2 times a day   - Out of bed for meals 2 times a day  - Out of bed for toileting  - Record patient progress and toleration of activity level   Outcome: Progressing     Problem: PAIN - ADULT  Goal: Verbalizes/displays adequate comfort level or baseline comfort level  Description: Interventions:  - Encourage patient to monitor pain and request assistance  - Assess pain using appropriate pain scale  - Administer analgesics based on type and severity of pain and evaluate response  - Implement non-pharmacological measures as appropriate and evaluate response  - Consider cultural and social influences on pain and pain management  - Notify physician/advanced practitioner if interventions unsuccessful or patient reports new pain  Outcome: Progressing     Problem: INFECTION - ADULT  Goal: Absence or prevention of progression during hospitalization  Description: INTERVENTIONS:  - Assess and monitor for signs and symptoms of infection  - Monitor lab/diagnostic results  - Monitor all insertion sites, i.e. indwelling lines, tubes, and drains  - Monitor endotracheal if appropriate and nasal secretions for changes in amount and color  - [place] appropriate cooling/warming therapies per order  - Administer medications as ordered  - Instruct and encourage patient and family to use good hand hygiene technique  - Identify and instruct in appropriate isolation precautions for identified infection/condition  Outcome: Progressing  Goal: Absence of fever/infection during neutropenic period  Description: INTERVENTIONS:  - Monitor WBC    Outcome: Progressing     Problem: SAFETY ADULT  Goal: Patient will remain free of falls  Description: INTERVENTIONS:  - Educate patient/family on patient safety including physical limitations  - Instruct patient to call for assistance with activity   - Consult OT/PT to assist with strengthening/mobility   - Keep Call bell within reach  - Keep bed low and locked with side rails adjusted as appropriate  - Keep care items and personal belongings within reach  - Initiate and maintain comfort rounds  - Make Fall Risk Sign visible to staff  - Initiate/Maintain bed alarm  - Apply yellow socks and bracelet for high fall risk patients  - Consider moving patient to room near nurses station  Outcome: Progressing  Goal: Maintain or return to baseline ADL function  Description: INTERVENTIONS:  -  Assess patient's ability to carry out ADLs; assess patient's baseline for ADL function and identify physical deficits which impact ability to perform ADLs (bathing, care of mouth/teeth, toileting, grooming, dressing, etc.)  - Assess/evaluate cause of self-care deficits   - Assess range of motion  - Assess patient's mobility; develop plan if impaired  - Assess patient's need for assistive devices and provide as appropriate  - Encourage maximum independence but intervene and supervise when necessary  - Involve family in performance of ADLs  - Assess for home care needs following discharge   - Consider OT consult to assist with ADL evaluation and planning for discharge  - Provide patient education as appropriate  Outcome: Progressing  Goal: Maintains/Returns to pre admission functional level  Description: INTERVENTIONS:  - Perform BMAT or MOVE assessment daily.   - Set and communicate daily mobility goal to care team and patient/family/caregiver. - Collaborate with rehabilitation services on mobility goals if consulted  - Perform Range of Motion 2 times a day. - Reposition patient every 2 hours.   - Dangle patient 2 times a day  - Stand patient 2 times a day  - Ambulate patient 2 times a day  - Out of bed to chair 2 times a day   - Out of bed for meals 2 times a day  - Out of bed for toileting  - Record patient progress and toleration of activity level   Outcome: Progressing     Problem: DISCHARGE PLANNING  Goal: Discharge to home or other facility with appropriate resources  Description: INTERVENTIONS:  - Identify barriers to discharge w/patient and caregiver  - Arrange for needed discharge resources and transportation as appropriate  - Identify discharge learning needs (meds, wound care, etc.)  - Arrange for interpretive services to assist at discharge as needed  - Refer to Case Management Department for coordinating discharge planning if the patient needs post-hospital services based on physician/advanced practitioner order or complex needs related to functional status, cognitive ability, or social support system  Outcome: Progressing     Problem: Knowledge Deficit  Goal: Patient/family/caregiver demonstrates understanding of disease process, treatment plan, medications, and discharge instructions  Description: Complete learning assessment and assess knowledge base.   Interventions:  - Provide teaching at level of understanding  - Provide teaching via preferred learning methods  Outcome: Progressing

## 2023-11-05 NOTE — RAPID RESPONSE
Rapid Response Note  Sugar Kahn 80 y.o. female MRN: [id number]  Unit/Bed#: ICU 06 Encounter: [id number]    Rapid Response Notification(s):   Response called date/time:  11/5/2023 5:19 PM  Response team arrival date/time:  11/5/2023 5:19 PM  Response end date/time:  11/5/2023 5:26 PM  Level of care:  ICU  Rapid response location:  ICU  Primary reason for rapid response call: Fall    Rapid Response Intervention(s):   Airway:  None  Breathing:  None  Circulation:  None  Fluids administered:  None  Medications administered:  None       Assessment:   Status post fall, mild head trauma    Plan:   CT skin of the head without contrast  Notify surgical resident     Rapid Response Outcome:   Transfer:  Remain on floor (Remain in ICU)  Primary service notified of transfer: Yes    Code Status: Level 1 (Full Code)           Background/Situation:   Sugar Kahn is a 80 y.o. female who is status post abdominal surgery, recovering in the intensive care unit. She was ambulating from the commode to the bed using her walker, supervised by ICU staff when she tripped on her SPO2 monitor cord and fell backwards striking the left side of the back of her head. This caused a tiny laceration and minor edema. The patient was immediately sent for a CT scan which demonstrates no fracture or acute bleed. Patient never lost consciousness. Her neurologic exam had not changed. She was clearly upset but was able to calm down quickly. Surgical resident was notified and is coming to examine her. An incident report has been filed. The patient does complain of a mild headache, will order Tylenol for her headache. Review of Systems   Constitutional: Negative. HENT:          Complains of headache secondary to fall   Eyes: Negative. Respiratory: Negative. Cardiovascular: Negative. Gastrointestinal: Negative. Endocrine: Negative. Genitourinary: Negative.     Musculoskeletal:         Complains of bilateral chronic shoulder pain   Skin: Negative. Allergic/Immunologic: Negative. Neurological: Negative. Hematological: Negative. 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 Comments: Tiny laceration left occipital portion of scalp with minimal edema     Nose: Nose normal.   Eyes:      Extraocular Movements: Extraocular movements intact. Conjunctiva/sclera: Conjunctivae normal.      Pupils: Pupils are equal, round, and reactive to light. Cardiovascular:      Rate and Rhythm: Normal rate and regular rhythm. Pulses: Normal pulses. Heart sounds: Normal heart sounds. Comments: Paced rhythm  Pulmonary:      Effort: Pulmonary effort is normal.      Breath sounds: Normal breath sounds. Abdominal:      General: Abdomen is flat. Bowel sounds are normal.      Palpations: Abdomen is soft. Musculoskeletal:         General: Normal range of motion. Cervical back: Normal range of motion and neck supple. Comments: Upper extremity range of motion limited due to chronic shoulder pain   Skin:     General: Skin is warm and dry. Capillary Refill: Capillary refill takes less than 2 seconds. Neurological:      Mental Status: She is alert. Mental status is at baseline. Comments: Alert, awake, dementia at baseline   Psychiatric:         Mood and Affect: Mood normal.         Portions of the record may have been created with voice recognition software. Occasional wrong word or "sound a like" substitutions may have occurred due to the inherent limitations of voice recognition software.   Read the chart carefully and recognize, using context, where substitutions have occurred.     Mariluz Apodaca

## 2023-11-05 NOTE — PROGRESS NOTES
Progress Note - General Surgery   Melody Ngo 80 y.o. female MRN: [id number]  Unit/Bed#: E4 -01 Encounter: [id number]    Assessment:  28-year-old female with past medical history of aortic regurgitation, lupus, esophagitis, heart block this post dual-chamber pacemaker (Medtronic), afib on coumadin, appendectomy and cholecystectomy who presents with closed-loop SBO and bowel ischemia 2/2 adhesions, now status post exploratory laparotomy with ileocecectomy on 11/3      Bradycardia in the 40s, other vitals normal on room air  Urine output 640cc     WBC 9.34 (from 11.59)  Hb 9.8 (from 9.3)  Cr pending (from 0.61)    Plan:  Advance to clear liquids with toast and crackers, await return of bowel function  [email or web address]  D/C Farris  PRN analgesia  Continue to hold home warfarin, discuss permanent discontinuation giving high risk of falls  OOB/ambulate  Delirium precaution, frequent re-orientation  PT/OT    Subjective/Objective     Subjective: Patient had a fall with positive head strike overnight so sent for stat CT head which was negative, tolerating clear liquids without nausea or vomiting, denies flatus or BM yet, pain controlled    Objective:     Blood pressure 134/69, pulse 55, temperature 97.7 °F (36.5 °C), temperature source Temporal, resp. rate 18, height 5' 2" (1.575 m), weight 64.1 kg (141 lb 4.8 oz), SpO2 92 %. ,Body mass index is 25.84 kg/m².       Intake/Output Summary (Last 24 hours) at 11/6/2023 0816  Last data filed at 11/6/2023 0327  Gross per 24 hour   Intake 1012.93 ml   Output 840 ml   Net 172.93 ml       Invasive Devices       Peripheral Intravenous Line  Duration             Peripheral IV 11/03/23 Left Hand 2 days    Peripheral IV 11/03/23 Right;Ventral (anterior) Forearm 2 days              Line  Duration             IABP 2 days              Drain  Duration             Urethral Catheter Non-latex 16 Fr. 2 days                    Physical Exam:   Gen:    NAD  CV:      warm, well-perfused  Lungs: No respiratory distress  Abd:     soft, appropriately tender, very minimally distended, incision clean dry and intact  Ext:      no CCE  Neuro: A&Ox3

## 2023-11-06 LAB
ABO GROUP BLD BPU: NORMAL
ANION GAP SERPL CALCULATED.3IONS-SCNC: 5 MMOL/L
BASOPHILS # BLD AUTO: 0.02 THOUSANDS/ÂΜL (ref 0–0.1)
BASOPHILS NFR BLD AUTO: 0 % (ref 0–1)
BPU ID: NORMAL
BUN SERPL-MCNC: 11 MG/DL (ref 5–25)
CALCIUM SERPL-MCNC: 7.8 MG/DL (ref 8.4–10.2)
CHLORIDE SERPL-SCNC: 107 MMOL/L (ref 96–108)
CO2 SERPL-SCNC: 24 MMOL/L (ref 21–32)
CREAT SERPL-MCNC: 0.53 MG/DL (ref 0.6–1.3)
CROSSMATCH: NORMAL
CROSSMATCH: NORMAL
EOSINOPHIL # BLD AUTO: 0.04 THOUSAND/ÂΜL (ref 0–0.61)
EOSINOPHIL NFR BLD AUTO: 0 % (ref 0–6)
ERYTHROCYTE [DISTWIDTH] IN BLOOD BY AUTOMATED COUNT: 14.3 % (ref 11.6–15.1)
GFR SERPL CREATININE-BSD FRML MDRD: 82 ML/MIN/1.73SQ M
GLUCOSE SERPL-MCNC: 128 MG/DL (ref 65–140)
HCT VFR BLD AUTO: 30.2 % (ref 34.8–46.1)
HGB BLD-MCNC: 9.8 G/DL (ref 11.5–15.4)
IMM GRANULOCYTES # BLD AUTO: 0.05 THOUSAND/UL (ref 0–0.2)
IMM GRANULOCYTES NFR BLD AUTO: 1 % (ref 0–2)
LYMPHOCYTES # BLD AUTO: 0.73 THOUSANDS/ÂΜL (ref 0.6–4.47)
LYMPHOCYTES NFR BLD AUTO: 8 % (ref 14–44)
MCH RBC QN AUTO: 30.1 PG (ref 26.8–34.3)
MCHC RBC AUTO-ENTMCNC: 32.5 G/DL (ref 31.4–37.4)
MCV RBC AUTO: 93 FL (ref 82–98)
MONOCYTES # BLD AUTO: 0.92 THOUSAND/ÂΜL (ref 0.17–1.22)
MONOCYTES NFR BLD AUTO: 10 % (ref 4–12)
NEUTROPHILS # BLD AUTO: 7.58 THOUSANDS/ÂΜL (ref 1.85–7.62)
NEUTS SEG NFR BLD AUTO: 81 % (ref 43–75)
NRBC BLD AUTO-RTO: 0 /100 WBCS
PHOSPHATE SERPL-MCNC: 2.4 MG/DL (ref 2.3–4.1)
PLATELET # BLD AUTO: 162 THOUSANDS/UL (ref 149–390)
PMV BLD AUTO: 9.9 FL (ref 8.9–12.7)
POTASSIUM SERPL-SCNC: 3.8 MMOL/L (ref 3.5–5.3)
RBC # BLD AUTO: 3.26 MILLION/UL (ref 3.81–5.12)
SODIUM SERPL-SCNC: 136 MMOL/L (ref 135–147)
UNIT DISPENSE STATUS: NORMAL
UNIT PRODUCT CODE: NORMAL
UNIT PRODUCT VOLUME: 279 ML
UNIT PRODUCT VOLUME: 336 ML
UNIT PRODUCT VOLUME: 350 ML
UNIT PRODUCT VOLUME: 350 ML
UNIT RH: NORMAL
WBC # BLD AUTO: 9.34 THOUSAND/UL (ref 4.31–10.16)

## 2023-11-06 PROCEDURE — 85025 COMPLETE CBC W/AUTO DIFF WBC: CPT | Performed by: SURGERY

## 2023-11-06 PROCEDURE — 84100 ASSAY OF PHOSPHORUS: CPT | Performed by: SURGERY

## 2023-11-06 PROCEDURE — 97167 OT EVAL HIGH COMPLEX 60 MIN: CPT

## 2023-11-06 PROCEDURE — 99024 POSTOP FOLLOW-UP VISIT: CPT | Performed by: SURGERY

## 2023-11-06 PROCEDURE — 80048 BASIC METABOLIC PNL TOTAL CA: CPT | Performed by: SURGERY

## 2023-11-06 PROCEDURE — 97163 PT EVAL HIGH COMPLEX 45 MIN: CPT

## 2023-11-06 RX ORDER — ACETAMINOPHEN 325 MG/1
650 TABLET ORAL EVERY 6 HOURS PRN
Status: DISCONTINUED | OUTPATIENT
Start: 2023-11-06 | End: 2023-11-08 | Stop reason: HOSPADM

## 2023-11-06 RX ORDER — HYDROMORPHONE HCL IN WATER/PF 6 MG/30 ML
0.2 PATIENT CONTROLLED ANALGESIA SYRINGE INTRAVENOUS
Status: DISCONTINUED | OUTPATIENT
Start: 2023-11-06 | End: 2023-11-07

## 2023-11-06 RX ORDER — QUETIAPINE FUMARATE 25 MG/1
25 TABLET, FILM COATED ORAL
Status: DISCONTINUED | OUTPATIENT
Start: 2023-11-06 | End: 2023-11-07

## 2023-11-06 RX ADMIN — CHLORHEXIDINE GLUCONATE 15 ML: 1.2 RINSE ORAL at 20:47

## 2023-11-06 RX ADMIN — POTASSIUM CHLORIDE, DEXTROSE MONOHYDRATE AND SODIUM CHLORIDE 75 ML/HR: 150; 5; 450 INJECTION, SOLUTION INTRAVENOUS at 00:16

## 2023-11-06 RX ADMIN — QUETIAPINE FUMARATE 25 MG: 25 TABLET ORAL at 21:00

## 2023-11-06 RX ADMIN — CHLORHEXIDINE GLUCONATE 15 ML: 1.2 RINSE ORAL at 09:26

## 2023-11-06 RX ADMIN — HEPARIN SODIUM 5000 UNITS: 5000 INJECTION INTRAVENOUS; SUBCUTANEOUS at 21:00

## 2023-11-06 RX ADMIN — POTASSIUM & SODIUM PHOSPHATES POWDER PACK 280-160-250 MG 2 PACKET: 280-160-250 PACK at 09:26

## 2023-11-06 RX ADMIN — HEPARIN SODIUM 5000 UNITS: 5000 INJECTION INTRAVENOUS; SUBCUTANEOUS at 13:56

## 2023-11-06 RX ADMIN — POTASSIUM & SODIUM PHOSPHATES POWDER PACK 280-160-250 MG 2 PACKET: 280-160-250 PACK at 16:48

## 2023-11-06 RX ADMIN — POTASSIUM CHLORIDE, DEXTROSE MONOHYDRATE AND SODIUM CHLORIDE 50 ML/HR: 150; 5; 450 INJECTION, SOLUTION INTRAVENOUS at 16:50

## 2023-11-06 NOTE — PLAN OF CARE
Problem: Potential for Falls  Goal: Patient will remain free of falls  Description: INTERVENTIONS:  - Educate patient/family on patient safety including physical limitations  - Instruct patient to call for assistance with activity   - Consult OT/PT to assist with strengthening/mobility   - Keep Call bell within reach  - Keep bed low and locked with side rails adjusted as appropriate  - Keep care items and personal belongings within reach  - Initiate and maintain comfort rounds  - Make Fall Risk Sign visible to staff  - Offer Toileting every  Hours, in advance of need  - Initiate/Maintain alarm  - Obtain necessary fall risk management equipment:   - Apply yellow socks and bracelet for high fall risk patients  - Consider moving patient to room near nurses station  Outcome: Progressing     Problem: MOBILITY - ADULT  Goal: Maintain or return to baseline ADL function  Description: INTERVENTIONS:  -  Assess patient's ability to carry out ADLs; assess patient's baseline for ADL function and identify physical deficits which impact ability to perform ADLs (bathing, care of mouth/teeth, toileting, grooming, dressing, etc.)  - Assess/evaluate cause of self-care deficits   - Assess range of motion  - Assess patient's mobility; develop plan if impaired  - Assess patient's need for assistive devices and provide as appropriate  - Encourage maximum independence but intervene and supervise when necessary  - Involve family in performance of ADLs  - Assess for home care needs following discharge   - Consider OT consult to assist with ADL evaluation and planning for discharge  - Provide patient education as appropriate  Outcome: Progressing  Goal: Maintains/Returns to pre admission functional level  Description: INTERVENTIONS:  - Perform BMAT or MOVE assessment daily.   - Set and communicate daily mobility goal to care team and patient/family/caregiver.    - Collaborate with rehabilitation services on mobility goals if consulted  - Perform Range of Motion  times a day. - Reposition patient every  hours.   - Dangle patient  times a day  - Stand patient  times a day  - Ambulate patient  times a day  - Out of bed to chair  times a day   - Out of bed for meals  times a day  - Out of bed for toileting  - Record patient progress and toleration of activity level   Outcome: Progressing     Problem: PAIN - ADULT  Goal: Verbalizes/displays adequate comfort level or baseline comfort level  Description: Interventions:  - Encourage patient to monitor pain and request assistance  - Assess pain using appropriate pain scale  - Administer analgesics based on type and severity of pain and evaluate response  - Implement non-pharmacological measures as appropriate and evaluate response  - Consider cultural and social influences on pain and pain management  - Notify physician/advanced practitioner if interventions unsuccessful or patient reports new pain  Outcome: Progressing     Problem: INFECTION - ADULT  Goal: Absence or prevention of progression during hospitalization  Description: INTERVENTIONS:  - Assess and monitor for signs and symptoms of infection  - Monitor lab/diagnostic results  - Monitor all insertion sites, i.e. indwelling lines, tubes, and drains  - Monitor endotracheal if appropriate and nasal secretions for changes in amount and color  - Oscar appropriate cooling/warming therapies per order  - Administer medications as ordered  - Instruct and encourage patient and family to use good hand hygiene technique  - Identify and instruct in appropriate isolation precautions for identified infection/condition  Outcome: Progressing  Goal: Absence of fever/infection during neutropenic period  Description: INTERVENTIONS:  - Monitor WBC    Outcome: Progressing     Problem: SAFETY ADULT  Goal: Patient will remain free of falls  Description: INTERVENTIONS:  - Educate patient/family on patient safety including physical limitations  - Instruct patient to call for assistance with activity   - Consult OT/PT to assist with strengthening/mobility   - Keep Call bell within reach  - Keep bed low and locked with side rails adjusted as appropriate  - Keep care items and personal belongings within reach  - Initiate and maintain comfort rounds  - Make Fall Risk Sign visible to staff  - Offer Toileting every  Hours, in advance of need  - Initiate/Maintain alarm  - Obtain necessary fall risk management equipment:   - Apply yellow socks and bracelet for high fall risk patients  - Consider moving patient to room near nurses station  Outcome: Progressing  Goal: Maintain or return to baseline ADL function  Description: INTERVENTIONS:  -  Assess patient's ability to carry out ADLs; assess patient's baseline for ADL function and identify physical deficits which impact ability to perform ADLs (bathing, care of mouth/teeth, toileting, grooming, dressing, etc.)  - Assess/evaluate cause of self-care deficits   - Assess range of motion  - Assess patient's mobility; develop plan if impaired  - Assess patient's need for assistive devices and provide as appropriate  - Encourage maximum independence but intervene and supervise when necessary  - Involve family in performance of ADLs  - Assess for home care needs following discharge   - Consider OT consult to assist with ADL evaluation and planning for discharge  - Provide patient education as appropriate  Outcome: Progressing  Goal: Maintains/Returns to pre admission functional level  Description: INTERVENTIONS:  - Perform BMAT or MOVE assessment daily.   - Set and communicate daily mobility goal to care team and patient/family/caregiver. - Collaborate with rehabilitation services on mobility goals if consulted  - Perform Range of Motion  times a day. - Reposition patient every  hours.   - Dangle patient  times a day  - Stand patient  times a day  - Ambulate patient  times a day  - Out of bed to chair  times a day   - Out of bed for meals times a day  - Out of bed for toileting  - Record patient progress and toleration of activity level   Outcome: Progressing     Problem: DISCHARGE PLANNING  Goal: Discharge to home or other facility with appropriate resources  Description: INTERVENTIONS:  - Identify barriers to discharge w/patient and caregiver  - Arrange for needed discharge resources and transportation as appropriate  - Identify discharge learning needs (meds, wound care, etc.)  - Arrange for interpretive services to assist at discharge as needed  - Refer to Case Management Department for coordinating discharge planning if the patient needs post-hospital services based on physician/advanced practitioner order or complex needs related to functional status, cognitive ability, or social support system  Outcome: Progressing     Problem: Knowledge Deficit  Goal: Patient/family/caregiver demonstrates understanding of disease process, treatment plan, medications, and discharge instructions  Description: Complete learning assessment and assess knowledge base.   Interventions:  - Provide teaching at level of understanding  - Provide teaching via preferred learning methods  Outcome: Progressing     Problem: Prexisting or High Potential for Compromised Skin Integrity  Goal: Skin integrity is maintained or improved  Description: INTERVENTIONS:  - Identify patients at risk for skin breakdown  - Assess and monitor skin integrity  - Assess and monitor nutrition and hydration status  - Monitor labs   - Assess for incontinence   - Turn and reposition patient  - Assist with mobility/ambulation  - Relieve pressure over bony prominences  - Avoid friction and shearing  - Provide appropriate hygiene as needed including keeping skin clean and dry  - Evaluate need for skin moisturizer/barrier cream  - Collaborate with interdisciplinary team   - Patient/family teaching  - Consider wound care consult   Outcome: Progressing     Problem: Nutrition/Hydration-ADULT  Goal: Nutrient/Hydration intake appropriate for improving, restoring or maintaining nutritional needs  Description: Monitor and assess patient's nutrition/hydration status for malnutrition. Collaborate with interdisciplinary team and initiate plan and interventions as ordered. Monitor patient's weight and dietary intake as ordered or per policy. Utilize nutrition screening tool and intervene as necessary. Determine patient's food preferences and provide high-protein, high-caloric foods as appropriate.      INTERVENTIONS:  - Monitor oral intake, urinary output, labs, and treatment plans  - Assess nutrition and hydration status and recommend course of action  - Evaluate amount of meals eaten  - Assist patient with eating if necessary   - Allow adequate time for meals  - Recommend/ encourage appropriate diets, oral nutritional supplements, and vitamin/mineral supplements  - Order, calculate, and assess calorie counts as needed  - Recommend, monitor, and adjust tube feedings and TPN/PPN based on assessed needs  - Assess need for intravenous fluids  - Provide specific nutrition/hydration education as appropriate  - Include patient/family/caregiver in decisions related to nutrition  Outcome: Progressing

## 2023-11-06 NOTE — CASE MANAGEMENT
Case Management Assessment & Discharge Planning Note    Patient name Jaspal Bill  Location East 4 /E4 -* MRN [id number]  : 1930 Date 2023       Current Admission Date: 11/3/2023  Current Admission Diagnosis:Intestinal obstruction [facility])   Patient Active Problem List    Diagnosis Date Noted    Intestinal obstruction ([address]) 2023    Urinary incontinence 08/10/2021    Pacemaker ( 2021 ) 2021    Complete heart block ([address]) -   PPM ( Medtronic) 2021    Anxiety 2018    Gastric erosion, -  resolved 2016    Peripheral neuropathy 2016    Constipation 2016    Forgetfulness 2016    Left shoulder tendonitis 2016    Dysphagia - improved 2015    Discoid lupus erythematosus 2012    Osteoarthritis of knee 2012      LOS (days): 3  Geometric Mean LOS (GMLOS) (days): 3.10  Days to GMLOS:0.2     OBJECTIVE:    Risk of Unplanned Readmission Score: 16.85         Current admission status: Inpatient       Preferred Pharmacy:   [address] [facility] [facility] [address], [address] [address]  Phone: [phone] Fax: [address] [facility] [address], [address] [facility]  [address] [place] [address]  Phone: [phone] Fax: [address] [place] [address], [address] [place] [address]  Phone: [phone] Fax: [phone]    [facility], [address] [place] [address]  [address] [place] [address]  Phone: [phone] Fax: [phone]    Primary Care Provider: No primary care provider on file. Primary Insurance: MEDICARE  Secondary Insurance: BLUE CROSS    ASSESSMENT:  Active Health Care Proxies    There are no active Health Care Proxies on file.        DISCHARGE DETAILS:  Additional Comments: Cm attempted to contact pts daughter Gennaro Grant to assess cm needs.  Cm left vm and will reattempt contact later,

## 2023-11-06 NOTE — CASE MANAGEMENT
Case Management Assessment & Discharge Planning Note    Patient name Cayden Mooney  Location East 4 /E4 -* MRN [id number]  : 1930 Date 2023       Current Admission Date: 11/3/2023  Current Admission Diagnosis:Intestinal obstruction [facility])   Patient Active Problem List    Diagnosis Date Noted    Intestinal obstruction ([address]) 2023    Urinary incontinence 08/10/2021    Pacemaker ( 2021 ) 2021    Complete heart block ([address]) -   PPM ( Medtronic) 2021    Anxiety 2018    Gastric erosion, -  resolved 2016    Peripheral neuropathy 2016    Constipation 2016    Forgetfulness 2016    Left shoulder tendonitis 2016    Dysphagia - improved 2015    Discoid lupus erythematosus 2012    Osteoarthritis of knee 2012      LOS (days): 3  Geometric Mean LOS (GMLOS) (days): 3.10  Days to GMLOS:0     OBJECTIVE:    Risk of Unplanned Readmission Score: 15.45         Current admission status: Inpatient       Preferred Pharmacy:   [address] [facility] [facility], [address] [address]  Phone: [phone] Fax: [address], [address] [facility]  [address] [place] [address] Road [facility]  Phone: [phone] Fax: [address] [place] [address], [address] [place] [address]  Phone: [phone] Fax: [phone]    [facility], [address] [place] [address]  [address] [place] [address] Road [address]  Phone: [phone] Fax: [phone]    Primary Care Provider: No primary care provider on file. Primary Insurance: MEDICARE  Secondary Insurance: BLUE CROSS    ASSESSMENT:  Active Health Care Proxies    There are no active Health Care Proxies on file.                  Readmission Root Cause  30 Day Readmission: No    Patient Information  Admitted from[de-identified] Facility  Mental Status: Confused  During Assessment patient was accompanied by: Not accompanied during assessment  Assessment information provided by[de-identified] Daughter  Primary Caregiver: Other (Comment) ([address])  Caregiver's Relationship to Patient[de-identified] Other (Specify)  Support Systems: [address] of Residence: [address] [place] [address] do you live in?: [place] entry access options. Select all that apply.: Elevator  Type of Current Residence: Facility  In the last 12 months, was there a time when you were not able to pay the mortgage or rent on time?: No  In the last 12 months, was there a time when you did not have a steady place to sleep or slept in a shelter (including now)?: No  Homeless/housing insecurity resource given?: N/A  Living Arrangements: Other (Comment)  Is patient a ?: No    Activities of Daily Living Prior to Admission  Functional Status: Assistance  Ambulates independently?: No  Level of ambulatory dependence: Assistance  Does patient use assisted devices?: No  Does patient currently own DME?: No  Does patient have a history of Outpatient Therapy (PT/OT)?: Yes (Op pt for shoulder.  Daughter unable to remember when or what agency)  Does the patient have a history of Short-Term Rehab?: No  Does patient have a history of HHC?: No  Does patient currently have [facility]?: No         Patient Information Continued  Income Source: SSI/SSD  Does patient have prescription coverage?: Yes  Within the past 12 months, you worried that your food would run out before you got the money to buy more.: Never true  Within the past 12 months, the food you bought just didn't last and you didn't have money to get more.: Never true  Food insecurity resource given?: N/A  Does patient receive dialysis treatments?: No  Does patient have a history of substance abuse?: No  Does patient have a history of Mental Health Diagnosis?: No         Means of Transportation  Means of Transport to Appts[de-identified] Family transport  In the past 12 months, has lack of transportation kept you from medical appointments or from getting medications?: No  In the past 12 months, has lack of transportation kept you from meetings, work, or from getting things needed for daily living?: No  Was application for public transport provided?: N/A        DISCHARGE DETAILS:    Discharge planning discussed with[de-identified] Daughter Saul Valdez of Choice: Yes  Comments - Freedom of Choice: Choice given for hh pt agencies  CM contacted family/caregiver?: Yes  Were Treatment Team discharge recommendations reviewed with patient/caregiver?: Yes  Did patient/caregiver verbalize understanding of patient care needs?: Yes  Were patient/caregiver advised of the risks associated with not following Treatment Team discharge recommendations?: Yes    Contacts  Patient Contacts: Kavya Kelly (Daughter)  Relationship to Patient[de-identified] Family  Contact Method: Phone  Phone Number: [phone]  Reason/Outcome: Continuity of Care, Referral, Discharge [address]         Is the patient interested in [facility] at discharge?: Yes  [facility] requested[de-identified] Physical [address] [facility] Name[de-identified] Other  [facility] Provider[de-identified] PCP  [place] Needed[de-identified] Gait/ADL Training  Homebound Criteria Met[de-identified] Requires the Assistance of Another Person for Safe Ambulation or to Leave the Home  Supporting Clincal Findings[de-identified] Limited Endurance, Cognitive Deficit Requiring the Assistance of Others, Fatigues Easliy in Short Distances    DME Referral Provided  Referral made for DME?: No  Additional Comments: Cm spoke with pts daughter Kavya Kelly [phone] since pt is confused. Liatreina Robin was made aware pt is reccomending hh for pt to which she is agreeable. Cm sent referrals and will provide Kavya Kelly with a list of accepting agencies once it is available. cm will continue following.

## 2023-11-06 NOTE — OCCUPATIONAL THERAPY NOTE
Occupational Therapy Evaluation     Patient Name: Clair Feliciano  [id number] Date: 11/6/2023  Problem List  Principal Problem:    Intestinal obstruction ([address])  Active Problems:    Discoid lupus erythematosus    Complete heart block (HCC) -   PPM ( [employer]tr[employer]) Feb 2021    Past Medical History  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    Transient elevated blood pressure     last assessed: 5/16/2017    Weight loss      Past Surgical History  Past Surgical History:   Procedure Laterality Date    APPENDECTOMY      CATARACT EXTRACTION      CHOLECYSTECTOMY      ESOPHAGOGASTRODUODENOSCOPY  06/2013    diagnostic- neg id have dilatation    EYE SURGERY      HYSTERECTOMY      LAPAROTOMY N/A 11/3/2023    Procedure: LAPAROTOMY EXPLORATORY, ILEOCECECTOMY;  Surgeon: Amy Purcell MD;  [facility]: [facility];  Service: General    LA ESOPHAGOGASTRODUODENOSCOPY TRANSORAL DIAGNOSTIC N/A 9/16/2016    Procedure: ESOPHAGOGASTRODUODENOSCOPY (EGD); Surgeon: Coretta Lau MD;  Location: [facility]; Service: Gastroenterology    REPLACEMENT TOTAL KNEE Left 01/2007 11/06/23 0902   OT Last Visit   OT Visit Date 11/06/23   Note Type   Note type Evaluation   Additional Comments Pt greeted in supine and agreeable to skilled OT evaluation. Pain Assessment   Pain Assessment Tool 0-10   Pain Score No Pain   Restrictions/Precautions   Weight Bearing Precautions Per Order No   Other Precautions Cognitive; Chair Alarm; Bed Alarm;Multiple lines; Fall Risk  (Farris cath)   Home Living   Type of Home Assisted living  [facility])   Home Layout One level  (0 [place])   Bathroom Shower/Tub Walk-in shower   Bathroom Toilet Raised   Bathroom Equipment Grab bars in shower; Shower chair;Grab bars around toilet   Bathroom Accessibility Accessible via walker   Home Equipment Walker;Cane  (does not use PTA)   Prior Function   Level of [place] Independent with ADLs; Independent with functional mobility; Needs assistance with IADLS   Lives With Alone; Facility staff   Receives Help From Personal care attendant; Family  (supportive family)   IADLs Family/Friend/Other provides transportation; Family/Friend/Other provides meals; Family/Friend/Other provides medication management   Falls in the last 6 months 5 to 10   Subjective   Subjective "Everytime I get up I fall"   ADL   Where Assessed Edge of bed   Eating Assistance 5  Supervision/Setup   Grooming Assistance 5  Supervision/Setup   UB Bathing Assistance 4  Minimal Assistance   LB Bathing Assistance 4  Minimal Assistance   [address] [facility] 4  Minimal [facility] 4  Minimal [address]  4  Minimal Assistance   Bed Mobility   Supine to Sit 4  Minimal assistance   Additional items Assist x 1;HOB elevated; Bedrails; Increased time required;Verbal cues   Sit to Supine 4  Minimal assistance   Additional items Assist x 1;Bedrails; Increased time required;Verbal cues;LE management   Transfers   Sit to Stand 4  Minimal assistance   Additional items Assist x 1;Bedrails; Increased time required;Verbal cues   Stand to Sit 4  Minimal assistance   Additional items Assist x 1; Armrests; Increased time required;Verbal cues   Additional Comments cues for safety, hand placement and best tech. Functional Mobility   Functional Mobility 4  Minimal assistance   Additional Comments Ax1, functional distances in room with RW. Additional items Rolling walker   Balance   Static Sitting Good   Dynamic Sitting Fair +   Static Standing Fair   Dynamic Standing Fair -   Ambulatory Poor +   Activity Tolerance   Activity Tolerance Patient tolerated treatment well;Patient limited by fatigue   Nurse Made Aware MICAELA Dotson.    RUE Assessment   RUE Assessment X  (limitations at shoulders.)   LUE Assessment   LUE Assessment X  (limitations at shoulder.)   Hand Function   Gross Motor Coordination Impaired   Fine Motor Coordination Functional   Vision-Basic Assessment   Current Vision Wears glasses all the time   Psychosocial   Psychosocial (WDL) WDL   Cognition   Overall Cognitive Status Impaired   Arousal/Participation Cooperative   Attention Attends with cues to redirect   Orientation Level Oriented to person;Oriented to time;Disoriented to place; Disoriented to situation  (stated month and year. required cues for place.)   Memory Decreased short term memory;Decreased recall of recent events;Decreased recall of precautions   Following Commands Follows one step commands without difficulty   Comments pleasant and cooperative. fearful of falling. Assessment   Limitation Decreased ADL status; Decreased Safe judgement during ADL;Decreased UE strength;Decreased endurance;Decreased self-care trans;Decreased high-level ADLs   Prognosis Good   Assessment Isaías Bullard is a 80 y.o. female seen for OT evaluation s/p admit to [facility] on 11/3/2023 w/ Intestinal obstruction ([address] status post exploratory laparotomy with ileocecectomy on 11/3. Course c/b bradycardia. Comorbidities affecting pt's functional performance at time of assessment include:  aortic regurgitation, lupus, esophagitis, heart block this post dual-chamber pacemaker ([employer]tronic), afib on coumadin, appendectomy and cholecystectomy . Pt with active OT orders and activity orders for OOB to chair. Personal factors affecting pt at time of IE include:difficulty performing ADLs, difficulty performing IADLs, difficulty performing transfers/mobility, fall risk , and functional decline . Prior to admission, pt lives alone in [place] [place] [facility] [place]. Has a walk in shower with seat and grab bars. Raised toilets with grab bars. Was I with ADLs and mobility with no AD. Owns a cane and RW. A for all IADLS. Supportive family. Does not drives, reports 6 falls in the past 6 months.  Upon evaluation: Pt currently requires Hayden for UB ADLs, Hayden for LB ADLs, Hayden for toileting, Hayden for bed mobility, Hayden for functional transfers, and Hayden with RW for mobility 2* the following deficits impacting occupational performance:weakness, decreased strength , decreased balance, decreased activity tolerance, decreased safety awareness, and increased pain. VITALS during session: HR seated EOB 50> standing 68> post mobility 57. Pt to benefit from continued skilled OT tx while in the hospital to address deficits as defined above and maximize level of functional independence w ADL's and functional mobility. Occupational Performance areas to address include: grooming, bathing/shower, toilet hygiene, dressing, health maintenance, functional mobility, and clothing management. From OT standpoint, recommendation at time of d/c would be STR vs RTF pending progress and support available. OT to follow pt on caseload 3-5x/wk. Goals   Patient Goals to feel better and go home. STG Time Frame 3-5   Short Term Goal #1 Pt will improve activity tolerance to G for min 30 min txment sessions for increase engagement in functional tasks   Short Term Goal #2 Pt will complete bed mobility at a Mod I level w/ G balance/safety demonstrated to decrease caregiver assistance required   Short Term Goal  Pt will complete toileting w/ mod I w/ G hygiene/thoroughness using DME as needed   LTG Time Frame 10-14   Long Term Goal #1 Pt will complete UB/LB dressing/self care w/ mod I using adaptive device and DME as needed   Long Term Goal #2 Pt will improve functional mobility during ADL/IADL/leisure tasks to Mod I using DME as needed w/ G balance/safety   Long Term Goal Pt will improve functional transfers to Mod I on/off all surfaces using DME as needed w/ G balance/safety   Plan   Treatment Interventions ADL retraining;Functional transfer training;UE strengthening/ROM; Endurance training;Patient/family training;Equipment evaluation/education; Compensatory technique education; Energy conservation; Activityengagement   Goal Expiration Date 11/20/23   OT Treatment Day 0   OT Frequency 3-5x/wk   Discharge Recommendation   Rehab Resource Intensity Level, OT II (Moderate Resource Intensity)  (STR vs HH pending progress and services available.)   Additional Comments  The patient's raw score on the AM-PAC Daily Activity Inpatient Short Form is 18. A raw score of less than 19 suggests the patient may benefit from discharge to post-acute rehabilitation services. Please refer to the recommendation of the Occupational Therapist for safe discharge planning.    AM-PAC Daily Activity Inpatient   Lower Body Dressing 2   Bathing 3   Toileting 3   Upper Body Dressing 3   Grooming 3   Eating 4   Daily Activity Raw Score 18   Daily Activity Standardized Score (Calc for Raw Score >=11) 38.66   AM-PAC Applied Cognition Inpatient   Following a Speech/Presentation 4   Understanding Ordinary Conversation 3   Taking Medications 2   Remembering Where Things Are Placed or Put Away 2   Remembering List of 4-5 Errands 2   Taking Care of Complicated Tasks 2   Applied Cognition Raw Score 15   Applied Cognition Standardized Score 33.54   Cherelle Vasquez, OT

## 2023-11-06 NOTE — PHYSICAL THERAPY NOTE
PT EVALUATION    Pt. Name: Nyla Lambert  Pt. Age: 80 y.o. MRN: [id number]  LENGTH OF STAY: 3      Admitting Diagnoses:   Small bowel obstruction ([address]) [K56.609]  GI bleed [K92.2]    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    Transient elevated blood pressure     last assessed: 5/16/2017    Weight loss        Past Surgical History:   Procedure Laterality Date    APPENDECTOMY      CATARACT EXTRACTION      CHOLECYSTECTOMY      ESOPHAGOGASTRODUODENOSCOPY  06/2013    diagnostic- neg id have dilatation    EYE SURGERY      HYSTERECTOMY      LAPAROTOMY N/A 11/3/2023    Procedure: LAPAROTOMY EXPLORATORY, ILEOCECECTOMY;  Surgeon: Victoria Taylor MD;  [facility];  Service: General    [place] ESOPHAGOGASTRODUODENOSCOPY TRANSORAL DIAGNOSTIC N/A 9/16/2016    Procedure: ESOPHAGOGASTRODUODENOSCOPY (EGD); Surgeon: Kevin Masters MD;  [facility]; Service: Gastroenterology    REPLACEMENT TOTAL KNEE Left 01/2007       Imaging Studies:  RADIOLOGY RESULTS   Final Result by  (11/06 1000)      CT head wo contrast   Final Result by Macario Joseph MD (11/05 1922)      1. Left posterior parietal scalp soft tissue laceration. 2.  No intracranial hemorrhage or calvarial fracture. Workstation performed: [id number]         CT abdomen pelvis with contrast   ED Interpretation by Yumiko Burch DO (11/03 1155)   IMPRESSION:     1. Closed-loop small bowel obstruction with findings concerning for small bowel ischemia. Emergent surgical consultation is advised. 2.  1 cm pancreatic cyst.        Final Result by Len Raphael MD (11/03 1150)      1. Closed-loop small bowel obstruction with findings concerning for small bowel ischemia. Emergent surgical consultation is advised.    2.  1 cm pancreatic cyst.         I personally discussed this study with [facility] on 11/3/2023 11:52 AM.            Workstation performed: [id number]               11/06/23 1048   PT Last Visit   PT Visit Date 11/06/23   Note Type   Note type Evaluation   Pain Assessment   Pain Score No Pain   Restrictions/Precautions   Weight Bearing Precautions Per Order No   Other Precautions Cognitive; Chair Alarm; Bed Alarm;Multiple lines; Fall Risk   Home Living   Type of Home Assisted living  [facility])   Home Layout One level  (0STE)   Bathroom Shower/Tub Walk-in shower   Bathroom Toilet Raised   Bathroom Equipment Grab bars in shower; Shower chair;Grab bars around toilet   Home Equipment Walker;Cane   Prior Function   Level of [place] Independent with ADLs; Independent with functional mobility; Needs assistance with IADLS  (w/o AD per pt)   Lives With Facility staff   Receives Help From Personal care attendant   Falls in the last 6 months 5 to 10   Vocational Retired   Comments pt unreliable historian hence ?accuracy of information provided by pt   General   Family/Caregiver Present No   Cognition   Overall Cognitive Status Impaired   Arousal/Participation Alert   Attention Attends with cues to redirect   Orientation Level Oriented to person;Oriented to place; Disoriented to time;Disoriented to situation  (oriented to general place, pt stated "hospital")   Following Commands Follows one step commands with increased time or repetition   Comments pleasant & cooperative   Subjective   Subjective Pt agreeable to PT eval.   RUE Assessment   RUE Assessment   (refer to OT)   LUE Assessment   LUE Assessment   (refer to OT)   RLE Assessment   RLE Assessment WFL  (4-/5 grossly)   LLE Assessment   LLE Assessment WFL  (4-/5 grossly)   Bed Mobility   Supine to Sit 4  Minimal assistance   Additional items Assist x 1;HOB elevated; Bedrails; Increased time required;Verbal cues;LE management   Additional Comments cues for techniques & safety   Transfers   Sit to Stand 4  Minimal assistance   Additional items Assist x 1; Increased time required;Verbal cues   Stand to Sit 4  Minimal assistance   Additional items Assist x 1; Increased time required;Verbal cues   Additional Comments cues for techniques & safety   Ambulation/Elevation   Gait pattern Wide LAILA; Decreased foot clearance; Excessively slow;Decreased heel strike   Gait Assistance 4  Minimal assist   Additional items Assist x 1;Verbal cues; Tactile cues   Assistive Device Rolling walker   Distance 70'x1   Ambulation/Elevation Additional Comments unsteady but no gross LOB noted   Balance   Static Sitting Good   Dynamic Sitting Fair +   Static Standing Fair -  (w/ RW)   Dynamic Standing Poor +  (w/ RW)   Ambulatory Poor +  (w/ RW)   Activity Tolerance   Activity Tolerance Patient tolerated treatment well   Nurse Made Aware MICAELA Bryant   Assessment   Prognosis Good   Problem List Decreased strength;Decreased endurance; Impaired balance;Decreased mobility; Decreased cognition; Impaired judgement;Decreased safety awareness   Assessment Pt. 89 y. o.female presented from [facility] w/ hematuria. CT showed closed loop small bowel obstruction with concern for ischemic bowel. Pt admitted for SBO. S/p  LAPAROTOMY EXPLORATORY. ILEOCECECTOMY on 11/3/23 . Pt referred to PT for mobility assessment & D/C planning w/ orders of OOB to chair. Please see above for information re: home set-up & PLOF as well as objective findings during PT assessment. PTA, pt reports being I w/o AD. Pt unreliable historian hence ?accuracy of information that was provided by pt. On eval, pt functioning below baseline hence will continue skilled PT to improve function & safety. Pt require minAx1 for most functional mobility w/ RW + cues for techniques & safety. Gait deviations as above, slow & unsteady w/ dec foot clearance but no gross LOB noted. The patient's AM-PAC Basic Mobility Inpatient Short Form Raw Score is 18.  A Raw score of greater than 16 suggests the patient may benefit from discharge to home. Please also refer to the recommendation of the Physical Therapist for safe discharge planning. From PT standpoint, will anticipate safe return to [facility] when medically cleared. Will recommend level III rehab intensity (HHPT). No SOB & dizziness reported t/o session. Nsg staff most recent vital signs as follows: /65 (BP Location: Left arm)   Pulse (!) 50   Temp 98.9 °F (37.2 °C) (Temporal)   Resp 18   Ht 5' 2" (1.575 m)   Wt 64.1 kg (141 lb 4.8 oz)   LMP  (LMP Unknown)   SpO2 93%   BMI 25.84 kg/m² . At end of session, pt OOB in chair in stable condition, call bell & phone in reach, chair alarm activated, all lines intact. Fall precautions reinforced w/ good understanding. CM to follow. Nsg staff to continue to mobilized pt (OOB in chair for all meals & ambulate in room/unit) as tolerated to prevent further decline in function. Will also recommend Restorative for daily amb &/or daily OOB in chair as appropriate. Nsg notified. Goals   Patient Goals to get better   STG Expiration Date 11/20/23   Short Term Goal #1 Goals to be met in 14 days; pt will be able to: 1) inc strength & balance by 1/2 grade to improve overall functional mobility & dec fall risk; 2) inc bed mobility to modified I for pt to be able to get in/OOB safely w/ proper techniques 100% of the time, to dec caregiver burden & safely function at home; 3) inc transfers to S for pt to transition safely from one surface to another w/o % of the time, to dec caregiver burden & safely function at home; 4) inc amb w/ RW approx. >5' w/ S for pt to ambulate community distances w/o any % of the time, to dec caregiver burden & safely function at home; 5) pt/caregiver ed   PT Treatment Day 0   Plan   Treatment/Interventions Functional transfer training;LE strengthening/ROM; Therapeutic exercise; Endurance training;Patient/family training;Bed mobility;Gait training;Spoke to nursing;OT   PT Frequency 3-5x/wk   Discharge Recommendation Rehab Resource Intensity Level, PT III (Minimum Resource Intensity)  (HHPT)   Equipment Recommended Walker   Walker Package Recommended Wheeled walker   Additional Comments restorative for daily amb   AM-PAC Basic Mobility Inpatient   Turning in Flat Bed Without Bedrails 4   Lying on Back to Sitting on Edge of Flat Bed Without Bedrails 3   Moving Bed to Chair 3   Standing Up From Chair Using Arms 3   Walk in Room 3   Climb 3-5 Stairs With Railing 2   Basic Mobility Inpatient Raw Score 18   Basic Mobility Standardized Score 41.05   Highest Level Of Mobility   JH-HLM Goal 6: Walk 10 steps or more   JH-HLM Achieved 7: Walk 25 feet or more   End of Consult   Patient Position at End of Consult Bedside chair;Bed/Chair alarm activated; All needs within reach   End of Consult Comments Pt in stable condition. All needs in reach. All lines intact. Chair alarm activated.    Hx/personal factors: co-morbidities, advanced age, mutliple lines, telemetry, use of AD, dec cognition, fall risk, and assist w/ ADL's  Examination: dec mobility, dec balance, dec endurance, dec amb, risk for falls, dec cognition  Clinical: unpredictable (ongoing medical status, abnormal lab values, and risk for falls)  Complexity: high    [employer]

## 2023-11-06 NOTE — PLAN OF CARE
Problem: OCCUPATIONAL THERAPY ADULT  Goal: Performs self-care activities at highest level of function for planned discharge setting. See evaluation for individualized goals. Description: Treatment Interventions: ADL retraining, Functional transfer training, UE strengthening/ROM, Endurance training, Patient/family training, Equipment evaluation/education, Compensatory technique education, Energy conservation, Activityengagement          See flowsheet documentation for full assessment, interventions and recommendations. Note: Limitation: Decreased ADL status, Decreased Safe judgement during ADL, Decreased UE strength, Decreased endurance, Decreased self-care trans, Decreased high-level ADLs  Prognosis: Good  Assessment: Eddi Patrick is a 80 y.o. female seen for OT evaluation s/p admit to [facility] on 11/3/2023 w/ Intestinal obstruction ([address] status post exploratory laparotomy with ileocecectomy on 11/3. Course c/b bradycardia. Comorbidities affecting pt's functional performance at time of assessment include:  aortic regurgitation, lupus, esophagitis, heart block this post dual-chamber pacemaker ([employer]tronic), afib on coumadin, appendectomy and cholecystectomy . Pt with active OT orders and activity orders for OOB to chair. Personal factors affecting pt at time of IE include:difficulty performing ADLs, difficulty performing IADLs, difficulty performing transfers/mobility, fall risk , and functional decline . Prior to admission, pt lives alone in a single level [facility]. [facility]. Has a walk in shower with seat and grab bars. Raised toilets with grab bars. Was I with ADLs and mobility with no AD. Owns a cane and RW. A for all IADLS. Supportive family. Does not drives, reports 6 falls in the past 6 months.  Upon evaluation: Pt currently requires Hayden for UB ADLs, Hayden for LB ADLs, Hayden for toileting, Hayden for bed mobility, Haydne for functional transfers, and Hayden with RW for mobility 2* the following deficits impacting occupational performance:weakness, decreased strength , decreased balance, decreased activity tolerance, decreased safety awareness, and increased pain. VITALS during session: HR seated EOB 50> standing 68> post mobility 57. Pt to benefit from continued skilled OT tx while in the hospital to address deficits as defined above and maximize level of functional independence w ADL's and functional mobility. Occupational Performance areas to address include: grooming, bathing/shower, toilet hygiene, dressing, health maintenance, functional mobility, and clothing management. From OT standpoint, recommendation at time of d/c would be STR vs RTF pending progress and support available. OT to follow pt on caseload 3-5x/wk.      Rehab Resource Intensity Level, OT: II (Moderate Resource Intensity) (STR vs HH pending progress and services available.)

## 2023-11-06 NOTE — PLAN OF CARE
Problem: PHYSICAL THERAPY ADULT  Goal: Performs mobility at highest level of function for planned discharge setting. See evaluation for individualized goals. Description: Treatment/Interventions: Functional transfer training, LE strengthening/ROM, Therapeutic exercise, Endurance training, Patient/family training, Bed mobility, Gait training, Spoke to nursing, OT  Equipment Recommended: Deshawn Shannon       See flowsheet documentation for full assessment, interventions and recommendations. Note: Prognosis: Good  Problem List: Decreased strength, Decreased endurance, Impaired balance, Decreased mobility, Decreased cognition, Impaired judgement, Decreased safety awareness  Assessment: Pt. 80 y. o.female presented from [facility] ED w/ hematuria. CT showed closed loop small bowel obstruction with concern for ischemic bowel. Pt admitted for SBO. S/p  LAPAROTOMY EXPLORATORY. ILEOCECECTOMY on 11/3/23 . Pt referred to PT for mobility assessment & D/C planning w/ orders of OOB to chair. Please see above for information re: home set-up & PLOF as well as objective findings during PT assessment. PTA, pt reports being I w/o AD. Pt unreliable historian hence ?accuracy of information that was provided by pt. On eval, pt functioning below baseline hence will continue skilled PT to improve function & safety. Pt require minAx1 for most functional mobility w/ RW + cues for techniques & safety. Gait deviations as above, slow & unsteady w/ dec foot clearance but no gross LOB noted. The patient's AM-PAC Basic Mobility Inpatient Short Form Raw Score is 18. A Raw score of greater than 16 suggests the patient may benefit from discharge to home. Please also refer to the recommendation of the Physical Therapist for safe discharge planning. From PT standpoint, will anticipate safe return to [facility] when medically cleared. Will recommend level III rehab intensity (HHPT). No SOB & dizziness reported t/o session.  [facility] staff most recent vital signs as follows: /65 (BP Location: Left arm)   Pulse (!) 50   Temp 98.9 °F (37.2 °C) (Temporal)   Resp 18   Ht 5' 2" (1.575 m)   Wt 64.1 kg (141 lb 4.8 oz)   LMP  (LMP Unknown)   SpO2 93%   BMI 25.84 kg/m² . At end of session, pt OOB in chair in stable condition, call bell & phone in reach, chair alarm activated, all lines intact. Fall precautions reinforced w/ good understanding. CM to follow. Nsg staff to continue to mobilized pt (OOB in chair for all meals & ambulate in room/unit) as tolerated to prevent further decline in function. Will also recommend Restorative for daily amb &/or daily OOB in chair as appropriate. Nsg notified. Rehab Resource Intensity Level, PT: III (Minimum Resource Intensity) (return to [facility] w/ HHPT)    See flowsheet documentation for full assessment.

## 2023-11-06 NOTE — PLAN OF CARE
Problem: Potential for Falls  Goal: Patient will remain free of falls  Description: INTERVENTIONS:  - Educate patient/family on patient safety including physical limitations  - Instruct patient to call for assistance with activity   - Consult OT/PT to assist with strengthening/mobility   - Keep Call bell within reach  - Keep bed low and locked with side rails adjusted as appropriate  - Keep care items and personal belongings within reach  - Initiate and maintain comfort rounds  - Make Fall Risk Sign visible to staff  - Offer Toileting every 2 Hours, in advance of need  - Initiate/Maintain bed alarm  - Obtain necessary fall risk management equipment  - Apply yellow socks and bracelet for high fall risk patients  - Consider moving patient to room near nurses station  Outcome: Progressing     Problem: MOBILITY - ADULT  Goal: Maintain or return to baseline ADL function  Description: INTERVENTIONS:  -  Assess patient's ability to carry out ADLs; assess patient's baseline for ADL function and identify physical deficits which impact ability to perform ADLs (bathing, care of mouth/teeth, toileting, grooming, dressing, etc.)  - Assess/evaluate cause of self-care deficits   - Assess range of motion  - Assess patient's mobility; develop plan if impaired  - Assess patient's need for assistive devices and provide as appropriate  - Encourage maximum independence but intervene and supervise when necessary  - Involve family in performance of ADLs  - Assess for home care needs following discharge   - Consider OT consult to assist with ADL evaluation and planning for discharge  - Provide patient education as appropriate  Outcome: Progressing  Goal: Maintains/Returns to pre admission functional level  Description: INTERVENTIONS:  - Perform BMAT or MOVE assessment daily.   - Set and communicate daily mobility goal to care team and patient/family/caregiver.    - Collaborate with rehabilitation services on mobility goals if consulted  - Perform Range of Motion 3 times a day. - Reposition patient every 2 hours.   - Dangle patient 3 times a day  - Stand patient 3 times a day  - Ambulate patient 3 times a day  - Out of bed to chair 3 times a day   - Out of bed for meals 3 times a day  - Out of bed for toileting  - Record patient progress and toleration of activity level   Outcome: Progressing     Problem: PAIN - ADULT  Goal: Verbalizes/displays adequate comfort level or baseline comfort level  Description: Interventions:  - Encourage patient to monitor pain and request assistance  - Assess pain using appropriate pain scale  - Administer analgesics based on type and severity of pain and evaluate response  - Implement non-pharmacological measures as appropriate and evaluate response  - Consider cultural and social influences on pain and pain management  - Notify physician/advanced practitioner if interventions unsuccessful or patient reports new pain  Outcome: Progressing     Problem: INFECTION - ADULT  Goal: Absence or prevention of progression during hospitalization  Description: INTERVENTIONS:  - Assess and monitor for signs and symptoms of infection  - Monitor lab/diagnostic results  - Monitor all insertion sites, i.e. indwelling lines, tubes, and drains  - Monitor endotracheal if appropriate and nasal secretions for changes in amount and color  - [place] appropriate cooling/warming therapies per order  - Administer medications as ordered  - Instruct and encourage patient and family to use good hand hygiene technique  - Identify and instruct in appropriate isolation precautions for identified infection/condition  Outcome: Progressing  Goal: Absence of fever/infection during neutropenic period  Description: INTERVENTIONS:  - Monitor WBC    Outcome: Progressing     Problem: SAFETY ADULT  Goal: Patient will remain free of falls  Description: INTERVENTIONS:  - Educate patient/family on patient safety including physical limitations  - Instruct patient to call for assistance with activity   - Consult OT/PT to assist with strengthening/mobility   - Keep Call bell within reach  - Keep bed low and locked with side rails adjusted as appropriate  - Keep care items and personal belongings within reach  - Initiate and maintain comfort rounds  - Make Fall Risk Sign visible to staff  - Offer Toileting every 2 Hours, in advance of need  - Initiate/Maintain bed alarm  - Obtain necessary fall risk management equipment  - Apply yellow socks and bracelet for high fall risk patients  - Consider moving patient to room near nurses station  Outcome: Progressing  Goal: Maintain or return to baseline ADL function  Description: INTERVENTIONS:  -  Assess patient's ability to carry out ADLs; assess patient's baseline for ADL function and identify physical deficits which impact ability to perform ADLs (bathing, care of mouth/teeth, toileting, grooming, dressing, etc.)  - Assess/evaluate cause of self-care deficits   - Assess range of motion  - Assess patient's mobility; develop plan if impaired  - Assess patient's need for assistive devices and provide as appropriate  - Encourage maximum independence but intervene and supervise when necessary  - Involve family in performance of ADLs  - Assess for home care needs following discharge   - Consider OT consult to assist with ADL evaluation and planning for discharge  - Provide patient education as appropriate  Outcome: Progressing  Goal: Maintains/Returns to pre admission functional level  Description: INTERVENTIONS:  - Perform BMAT or MOVE assessment daily.   - Set and communicate daily mobility goal to care team and patient/family/caregiver. - Collaborate with rehabilitation services on mobility goals if consulted  - Perform Range of Motion 3 times a day. - Reposition patient every 2 hours.   - Dangle patient 3 times a day  - Stand patient 3 times a day  - Ambulate patient 3 times a day  - Out of bed to chair 3 times a day   - Out of bed for meals 3 times a day  - Out of bed for toileting  - Record patient progress and toleration of activity level   Outcome: Progressing     Problem: DISCHARGE PLANNING  Goal: Discharge to home or other facility with appropriate resources  Description: INTERVENTIONS:  - Identify barriers to discharge w/patient and caregiver  - Arrange for needed discharge resources and transportation as appropriate  - Identify discharge learning needs (meds, wound care, etc.)  - Arrange for interpretive services to assist at discharge as needed  - Refer to Case Management Department for coordinating discharge planning if the patient needs post-hospital services based on physician/advanced practitioner order or complex needs related to functional status, cognitive ability, or social support system  Outcome: Progressing     Problem: Knowledge Deficit  Goal: Patient/family/caregiver demonstrates understanding of disease process, treatment plan, medications, and discharge instructions  Description: Complete learning assessment and assess knowledge base.   Interventions:  - Provide teaching at level of understanding  - Provide teaching via preferred learning methods  Outcome: Progressing     Problem: Prexisting or High Potential for Compromised Skin Integrity  Goal: Skin integrity is maintained or improved  Description: INTERVENTIONS:  - Identify patients at risk for skin breakdown  - Assess and monitor skin integrity  - Assess and monitor nutrition and hydration status  - Monitor labs   - Assess for incontinence   - Turn and reposition patient  - Assist with mobility/ambulation  - Relieve pressure over bony prominences  - Avoid friction and shearing  - Provide appropriate hygiene as needed including keeping skin clean and dry  - Evaluate need for skin moisturizer/barrier cream  - Collaborate with interdisciplinary team   - Patient/family teaching  - Consider wound care consult   Outcome: Progressing

## 2023-11-07 LAB
ANION GAP SERPL CALCULATED.3IONS-SCNC: 7 MMOL/L
BASOPHILS # BLD AUTO: 0.02 THOUSANDS/ÂΜL (ref 0–0.1)
BASOPHILS NFR BLD AUTO: 0 % (ref 0–1)
BUN SERPL-MCNC: 8 MG/DL (ref 5–25)
CALCIUM SERPL-MCNC: 8.2 MG/DL (ref 8.4–10.2)
CHLORIDE SERPL-SCNC: 109 MMOL/L (ref 96–108)
CO2 SERPL-SCNC: 23 MMOL/L (ref 21–32)
CREAT SERPL-MCNC: 0.58 MG/DL (ref 0.6–1.3)
EOSINOPHIL # BLD AUTO: 0.12 THOUSAND/ÂΜL (ref 0–0.61)
EOSINOPHIL NFR BLD AUTO: 2 % (ref 0–6)
ERYTHROCYTE [DISTWIDTH] IN BLOOD BY AUTOMATED COUNT: 13.9 % (ref 11.6–15.1)
FLUAV RNA RESP QL NAA+PROBE: NEGATIVE
FLUBV RNA RESP QL NAA+PROBE: NEGATIVE
GFR SERPL CREATININE-BSD FRML MDRD: 80 ML/MIN/1.73SQ M
GLUCOSE SERPL-MCNC: 106 MG/DL (ref 65–140)
HCT VFR BLD AUTO: 33.9 % (ref 34.8–46.1)
HGB BLD-MCNC: 10.1 G/DL (ref 11.5–15.4)
IMM GRANULOCYTES # BLD AUTO: 0.04 THOUSAND/UL (ref 0–0.2)
IMM GRANULOCYTES NFR BLD AUTO: 1 % (ref 0–2)
LYMPHOCYTES # BLD AUTO: 0.91 THOUSANDS/ÂΜL (ref 0.6–4.47)
LYMPHOCYTES NFR BLD AUTO: 12 % (ref 14–44)
MCH RBC QN AUTO: 29.4 PG (ref 26.8–34.3)
MCHC RBC AUTO-ENTMCNC: 29.8 G/DL (ref 31.4–37.4)
MCV RBC AUTO: 99 FL (ref 82–98)
MONOCYTES # BLD AUTO: 0.79 THOUSAND/ÂΜL (ref 0.17–1.22)
MONOCYTES NFR BLD AUTO: 11 % (ref 4–12)
NEUTROPHILS # BLD AUTO: 5.57 THOUSANDS/ÂΜL (ref 1.85–7.62)
NEUTS SEG NFR BLD AUTO: 74 % (ref 43–75)
NRBC BLD AUTO-RTO: 0 /100 WBCS
PHOSPHATE SERPL-MCNC: 2.6 MG/DL (ref 2.3–4.1)
PLATELET # BLD AUTO: 185 THOUSANDS/UL (ref 149–390)
PMV BLD AUTO: 9.5 FL (ref 8.9–12.7)
POTASSIUM SERPL-SCNC: 3.7 MMOL/L (ref 3.5–5.3)
RBC # BLD AUTO: 3.43 MILLION/UL (ref 3.81–5.12)
RSV RNA RESP QL NAA+PROBE: NEGATIVE
SARS-COV-2 RNA RESP QL NAA+PROBE: NEGATIVE
SODIUM SERPL-SCNC: 139 MMOL/L (ref 135–147)
WBC # BLD AUTO: 7.45 THOUSAND/UL (ref 4.31–10.16)

## 2023-11-07 PROCEDURE — 0241U HB NFCT DS VIR RESP RNA 4 TRGT: CPT

## 2023-11-07 PROCEDURE — 84100 ASSAY OF PHOSPHORUS: CPT | Performed by: SURGERY

## 2023-11-07 PROCEDURE — 80048 BASIC METABOLIC PNL TOTAL CA: CPT | Performed by: SURGERY

## 2023-11-07 PROCEDURE — 85025 COMPLETE CBC W/AUTO DIFF WBC: CPT | Performed by: SURGERY

## 2023-11-07 PROCEDURE — 99024 POSTOP FOLLOW-UP VISIT: CPT | Performed by: SURGERY

## 2023-11-07 RX ORDER — HYDROMORPHONE HCL IN WATER/PF 6 MG/30 ML
0.2 PATIENT CONTROLLED ANALGESIA SYRINGE INTRAVENOUS EVERY 4 HOURS PRN
Status: DISCONTINUED | OUTPATIENT
Start: 2023-11-07 | End: 2023-11-07

## 2023-11-07 RX ORDER — POTASSIUM CHLORIDE 20 MEQ/1
40 TABLET, EXTENDED RELEASE ORAL ONCE
Status: COMPLETED | OUTPATIENT
Start: 2023-11-07 | End: 2023-11-07

## 2023-11-07 RX ORDER — AMLODIPINE BESYLATE 5 MG/1
5 TABLET ORAL EVERY MORNING
Status: DISCONTINUED | OUTPATIENT
Start: 2023-11-07 | End: 2023-11-08 | Stop reason: HOSPADM

## 2023-11-07 RX ORDER — OXYCODONE HYDROCHLORIDE 5 MG/1
5 TABLET ORAL EVERY 4 HOURS PRN
Status: DISCONTINUED | OUTPATIENT
Start: 2023-11-07 | End: 2023-11-07

## 2023-11-07 RX ADMIN — HEPARIN SODIUM 5000 UNITS: 5000 INJECTION INTRAVENOUS; SUBCUTANEOUS at 22:34

## 2023-11-07 RX ADMIN — POTASSIUM & SODIUM PHOSPHATES POWDER PACK 280-160-250 MG 2 PACKET: 280-160-250 PACK at 16:43

## 2023-11-07 RX ADMIN — POTASSIUM & SODIUM PHOSPHATES POWDER PACK 280-160-250 MG 2 PACKET: 280-160-250 PACK at 09:45

## 2023-11-07 RX ADMIN — AMLODIPINE BESYLATE 5 MG: 5 TABLET ORAL at 09:45

## 2023-11-07 RX ADMIN — HEPARIN SODIUM 5000 UNITS: 5000 INJECTION INTRAVENOUS; SUBCUTANEOUS at 05:07

## 2023-11-07 RX ADMIN — POTASSIUM CHLORIDE 40 MEQ: 1500 TABLET, EXTENDED RELEASE ORAL at 09:45

## 2023-11-07 RX ADMIN — HEPARIN SODIUM 5000 UNITS: 5000 INJECTION INTRAVENOUS; SUBCUTANEOUS at 13:57

## 2023-11-07 NOTE — DISCHARGE SUMMARY
Discharge Summary - Nyla Lambert 80 y.o. female MRN: [id number]    Unit/Bed#: E4 -01 Encounter: [id number]    Admission Date:   Admission Orders (From admission, onward)       Ordered        11/03/23 1702  Inpatient Admission  Once                            Admitting Diagnosis: Small bowel obstruction ([address]) [K56.609]  GI bleed [K92.2]    HPI: Nyla Lambert is a 80 y.o. female with afib/flutter on coumadin, heart block s/p PPM implant 2021, discoid lupus, dysphagia who presents today from [facility] with hematuria noted this morning after urinating. She is a fair historian due to cognitive decline, her daughter Ngozi Sanchez is present to assist in providing history. She was recently seen in the ED earlier this week on Tuesday 10/31 for hematuria and has been holding her coumadin since then. She restarted coumadin yesterday evening and noted blood in the toilet bowl along with brown this morning. She does not usually have fecal incontinence but does have chronic constipation which she takes miralax, metamucil and a stool softener for. On CT scan in the ED a closed loop small bowel obstruction was noted with concern for ischemic bowel. She denies abdominal pain at this time and feels in her usual state of health. She is unsure if she is passing flatus or when her last bowel movement was. She has hiccupping and belching along with nausea but also reports this is not abnormal for her and she gets this "occasionally". Abdominal surgical history pertinent for hysterectomy, cholecystectomy, appendectomy. Procedures Performed:   Orders Placed This Encounter   Procedures    Critical Care       Summary of Hospital Course: Patient was brought emergently to the operating room on 11/3 for closed-loop small bowel obstruction. She underwent exploratory laparotomy with ileocecectomy.   Intraoperative findings included a dense band of scar tissue causing internal hernia and closed-loop obstruction of the distal ileum. 20 cm of distal ileum were noted to have significant hemorrhagic changes and considered nonviable. Postoperatively patient was left with an NG tube and Farris in place and was admitted to the ICU. NG tube and Farris were removed on postop day 2 and diet was slowly advanced. Patient regained bowel function on postop day 3. Patient had some issues with hospital delirium requiring 1 dose of Seroquel. On hospital day 5, patient was tolerating a soft diet, having bowel function, pain was controlled on oral medications and she was evaluated by PT/OT who recommended return to her assisted living facility with home health PT. At this point she was deemed appropriate for discharge and arrangements were made. She was instructed to follow-up with the surgical team in 2 weeks, her cardiologist as soon as possible to discuss the risk versus benefits of continuing or discontinuing her Coumadin, and her PCP to discuss her incidental finding of 1 cm pancreatic cyst.    Significant Findings, Care, Treatment and Services Provided:   11/3 exploratory laparotomy, ileocecectomy    Complications: None    Discharge Diagnosis: Closed loop SBO    Medical Problems       Resolved Problems  Date Reviewed: 11/3/2023   None         Condition at Discharge: fair         Discharge instructions/Information to patient and family:   See after visit summary for information provided to patient and family. Provisions for Follow-Up Care:  See after visit summary for information related to follow-up care and any pertinent home health orders. PCP: No primary care provider on file. Disposition: Assisted living/personal care at [facility] Readmission: No      Discharge Statement   I spent 30 minutes discharging the patient. This time was spent on the day of discharge. I had direct contact with the patient on the day of discharge.  Additional documentation is required if more than 30 minutes were spent on discharge. Discharge Medications:  See after visit summary for reconciled discharge medications provided to patient and family.

## 2023-11-07 NOTE — INCIDENTAL FINDINGS
The following findings require follow up:  Radiographic finding   Findin cm pancreatic cyst    Follow up required: Please follow up with your PCP   Follow up should be done within 4week(s)    Please notify the following clinician to assist with the follow up:   Dr. Andie Peter, In-house PCP at [facility]

## 2023-11-07 NOTE — PLAN OF CARE
Problem: Potential for Falls  Goal: Patient will remain free of falls  Description: INTERVENTIONS:  - Educate patient/family on patient safety including physical limitations  - Instruct patient to call for assistance with activity   - Consult OT/PT to assist with strengthening/mobility   - Keep Call bell within reach  - Keep bed low and locked with side rails adjusted as appropriate  - Keep care items and personal belongings within reach  - Initiate and maintain comfort rounds  - Make Fall Risk Sign visible to staff  - Offer Toileting every 2 Hours, in advance of need  - Initiate/Maintain bed/chair alarm  - Obtain necessary fall risk management equipment: alarms  - Apply yellow socks and bracelet for high fall risk patients  - Consider moving patient to room near nurses station  Outcome: Progressing     Problem: MOBILITY - ADULT  Goal: Maintain or return to baseline ADL function  Description: INTERVENTIONS:  -  Assess patient's ability to carry out ADLs; assess patient's baseline for ADL function and identify physical deficits which impact ability to perform ADLs (bathing, care of mouth/teeth, toileting, grooming, dressing, etc.)  - Assess/evaluate cause of self-care deficits   - Assess range of motion  - Assess patient's mobility; develop plan if impaired  - Assess patient's need for assistive devices and provide as appropriate  - Encourage maximum independence but intervene and supervise when necessary  - Involve family in performance of ADLs  - Assess for home care needs following discharge   - Consider OT consult to assist with ADL evaluation and planning for discharge  - Provide patient education as appropriate  Outcome: Progressing  Goal: Maintains/Returns to pre admission functional level  Description: INTERVENTIONS:  - Perform BMAT or MOVE assessment daily.   - Set and communicate daily mobility goal to care team and patient/family/caregiver.    - Collaborate with rehabilitation services on mobility goals Provider Procedure Text (D): After obtaining clear surgical margins the defect was repaired by another provider. Interpolation Flap Text: A decision was made to reconstruct the defect utilizing an interpolation axial flap and a staged reconstruction.  A telfa template was made of the defect.  This telfa template was then used to outline the interpolation flap.  The donor area for the pedicle flap was then injected with anesthesia.  The flap was excised through the skin and subcutaneous tissue down to the layer of the underlying musculature.  The interpolation flap was carefully excised within this deep plane to maintain its blood supply.  The edges of the donor site were undermined.   The donor site was closed in a primary fashion.  The pedicle was then rotated into position and sutured.  Once the tube was sutured into place, adequate blood supply was confirmed with blanching and refill.  The pedicle was then wrapped with xeroform gauze and dressed appropriately with a telfa and gauze bandage to ensure continued blood supply and protect the attached pedicle. if consulted  - Perform Range of Motion 2 times a day. - Reposition patient every 2 hours.   - Dangle patient 3 times a day  - Stand patient 3 times a day  - Ambulate patient 3 times a day  - Out of bed to chair 3 times a day   - Out of bed for meals 3 times a day  - Out of bed for toileting  - Record patient progress and toleration of activity level   Outcome: Progressing     Problem: PAIN - ADULT  Goal: Verbalizes/displays adequate comfort level or baseline comfort level  Description: Interventions:  - Encourage patient to monitor pain and request assistance  - Assess pain using appropriate pain scale  - Administer analgesics based on type and severity of pain and evaluate response  - Implement non-pharmacological measures as appropriate and evaluate response  - Consider cultural and social influences on pain and pain management  - Notify physician/advanced practitioner if interventions unsuccessful or patient reports new pain  Outcome: Progressing     Problem: INFECTION - ADULT  Goal: Absence or prevention of progression during hospitalization  Description: INTERVENTIONS:  - Assess and monitor for signs and symptoms of infection  - Monitor lab/diagnostic results  - Monitor all insertion sites, i.e. indwelling lines, tubes, and drains  - Monitor endotracheal if appropriate and nasal secretions for changes in amount and color  - [place] appropriate cooling/warming therapies per order  - Administer medications as ordered  - Instruct and encourage patient and family to use good hand hygiene technique  - Identify and instruct in appropriate isolation precautions for identified infection/condition  Outcome: Progressing  Goal: Absence of fever/infection during neutropenic period  Description: INTERVENTIONS:  - Monitor WBC    Outcome: Progressing     Problem: SAFETY ADULT  Goal: Patient will remain free of falls  Description: INTERVENTIONS:  - Educate patient/family on patient safety including physical limitations  - Instruct patient to call for assistance with activity   - Consult OT/PT to assist with strengthening/mobility   - Keep Call bell within reach  - Keep bed low and locked with side rails adjusted as appropriate  - Keep care items and personal belongings within reach  - Initiate and maintain comfort rounds  - Make Fall Risk Sign visible to staff  - Offer Toileting every 2 Hours, in advance of need  - Initiate/Maintain bed/ chair alarm  - Obtain necessary fall risk management equipment: alarms  - Apply yellow socks and bracelet for high fall risk patients  - Consider moving patient to room near nurses station  Outcome: Progressing  Goal: Maintain or return to baseline ADL function  Description: INTERVENTIONS:  -  Assess patient's ability to carry out ADLs; assess patient's baseline for ADL function and identify physical deficits which impact ability to perform ADLs (bathing, care of mouth/teeth, toileting, grooming, dressing, etc.)  - Assess/evaluate cause of self-care deficits   - Assess range of motion  - Assess patient's mobility; develop plan if impaired  - Assess patient's need for assistive devices and provide as appropriate  - Encourage maximum independence but intervene and supervise when necessary  - Involve family in performance of ADLs  - Assess for home care needs following discharge   - Consider OT consult to assist with ADL evaluation and planning for discharge  - Provide patient education as appropriate  Outcome: Progressing  Goal: Maintains/Returns to pre admission functional level  Description: INTERVENTIONS:  - Perform BMAT or MOVE assessment daily.   - Set and communicate daily mobility goal to care team and patient/family/caregiver.      Outcome: Progressing     Problem: DISCHARGE PLANNING  Goal: Discharge to home or other facility with appropriate resources  Description: INTERVENTIONS:  - Identify barriers to discharge w/patient and caregiver  - Arrange for needed discharge resources and Otolaryngologist Procedure Text (A): After obtaining clear surgical margins the patient was sent to otolaryngology for surgical repair.  The patient understands they will receive post-surgical care and follow-up from the referring physician's office. Ear Star Wedge Flap Text: The defect edges were debeveled with a #15 blade scalpel.  Given the location of the defect and the proximity to free margins (helical rim) an ear star wedge flap was deemed most appropriate.  Using a sterile surgical marker, the appropriate flap was drawn incorporating the defect and placing the expected incisions between the helical rim and antihelix where possible.  The area thus outlined was incised through and through with a #15 scalpel blade. transportation as appropriate  - Identify discharge learning needs (meds, wound care, etc.)  - Arrange for interpretive services to assist at discharge as needed  - Refer to Case Management Department for coordinating discharge planning if the patient needs post-hospital services based on physician/advanced practitioner order or complex needs related to functional status, cognitive ability, or social support system  Outcome: Progressing     Problem: Knowledge Deficit  Goal: Patient/family/caregiver demonstrates understanding of disease process, treatment plan, medications, and discharge instructions  Description: Complete learning assessment and assess knowledge base. Interventions:  - Provide teaching at level of understanding  - Provide teaching via preferred learning methods  Outcome: Progressing     Problem: Prexisting or High Potential for Compromised Skin Integrity  Goal: Skin integrity is maintained or improved  Description: INTERVENTIONS:  - Identify patients at risk for skin breakdown  - Assess and monitor skin integrity  - Assess and monitor nutrition and hydration status  - Monitor labs   - Assess for incontinence   - Turn and reposition patient  - Assist with mobility/ambulation  - Relieve pressure over bony prominences  - Avoid friction and shearing  - Provide appropriate hygiene as needed including keeping skin clean and dry  - Evaluate need for skin moisturizer/barrier cream  - Collaborate with interdisciplinary team   - Patient/family teaching  - Consider wound care consult   Outcome: Progressing     Problem: Nutrition/Hydration-ADULT  Goal: Nutrient/Hydration intake appropriate for improving, restoring or maintaining nutritional needs  Description: Monitor and assess patient's nutrition/hydration status for malnutrition. Collaborate with interdisciplinary team and initiate plan and interventions as ordered. Monitor patient's weight and dietary intake as ordered or per policy.  Utilize nutrition O-Z Flap Text: The defect edges were debeveled with a #15 scalpel blade.  Given the location of the defect, shape of the defect and the proximity to free margins an O-Z flap was deemed most appropriate.  Using a sterile surgical marker, an appropriate transposition flap was drawn incorporating the defect and placing the expected incisions within the relaxed skin tension lines where possible. The area thus outlined was incised deep to adipose tissue with a #15 scalpel blade.  The skin margins were undermined to an appropriate distance in all directions utilizing iris scissors. screening tool and intervene as necessary. Determine patient's food preferences and provide high-protein, high-caloric foods as appropriate.      INTERVENTIONS:  - Monitor oral intake, urinary output, labs, and treatment plans  - Assess nutrition and hydration status and recommend course of action  - Evaluate amount of meals eaten  - Assist patient with eating if necessary   - Allow adequate time for meals  - Recommend/ encourage appropriate diets, oral nutritional supplements, and vitamin/mineral supplements  - Order, calculate, and assess calorie counts as needed  - Recommend, monitor, and adjust tube feedings and TPN/PPN based on assessed needs  - Assess need for intravenous fluids  - Provide specific nutrition/hydration education as appropriate  - Include patient/family/caregiver in decisions related to nutrition  Outcome: Progressing Anesthesia Volume In Cc: 3 Special Stains Stage 8 - Results: Base On Clearance Noted Above Partial Purse String (Intermediate) Text: Given the location of the defect and the characteristics of the surrounding skin an intermediate purse string closure was deemed most appropriate.  Undermining was performed circumfirentially around the surgical defect.  A purse string suture was then placed and tightened. Wound tension only allowed a partial closure of the circular defect. Length To Time In Minutes Device Was In Place: 10 V-Y Plasty Text: The defect edges were debeveled with a #15 scalpel blade.  Given the location of the defect, shape of the defect and the proximity to free margins an V-Y advancement flap was deemed most appropriate.  Using a sterile surgical marker, an appropriate advancement flap was drawn incorporating the defect and placing the expected incisions within the relaxed skin tension lines where possible.    The area thus outlined was incised deep to adipose tissue with a #15 scalpel blade.  The skin margins were undermined to an appropriate distance in all directions utilizing iris scissors. Show Biopsy Photo Reviewed Variable: Yes Debridement Text: The wound edges were debrided prior to proceeding with the closure to facilitate wound healing. Oculoplastic Surgeon Procedure Text (F): After obtaining clear surgical margins the patient was sent to oculoplastics for surgical repair.  The patient understands they will receive post-surgical care and follow-up from the referring physician's office. Modified Advancement Flap Text: The defect edges were debeveled with a #15 scalpel blade.  Given the location of the defect, shape of the defect and the proximity to free margins a modified advancement flap was deemed most appropriate.  Using a sterile surgical marker, an appropriate advancement flap was drawn incorporating the defect and placing the expected incisions within the relaxed skin tension lines where possible.    The area thus outlined was incised deep to adipose tissue with a #15 scalpel blade.  The skin margins were undermined to an appropriate distance in all directions utilizing iris scissors. Graft Donor Site Dermal Sutures (Optional): 4-0 Vicryl Stage 7: Number Of Blocks?: 0 Stage 14: Additional Anesthesia Type: 1% lidocaine with epinephrine Asc Procedure Text (C): After obtaining clear surgical margins the patient was sent to an ASC for surgical repair.  The patient understands they will receive post-surgical care and follow-up from the ASC physician. Pain Refusal Text: I offered to prescribe pain medication but the patient refused to take this medication. Suturegard Body: The suture ends were repeatedly re-tightened and re-clamped to achieve the desired tissue expansion. O-T Advancement Flap Text: The defect edges were debeveled with a #15 scalpel blade.  Given the location of the defect, shape of the defect and the proximity to free margins an O-T advancement flap was deemed most appropriate.  Using a sterile surgical marker, an appropriate advancement flap was drawn incorporating the defect and placing the expected incisions within the relaxed skin tension lines where possible.    The area thus outlined was incised deep to adipose tissue with a #15 scalpel blade.  The skin margins were undermined to an appropriate distance in all directions utilizing iris scissors. Unna Boot Text: An Unna boot was placed to help immobilize the limb and facilitate more rapid healing. Consent Type: Consent 1 (Standard) Mid-Level Procedure Text (E): After obtaining clear surgical margins the patient was sent to a mid-level provider for surgical repair.  The patient understands they will receive post-surgical care and follow-up from the mid-level provider. Referred To Plastics For Closure Text (Leave Blank If You Do Not Want): After obtaining clear surgical margins the patient was sent to plastics for surgical repair.  The patient understands they will receive post-surgical care and follow-up from the referring physician's office. Mart-1 - Negative Histology Text: MART-1 staining demonstrates a normal density and pattern of melanocytes along the dermal-epidermal junction. The surgical margins are negative for tumor cells. Cheek Interpolation Flap Text: A decision was made to reconstruct the defect utilizing an interpolation axial flap and a staged reconstruction.  A telfa template was made of the defect.  This telfa template was then used to outline the Cheek Interpolation flap.  The donor area for the pedicle flap was then injected with anesthesia.  The flap was excised through the skin and subcutaneous tissue down to the layer of the underlying musculature.  The interpolation flap was carefully excised within this deep plane to maintain its blood supply.  The edges of the donor site were undermined.   The donor site was closed in a primary fashion.  The pedicle was then rotated into position and sutured.  Once the tube was sutured into place, adequate blood supply was confirmed with blanching and refill.  The pedicle was then wrapped with xeroform gauze and dressed appropriately with a telfa and gauze bandage to ensure continued blood supply and protect the attached pedicle. Did The Patient Refuse Pain Medications?: No Eye Protection Verbiage: Before proceeding with the stage, a plastic scleral shield was inserted. The globe was anesthetized with a few drops of 1% lidocaine with 1:100,000 epinephrine. Then, an appropriate sized scleral shield was chosen and coated with lacrilube ointment. The shield was gently inserted and left in place for the duration of each stage. After the stage was completed, the shield was gently removed. Dressing: pressure dressing Spiral Flap Text: The defect edges were debeveled with a #15 scalpel blade.  Given the location of the defect, shape of the defect and the proximity to free margins a spiral flap was deemed most appropriate.  Using a sterile surgical marker, an appropriate rotation flap was drawn incorporating the defect and placing the expected incisions within the relaxed skin tension lines where possible. The area thus outlined was incised deep to adipose tissue with a #15 scalpel blade.  The skin margins were undermined to an appropriate distance in all directions utilizing iris scissors. Bilobed Flap Text: The defect edges were debeveled with a #15 scalpel blade.  Given the location of the defect and the proximity to free margins a bilobe flap was deemed most appropriate.  Using a sterile surgical marker, an appropriate bilobe flap drawn around the defect.    The area thus outlined was incised deep to adipose tissue with a #15 scalpel blade.  The skin margins were undermined to an appropriate distance in all directions utilizing iris scissors. Closure 3 Information: This tab is for additional flaps and grafts above and beyond our usual structured repairs.  Please note if you enter information here it will not currently bill and you will need to add the billing information manually. Alar Island Pedicle Flap Text: The defect edges were debeveled with a #15 scalpel blade.  Given the location of the defect, shape of the defect and the proximity to the alar rim an island pedicle advancement flap was deemed most appropriate.  Using a sterile surgical marker, an appropriate advancement flap was drawn incorporating the defect, outlining the appropriate donor tissue and placing the expected incisions within the nasal ala running parallel to the alar rim. The area thus outlined was incised with a #15 scalpel blade.  The skin margins were undermined minimally to an appropriate distance in all directions around the primary defect and laterally outward around the island pedicle utilizing iris scissors.  There was minimal undermining beneath the pedicle flap. Xenograft Text: The defect edges were debeveled with a #15 scalpel blade.  Given the location of the defect, shape of the defect and the proximity to free margins a xenograft was deemed most appropriate.  The graft was then trimmed to fit the size of the defect.  The graft was then placed in the primary defect and oriented appropriately. Graft Donor Site Epidermal Sutures (Optional): 5-0 Ethilon Area M Indication Text: Tumors in this location are included in Area M (cheek, forehead, scalp, neck, jawline and pretibial skin).  Mohs surgery is indicated for tumors in these anatomic locations. Burow's Advancement Flap Text: The defect edges were debeveled with a #15 scalpel blade.  Given the location of the defect and the proximity to free margins a Burow's advancement flap was deemed most appropriate.  Using a sterile surgical marker, the appropriate advancement flap was drawn incorporating the defect and placing the expected incisions within the relaxed skin tension lines where possible.    The area thus outlined was incised deep to adipose tissue with a #15 scalpel blade.  The skin margins were undermined to an appropriate distance in all directions utilizing iris scissors. Consent (Temporal Branch)/Introductory Paragraph: The rationale for Mohs was explained to the patient and consent was obtained. The risks, benefits and alternatives to therapy were discussed in detail. Specifically, the risks of damage to the temporal branch of the facial nerve, infection, scarring, bleeding, prolonged wound healing, incomplete removal, allergy to anesthesia, and recurrence were addressed. Prior to the procedure, the treatment site was clearly identified and confirmed by the patient. All components of Universal Protocol/PAUSE Rule completed. Home Suture Removal Text: Patient was provided instructions on removing sutures and will remove their sutures at home.  If they have any questions or difficulties they will call the office. Cheiloplasty (Less Than 50%) Text: A decision was made to reconstruct the defect with a  cheiloplasty.  The defect was undermined extensively.  Additional obicularis oris muscle was excised with a 15 blade scalpel.  The defect was converted into a full thickness wedge, of less than 50% of the vertical height of the lip, to facilite a better cosmetic result.  Small vessels were then tied off with 5-0 monocyrl. The obicularis oris, superficial fascia, adipose and dermis were then reapproximated.  After the deeper layers were approximated the epidermis was reapproximated with particular care given to realign the vermilion border. Hemostasis: Electrodesiccation Tissue Cultured Epidermal Autograft Text: The defect edges were debeveled with a #15 scalpel blade.  Given the location of the defect, shape of the defect and the proximity to free margins a tissue cultured epidermal autograft was deemed most appropriate.  The graft was then trimmed to fit the size of the defect.  The graft was then placed in the primary defect and oriented appropriately. Stage 3: Additional Anesthesia Type: 1% lidocaine, 0.5% Marcaine, 1:100,000 epinephrine and 8.4% sodium bicarbonate Rhomboid Transposition Flap Text: The defect edges were debeveled with a #15 scalpel blade.  Given the location of the defect and the proximity to free margins a rhomboid transposition flap was deemed most appropriate.  Using a sterile surgical marker, an appropriate rhomboid flap was drawn incorporating the defect.    The area thus outlined was incised deep to adipose tissue with a #15 scalpel blade.  The skin margins were undermined to an appropriate distance in all directions utilizing iris scissors. Crescentic Intermediate Repair Preamble Text (Leave Blank If You Do Not Want): Undermining was performed with blunt dissection. Estimated Blood Loss (Cc): minimal O-T Plasty Text: The defect edges were debeveled with a #15 scalpel blade.  Given the location of the defect, shape of the defect and the proximity to free margins an O-T plasty was deemed most appropriate.  Using a sterile surgical marker, an appropriate O-T plasty was drawn incorporating the defect and placing the expected incisions within the relaxed skin tension lines where possible.    The area thus outlined was incised deep to adipose tissue with a #15 scalpel blade.  The skin margins were undermined to an appropriate distance in all directions utilizing iris scissors. Bcc Infiltrative Histology Text: There were numerous aggregates of basaloid cells demonstrating an infiltrative pattern. Epidermal Closure: running Bilobed Transposition Flap Text: The defect edges were debeveled with a #15 scalpel blade.  Given the location of the defect and the proximity to free margins a bilobed transposition flap was deemed most appropriate.  Using a sterile surgical marker, an appropriate bilobe flap drawn around the defect.    The area thus outlined was incised deep to adipose tissue with a #15 scalpel blade.  The skin margins were undermined to an appropriate distance in all directions utilizing iris scissors. Crescentic Advancement Flap Text: The defect edges were debeveled with a #15 scalpel blade.  Given the location of the defect and the proximity to free margins a crescentic advancement flap was deemed most appropriate.  Using a sterile surgical marker, the appropriate advancement flap was drawn incorporating the defect and placing the expected incisions within the relaxed skin tension lines where possible.    The area thus outlined was incised deep to adipose tissue with a #15 scalpel blade.  The skin margins were undermined to an appropriate distance in all directions utilizing iris scissors. M-Plasty Complex Repair Preamble Text (Leave Blank If You Do Not Want): Extensive wide undermining was performed. Post-Care Instructions: I reviewed with the patient in detail post-care instructions. Patient is not to engage in any heavy lifting, exercise, or swimming for the next 7 days. Should the patient develop any fevers, chills, bleeding, severe pain patient will contact the office immediately. Wound Care (No Sutures): Petrolatum H Plasty Text: Given the location of the defect, shape of the defect and the proximity to free margins a H-plasty was deemed most appropriate for repair.  Using a sterile surgical marker, the appropriate advancement arms of the H-plasty were drawn incorporating the defect and placing the expected incisions within the relaxed skin tension lines where possible. The area thus outlined was incised deep to adipose tissue with a #15 scalpel blade. The skin margins were undermined to an appropriate distance in all directions utilizing iris scissors.  The opposing advancement arms were then advanced into place in opposite direction and anchored with interrupted buried subcutaneous sutures. Suturegard Retention Suture: 2-0 Nylon Posterior Auricular Interpolation Flap Text: A decision was made to reconstruct the defect utilizing an interpolation axial flap and a staged reconstruction.  A telfa template was made of the defect.  This telfa template was then used to outline the posterior auricular interpolation flap.  The donor area for the pedicle flap was then injected with anesthesia.  The flap was excised through the skin and subcutaneous tissue down to the layer of the underlying musculature.  The pedicle flap was carefully excised within this deep plane to maintain its blood supply.  The edges of the donor site were undermined.   The donor site was closed in a primary fashion.  The pedicle was then rotated into position and sutured.  Once the tube was sutured into place, adequate blood supply was confirmed with blanching and refill.  The pedicle was then wrapped with xeroform gauze and dressed appropriately with a telfa and gauze bandage to ensure continued blood supply and protect the attached pedicle. Mucosal Advancement Flap Text: Given the location of the defect, shape of the defect and the proximity to free margins a mucosal advancement flap was deemed most appropriate. Incisions were made with a 15 blade scalpel in the appropriate fashion along the cutaneous vermilion border and the mucosal lip. The remaining actinically damaged mucosal tissue was excised.  The mucosal advancement flap was then elevated to the gingival sulcus with care taken to preserve the neurovascular structures and advanced into the primary defect. Care was taken to ensure that precise realignment of the vermilion border was achieved. Mart-1 - Positive Histology Text: MART-1 staining demonstrates areas of higher density and clustering of melanocytes with Pagetoid spread upwards within the epidermis. The surgical margins are positive for tumor cells. Mauc Instructions: By selecting yes to the question below the MAUC number will be added into the note.  This will be calculated automatically based on the diagnosis chosen, the size entered, the body zone selected (H,M,L) and the specific indications you chose. You will also have the option to override the Mohs AUC if you disagree with the automatically calculated number and this option is found in the Case Summary tab. Surgeon/Pathologist Verbiage (Will Incorporate Name Of Surgeon From Intro If Not Blank): operated in two distinct and integrated capacities as the surgeon and pathologist. Bi-Rhombic Flap Text: The defect edges were debeveled with a #15 scalpel blade.  Given the location of the defect and the proximity to free margins a bi-rhombic flap was deemed most appropriate.  Using a sterile surgical marker, an appropriate rhombic flap was drawn incorporating the defect. The area thus outlined was incised deep to adipose tissue with a #15 scalpel blade.  The skin margins were undermined to an appropriate distance in all directions utilizing iris scissors. Area H Indication Text: Tumors in this location are included in Area H (eyelids, eyebrows, nose, lips, chin, ear, pre-auricular, post-auricular, temple, genitalia, hands, feet, ankles and areola).  Tissue conservation is critical in these anatomic locations. Partial Purse String (Simple) Text: Given the location of the defect and the characteristics of the surrounding skin a simple purse string closure was deemed most appropriate.  Undermining was performed circumfirentially around the surgical defect.  A purse string suture was then placed and tightened. Wound tension only allowed a partial closure of the circular defect. Ear Wedge Repair Text: A wedge excision was completed by carrying down an excision through the full thickness of the ear and cartilage with an inward facing Burow's triangle. The wound was then closed in a layered fashion. O-Z Plasty Text: The defect edges were debeveled with a #15 scalpel blade.  Given the location of the defect, shape of the defect and the proximity to free margins an O-Z plasty (double transposition flap) was deemed most appropriate.  Using a sterile surgical marker, the appropriate transposition flaps were drawn incorporating the defect and placing the expected incisions within the relaxed skin tension lines where possible.    The area thus outlined was incised deep to adipose tissue with a #15 scalpel blade.  The skin margins were undermined to an appropriate distance in all directions utilizing iris scissors.  Hemostasis was achieved with electrocautery.  The flaps were then transposed into place, one clockwise and the other counterclockwise, and anchored with interrupted buried subcutaneous sutures. Banner Transposition Flap Text: The defect edges were debeveled with a #15 scalpel blade.  Given the location of the defect and the proximity to free margins a Banner transposition flap was deemed most appropriate.  Using a sterile surgical marker, an appropriate flap drawn around the defect. The area thus outlined was incised deep to adipose tissue with a #15 scalpel blade.  The skin margins were undermined to an appropriate distance in all directions utilizing iris scissors. Primary Defect Length In Cm (Final Defect Size - Required For Flaps/Grafts): 0.7 Location Indication Override (Is Already Calculated Based On Selected Body Location): Area H Graft Cartilage Fenestration Text: The cartilage was fenestrated with a 2mm punch biopsy to help facilitate graft survival and healing. Suturegard Intro: Intraoperative tissue expansion was performed, utilizing the SUTUREGARD device, in order to reduce wound tension. Advancement Flap (Single) Text: The defect edges were debeveled with a #15 scalpel blade.  Given the location of the defect and the proximity to free margins a single advancement flap was deemed most appropriate.  Using a sterile surgical marker, an appropriate advancement flap was drawn incorporating the defect and placing the expected incisions within the relaxed skin tension lines where possible.    The area thus outlined was incised deep to adipose tissue with a #15 scalpel blade.  The skin margins were undermined to an appropriate distance in all directions utilizing iris scissors. Alternatives Discussed Intro (Do Not Add Period): I discussed alternative treatments to Mohs surgery and specifically discussed the risks and benefits of Star Wedge Flap Text: The defect edges were debeveled with a #15 scalpel blade.  Given the location of the defect, shape of the defect and the proximity to free margins a star wedge flap was deemed most appropriate.  Using a sterile surgical marker, an appropriate rotation flap was drawn incorporating the defect and placing the expected incisions within the relaxed skin tension lines where possible. The area thus outlined was incised deep to adipose tissue with a #15 scalpel blade.  The skin margins were undermined to an appropriate distance in all directions utilizing iris scissors. Repair Type: Complex Repair Consent (Ear)/Introductory Paragraph: The rationale for Mohs was explained to the patient and consent was obtained. The risks, benefits and alternatives to therapy were discussed in detail. Specifically, the risks of ear deformity, infection, scarring, bleeding, prolonged wound healing, incomplete removal, allergy to anesthesia, nerve injury and recurrence were addressed. Prior to the procedure, the treatment site was clearly identified and confirmed by the patient. All components of Universal Protocol/PAUSE Rule completed. Consent (Scalp)/Introductory Paragraph: The rationale for Mohs was explained to the patient and consent was obtained. The risks, benefits and alternatives to therapy were discussed in detail. Specifically, the risks of changes in hair growth pattern secondary to repair, infection, scarring, bleeding, prolonged wound healing, incomplete removal, allergy to anesthesia, nerve injury and recurrence were addressed. Prior to the procedure, the treatment site was clearly identified and confirmed by the patient. All components of Universal Protocol/PAUSE Rule completed. X Size Of Lesion In Cm (Optional): 0.3 Consent (Marginal Mandibular)/Introductory Paragraph: The rationale for Mohs was explained to the patient and consent was obtained. The risks, benefits and alternatives to therapy were discussed in detail. Specifically, the risks of damage to the marginal mandibular branch of the facial nerve, infection, scarring, bleeding, prolonged wound healing, incomplete removal, allergy to anesthesia, and recurrence were addressed. Prior to the procedure, the treatment site was clearly identified and confirmed by the patient. All components of Universal Protocol/PAUSE Rule completed. Full Thickness Lip Wedge Repair (Flap) Text: Given the location of the defect and the proximity to free margins a full thickness wedge repair was deemed most appropriate.  Using a sterile surgical marker, the appropriate repair was drawn incorporating the defect and placing the expected incisions perpendicular to the vermilion border.  The vermilion border was also meticulously outlined to ensure appropriate reapproximation during the repair.  The area thus outlined was incised through and through with a #15 scalpel blade.  The muscularis and dermis were reaproximated with deep sutures following hemostasis. Care was taken to realign the vermilion border before proceeding with the superficial closure.  Once the vermilion was realigned the superfical and mucosal closure was finished. Consent 1/Introductory Paragraph: The rationale for Mohs was explained to the patient and consent was obtained. The risks, benefits and alternatives to therapy were discussed in detail. Specifically, the risks of infection, scarring, bleeding, prolonged wound healing, incomplete removal, allergy to anesthesia, nerve injury and recurrence were addressed. Prior to the procedure, the treatment site was clearly identified and confirmed by the patient. All components of Universal Protocol/PAUSE Rule completed. Postop Diagnosis: same Dorsal Nasal Flap Text: The defect edges were debeveled with a #15 scalpel blade.  Given the location of the defect and the proximity to free margins a dorsal nasal flap was deemed most appropriate.  Using a sterile surgical marker, an appropriate dorsal nasal flap was drawn around the defect.    The area thus outlined was incised deep to adipose tissue with a #15 scalpel blade.  The skin margins were undermined to an appropriate distance in all directions utilizing iris scissors. Subsequent Stages Histo Method Verbiage: Using a similar technique to that described above, a thin layer of tissue was removed from all areas where tumor was visible on the previous stage.  The tissue was again oriented, mapped, dyed, and processed as above. Primary Defect Width In Cm (Final Defect Size - Required For Flaps/Grafts): 0.6 Consent (Lip)/Introductory Paragraph: The rationale for Mohs was explained to the patient and consent was obtained. The risks, benefits and alternatives to therapy were discussed in detail. Specifically, the risks of lip deformity, changes in the oral aperture, infection, scarring, bleeding, prolonged wound healing, incomplete removal, allergy to anesthesia, nerve injury and recurrence were addressed. Prior to the procedure, the treatment site was clearly identified and confirmed by the patient. All components of Universal Protocol/PAUSE Rule completed. Simple / Intermediate / Complex Repair - Final Wound Length In Cm: 1.8 Complex Repair And Flap Additional Text (Will Appearing After The Standard Complex Repair Text): The complex repair was not sufficient to completely close the primary defect. The remaining additional defect was repaired with the flap mentioned below. Graft Donor Site Bandage (Optional-Leave Blank If You Don't Want In Note): pressure bandage were applied to the donor site. Detail Level: Detailed V-Y Flap Text: The defect edges were debeveled with a #15 scalpel blade.  Given the location of the defect, shape of the defect and the proximity to free margins a V-Y flap was deemed most appropriate.  Using a sterile surgical marker, an appropriate advancement flap was drawn incorporating the defect and placing the expected incisions within the relaxed skin tension lines where possible.    The area thus outlined was incised deep to adipose tissue with a #15 scalpel blade.  The skin margins were undermined to an appropriate distance in all directions utilizing iris scissors. Cartilage Graft Text: The defect edges were debeveled with a #15 scalpel blade.  Given the location of the defect, shape of the defect, the fact the defect involved a full thickness cartilage defect a cartilage graft was deemed most appropriate.  An appropriate donor site was identified, cleansed, and anesthetized. The cartilage graft was then harvested and transferred to the recipient site, oriented appropriately and then sutured into place.  The secondary defect was then repaired using a primary closure. Consent (Spinal Accessory)/Introductory Paragraph: The rationale for Mohs was explained to the patient and consent was obtained. The risks, benefits and alternatives to therapy were discussed in detail. Specifically, the risks of damage to the spinal accessory nerve, infection, scarring, bleeding, prolonged wound healing, incomplete removal, allergy to anesthesia, and recurrence were addressed. Prior to the procedure, the treatment site was clearly identified and confirmed by the patient. All components of Universal Protocol/PAUSE Rule completed. Same Histology In Subsequent Stages Text: The pattern and morphology of the tumor is as described in the first stage. Consent 2/Introductory Paragraph: Mohs surgery was explained to the patient and consent was obtained. The risks, benefits and alternatives to therapy were discussed in detail. Specifically, the risks of infection, scarring, bleeding, prolonged wound healing, incomplete removal, allergy to anesthesia, nerve injury and recurrence were addressed. Prior to the procedure, the treatment site was clearly identified and confirmed by the patient. All components of Universal Protocol/PAUSE Rule completed. Double Island Pedicle Flap Text: The defect edges were debeveled with a #15 scalpel blade.  Given the location of the defect, shape of the defect and the proximity to free margins a double island pedicle advancement flap was deemed most appropriate.  Using a sterile surgical marker, an appropriate advancement flap was drawn incorporating the defect, outlining the appropriate donor tissue and placing the expected incisions within the relaxed skin tension lines where possible.    The area thus outlined was incised deep to adipose tissue with a #15 scalpel blade.  The skin margins were undermined to an appropriate distance in all directions around the primary defect and laterally outward around the island pedicle utilizing iris scissors.  There was minimal undermining beneath the pedicle flap. Cheiloplasty (Complex) Text: A decision was made to reconstruct the defect with a  cheiloplasty.  The defect was undermined extensively.  Additional obicularis oris muscle was excised with a 15 blade scalpel.  The defect was converted into a full thickness wedge to facilite a better cosmetic result.  Small vessels were then tied off with 5-0 monocyrl. The obicularis oris, superficial fascia, adipose and dermis were then reapproximated.  After the deeper layers were approximated the epidermis was reapproximated with particular care given to realign the vermilion border. Hatchet Flap Text: The defect edges were debeveled with a #15 scalpel blade.  Given the location of the defect, shape of the defect and the proximity to free margins a hatchet flap was deemed most appropriate.  Using a sterile surgical marker, an appropriate hatchet flap was drawn incorporating the defect and placing the expected incisions within the relaxed skin tension lines where possible.    The area thus outlined was incised deep to adipose tissue with a #15 scalpel blade.  The skin margins were undermined to an appropriate distance in all directions utilizing iris scissors. No Repair - Repaired With Adjacent Surgical Defect Text (Leave Blank If You Do Not Want): After obtaining clear surgical margins the defect was repaired concurrently with another surgical defect which was in close approximation. Epidermal Autograft Text: The defect edges were debeveled with a #15 scalpel blade.  Given the location of the defect, shape of the defect and the proximity to free margins an epidermal autograft was deemed most appropriate.  Using a sterile surgical marker, the primary defect shape was transferred to the donor site. The epidermal graft was then harvested.  The skin graft was then placed in the primary defect and oriented appropriately. O-L Flap Text: The defect edges were debeveled with a #15 scalpel blade.  Given the location of the defect, shape of the defect and the proximity to free margins an O-L flap was deemed most appropriate.  Using a sterile surgical marker, an appropriate advancement flap was drawn incorporating the defect and placing the expected incisions within the relaxed skin tension lines where possible.    The area thus outlined was incised deep to adipose tissue with a #15 scalpel blade.  The skin margins were undermined to an appropriate distance in all directions utilizing iris scissors. Mohs Case Number:  Helical Rim Advancement Flap Text: The defect edges were debeveled with a #15 blade scalpel.  Given the location of the defect and the proximity to free margins (helical rim) a double helical rim advancement flap was deemed most appropriate.  Using a sterile surgical marker, the appropriate advancement flaps were drawn incorporating the defect and placing the expected incisions between the helical rim and antihelix where possible.  The area thus outlined was incised through and through with a #15 scalpel blade.  With a skin hook and iris scissors, the flaps were gently and sharply undermined and freed up. Initial Size Of Lesion: 0.4 Donor Site Anesthesia Type: same as repair anesthesia Double O-Z Plasty Text: The defect edges were debeveled with a #15 scalpel blade.  Given the location of the defect, shape of the defect and the proximity to free margins a Double O-Z plasty (double transposition flap) was deemed most appropriate.  Using a sterile surgical marker, the appropriate transposition flaps were drawn incorporating the defect and placing the expected incisions within the relaxed skin tension lines where possible. The area thus outlined was incised deep to adipose tissue with a #15 scalpel blade.  The skin margins were undermined to an appropriate distance in all directions utilizing iris scissors.  Hemostasis was achieved with electrocautery.  The flaps were then transposed into place, one clockwise and the other counterclockwise, and anchored with interrupted buried subcutaneous sutures. Distance Of Undermining In Cm (Required): 1 Cheek-To-Nose Interpolation Flap Text: A decision was made to reconstruct the defect utilizing an interpolation axial flap and a staged reconstruction.  A telfa template was made of the defect.  This telfa template was then used to outline the Cheek-To-Nose Interpolation flap.  The donor area for the pedicle flap was then injected with anesthesia.  The flap was excised through the skin and subcutaneous tissue down to the layer of the underlying musculature.  The interpolation flap was carefully excised within this deep plane to maintain its blood supply.  The edges of the donor site were undermined.   The donor site was closed in a primary fashion.  The pedicle was then rotated into position and sutured.  Once the tube was sutured into place, adequate blood supply was confirmed with blanching and refill.  The pedicle was then wrapped with xeroform gauze and dressed appropriately with a telfa and gauze bandage to ensure continued blood supply and protect the attached pedicle. Mohs Method Verbiage: An incision at a 45 degree angle following the standard Mohs approach was done and the specimen was harvested as a microscopic controlled layer. Composite Graft Text: The defect edges were debeveled with a #15 scalpel blade.  Given the location of the defect, shape of the defect, the proximity to free margins and the fact the defect was full thickness a composite graft was deemed most appropriate.  The defect was outline and then transferred to the donor site.  A full thickness graft was then excised from the donor site. The graft was then placed in the primary defect, oriented appropriately and then sutured into place.  The secondary defect was then repaired using a primary closure. Retention Suture Bite Size: 3 mm Stage 1: Number Of Blocks?: 2 Helical Rim Text: The closure involved the helical rim. Transposition Flap Text: The defect edges were debeveled with a #15 scalpel blade.  Given the location of the defect and the proximity to free margins a transposition flap was deemed most appropriate.  Using a sterile surgical marker, an appropriate transposition flap was drawn incorporating the defect.    The area thus outlined was incised deep to adipose tissue with a #15 scalpel blade.  The skin margins were undermined to an appropriate distance in all directions utilizing iris scissors. ia Id #: [id number] Skin Substitute Text: The defect edges were debeveled with a #15 scalpel blade.  Given the location of the defect, shape of the defect and the proximity to free margins a skin substitute graft was deemed most appropriate.  The graft material was trimmed to fit the size of the defect. The graft was then placed in the primary defect and oriented appropriately. Repair Anesthesia Method: local infiltration Tumor Debulked?: curette Localized Dermabrasion With Wire Brush Text: The patient was draped in routine manner.  Localized dermabrasion using 3 x 17 mm wire brush was performed in routine manner to papillary dermis. This spot dermabrasion is being performed to complete skin cancer reconstruction. It also will eliminate the other sun damaged precancerous cells that are known to be part of the regional effect of a lifetime's worth of sun exposure. This localized dermabrasion is therapeutic and should not be considered cosmetic in any regard. W Plasty Text: The lesion was extirpated to the level of the fat with a #15 scalpel blade.  Given the location of the defect, shape of the defect and the proximity to free margins a W-plasty was deemed most appropriate for repair.  Using a sterile surgical marker, the appropriate transposition arms of the W-plasty were drawn incorporating the defect and placing the expected incisions within the relaxed skin tension lines where possible.    The area thus outlined was incised deep to adipose tissue with a #15 scalpel blade.  The skin margins were undermined to an appropriate distance in all directions utilizing iris scissors.  The opposing transposition arms were then transposed into place in opposite direction and anchored with interrupted buried subcutaneous sutures. Area L Indication Text: Tumors in this location are included in Area L (trunk and extremities).  Mohs surgery is indicated for larger tumors, or tumors with aggressive histologic features, in these anatomic locations. Undermining Location (Optional): in the deep fat Undermining Type: Entire Wound Ftsg Text: The defect edges were debeveled with a #15 scalpel blade.  Given the location of the defect, shape of the defect and the proximity to free margins a full thickness skin graft was deemed most appropriate.  Using a sterile surgical marker, the primary defect shape was transferred to the donor site. The area thus outlined was incised deep to adipose tissue with a #15 scalpel blade.  The harvested graft was then trimmed of adipose tissue until only dermis and epidermis was left.  The skin margins of the secondary defect were undermined to an appropriate distance in all directions utilizing iris scissors.  The secondary defect was closed with interrupted buried subcutaneous sutures.  The skin edges were then re-apposed with running  sutures.  The skin graft was then placed in the primary defect and oriented appropriately. Chonodrocutaneous Helical Advancement Flap Text: The defect edges were debeveled with a #15 scalpel blade.  Given the location of the defect and the proximity to free margins a chondrocutaneous helical advancement flap was deemed most appropriate.  Using a sterile surgical marker, the appropriate advancement flap was drawn incorporating the defect and placing the expected incisions within the relaxed skin tension lines where possible.    The area thus outlined was incised deep to adipose tissue with a #15 scalpel blade.  The skin margins were undermined to an appropriate distance in all directions utilizing iris scissors. Closure 2 Information: This tab is for additional flaps and grafts, including complex repair and grafts and complex repair and flaps. You can also specify a different location for the additional defect, if the location is the same you do not need to select a new one. We will insert the automated text for the repair you select below just as we do for solitary flaps and grafts. Please note that at this time if you select a location with a different insurance zone you will need to override the ICD10 and CPT if appropriate. Island Pedicle Flap Text: The defect edges were debeveled with a #15 scalpel blade.  Given the location of the defect, shape of the defect and the proximity to free margins an island pedicle advancement flap was deemed most appropriate.  Using a sterile surgical marker, an appropriate advancement flap was drawn incorporating the defect, outlining the appropriate donor tissue and placing the expected incisions within the relaxed skin tension lines where possible.    The area thus outlined was incised deep to adipose tissue with a #15 scalpel blade.  The skin margins were undermined to an appropriate distance in all directions around the primary defect and laterally outward around the island pedicle utilizing iris scissors.  There was minimal undermining beneath the pedicle flap. Where Do You Want The Question To Include Opioid Counseling Located?: Case Summary Tab Medical Necessity Statement: Based on my medical judgement, Mohs surgery is the most appropriate treatment for this cancer compared to other treatments. Suture Removal: 7 days Complex Repair And Graft Additional Text (Will Appearing After The Standard Complex Repair Text): The complex repair was not sufficient to completely close the primary defect. The remaining additional defect was repaired with the graft mentioned below. Secondary Intention Text (Leave Blank If You Do Not Want): The defect will heal with secondary intention. Consent (Near Eyelid Margin)/Introductory Paragraph: The rationale for Mohs was explained to the patient and consent was obtained. The risks, benefits and alternatives to therapy were discussed in detail. Specifically, the risks of ectropion or eyelid deformity, infection, scarring, bleeding, prolonged wound healing, incomplete removal, allergy to anesthesia, nerve injury and recurrence were addressed. Prior to the procedure, the treatment site was clearly identified and confirmed by the patient. All components of Universal Protocol/PAUSE Rule completed. Information: Selecting Yes will display possible errors in your note based on the variables you have selected. This validation is only offered as a suggestion for you. PLEASE NOTE THAT THE VALIDATION TEXT WILL BE REMOVED WHEN YOU FINALIZE YOUR NOTE. IF YOU WANT TO FAX A PRELIMINARY NOTE YOU WILL NEED TO TOGGLE THIS TO 'NO' IF YOU DO NOT WANT IT IN YOUR FAXED NOTE. Mastoid Interpolation Flap Text: A decision was made to reconstruct the defect utilizing an interpolation axial flap and a staged reconstruction.  A telfa template was made of the defect.  This telfa template was then used to outline the mastoid interpolation flap.  The donor area for the pedicle flap was then injected with anesthesia.  The flap was excised through the skin and subcutaneous tissue down to the layer of the underlying musculature.  The pedicle flap was carefully excised within this deep plane to maintain its blood supply.  The edges of the donor site were undermined.   The donor site was closed in a primary fashion.  The pedicle was then rotated into position and sutured.  Once the tube was sutured into place, adequate blood supply was confirmed with blanching and refill.  The pedicle was then wrapped with xeroform gauze and dressed appropriately with a telfa and gauze bandage to ensure continued blood supply and protect the attached pedicle. Trilobed Flap Text: The defect edges were debeveled with a #15 scalpel blade.  Given the location of the defect and the proximity to free margins a trilobed flap was deemed most appropriate.  Using a sterile surgical marker, an appropriate trilobed flap drawn around the defect.    The area thus outlined was incised deep to adipose tissue with a #15 scalpel blade.  The skin margins were undermined to an appropriate distance in all directions utilizing iris scissors. Advancement-Rotation Flap Text: The defect edges were debeveled with a #15 scalpel blade.  Given the location of the defect, shape of the defect and the proximity to free margins an advancement-rotation flap was deemed most appropriate.  Using a sterile surgical marker, an appropriate flap was drawn incorporating the defect and placing the expected incisions within the relaxed skin tension lines where possible. The area thus outlined was incised deep to adipose tissue with a #15 scalpel blade.  The skin margins were undermined to an appropriate distance in all directions utilizing iris scissors. Consent (Nose)/Introductory Paragraph: The rationale for Mohs was explained to the patient and consent was obtained. The risks, benefits and alternatives to therapy were discussed in detail. Specifically, the risks of nasal deformity, changes in the flow of air through the nose, infection, scarring, bleeding, prolonged wound healing, incomplete removal, allergy to anesthesia, nerve injury and recurrence were addressed. Prior to the procedure, the treatment site was clearly identified and confirmed by the patient. All components of Universal Protocol/PAUSE Rule completed. Dressing (No Sutures): dry sterile dressing Tarsorrhaphy Text: A tarsorrhaphy was performed using Frost sutures. A-T Advancement Flap Text: The defect edges were debeveled with a #15 scalpel blade.  Given the location of the defect, shape of the defect and the proximity to free margins an A-T advancement flap was deemed most appropriate.  Using a sterile surgical marker, an appropriate advancement flap was drawn incorporating the defect and placing the expected incisions within the relaxed skin tension lines where possible.    The area thus outlined was incised deep to adipose tissue with a #15 scalpel blade.  The skin margins were undermined to an appropriate distance in all directions utilizing iris scissors. Island Pedicle Flap-Requiring Vessel Identification Text: The defect edges were debeveled with a #15 scalpel blade.  Given the location of the defect, shape of the defect and the proximity to free margins an island pedicle advancement flap was deemed most appropriate.  Using a sterile surgical marker, an appropriate advancement flap was drawn, based on the axial vessel mentioned above, incorporating the defect, outlining the appropriate donor tissue and placing the expected incisions within the relaxed skin tension lines where possible.    The area thus outlined was incised deep to adipose tissue with a #15 scalpel blade.  The skin margins were undermined to an appropriate distance in all directions around the primary defect and laterally outward around the island pedicle utilizing iris scissors.  There was minimal undermining beneath the pedicle flap. No Residual Tumor Seen Histology Text: There were no malignant cells seen in the sections examined. Purse String (Simple) Text: Given the location of the defect and the characteristics of the surrounding skin a pursestring closure was deemed most appropriate.  Undermining was performed circumfirentially around the surgical defect.  A purstring suture was then placed and tightened. Additional Anesthesia Volume In Cc: 6 Dermal Autograft Text: The defect edges were debeveled with a #15 scalpel blade.  Given the location of the defect, shape of the defect and the proximity to free margins a dermal autograft was deemed most appropriate.  Using a sterile surgical marker, the primary defect shape was transferred to the donor site. The area thus outlined was incised deep to adipose tissue with a #15 scalpel blade.  The harvested graft was then trimmed of adipose and epidermal tissue until only dermis was left.  The skin graft was then placed in the primary defect and oriented appropriately. Rotation Flap Text: The defect edges were debeveled with a #15 scalpel blade.  Given the location of the defect, shape of the defect and the proximity to free margins a rotation flap was deemed most appropriate.  Using a sterile surgical marker, an appropriate rotation flap was drawn incorporating the defect and placing the expected incisions within the relaxed skin tension lines where possible.    The area thus outlined was incised deep to adipose tissue with a #15 scalpel blade.  The skin margins were undermined to an appropriate distance in all directions utilizing iris scissors. Mohs Histo Method Verbiage: Each section was then chromacoded and processed in the Mohs lab using the Mohs protocol and submitted for frozen section. Bilateral Helical Rim Advancement Flap Text: The defect edges were debeveled with a #15 blade scalpel.  Given the location of the defect and the proximity to free margins (helical rim) a bilateral helical rim advancement flap was deemed most appropriate.  Using a sterile surgical marker, the appropriate advancement flaps were drawn incorporating the defect and placing the expected incisions between the helical rim and antihelix where possible.  The area thus outlined was incised through and through with a #15 scalpel blade.  With a skin hook and iris scissors, the flaps were gently and sharply undermined and freed up. Nostril Rim Text: The closure involved the nostril rim. S Plasty Text: Given the location and shape of the defect, and the orientation of relaxed skin tension lines, an S-plasty was deemed most appropriate for repair.  Using a sterile surgical marker, the appropriate outline of the S-plasty was drawn, incorporating the defect and placing the expected incisions within the relaxed skin tension lines where possible.  The area thus outlined was incised deep to adipose tissue with a #15 scalpel blade.  The skin margins were undermined to an appropriate distance in all directions utilizing iris scissors. The skin flaps were advanced over the defect.  The opposing margins were then approximated with interrupted buried subcutaneous sutures. Purse String (Intermediate) Text: Given the location of the defect and the characteristics of the surrounding skin a pursestring intermediate closure was deemed most appropriate.  Undermining was performed circumfirentially around the surgical defect.  A purstring suture was then placed and tightened. Manual Repair Warning Statement: We plan on removing the manually selected variable below in favor of our much easier automatic structured text blocks found in the previous tab. We decided to do this to help make the flow better and give you the full power of structured data. Manual selection is never going to be ideal in our platform and I would encourage you to avoid using manual selection from this point on, especially since I will be sunsetting this feature. It is important that you do one of two things with the customized text below. First, you can save all of the text in a word file so you can have it for future reference. Second, transfer the text to the appropriate area in the Library tab. Lastly, if there is a flap or graft type which we do not have you need to let us know right away so I can add it in before the variable is hidden. No need to panic, we plan to give you roughly 6 months to make the change. Mercedes Flap Text: The defect edges were debeveled with a #15 scalpel blade.  Given the location of the defect, shape of the defect and the proximity to free margins a Mercedes flap was deemed most appropriate.  Using a sterile surgical marker, an appropriate advancement flap was drawn incorporating the defect and placing the expected incisions within the relaxed skin tension lines where possible. The area thus outlined was incised deep to adipose tissue with a #15 scalpel blade.  The skin margins were undermined to an appropriate distance in all directions utilizing iris scissors. Advancement Flap (Double) Text: The defect edges were debeveled with a #15 scalpel blade.  Given the location of the defect and the proximity to free margins a double advancement flap was deemed most appropriate.  Using a sterile surgical marker, the appropriate advancement flaps were drawn incorporating the defect and placing the expected incisions within the relaxed skin tension lines where possible.    The area thus outlined was incised deep to adipose tissue with a #15 scalpel blade.  The skin margins were undermined to an appropriate distance in all directions utilizing iris scissors. Muscle Hinge Flap Text: The defect edges were debeveled with a #15 scalpel blade.  Given the size, depth and location of the defect and the proximity to free margins a muscle hinge flap was deemed most appropriate.  Using a sterile surgical marker, an appropriate hinge flap was drawn incorporating the defect. The area thus outlined was incised with a #15 scalpel blade.  The skin margins were undermined to an appropriate distance in all directions utilizing iris scissors. Melolabial Interpolation Flap Text: A decision was made to reconstruct the defect utilizing an interpolation axial flap and a staged reconstruction.  A telfa template was made of the defect.  This telfa template was then used to outline the melolabial interpolation flap.  The donor area for the pedicle flap was then injected with anesthesia.  The flap was excised through the skin and subcutaneous tissue down to the layer of the underlying musculature.  The pedicle flap was carefully excised within this deep plane to maintain its blood supply.  The edges of the donor site were undermined.   The donor site was closed in a primary fashion.  The pedicle was then rotated into position and sutured.  Once the tube was sutured into place, adequate blood supply was confirmed with blanching and refill.  The pedicle was then wrapped with xeroform gauze and dressed appropriately with a telfa and gauze bandage to ensure continued blood supply and protect the attached pedicle. Non-Graft Cartilage Fenestration Text: The cartilage was fenestrated with a 2mm punch biopsy to help facilitate healing. Z Plasty Text: The lesion was extirpated to the level of the fat with a #15 scalpel blade.  Given the location of the defect, shape of the defect and the proximity to free margins a Z-plasty was deemed most appropriate for repair.  Using a sterile surgical marker, the appropriate transposition arms of the Z-plasty were drawn incorporating the defect and placing the expected incisions within the relaxed skin tension lines where possible.    The area thus outlined was incised deep to adipose tissue with a #15 scalpel blade.  The skin margins were undermined to an appropriate distance in all directions utilizing iris scissors.  The opposing transposition arms were then transposed into place in opposite direction and anchored with interrupted buried subcutaneous sutures. Inflammation Suggestive Of Cancer Camouflage Histology Text: There was a dense lymphocytic infiltrate which prevented adequate histologic evaluation of adjacent structures. Island Pedicle Flap With Canthal Suspension Text: The defect edges were debeveled with a #15 scalpel blade.  Given the location of the defect, shape of the defect and the proximity to free margins an island pedicle advancement flap was deemed most appropriate.  Using a sterile surgical marker, an appropriate advancement flap was drawn incorporating the defect, outlining the appropriate donor tissue and placing the expected incisions within the relaxed skin tension lines where possible. The area thus outlined was incised deep to adipose tissue with a #15 scalpel blade.  The skin margins were undermined to an appropriate distance in all directions around the primary defect and laterally outward around the island pedicle utilizing iris scissors.  There was minimal undermining beneath the pedicle flap. A suspension suture was placed in the canthal tendon to prevent tension and prevent ectropion. Mohs Rapid Report Verbiage: The area of clinically evident tumor was marked with skin marking ink and appropriately hatched.  The initial incision was made following the Mohs approach through the skin.  The specimen was taken to the lab, divided into the necessary number of pieces, chromacoded and processed according to the Mohs protocol.  This was repeated in successive stages until a tumor free defect was achieved. Vermilion Border Text: The closure involved the vermilion border. Melolabial Transposition Flap Text: The defect edges were debeveled with a #15 scalpel blade.  Given the location of the defect and the proximity to free margins a melolabial flap was deemed most appropriate.  Using a sterile surgical marker, an appropriate melolabial transposition flap was drawn incorporating the defect.    The area thus outlined was incised deep to adipose tissue with a #15 scalpel blade.  The skin margins were undermined to an appropriate distance in all directions utilizing iris scissors. Retention Suture Text: Retention sutures were placed to support the closure and prevent dehiscence. Rhombic Flap Text: The defect edges were debeveled with a #15 scalpel blade.  Given the location of the defect and the proximity to free margins a rhombic flap was deemed most appropriate.  Using a sterile surgical marker, an appropriate rhombic flap was drawn incorporating the defect.    The area thus outlined was incised deep to adipose tissue with a #15 scalpel blade.  The skin margins were undermined to an appropriate distance in all directions utilizing iris scissors. Consent 3/Introductory Paragraph: I gave the patient a chance to ask questions they had about the procedure.  Following this I explained the Mohs procedure and consent was obtained. The risks, benefits and alternatives to therapy were discussed in detail. Specifically, the risks of infection, scarring, bleeding, prolonged wound healing, incomplete removal, allergy to anesthesia, nerve injury and recurrence were addressed. Prior to the procedure, the treatment site was clearly identified and confirmed by the patient. All components of Universal Protocol/PAUSE Rule completed. Keystone Flap Text: The defect edges were debeveled with a #15 scalpel blade.  Given the location of the defect, shape of the defect a keystone flap was deemed most appropriate.  Using a sterile surgical marker, an appropriate keystone flap was drawn incorporating the defect, outlining the appropriate donor tissue and placing the expected incisions within the relaxed skin tension lines where possible. The area thus outlined was incised deep to adipose tissue with a #15 scalpel blade.  The skin margins were undermined to an appropriate distance in all directions around the primary defect and laterally outward around the flap utilizing iris scissors. Paramedian Forehead Flap Text: A decision was made to reconstruct the defect utilizing an interpolation axial flap and a staged reconstruction.  A telfa template was made of the defect.  This telfa template was then used to outline the paramedian forehead pedicle flap.  The donor area for the pedicle flap was then injected with anesthesia.  The flap was excised through the skin and subcutaneous tissue down to the layer of the underlying musculature.  The pedicle flap was carefully excised within this deep plane to maintain its blood supply.  The edges of the donor site were undermined.   The donor site was closed in a primary fashion.  The pedicle was then rotated into position and sutured.  Once the tube was sutured into place, adequate blood supply was confirmed with blanching and refill.  The pedicle was then wrapped with xeroform gauze and dressed appropriately with a telfa and gauze bandage to ensure continued blood supply and protect the attached pedicle. Bcc Histology Text: There were numerous aggregates of basaloid cells. Surgeon: Jerald Willingham MD Graft Basting Suture (Optional): 5-0 Fast Absorbing Gut Double O-Z Flap Text: The defect edges were debeveled with a #15 scalpel blade.  Given the location of the defect, shape of the defect and the proximity to free margins a Double O-Z flap was deemed most appropriate.  Using a sterile surgical marker, an appropriate transposition flap was drawn incorporating the defect and placing the expected incisions within the relaxed skin tension lines where possible. The area thus outlined was incised deep to adipose tissue with a #15 scalpel blade.  The skin margins were undermined to an appropriate distance in all directions utilizing iris scissors. Split-Thickness Skin Graft Text: The defect edges were debeveled with a #15 scalpel blade.  Given the location of the defect, shape of the defect and the proximity to free margins a split thickness skin graft was deemed most appropriate.  Using a sterile surgical marker, the primary defect shape was transferred to the donor site. The split thickness graft was then harvested.  The skin graft was then placed in the primary defect and oriented appropriately.

## 2023-11-07 NOTE — PLAN OF CARE
Problem: Potential for Falls  Goal: Patient will remain free of falls  Description: INTERVENTIONS:  - Educate patient/family on patient safety including physical limitations  - Instruct patient to call for assistance with activity   - Consult OT/PT to assist with strengthening/mobility   - Keep Call bell within reach  - Keep bed low and locked with side rails adjusted as appropriate  - Keep care items and personal belongings within reach  - Initiate and maintain comfort rounds  - Make Fall Risk Sign visible to staff  Problem: MOBILITY - ADULT  Goal: Maintain or return to baseline ADL function  Description: INTERVENTIONS:  -  Assess patient's ability to carry out ADLs; assess patient's baseline for ADL function and identify physical deficits which impact ability to perform ADLs (bathing, care of mouth/teeth, toileting, grooming, dressing, etc.)  - Assess/evaluate cause of self-care deficits   - Assess range of motion  - Assess patient's mobility; develop plan if impaired  - Assess patient's need for assistive devices and provide as appropriate  - Encourage maximum independence but intervene and supervise when necessary  - Involve family in performance of ADLs  - Assess for home care needs following discharge   - Consider OT consult to assist with ADL evaluation and planning for discharge  - Provide patient education as appropriate  Outcome: Progressing  Goal: Maintains/Returns to pre admission functional level  Description: INTERVENTIONS:  - Perform BMAT or MOVE assessment daily.   - Set and communicate daily mobility goal to care team and patient/family/caregiver.    - Collaborate with rehabilitation services on mobility goals if consulted  Problem: PAIN - ADULT  Goal: Verbalizes/displays adequate comfort level or baseline comfort level  Description: Interventions:  - Encourage patient to monitor pain and request assistance  - Assess pain using appropriate pain scale  - Administer analgesics based on type and severity of pain and evaluate response  - Implement non-pharmacological measures as appropriate and evaluate response  - Consider cultural and social influences on pain and pain management  - Notify physician/advanced practitioner if interventions unsuccessful or patient reports new pain  Outcome: Progressing     Problem: INFECTION - ADULT  Goal: Absence or prevention of progression during hospitalization  Description: INTERVENTIONS:  - Assess and monitor for signs and symptoms of infection  - Monitor lab/diagnostic results  - Monitor all insertion sites, i.e. indwelling lines, tubes, and drains  - Monitor endotracheal if appropriate and nasal secretions for changes in amount and color  - [place] appropriate cooling/warming therapies per order  - Administer medications as ordered  - Instruct and encourage patient and family to use good hand hygiene technique  - Identify and instruct in appropriate isolation precautions for identified infection/condition  Outcome: Progressing  Goal: Absence of fever/infection during neutropenic period  Description: INTERVENTIONS:  - Monitor WBC    Outcome: Progressing     Problem: SAFETY ADULT  Goal: Patient will remain free of falls  Description: INTERVENTIONS:  - Educate patient/family on patient safety including physical limitations  - Instruct patient to call for assistance with activity   - Consult OT/PT to assist with strengthening/mobility   - Keep Call bell within reach  - Keep bed low and locked with side rails adjusted as appropriate  - Keep care items and personal belongings within reach  - Initiate and maintain comfort rounds  - Make Fall Risk Sign visible to staff  - Apply yellow socks and bracelet for high fall risk patients  - Consider moving patient to room near nurses station  Outcome: Progressing  Goal: Maintain or return to baseline ADL function  Description: INTERVENTIONS:  -  Assess patient's ability to carry out ADLs; assess patient's baseline for ADL function and identify physical deficits which impact ability to perform ADLs (bathing, care of mouth/teeth, toileting, grooming, dressing, etc.)  - Assess/evaluate cause of self-care deficits   - Assess range of motion  - Assess patient's mobility; develop plan if impaired  - Assess patient's need for assistive devices and provide as appropriate  - Encourage maximum independence but intervene and supervise when necessary  - Involve family in performance of ADLs  - Assess for home care needs following discharge   - Consider OT consult to assist with ADL evaluation and planning for discharge  - Provide patient education as appropriate  Outcome: Progressing  Goal: Maintains/Returns to pre admission functional level  Description: INTERVENTIONS:  - Perform BMAT or MOVE assessment daily.   - Set and communicate daily mobility goal to care team and patient/family/caregiver. - Collaborate with rehabilitation services on mobility goals if consulted  Problem: DISCHARGE PLANNING  Goal: Discharge to home or other facility with appropriate resources  Description: INTERVENTIONS:  - Identify barriers to discharge w/patient and caregiver  - Arrange for needed discharge resources and transportation as appropriate  - Identify discharge learning needs (meds, wound care, etc.)  - Arrange for interpretive services to assist at discharge as needed  - Refer to Case Management Department for coordinating discharge planning if the patient needs post-hospital services based on physician/advanced practitioner order or complex needs related to functional status, cognitive ability, or social support system  Outcome: Progressing     Problem: Knowledge Deficit  Goal: Patient/family/caregiver demonstrates understanding of disease process, treatment plan, medications, and discharge instructions  Description: Complete learning assessment and assess knowledge base.   Interventions:  - Provide teaching at level of understanding  - Provide teaching via preferred learning methods  Outcome: Progressing     Problem: Prexisting or High Potential for Compromised Skin Integrity  Goal: Skin integrity is maintained or improved  Description: INTERVENTIONS:  - Identify patients at risk for skin breakdown  - Assess and monitor skin integrity  - Assess and monitor nutrition and hydration status  - Monitor labs   - Assess for incontinence   - Turn and reposition patient  - Assist with mobility/ambulation  - Relieve pressure over bony prominences  - Avoid friction and shearing  - Provide appropriate hygiene as needed including keeping skin clean and dry  - Evaluate need for skin moisturizer/barrier cream  - Collaborate with interdisciplinary team   - Patient/family teaching  - Consider wound care consult   Outcome: Progressing     Problem: Nutrition/Hydration-ADULT  Goal: Nutrient/Hydration intake appropriate for improving, restoring or maintaining nutritional needs  Description: Monitor and assess patient's nutrition/hydration status for malnutrition. Collaborate with interdisciplinary team and initiate plan and interventions as ordered. Monitor patient's weight and dietary intake as ordered or per policy. Utilize nutrition screening tool and intervene as necessary. Determine patient's food preferences and provide high-protein, high-caloric foods as appropriate.      INTERVENTIONS:  - Monitor oral intake, urinary output, labs, and treatment plans  - Assess nutrition and hydration status and recommend course of action  - Evaluate amount of meals eaten  - Assist patient with eating if necessary   - Allow adequate time for meals  - Recommend/ encourage appropriate diets, oral nutritional supplements, and vitamin/mineral supplements  - Order, calculate, and assess calorie counts as needed  - Recommend, monitor, and adjust tube feedings and TPN/PPN based on assessed needs  - Assess need for intravenous fluids  - Provide specific nutrition/hydration education as appropriate  - Include patient/family/caregiver in decisions related to nutrition  Outcome: Progressing     - Record patient progress and toleration of activity level   Outcome: Progressing     - Out of bed for toileting  - Record patient progress and toleration of activity level   Outcome: Progressing     - Apply yellow socks and bracelet for high fall risk patients  - Consider moving patient to room near nurses station  Outcome: Progressing

## 2023-11-07 NOTE — CASE MANAGEMENT
Case Management Discharge Planning Note    Patient name Junior Madsen  Location East 4 /E4 -* MRN [id number]  : 1930 Date 2023       Current Admission Date: 11/3/2023  Current Admission Diagnosis:Intestinal obstruction [facility])   Patient Active Problem List    Diagnosis Date Noted    Intestinal obstruction ([address]) 2023    Urinary incontinence 08/10/2021    Pacemaker ( 2021 ) 2021    Complete heart block ([address]) -   PPM ( Medtronic) 2021    Anxiety 2018    Gastric erosion, -  resolved 2016    Peripheral neuropathy 2016    Constipation 2016    Forgetfulness 2016    Left shoulder tendonitis 2016    Dysphagia - improved 2015    Discoid lupus erythematosus 2012    Osteoarthritis of knee 2012      LOS (days): 4  Geometric Mean LOS (GMLOS) (days): 3.10  Days to GMLOS:-1     OBJECTIVE:  Risk of Unplanned Readmission Score: 14.86         Current admission status: Inpatient   Preferred Pharmacy:   [address], [address] [address]  Phone: [phone] Fax: [address] [facility] [address], [address] [address]  Phone: [phone] Fax: [address] [place] [address]  Phone: [phone] Fax: [phone]    [facility]  [address]  [address] [place] [address]  Phone: [phone] Fax: [phone]    Primary Care Provider: No primary care provider on file.     Primary Insurance: MEDICARE  Secondary Insurance: BLUE CROSS    DISCHARGE DETAILS:    Discharge planning discussed with[de-identified] Daughter Valentin mayorga                      Contacts  Patient Contacts: Valentin mayorga (Daughter)  Contact Method: Phone  Phone Number: [phone]  Reason/Outcome: Referral, Discharge Planning, Continuity of [address]         Is the patient interested in [facility] at discharge?: Yes  [address] requested[de-identified] Physical [address] Name[de-identified] Other  [facility] Provider[de-identified] PCP  Home Health Services Needed[de-identified] Evaluate Functional Status and Safety, Gait/ADL Training  Homebound Criteria Met[de-identified] Requires the Assistance of Another Person for Safe Ambulation or to Leave the Home  Supporting Clincal Findings[de-identified] Limited Endurance, Cognitive Deficit Requiring the Assistance of Others, Fatigues Easliy in Short Distances    DME Referral Provided  Referral made for DME?: No  Additional Comments: Deep spoke with Dr Baudilio Sanchez who medically cleared pt for dc tomorrow. Per [facility] policy, pt must return to facility no later than 130pm for which MD was made aware. cm spoke with Kb Jovel who states her sister Inna Saenz can transport pt back to [facility] upon dc. Cm waiting to hear back from [facility] about whether they can provide pt or not, however cm [facility] the [facility]. cm available to coordinate dc tomorrow.

## 2023-11-07 NOTE — PROGRESS NOTES
Progress Note - General Surgery  Maninder Sandoval 80 y.o. female MRN: [id number]  Unit/Bed#: E4 -01 Encounter: [id number]      Assessment:  80 y.o. 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 Improving. Afebrile, HDS  WBC 7.45 from 9.34  Hgb 10.1 from 9.8  Cr: 0.58 from 0.53    Plan:  - Advance to soft diet  - Discontinue fluids  - PRN analgesia and antiemetics  - Encourage OOB and ambulation  - Holding home warfarin  - PT/OT  - DVT ppx with lovenox  - Disposition planning. Possible DC today pending clinical progression. Subjective: Patient with delirium overnight, given low dose seroquel which appears to have helped. Patient with numerous bowel movements overnight. Afebrile, HDS. Denies nausea, vomiting, fevers, chills. Tolerating liquid diet. Drowsy this morning      Objective:   Temp:  [98.4 °F (36.9 °C)-98.9 °F (37.2 °C)] 98.5 °F (36.9 °C)  HR:  [50-62] 62  Resp:  [18] 18  BP: (129-152)/(65-83) 152/74    Physical Exam:  General: No acute distress, alert, tired appearing  CV: Well perfused, regular rate and rhythm  Lungs: Normal work of breathing, no increased respiratory effort  Abdomen: Soft, appropriately-tender, non-distended. Incision(s) clean, dry and intact. Extremities: No edema, clubbing or cyanosis  Skin: Warm, dry      I/O         11/05 0701 11/06 0700 11/06 0701 11/07 0700 11/07 0701 11/08 0700    P. O. 130      I.V. (mL/kg) 1422.1 (22.2) 2183.7 (34.6)     IV Piggyback 350      Total Intake(mL/kg) 1902.1 (29.7) 2183.7 (34.6)     Urine (mL/kg/hr) 1140 (0.7) 550 (0.4)     Stool  0     Total Output 1140 550     Net +762.1 +1633.7            Unmeasured Stool Occurrence  6 x             Lab, Imaging and other studies: I have personally reviewed pertinent reports.   , CBC with diff:   Lab Results   Component Value Date    WBC 7.45 11/07/2023    HGB 10.1 (L) 11/07/2023    HCT 33.9 (L) 11/07/2023    MCV 99 (H) 11/07/2023     11/07/2023    RBC 3.43 (L) 11/07/2023    MCH 29.4 11/07/2023    MCHC 29.8 (L) 11/07/2023    RDW 13.9 11/07/2023    MPV 9.5 11/07/2023    NRBC 0 11/07/2023   , BMP/CMP:   Lab Results   Component Value Date    SODIUM 139 11/07/2023    K 3.7 11/07/2023     (H) 11/07/2023    CO2 23 11/07/2023    BUN 8 11/07/2023    CREATININE 0.58 (L) 11/07/2023    CALCIUM 8.2 (L) 11/07/2023    EGFR 80 11/07/2023           Anuj Marx MD  11/7/2023 7:52 AM

## 2023-11-07 NOTE — DISCHARGE INSTR - AVS FIRST PAGE
Please follow-up as scheduled. Activity:  - No lifting greater than 20 pounds or strenuous physical activity or exercise for 2 weeks. - Walking and normal light activities are encouraged. - Normal daily activities including climbing steps are okay. - No driving until no longer using pain medications. Diet:    - You may resume your normal diet. Wound Care:  - May shower daily. No tub baths or swimming until cleared by your surgeon.  - Wash incision gently with soap and water and pat dry. - Do not apply any creams or ointments unless instructed to do so by your surgeon.  - Matthew Aliment may apply ice as needed (no longer than 20 minutes at a time) for the first 48 hours. - Bruising is not unusual and will go away with a little time. You may apply a warm, moist compress that may help the bruising resolve quicker. - You may remove the dressings the day after surgery (unless otherwise instructed). Leave any skin tapes (steri-strips) on the incision(s) in place until they fall off on their own. Any new dressings are optional.     Medications:    - You may resume all of your regular medications, EXCEPT for coumadin. Please refer to your discharge medication list for further details. Please follow up with your cardiologist to have a risk benefit discussion regarding continuation of coumadin given high risk of falls, including recent fall with headstrike in the hospital requiring CT imaging.  - Please take the pain medications as directed. - You are encouraged to use non-narcotic pain medications first and whenever possible. Reserve the use of narcotic pain medication for moderate to severe pain not controlled by non-narcotic medications.  - No driving while taking narcotic pain medications. - You may become constipated, especially if taking pain medications. You may take any over the counter stool softeners or laxatives as needed. Examples: Milk of Magnesia, Colace, Senna.     Follow Up:  -Please follow up with surgery team in 2 weeks as instructed. -Please follow up as soon as possible with your cardiologist to have risk/benefit discussion regarding continuation of coumadin.  -Please follow up within 4 weeks with your PCP, Dr. Yoana Marrufo, to discuss incidental finding of pancreatic cyst.    Additional Instructions:  - If you have any questions or concerns after discharge please call the office.  - Call office or return to ER if fever greater than 101, chills, persistent nausea/vomiting, worsening/uncontrollable pain, and/or increasing redness or purulent/foul smelling drainage from incision(s).

## 2023-11-08 VITALS
TEMPERATURE: 97.9 F | DIASTOLIC BLOOD PRESSURE: 72 MMHG | BODY MASS INDEX: 25.64 KG/M2 | OXYGEN SATURATION: 94 % | HEIGHT: 62 IN | HEART RATE: 51 BPM | SYSTOLIC BLOOD PRESSURE: 160 MMHG | WEIGHT: 139.33 LBS | RESPIRATION RATE: 18 BRPM

## 2023-11-08 DIAGNOSIS — K46.1: Primary | ICD-10-CM

## 2023-11-08 PROCEDURE — 97535 SELF CARE MNGMENT TRAINING: CPT

## 2023-11-08 PROCEDURE — 97530 THERAPEUTIC ACTIVITIES: CPT

## 2023-11-08 PROCEDURE — NC001 PR NO CHARGE: Performed by: SURGERY

## 2023-11-08 RX ADMIN — HEPARIN SODIUM 5000 UNITS: 5000 INJECTION INTRAVENOUS; SUBCUTANEOUS at 06:06

## 2023-11-08 RX ADMIN — AMLODIPINE BESYLATE 5 MG: 5 TABLET ORAL at 08:21

## 2023-11-08 RX ADMIN — POTASSIUM & SODIUM PHOSPHATES POWDER PACK 280-160-250 MG 2 PACKET: 280-160-250 PACK at 08:21

## 2023-11-08 NOTE — CASE MANAGEMENT
Case Management Discharge Planning Note    Patient name Marissa Coronado  Location East 4 /E4 -* MRN [id number]  : 1930 Date 2023       Current Admission Date: 11/3/2023  Current Admission Diagnosis:Intestinal obstruction [facility])   Patient Active Problem List    Diagnosis Date Noted    Intestinal obstruction ([address]) 2023    Urinary incontinence 08/10/2021    Pacemaker ( 2021 ) 2021    Complete heart block ([address]) -   PPM ( Medtronic) 2021    Anxiety 2018    Gastric erosion, -  resolved 2016    Peripheral neuropathy 2016    Constipation 2016    Forgetfulness 2016    Left shoulder tendonitis 2016    Dysphagia - improved 2015    Discoid lupus erythematosus 2012    Osteoarthritis of knee 2012      LOS (days): 5  Geometric Mean LOS (GMLOS) (days): 3.10  Days to GMLOS:-1.7     OBJECTIVE:  Risk of Unplanned Readmission Score: 15.05         Current admission status: Inpatient   Preferred Pharmacy:   [address] [facility] [facility] [address], [address] [address]  Phone: [phone] Fax: [address] [facility], [address] [address]  Phone: [phone] Fax: [address] [place] [address]  Phone: [phone] Fax: [phone]    [facility], [address] [place] [address]  [address] [place] [address]  Phone: [phone] Fax: [phone]    Primary Care Provider: No primary care provider on file.     Primary Insurance: MEDICARE  Secondary Insurance: BLUE CROSS    DISCHARGE DETAILS:    Discharge planning discussed with[de-identified] Daughter Florecita Delude of Choice: Yes     CM contacted family/caregiver?: Yes  Were Treatment Team discharge recommendations reviewed with patient/caregiver?: Yes  Did patient/caregiver verbalize understanding of patient care needs?: Yes  Were patient/caregiver advised of the risks associated with not following Treatment Team discharge recommendations?: Yes    Contacts  Patient Contacts: Edwardo Leon (Daughter)  Relationship to Patient[de-identified] Family  Contact Method: Phone  Phone Number: [phone]  Reason/Outcome: Discharge Planning, [address]         Is the patient interested in [address] at discharge?: Yes  [facility] requested[de-identified] Physical [address] Name[de-identified] [facility] Provider[de-identified] PCP  Home Health Services Needed[de-identified] Evaluate Functional Status and Safety, Strengthening/Theraputic Exercises to Improve Function  Homebound Criteria Met[de-identified] Requires the Assistance of Another Person for Safe Ambulation or to Leave the Home  Supporting Clincal Findings[de-identified] Limited Endurance, Cognitive Deficit Requiring the Assistance of Others, Fatigues Easliy in Short Distances    DME Referral Provided  Referral made for DME?: No    Other Referral/Resources/Interventions Provided:  Interventions: HHA     Transport at Discharge : Automobile   Transfer Mode: Self  Accompanied by: Family member     IMM Given (Date):: 11/08/23  IMM Given to[de-identified] Family  Family notified[de-identified] Reviewed IMM with sandee Ashley over the phone, signed verbally  Additional Comments: Cm coordinated dc plans with [facility] who is able to accept pt for hhpt upon dc, Sandee Ashley will be transporting pt back to [facility]. cm will let Girish Ashley know once RN is ready to dc patient. cm available as needed.

## 2023-11-08 NOTE — NURSING NOTE
Left VM for [facility] regarding discharge instructions. Left message about no new scripts, follow up appoints and wound care. Also notified [facility] about bowel incontinence and recommending pull ups and increased supervision during first few days after discharge. Taken down via wheelchair for ride home with [facility] [employer].

## 2023-11-08 NOTE — PLAN OF CARE
Problem: Potential for Falls  Goal: Patient will remain free of falls  Description: INTERVENTIONS:  - Educate patient/family on patient safety including physical limitations  - Instruct patient to call for assistance with activity   - Consult OT/PT to assist with strengthening/mobility   - Keep Call bell within reach  - Keep bed low and locked with side rails adjusted as appropriate  - Keep care items and personal belongings within reach  - Initiate and maintain comfort rounds  - Make Fall Risk Sign visible to staff  - Offer Toileting every 2 Hours, in advance of need  - Initiate/Maintain bed/chair alarm  - Obtain necessary fall risk management equipment:   - Apply yellow socks and bracelet for high fall risk patients  - Consider moving patient to room near nurses station  Outcome: Progressing     Problem: MOBILITY - ADULT  Goal: Maintain or return to baseline ADL function  Description: INTERVENTIONS:  -  Assess patient's ability to carry out ADLs; assess patient's baseline for ADL function and identify physical deficits which impact ability to perform ADLs (bathing, care of mouth/teeth, toileting, grooming, dressing, etc.)  - Assess/evaluate cause of self-care deficits   - Assess range of motion  - Assess patient's mobility; develop plan if impaired  - Assess patient's need for assistive devices and provide as appropriate  - Encourage maximum independence but intervene and supervise when necessary  - Involve family in performance of ADLs  - Assess for home care needs following discharge   - Consider OT consult to assist with ADL evaluation and planning for discharge  - Provide patient education as appropriate  Outcome: Progressing  Goal: Maintains/Returns to pre admission functional level  Description: INTERVENTIONS:  - Perform BMAT or MOVE assessment daily.   - Set and communicate daily mobility goal to care team and patient/family/caregiver.    - Collaborate with rehabilitation services on mobility goals if consulted  - Perform Range of Motion 3 times a day. - Reposition patient every 2 hours.   - Dangle patient 3 times a day  - Stand patient 3 times a day  - Ambulate patient 3 times a day  - Out of bed to chair 3 times a day   - Out of bed for meals 3 times a day  - Out of bed for toileting  - Record patient progress and toleration of activity level   Outcome: Progressing     Problem: PAIN - ADULT  Goal: Verbalizes/displays adequate comfort level or baseline comfort level  Description: Interventions:  - Encourage patient to monitor pain and request assistance  - Assess pain using appropriate pain scale  - Administer analgesics based on type and severity of pain and evaluate response  - Implement non-pharmacological measures as appropriate and evaluate response  - Consider cultural and social influences on pain and pain management  - Notify physician/advanced practitioner if interventions unsuccessful or patient reports new pain  Outcome: Progressing     Problem: INFECTION - ADULT  Goal: Absence or prevention of progression during hospitalization  Description: INTERVENTIONS:  - Assess and monitor for signs and symptoms of infection  - Monitor lab/diagnostic results  - Monitor all insertion sites, i.e. indwelling lines, tubes, and drains  - Monitor endotracheal if appropriate and nasal secretions for changes in amount and color  - [place] appropriate cooling/warming therapies per order  - Administer medications as ordered  - Instruct and encourage patient and family to use good hand hygiene technique  - Identify and instruct in appropriate isolation precautions for identified infection/condition  Outcome: Progressing  Goal: Absence of fever/infection during neutropenic period  Description: INTERVENTIONS:  - Monitor WBC    Outcome: Progressing     Problem: SAFETY ADULT  Goal: Patient will remain free of falls  Description: INTERVENTIONS:  - Educate patient/family on patient safety including physical limitations  - Instruct patient to call for assistance with activity   - Consult OT/PT to assist with strengthening/mobility   - Keep Call bell within reach  - Keep bed low and locked with side rails adjusted as appropriate  - Keep care items and personal belongings within reach  - Initiate and maintain comfort rounds  - Make Fall Risk Sign visible to staff  - Offer Toileting every 2 Hours, in advance of need  - Initiate/Maintain bed/chair alarm  - Obtain necessary fall risk management equipment: alarms  - Apply yellow socks and bracelet for high fall risk patients  - Consider moving patient to room near nurses station  Outcome: Progressing  Goal: Maintain or return to baseline ADL function  Description: INTERVENTIONS:  -  Assess patient's ability to carry out ADLs; assess patient's baseline for ADL function and identify physical deficits which impact ability to perform ADLs (bathing, care of mouth/teeth, toileting, grooming, dressing, etc.)  - Assess/evaluate cause of self-care deficits   - Assess range of motion  - Assess patient's mobility; develop plan if impaired  - Assess patient's need for assistive devices and provide as appropriate  - Encourage maximum independence but intervene and supervise when necessary  - Involve family in performance of ADLs  - Assess for home care needs following discharge   - Consider OT consult to assist with ADL evaluation and planning for discharge  - Provide patient education as appropriate  Outcome: Progressing  Goal: Maintains/Returns to pre admission functional level  Description: INTERVENTIONS:  - Perform BMAT or MOVE assessment daily.   - Set and communicate daily mobility goal to care team and patient/family/caregiver. - Collaborate with rehabilitation services on mobility goals if consulted  - Perform Range of Motion 4 times a day. - Reposition patient every 3 hours.   - Dangle patient 3 times a day  - Stand patient 3 times a day  - Ambulate patient *** times a day  - Out of bed to chair *** times a day   - Out of bed for meals *** times a day  - Out of bed for toileting  - Record patient progress and toleration of activity level   Outcome: Progressing     Problem: DISCHARGE PLANNING  Goal: Discharge to home or other facility with appropriate resources  Description: INTERVENTIONS:  - Identify barriers to discharge w/patient and caregiver  - Arrange for needed discharge resources and transportation as appropriate  - Identify discharge learning needs (meds, wound care, etc.)  - Arrange for interpretive services to assist at discharge as needed  - Refer to Case Management Department for coordinating discharge planning if the patient needs post-hospital services based on physician/advanced practitioner order or complex needs related to functional status, cognitive ability, or social support system  Outcome: Progressing     Problem: Knowledge Deficit  Goal: Patient/family/caregiver demonstrates understanding of disease process, treatment plan, medications, and discharge instructions  Description: Complete learning assessment and assess knowledge base.   Interventions:  - Provide teaching at level of understanding  - Provide teaching via preferred learning methods  Outcome: Progressing     Problem: Prexisting or High Potential for Compromised Skin Integrity  Goal: Skin integrity is maintained or improved  Description: INTERVENTIONS:  - Identify patients at risk for skin breakdown  - Assess and monitor skin integrity  - Assess and monitor nutrition and hydration status  - Monitor labs   - Assess for incontinence   - Turn and reposition patient  - Assist with mobility/ambulation  - Relieve pressure over bony prominences  - Avoid friction and shearing  - Provide appropriate hygiene as needed including keeping skin clean and dry  - Evaluate need for skin moisturizer/barrier cream  - Collaborate with interdisciplinary team   - Patient/family teaching  - Consider wound care consult   Outcome: Progressing Problem: Nutrition/Hydration-ADULT  Goal: Nutrient/Hydration intake appropriate for improving, restoring or maintaining nutritional needs  Description: Monitor and assess patient's nutrition/hydration status for malnutrition. Collaborate with interdisciplinary team and initiate plan and interventions as ordered. Monitor patient's weight and dietary intake as ordered or per policy. Utilize nutrition screening tool and intervene as necessary. Determine patient's food preferences and provide high-protein, high-caloric foods as appropriate.      INTERVENTIONS:  - Monitor oral intake, urinary output, labs, and treatment plans  - Assess nutrition and hydration status and recommend course of action  - Evaluate amount of meals eaten  - Assist patient with eating if necessary   - Allow adequate time for meals  - Recommend/ encourage appropriate diets, oral nutritional supplements, and vitamin/mineral supplements  - Order, calculate, and assess calorie counts as needed  - Recommend, monitor, and adjust tube feedings and TPN/PPN based on assessed needs  - Assess need for intravenous fluids  - Provide specific nutrition/hydration education as appropriate  - Include patient/family/caregiver in decisions related to nutrition  Outcome: Progressing

## 2023-11-08 NOTE — PLAN OF CARE
Problem: Potential for Falls  Goal: Patient will remain free of falls  Description: INTERVENTIONS:  - Educate patient/family on patient safety including physical limitations  - Instruct patient to call for assistance with activity   - Consult OT/PT to assist with strengthening/mobility   - Keep Call bell within reach  - Keep bed low and locked with side rails adjusted as appropriate  - Keep care items and personal belongings within reach  - Initiate and maintain comfort rounds  - Make Fall Risk Sign visible to staff  - Offer Toileting every 2 Hours, in advance of need  - Initiate/Maintain Bed alarm  - Obtain necessary fall risk management equipment  - Apply yellow socks and bracelet for high fall risk patients  - Consider moving patient to room near nurses station  Outcome: Progressing     Problem: MOBILITY - ADULT  Goal: Maintain or return to baseline ADL function  Description: INTERVENTIONS:  -  Assess patient's ability to carry out ADLs; assess patient's baseline for ADL function and identify physical deficits which impact ability to perform ADLs (bathing, care of mouth/teeth, toileting, grooming, dressing, etc.)  - Assess/evaluate cause of self-care deficits   - Assess range of motion  - Assess patient's mobility; develop plan if impaired  - Assess patient's need for assistive devices and provide as appropriate  - Encourage maximum independence but intervene and supervise when necessary  - Involve family in performance of ADLs  - Assess for home care needs following discharge   - Consider OT consult to assist with ADL evaluation and planning for discharge  - Provide patient education as appropriate  Outcome: Progressing  Goal: Maintains/Returns to pre admission functional level  Description: INTERVENTIONS:  - Perform BMAT or MOVE assessment daily.   - Set and communicate daily mobility goal to care team and patient/family/caregiver.    - Collaborate with rehabilitation services on mobility goals if consulted  - Perform Range of Motion 3 times a day. - Reposition patient every 2 hours.   - Dangle patient 3 times a day  - Stand patient 3 times a day  - Ambulate patient 3 times a day  - Out of bed to chair 3 times a day   - Out of bed for meals 3 times a day  - Out of bed for toileting  - Record patient progress and toleration of activity level   Outcome: Progressing     Problem: PAIN - ADULT  Goal: Verbalizes/displays adequate comfort level or baseline comfort level  Description: Interventions:  - Encourage patient to monitor pain and request assistance  - Assess pain using appropriate pain scale  - Administer analgesics based on type and severity of pain and evaluate response  - Implement non-pharmacological measures as appropriate and evaluate response  - Consider cultural and social influences on pain and pain management  - Notify physician/advanced practitioner if interventions unsuccessful or patient reports new pain  Outcome: Progressing     Problem: INFECTION - ADULT  Goal: Absence or prevention of progression during hospitalization  Description: INTERVENTIONS:  - Assess and monitor for signs and symptoms of infection  - Monitor lab/diagnostic results  - Monitor all insertion sites, i.e. indwelling lines, tubes, and drains  - Monitor endotracheal if appropriate and nasal secretions for changes in amount and color  - [place] appropriate cooling/warming therapies per order  - Administer medications as ordered  - Instruct and encourage patient and family to use good hand hygiene technique  - Identify and instruct in appropriate isolation precautions for identified infection/condition  Outcome: Progressing  Goal: Absence of fever/infection during neutropenic period  Description: INTERVENTIONS:  - Monitor WBC    Outcome: Progressing     Problem: SAFETY ADULT  Goal: Patient will remain free of falls  Description: INTERVENTIONS:  - Educate patient/family on patient safety including physical limitations  - Instruct patient to call for assistance with activity   - Consult OT/PT to assist with strengthening/mobility   - Keep Call bell within reach  - Keep bed low and locked with side rails adjusted as appropriate  - Keep care items and personal belongings within reach  - Initiate and maintain comfort rounds  - Make Fall Risk Sign visible to staff  - Offer Toileting every 2 Hours, in advance of need  - Initiate/Maintain Bed alarm  - Obtain necessary fall risk management equipment  - Apply yellow socks and bracelet for high fall risk patients  - Consider moving patient to room near nurses station  Outcome: Progressing  Goal: Maintain or return to baseline ADL function  Description: INTERVENTIONS:  -  Assess patient's ability to carry out ADLs; assess patient's baseline for ADL function and identify physical deficits which impact ability to perform ADLs (bathing, care of mouth/teeth, toileting, grooming, dressing, etc.)  - Assess/evaluate cause of self-care deficits   - Assess range of motion  - Assess patient's mobility; develop plan if impaired  - Assess patient's need for assistive devices and provide as appropriate  - Encourage maximum independence but intervene and supervise when necessary  - Involve family in performance of ADLs  - Assess for home care needs following discharge   - Consider OT consult to assist with ADL evaluation and planning for discharge  - Provide patient education as appropriate  Outcome: Progressing  Goal: Maintains/Returns to pre admission functional level  Description: INTERVENTIONS:  - Perform BMAT or MOVE assessment daily.   - Set and communicate daily mobility goal to care team and patient/family/caregiver. - Collaborate with rehabilitation services on mobility goals if consulted  - Perform Range of Motion 3 times a day. - Reposition patient every 2 hours.   - Dangle patient 3 times a day  - Stand patient 3 times a day  - Ambulate patient 3 times a day  - Out of bed to chair 3 times a day   - Out of bed for meals 3 times a day  - Out of bed for toileting  - Record patient progress and toleration of activity level   Outcome: Progressing     Problem: DISCHARGE PLANNING  Goal: Discharge to home or other facility with appropriate resources  Description: INTERVENTIONS:  - Identify barriers to discharge w/patient and caregiver  - Arrange for needed discharge resources and transportation as appropriate  - Identify discharge learning needs (meds, wound care, etc.)  - Arrange for interpretive services to assist at discharge as needed  - Refer to Case Management Department for coordinating discharge planning if the patient needs post-hospital services based on physician/advanced practitioner order or complex needs related to functional status, cognitive ability, or social support system  Outcome: Progressing     Problem: Knowledge Deficit  Goal: Patient/family/caregiver demonstrates understanding of disease process, treatment plan, medications, and discharge instructions  Description: Complete learning assessment and assess knowledge base.   Interventions:  - Provide teaching at level of understanding  - Provide teaching via preferred learning methods  Outcome: Progressing     Problem: Prexisting or High Potential for Compromised Skin Integrity  Goal: Skin integrity is maintained or improved  Description: INTERVENTIONS:  - Identify patients at risk for skin breakdown  - Assess and monitor skin integrity  - Assess and monitor nutrition and hydration status  - Monitor labs   - Assess for incontinence   - Turn and reposition patient  - Assist with mobility/ambulation  - Relieve pressure over bony prominences  - Avoid friction and shearing  - Provide appropriate hygiene as needed including keeping skin clean and dry  - Evaluate need for skin moisturizer/barrier cream  - Collaborate with interdisciplinary team   - Patient/family teaching  - Consider wound care consult   Outcome: Progressing     Problem: Nutrition/Hydration-ADULT  Goal: Nutrient/Hydration intake appropriate for improving, restoring or maintaining nutritional needs  Description: Monitor and assess patient's nutrition/hydration status for malnutrition. Collaborate with interdisciplinary team and initiate plan and interventions as ordered. Monitor patient's weight and dietary intake as ordered or per policy. Utilize nutrition screening tool and intervene as necessary. Determine patient's food preferences and provide high-protein, high-caloric foods as appropriate.      INTERVENTIONS:  - Monitor oral intake, urinary output, labs, and treatment plans  - Assess nutrition and hydration status and recommend course of action  - Evaluate amount of meals eaten  - Assist patient with eating if necessary   - Allow adequate time for meals  - Recommend/ encourage appropriate diets, oral nutritional supplements, and vitamin/mineral supplements  - Order, calculate, and assess calorie counts as needed  - Recommend, monitor, and adjust tube feedings and TPN/PPN based on assessed needs  - Assess need for intravenous fluids  - Provide specific nutrition/hydration education as appropriate  - Include patient/family/caregiver in decisions related to nutrition  Outcome: Progressing

## 2023-11-08 NOTE — PROGRESS NOTES
General Surgery  Progress Note   Cayden Mooney 80 y.o. female MRN: [id number]  Unit/Bed#: E4 -01 Encounter: [id number]    Assessment:  80 y.o. 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 AVSS on room air  UOP: 700 cc during PM shift +2 unmeasured occurrences    Plan:  - Continue soft diet  - PRN analgesia and antiemetics  - Encourage OOB and ambulation  - Holding home warfarin  - PT/OT  - DVT ppx with lovenox  - Dispo: DC today    Subjective/Objective     Subjective:   Patient seen and examined at bedside, in no acute distress. No acute events overnight. Patient reports that she is very bored overnight and would like to go home. Reports only minimal pain, no nausea vomiting fevers or chills chest pain or shortness of breath. Patient is unsure if she has had a bowel movement but reports she is passing flatus. Patient reports she had a little bit of food and drink some ensures yesterday and tolerated it without any nausea or vomiting. Objective:   Vitals:Blood pressure 154/74, pulse (!) 52, temperature 98.4 °F (36.9 °C), temperature source Temporal, resp. rate 18, height 5' 2" (1.575 m), weight 63.2 kg (139 lb 5.3 oz), SpO2 92 %. Temp (24hrs), Av °F (36.7 °C), Min:97.7 °F (36.5 °C), Max:98.4 °F (36.9 °C)      I/O          0701   0700  07 0700    P. O.  120    I.V. (mL/kg) 2183.7 (34.6) 20 (0.3)    Total Intake(mL/kg) 2183.7 (34.6) 140 (2.2)    Urine (mL/kg/hr) 550 (0.4)     Stool 0     Total Output 550     Net +1633.7 +140          Unmeasured Urine Occurrence  2 x    Unmeasured Stool Occurrence 6 x             Invasive Devices       Peripheral Intravenous Line  Duration             Peripheral IV 11/03/23 Right;Ventral (anterior) Forearm 4 days    Peripheral IV 11/07/23 Left;Ventral (anterior) Forearm 1 day              Line  Duration             IABP 4 days              Drain  Duration             External Urinary Catheter <1 day                    Physical Exam:  General: No acute distress  Neuro: Awake, Alert and Oriented x 3  HEENT:  Normocephalic, atraumatic, moist mucous membranes  CV: Regular rate and rhythm  Lungs: Normal work of breathing, no increased respiratory effort  Abdomen: Soft, tenderness with deep palpation around the umbilicus and right lower quadrant, minimal distention. Incision(s) clean, dry and intact.   Extremities: No edema, clubbing or cyanosis  Skin: Warm, dry    Lab Results   Component Value Date    WBC 7.45 11/07/2023    HGB 10.1 (L) 11/07/2023    HCT 33.9 (L) 11/07/2023    MCV 99 (H) 11/07/2023     11/07/2023      Lab Results   Component Value Date     12/22/2015    SODIUM 139 11/07/2023    K 3.7 11/07/2023     (H) 11/07/2023    CO2 23 11/07/2023    ANIONGAP 8 12/22/2015    AGAP 7 11/07/2023    BUN 8 11/07/2023    CREATININE 0.58 (L) 11/07/2023    GLUC 106 11/07/2023    GLUF 93 06/18/2019    CALCIUM 8.2 (L) 11/07/2023    AST 18 11/03/2023    ALT 19 11/03/2023    ALKPHOS 60 11/03/2023    PROT 7.7 12/22/2015    TP 7.0 11/03/2023    BILITOT 1.14 (H) 12/22/2015    TBILI 1.45 (H) 11/03/2023    EGFR 80 11/07/2023       VTE Prophylaxis: Sequential compression device (Venodyne)  and Heparin      Arlet Ndiaye MD  11/8/2023  6:26 AM

## 2023-11-09 ENCOUNTER — NURSE TRIAGE (OUTPATIENT)
Age: 88
End: 2023-11-09

## 2023-11-09 ENCOUNTER — TELEPHONE (OUTPATIENT)
Dept: CARDIOLOGY CLINIC | Facility: CLINIC | Age: 88
End: 2023-11-09

## 2023-11-09 PROCEDURE — 88307 TISSUE EXAM BY PATHOLOGIST: CPT | Performed by: SPECIALIST

## 2023-11-09 NOTE — PROGRESS NOTES
11/9/23~patient admitted 11/3/23, had bowel resection, discharged 11/8/23, Coumadin on hold till evaluated by Cardiology to decide weather to keep on Coumadin.   Next visit 11/21/23

## 2023-11-09 NOTE — TELEPHONE ENCOUNTER
Marisol Alba (daughter) called, advised patient admitted 11/3/23, had bowel resection, discharged 11/8/23, Coumadin on hold till evaluated by Cardiology to decide weather to keep on Coumadin. Next visit 11/21/23    States she spoke with surgeon, Dr Prateek Watson on Tuesday, they were discussing having patient restart Coumadin, she advised risk of patient falling and occurring injury while on Coumadin is higher than the 3 % stroke risk. Marisol Alba agreed with holding Coumadin until follow up with Dr Summer Slaughter to discuss further. Marisol Alba states that the facility where patient resides advised her that is a bad idea. She would like Dr Summer Slaguhter thoughts. Advised Dr Summer Slaughter not in the office, will discuss when he is available.

## 2023-11-09 NOTE — TELEPHONE ENCOUNTER
Patient's daughter calling in. Saw Dr. Janet Brown on 11/2/23. She was admitted to hospital on 11/3/23  Daughter would like know if she still needs the [address] since she had imaging done in ED. She would also like know if the swallowing  issues she was having when if the office was related to the bowel obstruction found in the ED. Reason for Disposition   Nursing judgment    Answer Assessment - Initial Assessment Questions  1. REASON FOR CALL or QUESTION: "What is your reason for calling today?" or "How can I best help you?" or "What question do you have that I can help answer?"      Patient's daughter calling in. Saw Dr. Janet Brown on 11/2/23. She was admitted to hospital on 11/3/23  Daughter would like know if she still needs the [address] since she had imaging done in ED. She would also like know if the swallowing  issues she was having when if the office was related to the bowel obstruction found in the ED. Protocols used:  Information Only Call - No Triage-ADULT-[place]

## 2023-11-13 ENCOUNTER — REMOTE DEVICE CLINIC VISIT (OUTPATIENT)
Dept: CARDIOLOGY CLINIC | Facility: CLINIC | Age: 88
End: 2023-11-13
Payer: MEDICARE

## 2023-11-13 DIAGNOSIS — Z95.0 PRESENCE OF CARDIAC PACEMAKER: Primary | ICD-10-CM

## 2023-11-13 PROCEDURE — 93296 REM INTERROG EVL PM/IDS: CPT | Performed by: INTERNAL MEDICINE

## 2023-11-13 PROCEDURE — 93294 REM INTERROG EVL PM/LDLS PM: CPT | Performed by: INTERNAL MEDICINE

## 2023-11-13 NOTE — PROGRESS NOTES
Results for orders placed or performed in visit on 11/13/23   Cardiac EP device report    Narrative    MDT DC PPM - ACTIVE SYSTEM IS MRI CONDITIONAL  CARELINK TRANSMISSION: BATTERY VOLTAGE ADEQUATE (9.8 YRS). AP: 14.3%. : 73.6% (>40%~MVP-OFF~AVB). ALL AVAILABLE LEAD PARAMETERS WITHIN NORMAL LIMITS. 53 AT/AF EPISODES W/ AF IN PROGRESS (HX OF SAME). AF BURDEN: 42.4%. PT IS NOT CURRENTLY TAKING AC DUE TO RECENT SURGERY, WILL FURTHER DISCUSS RISKS/BENEFITS W/ DR. AGEE ON 11/21/23. PT DOES NOT TAKE ANY BB. EF: 50% (ECHO 10/23/23). NORMAL DEVICE FUNCTION.  [id number] [id number] [facility]

## 2023-11-22 ENCOUNTER — OFFICE VISIT (OUTPATIENT)
Dept: SURGERY | Facility: CLINIC | Age: 88
End: 2023-11-22

## 2023-11-22 VITALS
BODY MASS INDEX: 25.58 KG/M2 | WEIGHT: 139 LBS | HEIGHT: 62 IN | OXYGEN SATURATION: 98 % | TEMPERATURE: 98 F | HEART RATE: 66 BPM

## 2023-11-22 DIAGNOSIS — Z98.890 POST-OPERATIVE STATE: Primary | ICD-10-CM

## 2023-11-22 PROCEDURE — 99024 POSTOP FOLLOW-UP VISIT: CPT | Performed by: SURGERY

## 2023-11-22 NOTE — PROGRESS NOTES
Assessment/Plan:  Status post exploratory laparotomy with bowel resection. Staples were removed today and erythema appears reactive only and there does not appear to be any cellulitis. This can be open to air. Continue diet as tolerated. Discussed resumption of anticoagulation with Coumadin, at this time she is not anticoagulated. While safe from a surgical standpoint, there is risk of bleeding and life-threatening hemorrhage associated with Coumadin especially in the setting of increased fall risk. She did sustain a fall in the hospital while not anticoagulated. She and her daughter will discuss this with her cardiologist at the next appointment. No problem-specific Assessment & Plan notes found for this encounter. Diagnoses and all orders for this visit:    Post-operative state          Subjective:      Patient ID: Cassandra Garibay is a 80 y.o. female. She presents for postop status post exploratory laparotomy with small bowel resection and ileocecectomy for internal hernia. She is doing well, denies significant abdominal pain. She is eating and drinking without difficulty, denies any postprandial nausea distention or vomiting. No issues with constipation. Her facility nurse was concerned about erythema around the staples and a short course of antibiotics was prescribed, per patient's daughter she thinks this is improved. The following portions of the patient's history were reviewed and updated as appropriate: allergies, current medications, past family history, past medical history, past social history, past surgical history, and problem list.    Review of Systems   Skin:  Positive for wound. All other systems reviewed and are negative. Objective:      Pulse 66   Temp 98 °F (36.7 °C) (Tympanic)   Ht 5' 2" (1.575 m)   Wt 63 kg (139 lb)   LMP  (LMP Unknown)   SpO2 98%   BMI 25.42 kg/m²          Physical Exam  Vitals reviewed.    Constitutional:       General: She is not in acute distress. Appearance: Normal appearance. She is normal weight. Abdominal:      General: There is distension. Palpations: Abdomen is soft. Tenderness: There is no abdominal tenderness. There is no guarding or rebound. Neurological:      Mental Status: She is alert.

## 2023-12-01 ENCOUNTER — TELEPHONE (OUTPATIENT)
Dept: FAMILY MEDICINE CLINIC | Facility: CLINIC | Age: 88
End: 2023-12-01

## 2023-12-01 NOTE — TELEPHONE ENCOUNTER
Per Beto Screen: Following up on faxed paperwork for Dr Eddi Nicholson to approve the Referral for Outpatient Physical Therapy on 11/27/23. I informed Jackelyn Chandra that PT has no PCP listed, and the last time PT saw Dr Eddi Nicholson was 8/10/2021. Referral paperwork will not be completed until PT schedules appt, and we don't have any appts for PT. Jackelyn Chandra will clarify w/[facility], and call us back.

## 2023-12-06 ENCOUNTER — TELEPHONE (OUTPATIENT)
Dept: FAMILY MEDICINE CLINIC | Facility: CLINIC | Age: 88
End: 2023-12-06

## 2023-12-21 ENCOUNTER — OFFICE VISIT (OUTPATIENT)
Dept: CARDIOLOGY CLINIC | Facility: CLINIC | Age: 88
End: 2023-12-21
Payer: MEDICARE

## 2023-12-21 VITALS
WEIGHT: 124 LBS | OXYGEN SATURATION: 98 % | SYSTOLIC BLOOD PRESSURE: 130 MMHG | HEIGHT: 62 IN | BODY MASS INDEX: 22.82 KG/M2 | DIASTOLIC BLOOD PRESSURE: 80 MMHG | HEART RATE: 76 BPM

## 2023-12-21 DIAGNOSIS — I10 SYSTOLIC HYPERTENSION: ICD-10-CM

## 2023-12-21 DIAGNOSIS — R60.0 LEG EDEMA: Primary | ICD-10-CM

## 2023-12-21 PROCEDURE — 99214 OFFICE O/P EST MOD 30 MIN: CPT

## 2023-12-21 RX ORDER — AMLODIPINE BESYLATE 5 MG/1
5 TABLET ORAL EVERY MORNING
Qty: 90 TABLET | Refills: 2 | Status: SHIPPED | OUTPATIENT
Start: 2023-12-21

## 2023-12-21 RX ORDER — ASPIRIN 81 MG/1
81 TABLET, CHEWABLE ORAL DAILY
Qty: 90 TABLET | Refills: 3 | Status: SHIPPED | OUTPATIENT
Start: 2023-12-21

## 2023-12-21 NOTE — PROGRESS NOTES
Cardiology   MD Deandre Fritz MD, FACC  Sotero Eng DO, [facility] FACP Ather Mansoor, MD Rujul Patel, DO, [facility]  James Valencia DO, [facility]  -------------------------------------------------------------------  [facility]  [address] [place] [address]  Phone: [phone]  Fax: [phone]  12/21/23  Summer Blanton  YOB: 1930   MRN: [id number]      Referring Physician: Carmelo Aleman MD  [address] [place] [id number]     HPI: Summer Blanton is a 89 y.o. female with:   Complete heart block, with severe underlying conduction system disease  Status post dual chamber Medtronic pacemaker placed on February 9, 2021  Parox afib / aflutter  Symptomatic bradycardia   Palpitations  Anxiety  Nonischemic nuclear stress in 2018    She was admitted in November with small bowel obstruction, ultimately underwent surgery.  Was having bleeding at that time as well.  Has been off Coumadin since.  Has had some episodes of falling, 1 where she hit her head as well.    Review of Systems   Constitutional:  Negative for chills and fever.   HENT:  Negative for facial swelling and sore throat.    Eyes:  Negative for visual disturbance.   Respiratory:  Negative for cough, chest tightness, shortness of breath and wheezing.    Cardiovascular:  Negative for chest pain, palpitations and leg swelling.   Gastrointestinal:  Negative for abdominal pain, blood in stool, constipation, diarrhea, nausea and vomiting.   Endocrine: Negative for cold intolerance and heat intolerance.   Genitourinary:  Negative for decreased urine volume, difficulty urinating, dysuria and hematuria.   Musculoskeletal:  Negative for arthralgias, back pain and myalgias.   Skin:  Negative for rash.   Neurological:  Negative for dizziness, syncope, weakness and numbness.   Psychiatric/Behavioral:  Negative for agitation, behavioral problems and confusion. The patient is not  nervous/anxious.         OBJECTIVE  Vitals:    12/21/23 1246   BP: 130/80   Pulse: 76   SpO2: 98%       Physical Exam  Constitutional: awake, alert and oriented, in no acute distress, no obvious deformities  Head: Normocephalic, without obvious abnormality, atraumatic  Eyes: conjunctivae clear and moist. Sclera anicteric.  No xanthelasmas. Pupils equal bilaterally.  Extraocular motions are full.  Ear nose mouth and throat: ears are symmetrical bilaterally, hearing appears to be equal bilaterally, no nasal discharge or epistaxis, oropharynx is clear with moist mucous membranes  Neck:  Trachea is midline, neck is supple, no thyromegaly or significant lymphadenopathy, there is full range of motion.  Lungs: clear to auscultation bilaterally, no wheezes, no rales, no rhonchi, no accessory muscle use, breathing is nonlabored  Heart: regular rate and rhythm, S1, S2 normal, no murmur, no click, rub or gallop, no lower extremity edema  Abdomen: soft, non-tender; bowel sounds normal; no masses,  no organomegaly  Psychiatric:  Patient is oriented to time, place, person, mood/affect is negative for depression, anxiety, agitation, appears to have appropriate insight  Skin: Skin is warm, dry, intact. No obvious rashes or lesions on exposed extremities.  Nail beds are pink with no cyanosis or clubbing.      EKG:  No results found for this visit on 12/21/23.     IMPRESSION:  Complete heart block, with severe underlying conduction system disease  Status post dual chamber Medtronic pacemaker placed on February 9, 2021  Parox afib / aflutter  Symptomatic bradycardia   Palpitations  Anxiety  Nonischemic nuclear stress in 2018 [place], November 2023 where she hit her head  Hematuria    DISCUSSION/RECOMMENDATIONS:  She presents today for follow-up.  She was admitted in November with hematuria ultimately found to have small bowel obstruction and underwent surgery for this.  Was having some issues with hematuria and has fallen a few times,  1 where she hit her head.  Ever since her hospitalization she has been off the Coumadin and.  She has been stable with this.  Her OTC5OP1-CTDq is 3.  Given her current situation, her risk of injury while on full dose anticoagulation seems to be rather significant.  Would hold off on Coumadin going forward and instead will just try 81 mg aspirin.  She does have some lower extremity swelling today, she is on amlodipine and this may be causing it.  We just recommend compression stockings 15 to 20 mmHg for this  Will continue with routine device interrogations  Sent Rx for amlodipine today as well as aspirin    James Valencia DO, [facility], [facility]  --------------------------------------------------------------------------------  TREADMILL STRESS  No results found for this or any previous visit.     ----------------------------------------------------------------------------------------------  NUCLEAR STRESS TEST: No results found for this or any previous visit.    Results for orders placed during the hospital encounter of 18    NM myocardial perfusion spect (rx stress and/or rest)    [address] [address] [phone]    Rest/Stress Gated SPECT Myocardial Perfusion Imaging After Regadenoson    Patient: ISABEL GAONA  MR number: [id number]  Account number: [id number]  : 1930  Age: 87 years  Gender: Female  Status: Outpatient  Location: Stress lab  Height: 61 in  Weight: 126 lb  BP: 132/ 72 mmHg    Allergies: NO KNOWN ALLERGIES    Diagnosis: 794.31 - ABNORM ELECTROCARDIOGRAM, R07.9 - Chest pain, unspecified    Primary Physician:  London Farias DO  Technician:  Tanvi Meyer  RN:  Kiarra Salazar RN  Referring Physician:  London Farias DO  Group:  [facility]  Report Prepared By::  Tanvi Meyer  Interpreting Physician:  Tye Perea MD    INDICATIONS: Detection of Coronary artery disease.    HISTORY: The  patient is a 87 year old  female. Chest pain status: chest pain. Coronary artery disease risk factors: family history of premature coronary artery disease and post-menopausal state. Cardiovascular history: none  significant. Previous test results: abnormal ECG and abnormal resting echocardiogram.    PHYSICAL EXAM: Baseline physical exam screening: no wheezes audible.    REST ECG: Normal sinus rhythm. Left anterior fasicular block.    PROCEDURE: The study was performed in the stress lab. A regadenoson infusion pharmacologic stress test was performed. Gated SPECT myocardial perfusion imaging was performed after stress. Systolic blood pressure was 132 mmHg, at the start  of the study. Diastolic blood pressure was 72 mmHg, at the start of the study. The heart rate was 74 bpm, at the start of the study. IV double checked.  Regadenoson protocol:  HR bpm SBP mmHg DBP mmHg Symptoms  Baseline 74 132 72 none  Immediate 97 130 70 [place]  2 min 93 168 80 none  4 min 92 144 80 none  No medications or fluids given. The patient also performed low level exercise.    STRESS SUMMARY: Duration of pharmacologic stress was 3 min. Maximal heart rate during stress was 97 bpm ( 81 % of maximal predicted heart rate). The rate-pressure product for the peak heart rate and blood pressure was 02611. There was no  chest pain during stress. The stress test was terminated due to protocol completion. Pre oxygen saturation: 97 %. Peak oxygen saturation: 97 %. The stress ECG was negative for ischemia and normal. Arrhythmia during stress: isolated  premature ventricular beats.    ISOTOPE ADMINISTRATION:  Resting isotope administration Stress isotope administration  Agent Tetrofosmin Tetrofosmin  Dose 10.4 mCi 1430 mCi  Date 06/13/2018 06/13/2018  Injection time 12:06 13:15  Imaging time 12:41 13:49  Injection-image interval 35 min 34 min    The radiopharmaceutical was injected at the peak effect of pharmacologic stress.    MYOCARDIAL  PERFUSION IMAGING:  The image quality was good. Left ventricular size was normal. The TID ratio was 0.87.    PERFUSION DEFECTS:  -  There were no perfusion defects.    GATED SPECT:  The calculated left ventricular ejection fraction was 68 %. Left ventricular ejection fraction was within normal limits by visual estimate. There was no left ventricular regional abnormality.    SUMMARY:  -  Stress results: Target heart rate was not achieved. There was no chest pain during stress.  -  ECG conclusions: The stress ECG was negative for ischemia and normal.  -  Perfusion imaging: There were no perfusion defects.  -  Gated SPECT: The calculated left ventricular ejection fraction was 68 %. Left ventricular ejection fraction was within normal limits by visual estimate. There was no left ventricular regional abnormality.    IMPRESSIONS: Normal study after pharmacologic vasodilation. Myocardial perfusion imaging was normal at rest and with stress.    Prepared and signed by    Tye Perea MD  Signed 2018 15:34:56      --------------------------------------------------------------------------------  CATH:  No results found for this or any previous visit.    --------------------------------------------------------------------------------  ECHO:   Results for orders placed during the hospital encounter of 21    Echo complete with contrast if indicated    [address] [address] [phone]    Transthoracic Echocardiogram  Limited 2D, M-mode, Doppler, and Color Doppler    Study date:  2021    Patient: ISABEL GAONA  MR number: [id number]  Account number: [id number]  : 23-Dec-1930  Age: 89 years  Gender: Female  Status: Inpatient  Location: Emergency room  Height: 61 in  Weight: 128.7 lb  BP: 205/ 91 mmHg    Indications: Complete heart block/pre-pacemaker insertion    Diagnoses: I44.2 - Atrioventricular block, complete    Sonographer:   Sotero Schmidt [facility]  Primary Physician:  London Farias DO  Referring Physician:  Mike Cotto PA-C  Group:  [facility]  Interpreting Physician:  James Valencia D.O.    SUMMARY    LEFT VENTRICLE:  Systolic function was normal by visual assessment. Ejection fraction was estimated to be 55 %.  There were no regional wall motion abnormalities.  Wall thickness was mildly increased.  There was mild concentric hypertrophy.    LEFT ATRIUM:  The atrium was markedly dilated. LA Volume index 47 mL/m2.    RIGHT ATRIUM:  The atrium was moderately to markedly dilated.    MITRAL VALVE:  There was trace regurgitation.    AORTIC VALVE:  There was mild regurgitation.    TRICUSPID VALVE:  There was moderate regurgitation.    AORTA:  The root exhibited mild dilatation.  There was mild dilatation of the ascending aorta to 3.9 cm.    SUMMARY MEASUREMENTS  2D measurements:  Unspecified Anatomy:   %FS was 46.5 %.  Ao Diam was 3.6 cm.  EDV(Teich) was 125.4 ml.  EF(Teich) was 77.6 %.  ESV(Teich) was 28.1 ml.  HR_4Ch_Q was 32.4 BPM.  IVSd was 1 cm.  LA Diam was 3.3 cm.  LAAs A2C was 21.8 cm2.  LAAs A4C was 23.7  cm2.  LAESV A-L A2C was 66.9 ml.  LAESV A-L A4C was 81.2 ml.  LAESV Index (A-L) was 47.5 ml/m2.  LAESV MOD A2C was 62.5 ml.  LAESV MOD A4C was 76.5 ml.  LAESV(A-L) was 74.5 ml.  LAESV(MOD BP) was 69.7 ml.  LAESVInd MOD BP was 44.4 ml/m2.  LALs A2C was 6 cm.  LALs A4C was 5.9 cm.  LVCO_4Ch_Q was 1.9 L/min.  LVEF_4Ch_Q was 58.3 %.  LVIDd was 5.1 cm.  LVIDs was 2.7 cm.  LVLd_4Ch_Q was 7.6 cm.  LVLs_4Ch_Q was 6 cm.  LVPWd was 1.1 cm.  LVSV_4Ch_Q was 60.1 ml.  LVVED_4Ch_Q was  103.1 ml.  LVVES_4Ch_Q was 43 ml.  RAAs A4C was 18.8 cm2.  RAESV A-L was 55.9 ml.  RAESV MOD was 53.8 ml.  RALs was 5.4 cm.  RVIDd was 3.9 cm.  SV(Teich) was 97.3 ml.  CW measurements:  Unspecified Anatomy:   AR Dec [place] was 1.4 m/s2.  AR Dec Time was 3519.3 ms.  AR PHT was 1020.6 ms.  AR Vmax was 5.1 m/s.  AR maxPG was 102.9 mmHg.   AV Vmax was 1.9 m/s.  AV maxPG was 14.6 mmHg.  TR Vmax was 3.5 m/s.  TR maxPG was 49.7  mmHg.  MM measurements:  Unspecified Anatomy:   TAPSE was 3 cm.  PW measurements:  Unspecified Anatomy:   LVOT Vmax was 1.5 m/s.  LVOT maxPG was 8.6 mmHg.  MV A Riaz was 1.1 m/s.  MV Dec [place] was 5.3 m/s2.  MV DecT was 191.3 ms.  MV E Riaz was 1 m/s.  MV E/A Ratio was 0.9 .  MV PHT was 55.5 ms.  MVA By PHT was 4 cm2.    HISTORY: PRIOR HISTORY: Second-degree AV block, HTN    PROCEDURE: The procedure was performed in the emergency room. This was a routine study. The transthoracic approach was used. The study included limited 2D imaging, M-mode, limited spectral Doppler, and color Doppler. Image quality was  adequate.    LEFT VENTRICLE: Size was normal. Systolic function was normal by visual assessment. Ejection fraction was estimated to be 55 %. There were no regional wall motion abnormalities. Wall thickness was mildly increased. There was mild  concentric hypertrophy. DOPPLER: There was an increased relative contribution of atrial contraction to ventricular filling.    RIGHT VENTRICLE: The size was normal. Systolic function was normal. Wall thickness was normal.    LEFT ATRIUM: The atrium was markedly dilated. LA Volume index 47 mL/m2.    RIGHT ATRIUM: The atrium was moderately to markedly dilated.    MITRAL VALVE: Valve structure was normal. There was mild-moderate calcification of the anterior leaflet. There was normal leaflet separation. DOPPLER: The transmitral velocity was within the normal range. There was no evidence for  stenosis. There was trace regurgitation.    AORTIC VALVE: The valve was trileaflet. Leaflets exhibited normal thickness, mild calcification, and normal cuspal separation. DOPPLER: Transaortic velocity was within the normal range. There was no evidence for stenosis. There was mild  regurgitation.    TRICUSPID VALVE: The valve structure was normal. There was normal leaflet separation. DOPPLER: The  transtricuspid velocity was within the normal range. There was no evidence for stenosis. There was moderate regurgitation. Estimated peak PA  pressure was 53 mmHg. The findings suggest mild to moderate pulmonary hypertension.    PULMONIC VALVE: Leaflets exhibited normal thickness, no calcification, and normal cuspal separation. DOPPLER: The transpulmonic velocity was within the normal range. There was no regurgitation.    PERICARDIUM: There was no pericardial effusion. The pericardium was normal in appearance.    AORTA: The root exhibited mild dilatation. There was mild dilatation of the ascending aorta to 3.9 cm.    SYSTEMIC VEINS: IVC: The inferior vena cava was normal in size and course. Respirophasic changes were normal.    SYSTEM MEASUREMENT TABLES    2D  %FS: 46.5 %  Ao Diam: 3.6 cm  EDV(Teich): 125.4 ml  EF(Teich): 77.6 %  ESV(Teich): 28.1 ml  HR_4Ch_Q: 32.4 BPM  IVSd: 1 cm  LA Diam: 3.3 cm  LAAs A2C: 21.8 cm2  LAAs A4C: 23.7 cm2  LAESV A-L A2C: 66.9 ml  LAESV A-L A4C: 81.2 ml  LAESV Index (A-L): 47.5 ml/m2  LAESV MOD A2C: 62.5 ml  LAESV MOD A4C: 76.5 ml  LAESV(A-L): 74.5 ml  LAESV(MOD BP): 69.7 ml  LAESVInd MOD BP: 44.4 ml/m2  LALs A2C: 6 cm  LALs A4C: 5.9 cm  LVCO_4Ch_Q: 1.9 L/min  LVEF_4Ch_Q: 58.3 %  LVIDd: 5.1 cm  LVIDs: 2.7 cm  LVLd_4Ch_Q: 7.6 cm  LVLs_4Ch_Q: 6 cm  LVPWd: 1.1 cm  LVSV_4Ch_Q: 60.1 ml  LVVED_4Ch_Q: 103.1 ml  LVVES_4Ch_Q: 43 ml  RAAs A4C: 18.8 cm2  RAESV A-L: 55.9 ml  RAESV MOD: 53.8 ml  RALs: 5.4 cm  RVIDd: 3.9 cm  SV(Teich): 97.3 ml    CW  AR Dec [place]: 1.4 m/s2  AR Dec Time: 3519.3 ms  AR PHT: 1020.6 ms  AR Vmax: 5.1 m/s  AR maxP.9 mmHg  AV Vmax: 1.9 m/s  AV maxP.6 mmHg  TR Vmax: 3.5 m/s  TR maxP.7 mmHg    MM  TAPSE: 3 cm    PW  LVOT Vmax: 1.5 m/s  LVOT maxP.6 mmHg  MV A Riaz: 1.1 m/s  MV Dec [place]: 5.3 m/s2  MV DecT: 191.3 ms  MV E Riaz: 1 m/s  MV E/A Ratio: 0.9  MV PHT: 55.5 ms  MVA By PHT: 4 cm2    [employer] Accredited Echocardiography  Laboratory    Prepared and electronically signed by    James Valencia D.O.  Signed 09-Feb-2021 16:19:18    No results found for this or any previous visit.    --------------------------------------------------------------------------------  HOLTER  No results found for this or any previous visit.    No results found for this or any previous visit.    --------------------------------------------------------------------------------  CAROTIDS  No results found for this or any previous visit.     --------------------------------------------------------------------------------  Diagnoses and all orders for this visit:    Leg edema  -     Compression Stocking    Systolic hypertension  -     aspirin 81 mg chewable tablet; Chew 1 tablet (81 mg total) daily  -     amLODIPine (NORVASC) 5 mg tablet; Take 1 tablet (5 mg total) by mouth every morning    Other orders  -     Calcium Carb-Cholecalciferol (CALCIUM CARBONATE+VITAMIN D PO); Take 600 mg by mouth in the morning (Patient not taking: Reported on 12/21/2023)       ======================================================    Past Medical History:   Diagnosis Date   • Aortic regurgitation    • Constipation    • Discoid lupus erythematosus    • Dysphagia    • Esophagitis    • Forgetfulness    • 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   • Transient elevated blood pressure     last assessed: 5/16/2017   • Weight loss      Past Surgical History:   Procedure Laterality Date   • APPENDECTOMY     • CATARACT EXTRACTION     • CHOLECYSTECTOMY     • ESOPHAGOGASTRODUODENOSCOPY  06/2013    diagnostic- neg id have dilatation   • EYE SURGERY     • HYSTERECTOMY     • LAPAROTOMY N/A 11/3/2023    Procedure: LAPAROTOMY EXPLORATORY, ILEOCECECTOMY;  Surgeon: Katlyn Fleming MD;  Location: [facility];  Service: General   • OH ESOPHAGOGASTRODUODENOSCOPY TRANSORAL DIAGNOSTIC N/A 9/16/2016    Procedure: ESOPHAGOGASTRODUODENOSCOPY (EGD);  Surgeon: Vladislav Garcia MD;  Location: [facility];  Service:  Gastroenterology   • REPLACEMENT TOTAL KNEE Left 01/2007         Medications  Current Outpatient Medications   Medication Sig Dispense Refill   • acetaminophen (TYLENOL) 325 mg tablet Take 2 tablets (650 mg total) by mouth every 4 (four) hours as needed for mild pain 30 tablet 0   • amLODIPine (NORVASC) 5 mg tablet Take 1 tablet (5 mg total) by mouth every morning 90 tablet 2   • aspirin 81 mg chewable tablet Chew 1 tablet (81 mg total) daily 90 tablet 3   • Cholecalciferol (VITAMIN D) 2000 units CAPS Take 1 capsule (2,000 Units total) by mouth daily  0   • fish oil 1,000 mg Take 1,000 mg by mouth daily.     • Multiple Vitamins-Minerals (PRESERVISION AREDS PO) Take by mouth.     • multivitamin (THERAGRAN) TABS Take 1 tablet by mouth daily     • senna-docusate sodium (SENOKOT-S) 8.6-50 mg per tablet Take 1 tablet by mouth daily     • trospium chloride (SANCTURA) 20 mg tablet Take 20 mg by mouth in the morning     • Turmeric 500 MG CAPS Take 500 mg by mouth in the morning     • vitamin B-12 (VITAMIN B-12) 500 mcg tablet Take 500 mcg by mouth daily     • Calcium Carb-Cholecalciferol (CALCIUM CARBONATE+VITAMIN D PO) Take 600 mg by mouth in the morning (Patient not taking: Reported on 12/21/2023)       No current facility-administered medications for this visit.        Allergies   Allergen Reactions   • Gabapentin      dreams   • Sertraline GI Intolerance     ' felt like a zombie'       Social History     Socioeconomic History   • Marital status: /Civil Union     Spouse name: Not on file   • Number of children: Not on file   • Years of education: Not on file   • Highest education level: Not on file   Occupational History   • Not on file   Tobacco Use   • Smoking status: Never   • Smokeless tobacco: Never   Vaping Use   • Vaping status: Never Used   Substance and Sexual Activity   • Alcohol use: No   • Drug use: No   • Sexual activity: Not Currently   Other Topics Concern   • Not on file   Social History Narrative  "  • Not on file     Social Determinants of Health     Financial Resource Strain: Not on file   Food Insecurity: No Food Insecurity (11/6/2023)    Hunger Vital Sign    • Worried About Running Out of Food in the Last Year: Never true    • Ran Out of Food in the Last Year: Never true   Transportation Needs: No Transportation Needs (11/6/2023)    PRAPARE - Transportation    • Lack of Transportation (Medical): No    • Lack of Transportation (Non-Medical): No   Physical Activity: Not on file   Stress: Not on file   Social Connections: Not on file   Intimate Partner Violence: Not on file   Housing Stability: Unknown (11/6/2023)    Housing Stability Vital Sign    • Unable to Pay for Housing in the Last Year: No    • Number of Places Lived in the Last Year: Not on file    • Unstable Housing in the Last Year: No        Family History   Problem Relation Age of Onset   • Diabetes Mother    • Coronary artery disease Father        Lab Results   Component Value Date    WBC 7.45 11/07/2023    HGB 10.1 (L) 11/07/2023    HCT 33.9 (L) 11/07/2023    MCV 99 (H) 11/07/2023     11/07/2023      Lab Results   Component Value Date    SODIUM 139 11/07/2023    K 3.7 11/07/2023     (H) 11/07/2023    CO2 23 11/07/2023    BUN 8 11/07/2023    CREATININE 0.58 (L) 11/07/2023    GLUC 106 11/07/2023    CALCIUM 8.2 (L) 11/07/2023      Lab Results   Component Value Date    HGBA1C 5.9 (H) 05/19/2022      Lab Results   Component Value Date    CHOL 159 11/26/2013     Lab Results   Component Value Date    HDL 80 (H) 07/11/2017    HDL 69 11/26/2013     Lab Results   Component Value Date    LDLCALC 74 07/11/2017    LDLCALC 78 11/26/2013     Lab Results   Component Value Date    TRIG 48 07/11/2017    TRIG 61 11/26/2013     No results found for: \"CHOLHDL\"   Lab Results   Component Value Date    INR 1.61 (H) 11/03/2023    INR 1.70 (A) 11/02/2023    INR 7.39 (HH) 10/31/2023    PROTIME 19.3 (H) 11/03/2023    PROTIME 62.2 (H) 10/31/2023    PROTIME 15.1 " "(H) 02/09/2021          Patient Active Problem List    Diagnosis Date Noted   • Leg edema 12/21/2023   • Intestinal obstruction (HCC) 11/03/2023   • Urinary incontinence 08/10/2021   • Pacemaker ( Feb 2021 ) 02/11/2021   • Complete heart block (HCC) -   PPM ( Medtronic) Feb 2021 02/09/2021   • Anxiety 08/14/2018   • Gastric erosion, -  resolved 09/16/2016   • Peripheral neuropathy 08/29/2016   • Constipation 06/21/2016   • Forgetfulness 06/21/2016   • Left shoulder tendonitis 06/21/2016   • Dysphagia - improved 06/16/2015   • Discoid lupus erythematosus 06/01/2012   • Osteoarthritis of knee 06/01/2012       Portions of the record may have been created with voice recognition software. Occasional wrong word or \"sound a like\" substitutions may have occurred due to the inherent limitations of voice recognition software. Read the chart carefully and recognize, using context, where substitutions have occurred.    James Valencia DO, FACC  12/21/2023 1:41 PM          "

## 2023-12-22 ENCOUNTER — APPOINTMENT (EMERGENCY)
Dept: CT IMAGING | Facility: HOSPITAL | Age: 88
End: 2023-12-22
Payer: MEDICARE

## 2023-12-22 ENCOUNTER — HOSPITAL ENCOUNTER (EMERGENCY)
Facility: HOSPITAL | Age: 88
Discharge: HOME | End: 2023-12-22
Attending: EMERGENCY MEDICINE
Payer: MEDICARE

## 2023-12-22 ENCOUNTER — APPOINTMENT (EMERGENCY)
Dept: RADIOLOGY | Facility: HOSPITAL | Age: 88
End: 2023-12-22
Payer: MEDICARE

## 2023-12-22 VITALS
DIASTOLIC BLOOD PRESSURE: 78 MMHG | TEMPERATURE: 97.7 F | SYSTOLIC BLOOD PRESSURE: 140 MMHG | OXYGEN SATURATION: 99 % | HEART RATE: 68 BPM | RESPIRATION RATE: 18 BRPM

## 2023-12-22 DIAGNOSIS — S40.011A CONTUSION OF RIGHT SHOULDER, INITIAL ENCOUNTER: ICD-10-CM
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
## 2023-12-22 DIAGNOSIS — S09.90XA INJURY OF HEAD, INITIAL ENCOUNTER: ICD-10-CM

## 2023-12-22 PROCEDURE — 70450 CT HEAD/BRAIN W/O DYE: CPT

## 2023-12-22 PROCEDURE — 99284 EMERGENCY DEPT VISIT MOD MDM: CPT | Performed by: EMERGENCY MEDICINE

## 2023-12-22 PROCEDURE — 99285 EMERGENCY DEPT VISIT HI MDM: CPT

## 2023-12-22 PROCEDURE — G1004 CDSM NDSC: HCPCS

## 2023-12-22 PROCEDURE — 72125 CT NECK SPINE W/O DYE: CPT

## 2023-12-22 PROCEDURE — 73030 X-RAY EXAM OF SHOULDER: CPT

## 2023-12-22 PROCEDURE — 73502 X-RAY EXAM HIP UNI 2-3 VIEWS: CPT

## 2023-12-22 NOTE — ED NOTES
Pt ambulatory with rolling walker with steady gait. Pt daughter called at this time to come  pt.     Elizabeth Jauregui RN  12/22/23 3400

## 2023-12-22 NOTE — ED PROVIDER NOTES
History  Chief Complaint   Patient presents with    Fall     Patient arrived via ems after a mechanical fall. Patient reports putting cookies away when she turned around and fell to the ground. +headstrike. Laceration present above right eyebrow C/o right hip and right shoulder pain. +thinners     89 y/o female from assisted living presents after accidentally having a mechanical fall when turning around.  She hit her head , is only on asa for blood thinners.  No headaches, no neck pain. She has a small lac. Over R eyebrow.  Earlier complained of R shoulder and hip pain but now now.  She states that she has chronic b/l shoulder pain.  No n/v        Prior to Admission Medications   Prescriptions Last Dose Informant Patient Reported? Taking?   Calcium Carb-Cholecalciferol (CALCIUM CARBONATE+VITAMIN D PO)  Family Member Yes No   Sig: Take 600 mg by mouth in the morning   Patient not taking: Reported on 12/21/2023   Cholecalciferol (VITAMIN D) 2000 units CAPS  Outside Facility (Specify), Family Member No No   Sig: Take 1 capsule (2,000 Units total) by mouth daily   Multiple Vitamins-Minerals (PRESERVISION AREDS PO)  Outside Facility (Specify), Family Member Yes No   Sig: Take by mouth.   Turmeric 500 MG CAPS  Outside Facility (Specify), Family Member Yes No   Sig: Take 500 mg by mouth in the morning   acetaminophen (TYLENOL) 325 mg tablet  Outside Facility (Specify), Family Member No No   Sig: Take 2 tablets (650 mg total) by mouth every 4 (four) hours as needed for mild pain   amLODIPine (NORVASC) 5 mg tablet   No No   Sig: Take 1 tablet (5 mg total) by mouth every morning   aspirin 81 mg chewable tablet   No No   Sig: Chew 1 tablet (81 mg total) daily   fish oil 1,000 mg  Outside Facility (Specify), Family Member Yes No   Sig: Take 1,000 mg by mouth daily.   multivitamin (THERAGRAN) TABS  Outside Facility (Specify), Family Member Yes No   Sig: Take 1 tablet by mouth daily   senna-docusate sodium (SENOKOT-S) 8.6-50 mg  per tablet  Outside Facility (Specify), Family Member Yes No   Sig: Take 1 tablet by mouth daily   trospium chloride (SANCTURA) 20 mg tablet  Outside Facility (Specify), Family Member Yes No   Sig: Take 20 mg by mouth in the morning   vitamin B-12 (VITAMIN B-12) 500 mcg tablet  Outside Facility (Specify), Family Member Yes No   Sig: Take 500 mcg by mouth daily      Facility-Administered Medications: None       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    Transient elevated blood pressure     last assessed: 5/16/2017    Weight loss        Past Surgical History:   Procedure Laterality Date    APPENDECTOMY      CATARACT EXTRACTION      CHOLECYSTECTOMY      ESOPHAGOGASTRODUODENOSCOPY  06/2013    diagnostic- neg id have dilatation    EYE SURGERY      HYSTERECTOMY      LAPAROTOMY N/A 11/3/2023    Procedure: LAPAROTOMY EXPLORATORY, ILEOCECECTOMY;  Surgeon: Katlyn Fleming MD;  Location: [facility];  Service: General    IA ESOPHAGOGASTRODUODENOSCOPY TRANSORAL DIAGNOSTIC N/A 9/16/2016    Procedure: ESOPHAGOGASTRODUODENOSCOPY (EGD);  Surgeon: Vladislav Garcia MD;  Location: [facility];  Service: Gastroenterology    REPLACEMENT TOTAL KNEE Left 01/2007       Family History   Problem Relation Age of Onset    Diabetes Mother     Coronary artery disease Father      I have reviewed and agree with the history as documented.    E-Cigarette/Vaping    E-Cigarette Use Never User      E-Cigarette/Vaping Substances    Nicotine No     THC No     CBD No     Flavoring No     Other No     Unknown No      Social History     Tobacco Use    Smoking status: Never    Smokeless tobacco: Never   Vaping Use    Vaping status: Never Used   Substance Use Topics    Alcohol use: No    Drug use: No       Review of Systems   Constitutional:  Negative for fatigue and fever.   HENT:  Negative for  rhinorrhea and sore throat.    Eyes:  Negative for pain and redness.   Respiratory:  Negative for cough and shortness of breath.    Cardiovascular:  Negative for chest pain and leg swelling.   Gastrointestinal:  Negative for abdominal pain, diarrhea and vomiting.   Genitourinary:  Negative for difficulty urinating and flank pain.   Musculoskeletal:  Negative for back pain and myalgias.   Skin:  Negative for color change and rash.   Neurological:  Negative for dizziness, syncope and headaches.       Physical Exam  Physical Exam  Vitals and nursing note reviewed.   Constitutional:       Appearance: She is well-developed.   HENT:      Head: Normocephalic and atraumatic.      Right Ear: External ear normal.      Left Ear: External ear normal.   Eyes:      General: No scleral icterus.     Extraocular Movements: Extraocular movements intact.      Pupils: Pupils are equal, round, and reactive to light.   Cardiovascular:      Rate and Rhythm: Normal rate and regular rhythm.   Pulmonary:      Effort: Pulmonary effort is normal. No respiratory distress.      Breath sounds: Normal breath sounds.   Abdominal:      Palpations: Abdomen is soft.      Tenderness: There is no abdominal tenderness.   Musculoskeletal:         General: No deformity or signs of injury. Normal range of motion.      Cervical back: Normal range of motion and neck supple. No tenderness.      Comments: All ext. FROM, nontender, back is nontender   Skin:     Comments: Small <0.5cm lac. Above R eyebrow, well approximated, no sutures needed, no bleeding   Neurological:      General: No focal deficit present.      Mental Status: She is alert and oriented to person, place, and time.   Psychiatric:         Mood and Affect: Mood normal.         Behavior: Behavior normal.         Vital Signs  ED Triage Vitals [12/22/23 1357]   Temperature Pulse Respirations Blood Pressure SpO2   97.7 °F (36.5 °C) 63 18 161/78 97 %      Temp Source Heart Rate Source Patient Position  - Orthostatic VS BP Location FiO2 (%)   Oral Monitor Lying Left arm --      Pain Score       5           Vitals:    12/22/23 1357 12/22/23 1604   BP: 161/78 140/78   Pulse: 63 68   Patient Position - Orthostatic VS: Lying Sitting         Visual Acuity      ED Medications  Medications - No data to display    Diagnostic Studies  Results Reviewed       None                   CT head without contrast   Final Result by Deandre Sharp MD (12/22 1516)      No intracranial hemorrhage or calvarial fracture.   Mild, chronic microangiopathy                  Workstation performed: [id number]         CT cervical spine without contrast   Final Result by Deandre Sharp MD (12/22 1519)      No cervical spine fracture or traumatic malalignment.                  Workstation performed: [id number]         XR shoulder 2+ views RIGHT   Final Result by Deandre Sharp MD (12/22 1648)      No acute osseous abnormality.      Degenerative changes as described.      Workstation performed: [id number]         XR hip/pelv 2-3 vws right   Final Result by Deandre Sharp MD (12/22 1654)      No acute osseous abnormality.            Workstation performed: [id number]                    Procedures  Procedures         ED Course                               SBIRT 22yo+      Flowsheet Row Most Recent Value   Initial Alcohol Screen: US AUDIT-C     1. How often do you have a drink containing alcohol? 0 Filed at: 12/22/2023 1400   2. How many drinks containing alcohol do you have on a typical day you are drinking?  0 Filed at: 12/22/2023 1400   3a. Male UNDER 65: How often do you have five or more drinks on one occasion? 0 Filed at: 12/22/2023 1400   3b. FEMALE Any Age, or MALE 65+: How often do you have 4 or more drinks on one occassion? 0 Filed at: 12/22/2023 1400   Audit-C Score 0 Filed at: 12/22/2023 1400   [place]: How many times in the past year have you...    Used an illegal drug or used a prescription medication for non-medical  reasons? Never Filed at: 12/22/2023 1400                      Medical Decision Making  I went over CT and xrays with pt.    She is able to ambulate with a walker and her daughter is able to drive her back to assisted living.    Amount and/or Complexity of Data Reviewed  Radiology: ordered.             Disposition  Final diagnoses:   Fall, initial encounter   Injury of head, initial encounter   Contusion of right shoulder, initial encounter     Time reflects when diagnosis was documented in both MDM as applicable and the Disposition within this note       Time User Action Codes Description Comment    12/22/2023  3:54 PM Lorraine Mitchell [W19.XXXA] Fall, initial encounter     12/22/2023  3:54 PM Lorraine Mitchell [S09.90XA] Injury of head, initial encounter     12/22/2023  3:54 PM Lorraine Mitchell [S40.011A] Contusion of right shoulder, initial encounter           ED Disposition       ED Disposition   Discharge    Condition   Stable    Date/Time   Fri Dec 22, 2023 1554    Comment   Summer Blanton discharge to home/self care.                   Follow-up Information       Follow up With Specialties Details Why Contact Info Additional Information    [facility]   [address] [place] [id number] [phone] [facility], [address] [place] [address], [id number] [phone]            Discharge Medication List as of 12/22/2023  3:54 PM        CONTINUE these medications which have NOT CHANGED    Details   acetaminophen (TYLENOL) 325 mg tablet Take 2 tablets (650 mg total) by mouth every 4 (four) hours as needed for mild pain, Starting Fri 2/12/2021, Print      amLODIPine (NORVASC) 5 mg tablet Take 1 tablet (5 mg total) by mouth every morning, Starting Thu 12/21/2023, Normal      aspirin 81 mg chewable tablet Chew 1 tablet (81 mg total) daily,  Starting u 12/21/2023, Normal      Calcium Carb-Cholecalciferol (CALCIUM CARBONATE+VITAMIN D PO) Take 600 mg by mouth in the morning, Historical Med      Cholecalciferol (VITAMIN D) 2000 units CAPS Take 1 capsule (2,000 Units total) by mouth daily, Starting Wed 8/15/2018, No Print      fish oil 1,000 mg Take 1,000 mg by mouth daily., Historical Med      Multiple Vitamins-Minerals (PRESERVISION AREDS PO) Take by mouth., Historical Med      multivitamin (THERAGRAN) TABS Take 1 tablet by mouth daily, Historical Med      senna-docusate sodium (SENOKOT-S) 8.6-50 mg per tablet Take 1 tablet by mouth daily, Historical Med      trospium chloride (SANCTURA) 20 mg tablet Take 20 mg by mouth in the morning, Historical Med      Turmeric 500 MG CAPS Take 500 mg by mouth in the morning, Historical Med      vitamin B-12 (VITAMIN B-12) 500 mcg tablet Take 500 mcg by mouth daily, Historical Med             No discharge procedures on file.    PDMP Review       None            ED Provider  Electronically Signed by             Lorraine Wagner MD  12/22/23 8146

## 2024-01-03 ENCOUNTER — ANTICOAG VISIT (OUTPATIENT)
Dept: CARDIOLOGY CLINIC | Facility: CLINIC | Age: 89
End: 2024-01-03

## 2024-01-06 NOTE — ASSESSMENT & PLAN NOTE
80year old female admitted after being sent from her PCP office   She was found to have bradycardia with HR in the 30s secondary to a complete heart block    Underwent insertion of dual chamber Med[employer]onic permanent pacemaker 2/9/21 by Dr Monroy Feeling  TSH within normal limits at 3 132  Currently ventricularly paced in the 70s 3 = A little assistance

## 2024-02-12 ENCOUNTER — REMOTE DEVICE CLINIC VISIT (OUTPATIENT)
Dept: CARDIOLOGY CLINIC | Facility: CLINIC | Age: 89
End: 2024-02-12
Payer: MEDICARE

## 2024-02-12 DIAGNOSIS — Z95.0 PRESENCE OF CARDIAC PACEMAKER: Primary | ICD-10-CM

## 2024-02-12 PROCEDURE — 93294 REM INTERROG EVL PM/LDLS PM: CPT | Performed by: INTERNAL MEDICINE

## 2024-02-12 PROCEDURE — 93296 REM INTERROG EVL PM/IDS: CPT | Performed by: INTERNAL MEDICINE

## 2024-02-12 NOTE — PROGRESS NOTES
Results for orders placed or performed in visit on 02/12/24   Cardiac EP device report    Narrative    MDT DC PPM - ACTIVE SYSTEM IS MRI CONDITIONAL  CARELINK TRANSMISSION: BATTERY VOLTAGE ADEQUATE (9.6 YRS). AP: <0.1%.  30.4% (MVP-OFF). ALL AVAILABLE LEAD PARAMETERS WITHIN NORMAL LIMITS. 413 AT/AF EPISODES W/ AF IN PROGRESS (HX OF SAME) AF BURDEN: 98.7%. PER DR. AGEE NOTE, NO AC DUE T OHIGH RISK/FALL AGE 89 YO. PT TAKES ASA 81MG. EF: 50% (ECHO 10/23/23). NORMAL DEVICE FUNCTION. CH

## 2024-04-23 ENCOUNTER — HOSPITAL ENCOUNTER (EMERGENCY)
Facility: HOSPITAL | Age: 89
Discharge: HOME | End: 2024-04-23
Attending: EMERGENCY MEDICINE
Payer: MEDICARE

## 2024-04-23 ENCOUNTER — APPOINTMENT (EMERGENCY)
Dept: RADIOLOGY | Facility: HOSPITAL | Age: 89
End: 2024-04-23
Payer: MEDICARE

## 2024-04-23 ENCOUNTER — APPOINTMENT (EMERGENCY)
Dept: CT IMAGING | Facility: HOSPITAL | Age: 89
End: 2024-04-23
Payer: MEDICARE

## 2024-04-23 VITALS
BODY MASS INDEX: 23.91 KG/M2 | HEART RATE: 73 BPM | RESPIRATION RATE: 18 BRPM | DIASTOLIC BLOOD PRESSURE: 86 MMHG | WEIGHT: 130.73 LBS | TEMPERATURE: 98.2 F | SYSTOLIC BLOOD PRESSURE: 173 MMHG | OXYGEN SATURATION: 96 %

## 2024-04-23 DIAGNOSIS — I77.810 ECTATIC THORACIC AORTA (HCC): ICD-10-CM

## 2024-04-23 DIAGNOSIS — S00.11XA TRAUMATIC HEMATOMA OF RIGHT EYEBROW, INITIAL ENCOUNTER: ICD-10-CM

## 2024-04-23 DIAGNOSIS — S09.90XA INJURY OF HEAD, INITIAL ENCOUNTER: Primary | ICD-10-CM
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
## 2024-04-23 DIAGNOSIS — S51.011A SKIN TEAR OF RIGHT ELBOW WITHOUT COMPLICATION, INITIAL ENCOUNTER: ICD-10-CM

## 2024-04-23 PROCEDURE — 72125 CT NECK SPINE W/O DYE: CPT

## 2024-04-23 PROCEDURE — 99285 EMERGENCY DEPT VISIT HI MDM: CPT | Performed by: EMERGENCY MEDICINE

## 2024-04-23 PROCEDURE — 99285 EMERGENCY DEPT VISIT HI MDM: CPT

## 2024-04-23 PROCEDURE — 73080 X-RAY EXAM OF ELBOW: CPT

## 2024-04-23 PROCEDURE — 73030 X-RAY EXAM OF SHOULDER: CPT

## 2024-04-23 PROCEDURE — 70450 CT HEAD/BRAIN W/O DYE: CPT

## 2024-04-23 RX ORDER — ACETAMINOPHEN 325 MG/1
650 TABLET ORAL ONCE
Status: COMPLETED | OUTPATIENT
Start: 2024-04-23 | End: 2024-04-23

## 2024-04-23 RX ADMIN — ACETAMINOPHEN 650 MG: 325 TABLET, FILM COATED ORAL at 11:01

## 2024-04-23 NOTE — DISCHARGE INSTRUCTIONS
"Clean the wounds to your right elbow with soap and water once a day.  Apply a nonstick dressing to the wounds until they start to heal.  Once they are healing, no need to place a dressing.  Return to the ED if you develop increased pain around the skin tears, redness, drainage or fevers.    The CT of your neck also revealed \"Partially visualized ascending thoracic aorta is borderline ectatic at 40 mm in diameter\".  You will need a CT Chest for further evaluation of this, you can follow up with either your family doctor or cardiologist to have this obtained.  "

## 2024-04-23 NOTE — ED PROVIDER NOTES
History  Chief Complaint   Patient presents with    Fall     Arrives via ems from [facility]. Fall from standing trying to get water out of refrigerator. Considerable hematoma to her head. Also skin tear to right elbow. Per ems pt has been having frequent falls because she does not consistently use her walker.      A 89-year-old female with past medical history of discoid lupus erythematosus, complete heart block s/p pacemaker and Paroxsymal afib/aflutter (on ASA 81 mg); presents from her assisted living following a fall.  Patient states she was standing at the kitchen sink, when she went to reach for something in her refrigerator and fell.  Patient denies preceding dizziness, lightheadedness, chest pain and palpitations.  She denies loss of consciousness.  Currently patient complains of bilateral shoulder pain, although states this is baseline for her.  She otherwise denies headache, visual disturbance, dizziness, lightheadedness, neck pain, back pain, chest pain, shortness of breath, abdominal pain, nausea, vomiting, diarrhea, peripheral edema and rashes.      History provided by:  Patient and medical records  Fall      Prior to Admission Medications   Prescriptions Last Dose Informant Patient Reported? Taking?   Calcium Carb-Cholecalciferol (CALCIUM CARBONATE+VITAMIN D PO)  Family Member Yes No   Sig: Take 600 mg by mouth in the morning   Patient not taking: Reported on 12/21/2023   Cholecalciferol (VITAMIN D) 2000 units CAPS  Outside Facility (Specify), Family Member No Yes   Sig: Take 1 capsule (2,000 Units total) by mouth daily   Multiple Vitamins-Minerals (PRESERVISION AREDS PO)  Outside Facility (Specify), Family Member Yes No   Sig: Take by mouth.   Turmeric 500 MG CAPS  Outside Facility (Specify), Family Member Yes Yes   Sig: Take 500 mg by mouth in the morning   acetaminophen (TYLENOL) 325 mg tablet  Outside Facility (Specify), Family Member No Yes   Sig: Take 2 tablets (650 mg total) by mouth every 4  (four) hours as needed for mild pain   amLODIPine (NORVASC) 5 mg tablet   No Yes   Sig: Take 1 tablet (5 mg total) by mouth every morning   aspirin 81 mg chewable tablet   No No   Sig: Chew 1 tablet (81 mg total) daily   fish oil 1,000 mg  Outside Facility (Specify), Family Member Yes Yes   Sig: Take 1,000 mg by mouth daily.   multivitamin (THERAGRAN) TABS  Outside Facility (Specify), Family Member Yes Yes   Sig: Take 1 tablet by mouth daily   senna-docusate sodium (SENOKOT-S) 8.6-50 mg per tablet  Outside Facility (Specify), Family Member Yes Yes   Sig: Take 1 tablet by mouth daily   trospium chloride (SANCTURA) 20 mg tablet  Outside Facility (Specify), Family Member Yes Yes   Sig: Take 20 mg by mouth in the morning   vitamin B-12 (VITAMIN B-12) 500 mcg tablet  Outside Facility (Specify), Family Member Yes Yes   Sig: Take 500 mcg by mouth daily      Facility-Administered Medications: None       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    Transient elevated blood pressure     last assessed: 5/16/2017    Weight loss        Past Surgical History:   Procedure Laterality Date    APPENDECTOMY      CATARACT EXTRACTION      CHOLECYSTECTOMY      ESOPHAGOGASTRODUODENOSCOPY  06/2013    diagnostic- neg id have dilatation    EYE SURGERY      HYSTERECTOMY      LAPAROTOMY N/A 11/3/2023    Procedure: LAPAROTOMY EXPLORATORY, ILEOCECECTOMY;  Surgeon: Katlyn Fleming MD;  Location: [facility];  Service: General    TN ESOPHAGOGASTRODUODENOSCOPY TRANSORAL DIAGNOSTIC N/A 9/16/2016    Procedure: ESOPHAGOGASTRODUODENOSCOPY (EGD);  Surgeon: Vladislav Garcia MD;  Location: [facility];  Service: Gastroenterology    REPLACEMENT TOTAL KNEE Left 01/2007       Family History   Problem Relation Age of Onset    Diabetes Mother     Coronary artery disease Father      I have reviewed and agree  with the history as documented.    E-Cigarette/Vaping    E-Cigarette Use Never User      E-Cigarette/Vaping Substances    Nicotine No     THC No     CBD No     Flavoring No     Other No     Unknown No      Social History     Tobacco Use    Smoking status: Never    Smokeless tobacco: Never   Vaping Use    Vaping status: Never Used   Substance Use Topics    Alcohol use: No    Drug use: No       Review of Systems   Musculoskeletal:  Positive for arthralgias.   Skin:  Positive for wound.   All other systems reviewed and are negative.      Physical Exam  Physical Exam  General Appearance: alert and oriented, nad, non toxic appearing  Skin:  Warm, dry.  No cyanosis.  2.5 cm skin tear and 1.5 cm skin tear to posterior right elbow.  HEENT: Normocephalic.  Hematoma and ecchymosis noted to lateral aspect of right eyebrow and temporal region.  PERRLA, EOMI.  No eye drainage.  Normal hearing.  Moist mucous membranes.    Neck: Supple, trachea midline.  No midline cervical spine tenderness.  Cardiac: RRR; no murmurs, rub, gallops.  No pedal edema, 2+ pulses  Pulmonary: lungs CTAB; no wheezes, rales, rhonchi  Gastrointestinal: abdomen soft, nontender, nondistended; no guarding or rebound tenderness; good bowel sounds, no mass or bruits  Extremities:  No midline spinal tenderness.  Extremities nontender with full ROM.  Full active ROM to bilateral extremities, although increased pain to bilateral shoulders and right elbow.  No deformities.  No calf tenderness, no clubbing  Neuro:  no focal motor or sensory deficits, CN 2-12 grossly intact  Psych:  Normal mood and affect, normal judgement and insight      Vital Signs  ED Triage Vitals [04/23/24 1025]   Temperature Pulse Respirations Blood Pressure SpO2   98.2 °F (36.8 °C) 73 18 (!) 173/86 96 %      Temp Source Heart Rate Source Patient Position - Orthostatic VS BP Location FiO2 (%)   Oral -- Lying Left arm --      Pain Score       3           Vitals:    04/23/24 1025   BP: (!)  173/86   Pulse: 73   Patient Position - Orthostatic VS: Lying         Visual Acuity      ED Medications  Medications   acetaminophen (TYLENOL) tablet 650 mg (650 mg Oral Given 4/23/24 1101)       Diagnostic Studies  Results Reviewed       None                   CT head without contrast   Final Result by Omkar Deshpande MD (04/23 1218)      No acute intracranial abnormality.      Right periorbital soft tissue swelling and probable small underlying hematoma.                  Workstation performed: [id number]         CT cervical spine without contrast   Final Result by Omkar Deshpande MD (04/23 1224)      1. No cervical spine fracture or traumatic malalignment.   2. The partially visualized ascending thoracic aorta is borderline ectatic at 40 mm in diameter. This could be more fully assessed with noncontrast chest CT on a nonemergent basis.               Workstation performed: [id number]         XR elbow 3+ views RIGHT   ED Interpretation by Roxy Putnam DO (04/23 1127)   Small avulsion likely from the olecranon, appears chronic.  No acute fracture or dislocation    Image independently interpreted by myself       XR shoulder 2+ views RIGHT   ED Interpretation by Roxy Putnam DO (04/23 1126)   Severe osteoarthritis, no acute fracture or dislocation    Image independently interpreted by myself       XR shoulder 2+ views LEFT   ED Interpretation by Roxy Putnam DO (04/23 1126)   Severe osteoarthritis, no acute fracture or dislocation    Image independently interpreted by myself                  Procedures  Procedures         ED Course  ED Course as of 04/23/24 1354   Tue Apr 23, 2024   1229 CT head without contrast  1.) No acute intracranial abnormality.  2.) Right periorbital soft tissue swelling and probable small underlying hematoma.   1230 CT cervical spine without contrast  1.) No cervical spine fracture or traumatic malalignment.  2.) The partially visualized ascending thoracic aorta is borderline  ectatic at 40 mm in diameter. This could be more fully assessed with noncontrast chest CT on a nonemergent basis.   1231 On review, pt had an echo in 10/2023 which noted ascending aorta dilation at 2.5 cm.     1245 Pt and family updated on imaging results.  Pt able to ambulate with a walker in the department.  Pt and family feel comfortable with discharge back to Assisted Living facility.  Pt and family updated on incidental findings of aorta, will follow up with cardiology.                               SBIRT 20yo+      Flowsheet Row Most Recent Value   Initial Alcohol Screen: US AUDIT-C     1. How often do you have a drink containing alcohol? 0 Filed at: 04/23/2024 1032   2. How many drinks containing alcohol do you have on a typical day you are drinking?  0 Filed at: 04/23/2024 1032   3b. FEMALE Any Age, or MALE 65+: How often do you have 4 or more drinks on one occassion? 0 Filed at: 04/23/2024 1032   Audit-C Score 0 Filed at: 04/23/2024 1032   MIREILLE: How many times in the past year have you...    Used an illegal drug or used a prescription medication for non-medical reasons? Never Filed at: 04/23/2024 1032                      Medical Decision Making  A 89-year-old female presents following a mechanical fall, sustaining a a head injury and skin tears to the right elbow.  She is otherwise neurologically intact.  Will obtain imaging to rule out traumatic injury.  Recommend continued wound care to the skin tears.    Amount and/or Complexity of Data Reviewed  Radiology: ordered and independent interpretation performed. Decision-making details documented in ED Course.    Risk  OTC drugs.             Disposition  Final diagnoses:   Injury of head, initial encounter   Traumatic hematoma of right eyebrow, initial encounter   Skin tear of right elbow without complication, initial encounter   Fall, initial encounter   Ectatic thoracic aorta (HCC)     Time reflects when diagnosis was documented in both MDM as applicable  and the Disposition within this note       Time User Action Codes Description Comment    4/23/2024 12:45 PM Roxy Putnam Add [S09.90XA] Injury of head, initial encounter     4/23/2024 12:46 PM Roxy Putnam Add [S00.11XA] Traumatic hematoma of right eyebrow, initial encounter     4/23/2024 12:46 PM Roxy Putnam Add [S51.011A] Skin tear of right elbow without complication, initial encounter     4/23/2024 12:46 PM Roxy Putnam Add [W19.XXXA] Fall, initial encounter     4/23/2024 12:46 PM Anisha Putnamsa Add [I77.810] Ectatic thoracic aorta (HCC)           ED Disposition       ED Disposition   Discharge    Condition   Stable    Date/Time   Tue Apr 23, 2024 1245    Comment   Summer Blanton discharge to home/self care.                   Follow-up Information       Follow up With Specialties Details Why Contact Info    Family Physician  Schedule an appointment as soon as possible for a visit in 3 days For re-evaluation     James Valencia DO Cardiology Schedule an appointment as soon as possible for a visit  To discuss the need for monitoring of your aorta [address] [place] [address]  [phone]              Discharge Medication List as of 4/23/2024 12:50 PM        CONTINUE these medications which have NOT CHANGED    Details   acetaminophen (TYLENOL) 325 mg tablet Take 2 tablets (650 mg total) by mouth every 4 (four) hours as needed for mild pain, Starting Fri 2/12/2021, Print      amLODIPine (NORVASC) 5 mg tablet Take 1 tablet (5 mg total) by mouth every morning, Starting Thu 12/21/2023, Normal      Cholecalciferol (VITAMIN D) 2000 units CAPS Take 1 capsule (2,000 Units total) by mouth daily, Starting Wed 8/15/2018, No Print      fish oil 1,000 mg Take 1,000 mg by mouth daily., Historical Med      multivitamin (THERAGRAN) TABS Take 1 tablet by mouth daily, Historical Med      senna-docusate sodium (SENOKOT-S) 8.6-50 mg per tablet Take 1 tablet by mouth daily, Historical Med      trospium  chloride (SANCTURA) 20 mg tablet Take 20 mg by mouth in the morning, Historical Med      Turmeric 500 MG CAPS Take 500 mg by mouth in the morning, Historical Med      vitamin B-12 (VITAMIN B-12) 500 mcg tablet Take 500 mcg by mouth daily, Historical Med      aspirin 81 mg chewable tablet Chew 1 tablet (81 mg total) daily, Starting Thu 12/21/2023, Normal      Calcium Carb-Cholecalciferol (CALCIUM CARBONATE+VITAMIN D PO) Take 600 mg by mouth in the morning, Historical Med      Multiple Vitamins-Minerals (PRESERVISION AREDS PO) Take by mouth., Historical Med             No discharge procedures on file.    PDMP Review       None            ED Provider  Electronically Signed by             Roxy Putnam DO  04/23/24 9379

## 2024-04-29 NOTE — PROGRESS NOTES
Cardiology Follow Up    [facility] [address] [address]  PHONE: [phone]  FAX: [phone]    Summer Blanton  12/23/1930  [id number]      Assessment/Plan:    Recent fall, 4/23/2024  Complete heart block with severe underlying conduction system disease.  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 She had a hematoma on her head and right elbow skin tear. These are healing.Her lower extremity edema from prior is improved with using compression stockings. Her blood pressure is controlled. Continue current medications.    Can have echo in 1 year to monitor her dilated ascending aorta.    RTO in Nov or Dec with Dr. Valencia.    Interval History:   Summer Blanton is a 89 y.o. female with PMH as below. She presents to the office for follow up after an ED visit for a fall. She was told to follow up with cardiology to discuss the need for monitoring for her aorta.     Pt denies chest pain, shortness of breath, pain or heaviness in her legs.  Patient reports she is afraid of walking because of her history of frequent falls.  She has been getting physical therapy exercises 3 times a week at her assisted living facility and does not have fatigue or chest pain or pressure with exertion. No dizziness, lightheadedness or syncope.    Review of Systems   Constitutional:        - weight change   Cardiovascular:  Positive for leg swelling. Negative for chest pain, dyspnea on exertion, irregular heartbeat, near-syncope, orthopnea, palpitations, paroxysmal nocturnal dyspnea and syncope.   Respiratory:  Negative for cough and shortness of breath.    Skin:         +  right elbow skin tear improving with wounds cares at her [facility].   Musculoskeletal:  Positive for arthritis and falls.   Neurological:  Negative for dizziness, headaches and light-headedness.     Medical Problems       Problem List       Constipation    Discoid lupus erythematosus    Dysphagia - improved    Forgetfulness    Gastric erosion, -  resolved    Left shoulder tendonitis    Osteoarthritis of knee    Peripheral neuropathy    Anxiety    Complete heart block (HCC) -   PPM ( Medtronic) Feb 2021    Pacemaker ( Feb 2021 )    Urinary incontinence    Intestinal obstruction (HCC)    Leg edema         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    Transient elevated blood pressure     last assessed: 5/16/2017    Weight loss       Past Surgical History:   Procedure Laterality Date    APPENDECTOMY      CATARACT EXTRACTION      CHOLECYSTECTOMY      ESOPHAGOGASTRODUODENOSCOPY  06/2013    diagnostic- neg id have dilatation    EYE SURGERY      HYSTERECTOMY      LAPAROTOMY N/A 11/3/2023    Procedure: LAPAROTOMY EXPLORATORY, ILEOCECECTOMY;  Surgeon: Katlyn Fleming MD;  Location: [facility];  Service: General    ME ESOPHAGOGASTRODUODENOSCOPY TRANSORAL DIAGNOSTIC N/A 9/16/2016    Procedure: ESOPHAGOGASTRODUODENOSCOPY (EGD);  Surgeon: Vladislav Garcia MD;  Location: [facility];  Service: Gastroenterology    REPLACEMENT TOTAL KNEE Left 01/2007      Family History   Problem Relation Age of Onset    Diabetes Mother     Coronary artery disease Father       Social history:  Smoker: never  Alcohol: never  Drugs: none  [facility] situation: [facility]    Current Outpatient Medications:     acetaminophen (TYLENOL) 325 mg tablet, Take 2 tablets (650 mg total) by mouth every 4 (four) hours as needed for mild pain, Disp: 30 tablet, Rfl: 0    amLODIPine (NORVASC) 5 mg tablet, Take 1 tablet  "(5 mg total) by mouth every morning, Disp: 90 tablet, Rfl: 2    aspirin 81 mg chewable tablet, Chew 1 tablet (81 mg total) daily, Disp: 90 tablet, Rfl: 3    Calcium Carb-Cholecalciferol (CALCIUM CARBONATE+VITAMIN D PO), Take 600 mg by mouth in the morning, Disp: , Rfl:     Cholecalciferol (VITAMIN D) 2000 units CAPS, Take 1 capsule (2,000 Units total) by mouth daily, Disp: , Rfl: 0    fish oil 1,000 mg, Take 1,000 mg by mouth daily., Disp: , Rfl:     Multiple Vitamins-Minerals (PRESERVISION AREDS PO), Take by mouth., Disp: , Rfl:     multivitamin (THERAGRAN) TABS, Take 1 tablet by mouth daily, Disp: , Rfl:     senna-docusate sodium (SENOKOT-S) 8.6-50 mg per tablet, Take 1 tablet by mouth daily, Disp: , Rfl:     trospium chloride (SANCTURA) 20 mg tablet, Take 20 mg by mouth in the morning, Disp: , Rfl:     Turmeric 500 MG CAPS, Take 500 mg by mouth in the morning, Disp: , Rfl:     vitamin B-12 (VITAMIN B-12) 500 mcg tablet, Take 500 mcg by mouth daily, Disp: , Rfl:      Allergies   Allergen Reactions    Gabapentin      dreams    Sertraline GI Intolerance     ' felt like a zombie'     Physical Exam:  Vitals:    05/02/24 0939   BP: 127/77   Pulse: 70     Vitals:    05/02/24 0939   Weight: 54.4 kg (120 lb)     Height: 5' 2\" (157.5 cm)   Body mass index is 21.95 kg/m².    Physical Exam  Vitals and nursing note reviewed.   Constitutional:       General: She is not in acute distress.     Appearance: She is not ill-appearing.   HENT:      Head:      Comments: Right eye ecchymosis is old. Old hematoma right eye brow     Nose: Nose normal.      Mouth/Throat:      Mouth: Mucous membranes are moist.   Eyes:      Comments: Right eye conjunctival hemorrhage healing   Neck:      Comments: No jvd  Cardiovascular:      Rate and Rhythm: Regular rhythm.      Pulses:           Radial pulses are 2+ on the right side and 2+ on the left side.      Heart sounds: S1 normal and S2 normal. Murmur heard.      Systolic murmur is present with a " grade of 1/6.      Comments: Hr approx 88 bpm  Musculoskeletal:         General: Normal range of motion.      Cervical back: Normal range of motion.      Comments: Trace pitting edema bl ankles   Skin:     Comments: Right elbow skin tear is dressed. Dressing is c/d/I I did not view the wound   Neurological:      Mental Status: She is alert.      Gait: Gait abnormal.      Comments: Walks w rw   Psychiatric:      Comments: Cognitive changes in the older adult     Data:  Labs:     Chemistry        Component Value Date/Time     12/22/2015 1109    K 4.3 02/08/2024 0601     02/08/2024 0601    CO2 25 02/08/2024 0601    BUN 30 (H) 02/08/2024 0601    CREATININE 0.77 02/08/2024 0601        Component Value Date/Time    CALCIUM 9.5 02/08/2024 0601    ALKPHOS 58 02/08/2024 0601    AST 21 02/08/2024 0601    ALT 22 02/08/2024 0601    BILITOT 1.14 (H) 12/22/2015 1109        Lab Results   Component Value Date    CHOL 159 11/26/2013    HDL 80 (H) 07/11/2017    LDLCALC 74 07/11/2017    TRIG 48 07/11/2017     Lab Results   Component Value Date    HGBA1C 5.9 (H) 05/19/2022     Remote device interrogation: Feb 2024  MDT DC PPM - ACTIVE SYSTEM IS MRI CONDITIONAL  CARELINK TRANSMISSION: BATTERY VOLTAGE ADEQUATE (9.6 YRS). AP: <0.1%.  30.4% (MVP-OFF). ALL AVAILABLE LEAD PARAMETERS WITHIN NORMAL LIMITS. 413 AT/AF EPISODES W/ AF IN PROGRESS (HX OF SAME) AF BURDEN: 98.7%. PER DR. AGEE NOTE, NO AC DUE T OHIGH RISK/FALL AGE 89 YO. PT TAKES ASA 81MG. EF: 50% (ECHO 10/23/23). NORMAL DEVICE FUNCTION.      ECHO: Oct 2023    Left Ventricle: Left ventricular cavity size is normal. Wall thickness is mildly increased. There is concentric remodeling. The left ventricular ejection fraction is 50%. Systolic function is low normal. Wall motion is normal.    Left Atrium: The atrium is moderately dilated.    Right Atrium: The atrium is dilated.    Aortic Valve: There is mild regurgitation. There is aortic valve sclerosis.    Mitral Valve: There  is mild annular calcification. There is mild regurgitation.    Tricuspid Valve: There is mild regurgitation. The right ventricular systolic pressure is mildly elevated. The estimated right ventricular systolic pressure is 35.00 mmHg.    Pulmonic Valve: There is mild regurgitation.    Aorta: The aortic root is mildly dilated (2.1 cm.m2 when indexed to BSA). The ascending aorta is mildly dilated (2.5 cm/m2 when indexed to BSA).    Layla Lopez PA-C  [facility]

## 2024-05-02 ENCOUNTER — OFFICE VISIT (OUTPATIENT)
Dept: CARDIOLOGY CLINIC | Facility: CLINIC | Age: 89
End: 2024-05-02
Payer: MEDICARE

## 2024-05-02 VITALS
HEIGHT: 62 IN | SYSTOLIC BLOOD PRESSURE: 127 MMHG | HEART RATE: 70 BPM | BODY MASS INDEX: 22.08 KG/M2 | WEIGHT: 120 LBS | DIASTOLIC BLOOD PRESSURE: 77 MMHG

## 2024-05-02 DIAGNOSIS — F41.9 ANXIETY: ICD-10-CM

## 2024-05-02 DIAGNOSIS — I77.819 DILATION OF AORTA (HCC): ICD-10-CM

## 2024-05-02 DIAGNOSIS — Z95.0 PACEMAKER: ICD-10-CM

## 2024-05-02 DIAGNOSIS — I44.2 COMPLETE HEART BLOCK (HCC): Primary | ICD-10-CM

## 2024-05-02 DIAGNOSIS — R60.0 LEG EDEMA: ICD-10-CM

## 2024-05-02 PROCEDURE — 99214 OFFICE O/P EST MOD 30 MIN: CPT | Performed by: PHYSICIAN ASSISTANT

## 2024-05-13 ENCOUNTER — REMOTE DEVICE CLINIC VISIT (OUTPATIENT)
Dept: CARDIOLOGY CLINIC | Facility: CLINIC | Age: 89
End: 2024-05-13
Payer: MEDICARE

## 2024-05-13 DIAGNOSIS — Z95.0 PRESENCE OF PERMANENT CARDIAC PACEMAKER: Primary | ICD-10-CM

## 2024-05-13 PROCEDURE — 93296 REM INTERROG EVL PM/IDS: CPT | Performed by: INTERNAL MEDICINE

## 2024-05-13 PROCEDURE — 93294 REM INTERROG EVL PM/LDLS PM: CPT | Performed by: INTERNAL MEDICINE

## 2024-05-13 NOTE — PROGRESS NOTES
MDT DC PM - ACTIVE SYSTEM IS MRI CONDITIONAL   CARELINK TRANSMISSION:  BATTERY VOLTAGE ADEQUATE (9.4 YR.).  AP 49% -LBB 99.8% (>40%/AVB/DDDR 50 PPM, -180 MS).  ALL LEAD PARAMETERS WITHIN NORMAL LIMITS.  9 AT/AF EPISODES WITH BURDEN 70.8% SINCE 2/11/2024.  PATIENT TAKES ASA, NO AC DUE TO FALL RISK.  NORMAL DEVICE FUNCTION.  RG

## 2024-08-26 ENCOUNTER — REMOTE DEVICE CLINIC VISIT (OUTPATIENT)
Dept: CARDIOLOGY CLINIC | Facility: CLINIC | Age: 89
End: 2024-08-26
Payer: MEDICARE

## 2024-08-26 DIAGNOSIS — Z95.0 PRESENCE OF PERMANENT CARDIAC PACEMAKER: Primary | ICD-10-CM

## 2024-08-26 PROCEDURE — 93294 REM INTERROG EVL PM/LDLS PM: CPT | Performed by: INTERNAL MEDICINE

## 2024-08-26 PROCEDURE — 93296 REM INTERROG EVL PM/IDS: CPT | Performed by: INTERNAL MEDICINE

## 2024-08-26 NOTE — PROGRESS NOTES
MDT DC PM - ACTIVE SYSTEM IS MRI CONDITIONAL   CARELINK TRANSMISSION:  BATTERY VOLTAGE ADEQUATE (9.2 YR.).  AP 15.7% -LBB 99.9% (>40%/CHB/DDDR 50 PPM, -180 MS).  ALL LEAD PARAMETERS WITHIN NORMAL LIMITS.  SINCE 5/13/24;  1 EPISODE OF NSVT (17 @ 168 BPM).  6 AT/AF EPISODE WITH 15.1% BURDEN.  EF 50% (ECHO 10/2023).  PATIENT TAKES ASA.  NO AC DUE TO FALL RISK.  TASK TO DR AGEE.  NORMAL DEVICE FUNCTION.  RG

## 2024-10-04 ENCOUNTER — TELEPHONE (OUTPATIENT)
Dept: CARDIOLOGY CLINIC | Facility: CLINIC | Age: 89
End: 2024-10-04

## 2024-10-04 ENCOUNTER — HOSPITAL ENCOUNTER (OUTPATIENT)
Dept: NON INVASIVE DIAGNOSTICS | Facility: HOSPITAL | Age: 89
Discharge: HOME | End: 2024-10-04
Payer: MEDICARE

## 2024-10-04 VITALS
HEART RATE: 62 BPM | BODY MASS INDEX: 22.08 KG/M2 | DIASTOLIC BLOOD PRESSURE: 67 MMHG | HEIGHT: 62 IN | WEIGHT: 120 LBS | SYSTOLIC BLOOD PRESSURE: 145 MMHG

## 2024-10-04 DIAGNOSIS — R60.0 LEG EDEMA: ICD-10-CM

## 2024-10-04 DIAGNOSIS — I77.819 DILATION OF AORTA (HCC): ICD-10-CM

## 2024-10-04 LAB
AORTIC ROOT: 3.8 CM
AORTIC VALVE MEAN VELOCITY: 8.8 M/S
APICAL FOUR CHAMBER EJECTION FRACTION: 55 %
ASCENDING AORTA: 4.2 CM
AV AREA BY CONTINUOUS VTI: 3.4 CM2
AV AREA PEAK VELOCITY: 3.4 CM2
AV LVOT MEAN GRADIENT: 4 MMHG
AV LVOT PEAK GRADIENT: 6 MMHG
AV MEAN GRADIENT: 4 MMHG
AV PEAK GRADIENT: 6 MMHG
AV REGURGITATION PRESSURE HALF TIME: 598 MS
AV VALVE AREA: 3.43 CM2
AV VELOCITY RATIO: 0.97
BSA FOR ECHO PROCEDURE: 1.54 M2
DOP CALC AO PEAK VEL: 1.26 M/S
DOP CALC AO VTI: 27.56 CM
DOP CALC LVOT AREA: 3.46 CM2
DOP CALC LVOT CARDIAC INDEX: 4.31 L/MIN/M2
DOP CALC LVOT CARDIAC OUTPUT: 6.63 L/MIN
DOP CALC LVOT DIAMETER: 2.1 CM
DOP CALC LVOT PEAK VEL VTI: 27.34 CM
DOP CALC LVOT PEAK VEL: 1.22 M/S
DOP CALC LVOT STROKE INDEX: 63 ML/M2
DOP CALC LVOT STROKE VOLUME: 97
E WAVE DECELERATION TIME: 202 MS
E/A RATIO: 0.78
LEFT VENTRICLE DIASTOLIC VOLUME (MOD BIPLANE): 55 ML
LEFT VENTRICLE DIASTOLIC VOLUME INDEX (MOD BIPLANE): 35.7 ML/M2
LEFT VENTRICLE SYSTOLIC VOLUME (MOD BIPLANE): 22 ML
LEFT VENTRICLE SYSTOLIC VOLUME INDEX (MOD BIPLANE): 14.3 ML/M2
LV EF: 59 %
MV E'TISSUE VEL-LAT: 5 CM/S
MV E'TISSUE VEL-SEP: 4 CM/S
MV PEAK A VEL: 0.85 M/S
MV PEAK E VEL: 66 CM/S
RA PRESSURE ESTIMATED: 8 MMHG
RV PSP: 38 MMHG
SL CV AV DECELERATION TIME RETROGRADE: 2063 MS
SL CV AV PEAK GRADIENT RETROGRADE: 86 MMHG
SL CV LV EF: 55
TR MAX PG: 30 MMHG
TR PEAK VELOCITY: 2.7 M/S
TRICUSPID ANNULAR PLANE SYSTOLIC EXCURSION: 1.8 CM
TRICUSPID VALVE PEAK REGURGITATION VELOCITY: 2.73 M/S

## 2024-10-04 PROCEDURE — 93308 TTE F-UP OR LMTD: CPT | Performed by: STUDENT IN AN ORGANIZED HEALTH CARE EDUCATION/TRAINING PROGRAM

## 2024-10-04 PROCEDURE — 93321 DOPPLER ECHO F-UP/LMTD STD: CPT

## 2024-10-04 PROCEDURE — 93321 DOPPLER ECHO F-UP/LMTD STD: CPT | Performed by: STUDENT IN AN ORGANIZED HEALTH CARE EDUCATION/TRAINING PROGRAM

## 2024-10-04 PROCEDURE — 93325 DOPPLER ECHO COLOR FLOW MAPG: CPT

## 2024-10-04 PROCEDURE — 93325 DOPPLER ECHO COLOR FLOW MAPG: CPT | Performed by: STUDENT IN AN ORGANIZED HEALTH CARE EDUCATION/TRAINING PROGRAM

## 2024-10-04 PROCEDURE — 93308 TTE F-UP OR LMTD: CPT

## 2024-10-04 NOTE — TELEPHONE ENCOUNTER
----- Message from Layla Lopez PA-C sent at 10/4/2024 12:41 PM EDT -----  Hello    Can you please call Summer to let her know the echocardiogram is the same as last year. Heart size and function is good. No problematic findings. Thank you for getting the echo completed.    No changes to medications or new recommendations based on the echo findings. Keep doing what you are doing.    From, Layla Lopez

## 2024-10-09 ENCOUNTER — IN-CLINIC DEVICE VISIT (OUTPATIENT)
Dept: CARDIOLOGY CLINIC | Facility: CLINIC | Age: 89
End: 2024-10-09
Payer: MEDICARE

## 2024-10-09 DIAGNOSIS — Z95.0 CARDIAC PACEMAKER IN SITU: Primary | ICD-10-CM

## 2024-10-09 PROCEDURE — 93280 PM DEVICE PROGR EVAL DUAL: CPT | Performed by: STUDENT IN AN ORGANIZED HEALTH CARE EDUCATION/TRAINING PROGRAM

## 2024-10-09 NOTE — PROGRESS NOTES
Results for orders placed or performed in visit on 10/09/24   Cardiac EP device report    Narrative    MDT DC PM - ACTIVE SYSTEM IS MRI CONDITIONAL  DEVICE INTERROGATED IN THE [place] OFFICE: BATTERY VOLTAGE ADEQUATE-8.9 YRS. AP 10%  88%. ALL AVAILABLE LEAD PARAMETERS WITHIN NORMAL LIMITS. 1 NSVT NOTED. 7 AT/AF NOTED; 49% BURDEN; EGRAMS PRESENT AS AF. NO AC OR BBLOCKERS. EF 55% (ECHO 2024). NO PROGRAMMING CHANGES MADE TO DEVICE PARAMETERS. NORMAL DEVICE FUNCTION. NC

## 2024-11-03 DIAGNOSIS — I10 SYSTOLIC HYPERTENSION: ICD-10-CM

## 2024-11-04 RX ORDER — AMLODIPINE BESYLATE 5 MG/1
5 TABLET ORAL EVERY MORNING
Qty: 90 TABLET | Refills: 0 | Status: SHIPPED | OUTPATIENT
Start: 2024-11-04

## 2024-11-05 ENCOUNTER — OFFICE VISIT (OUTPATIENT)
Dept: GASTROENTEROLOGY | Facility: MEDICAL CENTER | Age: 89
End: 2024-11-05
Payer: MEDICARE

## 2024-11-05 VITALS
DIASTOLIC BLOOD PRESSURE: 72 MMHG | HEART RATE: 72 BPM | SYSTOLIC BLOOD PRESSURE: 135 MMHG | HEIGHT: 62 IN | BODY MASS INDEX: 22.38 KG/M2 | TEMPERATURE: 98.2 F | OXYGEN SATURATION: 98 % | WEIGHT: 121.6 LBS

## 2024-11-05 DIAGNOSIS — K59.00 CONSTIPATION, UNSPECIFIED CONSTIPATION TYPE: ICD-10-CM

## 2024-11-05 DIAGNOSIS — K56.52 INTESTINAL ADHESIONS WITH COMPLETE OBSTRUCTION (HCC): ICD-10-CM

## 2024-11-05 DIAGNOSIS — R13.10 DYSPHAGIA, UNSPECIFIED TYPE: Primary | ICD-10-CM

## 2024-11-05 PROCEDURE — 99213 OFFICE O/P EST LOW 20 MIN: CPT | Performed by: INTERNAL MEDICINE

## 2024-11-05 PROCEDURE — G2211 COMPLEX E/M VISIT ADD ON: HCPCS | Performed by: INTERNAL MEDICINE

## 2024-11-05 RX ORDER — RISPERIDONE 0.25 MG/1
0.25 TABLET ORAL AS NEEDED
COMMUNITY

## 2024-11-05 NOTE — PROGRESS NOTES
[facility] - Outpatient Follow-up Note  Summer Blanton 89 y.o. female MRN: [id number]  Encounter: [id number]          ASSESSMENT AND PLAN:      1. Dysphagia, unspecified type  Patient has only episodes of dysphagia once every 4 to 5 months.  She had a previous extensive workups with no signs of obstruction lesion.  Patient was counseled on chewing the food well and avoiding red meat, apple and dry bread.     2. Intestinal adhesions with complete obstruction (HCC)  Patient had small bowel obstruction in 2023 status post small bowel resection (round 20 cm of ileum) patient recovered well from the surgery with no further complications.    3. Constipation, unspecified constipation type  Continue senna.    Follow-up on as-needed basis.    ______________________________________________________________________    SUBJECTIVE:  89 yo F with hx of lupus was seen by me between 2016 and 2017. She initially presented with dysphagia with solid food was was evaluated with barium swallow, it was suspected she might had achalasia then and she underwent EGD and manometry, manometry showed non specific motility of the esophagus but not consistent with achalasia. Her symptoms subsided since and she lost follow up with us. She presented with symptoms of dysphagia again in 2023, her symptoms are very intermittent and usually with solid food.  She does not want any invasive test then.  Her clinical course was complicated with vagina bleeding and incidental finding of small bowel obstruction during imaging in the emergency room.  She underwent partial small bowel resection and her symptoms resolved.  She reported she lost significant amount of weight during hospitalization in November 2023 and she has not gained weight back.  She reported she has good appetite today.  Patient was accompanied by daughter and part of the history was provided by daughter  Her weight has been stable  She has intermittent  constipation,  Her symptoms are well-controlled with senna.         REVIEW OF SYSTEMS IS OTHERWISE NEGATIVE.      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    Transient elevated blood pressure     last assessed: 5/16/2017    Weight loss      Past Surgical History:   Procedure Laterality Date    APPENDECTOMY      CATARACT EXTRACTION      CHOLECYSTECTOMY      ESOPHAGOGASTRODUODENOSCOPY  06/2013    diagnostic- neg id have dilatation    EYE SURGERY      HYSTERECTOMY      LAPAROTOMY N/A 11/3/2023    Procedure: LAPAROTOMY EXPLORATORY, ILEOCECECTOMY;  Surgeon: Katlyn Fleming MD;  Location: [facility];  Service: General    IA ESOPHAGOGASTRODUODENOSCOPY TRANSORAL DIAGNOSTIC N/A 9/16/2016    Procedure: ESOPHAGOGASTRODUODENOSCOPY (EGD);  Surgeon: Vladislav Garcia MD;  Location: [facility];  Service: Gastroenterology    REPLACEMENT TOTAL KNEE Left 01/2007     Social History   Social History     Substance and Sexual Activity   Alcohol Use No     Social History     Substance and Sexual Activity   Drug Use No     Social History     Tobacco Use   Smoking Status Never   Smokeless Tobacco Never     Family History   Problem Relation Age of Onset    Diabetes Mother     Coronary artery disease Father        Meds/Allergies       Current Outpatient Medications:     acetaminophen (TYLENOL) 325 mg tablet    amLODIPine (NORVASC) 5 mg tablet    Cholecalciferol (VITAMIN D) 2000 units CAPS    fish oil 1,000 mg    Multiple Vitamins-Minerals (PRESERVISION AREDS PO)    risperiDONE (RisperDAL) 0.25 mg tablet    senna-docusate sodium (SENOKOT-S) 8.6-50 mg per tablet    vitamin B-12 (VITAMIN B-12) 500 mcg tablet    aspirin 81 mg chewable tablet    Calcium Carb-Cholecalciferol (CALCIUM CARBONATE+VITAMIN D PO)    multivitamin (THERAGRAN) TABS    trospium chloride (SANCTURA)  "20 mg tablet    Turmeric 500 MG CAPS    Allergies   Allergen Reactions    Gabapentin      dreams    Sertraline GI Intolerance     ' felt like a zombie'           Objective     Blood pressure 135/72, pulse 72, temperature 98.2 °F (36.8 °C), temperature source Tympanic, height 5' 2\" (1.575 m), weight 55.2 kg (121 lb 9.6 oz), SpO2 98%. Body mass index is 22.24 kg/m².      PHYSICAL EXAM:      General Appearance:   Alert, cooperative, no distress   HEENT:   Normocephalic, atraumatic, anicteric.     Neck:  Supple, symmetrical, trachea midline   Lungs:   Clear to auscultation bilaterally; no rales, rhonchi or wheezing; respirations unlabored    Heart::   Regular rate and rhythm; no murmur, rub, or gallop.   Abdomen:   Soft, non-tender, non-distended; normal bowel sounds; no masses, no organomegaly    Genitalia:   Deferred    Rectal:   Deferred    Extremities:  No cyanosis, clubbing or edema    Pulses:  2+ and symmetric    Skin:  No jaundice, rashes, or lesions    Lymph nodes:  No palpable cervical lymphadenopathy        Lab Results:   No visits with results within 1 Day(s) from this visit.   Latest known visit with results is:   Hospital Outpatient Visit on 10/04/2024   Component Date Value    BSA 10/04/2024 1.54     A4C EF 10/04/2024 55     LVOT stroke volume 10/04/2024 97.00     LVOT stroke volume index 10/04/2024 63.00     LV Diastolic Volume (BP) 10/04/2024 55     LV Systolic Volume (BP) 10/04/2024 22     EF 10/04/2024 59     LVOT Cardiac Output 10/04/2024 6.63     LVOT Cardiac Index 10/04/2024 4.31     LVOT diameter 10/04/2024 2.1     LVOT peak VTI 10/04/2024 27.34     MV E' Tissue Velocity Se* 10/04/2024 4     MV E' Tissue Velocity La* 10/04/2024 5     E/A ratio 10/04/2024 0.78     E wave deceleration time 10/04/2024 202     MV Peak E Riaz 10/04/2024 66     MV Peak A Riaz 10/04/2024 0.85     AV LVOT peak gradient 10/04/2024 6     LVOT peak riaz 10/04/2024 1.22     Tricuspid annular plane * 10/04/2024 1.80     Aortic " valve peak veloci* 10/04/2024 1.26     Ao VTI 10/04/2024 27.56     AV mean gradient 10/04/2024 4     LVOT mn grad 10/04/2024 4.0     AV peak gradient 10/04/2024 6     AV Deceleration Time 10/04/2024 2,063     AV regurgitation pressur* 10/04/2024 598     AV area by cont VTI 10/04/2024 3.4     AV area peak riaz 10/04/2024 3.4     TR Peak Riaz 10/04/2024 2.7     Triscuspid Valve Regurgi* 10/04/2024 30.0     Ao root 10/04/2024 3.80     Asc Ao 10/04/2024 4.2     AV peak gradient 10/04/2024 86     Aortic valve mean veloci* 10/04/2024 8.80     Tricuspid valve peak reg* 10/04/2024 2.73     LVOT area 10/04/2024 3.46     DVI 10/04/2024 0.97     AV valve area 10/04/2024 3.43     LV Diastolic Volume Inde* 10/04/2024 35.7     LV Systolic Volume Index* 10/04/2024 14.3     LV EF 10/04/2024 55     Est. RA pres 10/04/2024 8.0     Right Ventricular Peak S* 10/04/2024 38.00          Radiology Results:   Cardiac EP device report    Result Date: 10/9/2024  Narrative: [facility]I DC PM - ACTIVE SYSTEM IS MRI CONDITIONAL DEVICE INTERROGATED IN THE [place] OFFICE: BATTERY VOLTAGE ADEQUATE-8.9 YRS. AP 10%  88%. ALL AVAILABLE LEAD PARAMETERS WITHIN NORMAL LIMITS. 1 NSVT NOTED. 7 AT/AF NOTED; 49% BURDEN; EGRAMS PRESENT AS AF. NO AC OR BBLOCKERS. EF 55% (ECHO 2024). NO PROGRAMMING CHANGES MADE TO DEVICE PARAMETERS. NORMAL DEVICE FUNCTION. NC

## 2024-11-18 ENCOUNTER — OFFICE VISIT (OUTPATIENT)
Dept: CARDIOLOGY CLINIC | Facility: CLINIC | Age: 89
End: 2024-11-18
Payer: MEDICARE

## 2024-11-18 VITALS
WEIGHT: 126 LBS | SYSTOLIC BLOOD PRESSURE: 130 MMHG | DIASTOLIC BLOOD PRESSURE: 70 MMHG | HEART RATE: 63 BPM | BODY MASS INDEX: 23.05 KG/M2

## 2024-11-18 DIAGNOSIS — I44.2 COMPLETE HEART BLOCK (HCC): Primary | ICD-10-CM

## 2024-11-18 DIAGNOSIS — I10 SYSTOLIC HYPERTENSION: ICD-10-CM

## 2024-11-18 DIAGNOSIS — R68.89 FORGETFULNESS: ICD-10-CM

## 2024-11-18 PROCEDURE — 93000 ELECTROCARDIOGRAM COMPLETE: CPT

## 2024-11-18 PROCEDURE — 99214 OFFICE O/P EST MOD 30 MIN: CPT

## 2024-11-18 RX ORDER — ASPIRIN 81 MG/1
81 TABLET, CHEWABLE ORAL DAILY
Qty: 90 TABLET | Refills: 3 | Status: SHIPPED | OUTPATIENT
Start: 2024-11-18

## 2024-11-18 NOTE — PROGRESS NOTES
Cardiology   MD Deandre Fritz MD, [facility]  Sotero Eng DO, [facility] FACP, FASNC Ather Mansoor, MD Rujul Patel, DO, [facility]  James Valencia DO, [facility]  -------------------------------------------------------------------  [facility]  [address] [place] [address]  Phone: [phone]  Fax: [phone]  11/18/24  Summer Blanton  YOB: 1930   MRN: [id number]      Referring Physician: No referring provider defined for this encounter.     HPI: Summer Blanton is a 89 y.o. female with:   Complete heart block with severe underlying conduction system disease.  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  For the most part she is doing well.  Does note lower extremity swelling which is chronic for her, uses compression stockings which seems to help.  ECG today shows a sensed V paced rhythm    Pacemaker interrogation October 2024  BATTERY VOLTAGE ADEQUATE-8.9 YRS. AP 10%  88%. ALL AVAILABLE LEAD PARAMETERS WITHIN NORMAL LIMITS. 1 NSVT NOTED. 7 AT/AF NOTED; 49% BURDEN; EGRAMS PRESENT AS AF. NO AC OR BBLOCKERS. EF 55% (ECHO 2024). NO PROGRAMMING CHANGES MADE TO DEVICE PARAMETERS. NORMAL DEVICE FUNCTION     She had echo done October 4, 2024 EF 55%, mild left atrial dilation, mild AI MR TR PI and mild aortic root dilation but no significant changes compared to prior echo    Review of Systems   Constitutional:  Negative for chills and fever.   HENT:  Negative for facial swelling and sore throat.    Eyes:  Negative for visual disturbance.   Respiratory:  Negative for cough, chest tightness, shortness of breath and  wheezing.    Cardiovascular:  Negative for chest pain, palpitations and leg swelling.   Gastrointestinal:  Negative for abdominal pain, blood in stool, constipation, diarrhea, nausea and vomiting.   Endocrine: Negative for cold intolerance and heat intolerance.   Genitourinary:  Negative for decreased urine volume, difficulty urinating, dysuria and hematuria.   Musculoskeletal:  Negative for arthralgias, back pain and myalgias.   Skin:  Negative for rash.   Neurological:  Negative for dizziness, syncope, weakness and numbness.   Psychiatric/Behavioral:  Negative for agitation, behavioral problems and confusion. The patient is not nervous/anxious.         OBJECTIVE  Vitals:    11/18/24 0925   BP: 130/70   Pulse: 63       Physical Exam  Constitutional: awake, alert and oriented, in no acute distress, no obvious deformities  Head: Normocephalic, without obvious abnormality, atraumatic  Eyes: conjunctivae clear and moist. Sclera anicteric.  No xanthelasmas. Pupils equal bilaterally.  Extraocular motions are full.  Ear nose mouth and throat: ears are symmetrical bilaterally, hearing appears to be equal bilaterally, no nasal discharge or epistaxis, oropharynx is clear with moist mucous membranes  Neck:  Trachea is midline, neck is supple, no thyromegaly or significant lymphadenopathy, there is full range of motion.  Lungs: clear to auscultation bilaterally, no wheezes, no rales, no rhonchi, no accessory muscle use, breathing is nonlabored  Heart: Regular rhythm with a Normal heart rate, S1, S2 normal, No Murmur, no click, rub or gallop, No lower extremity edema today  Abdomen: soft, non-tender; bowel sounds normal; no masses,  no organomegaly  Psychiatric:  Patient is oriented to time, place, person, mood/affect is negative for depression, anxiety, agitation, appears to have appropriate insight  Skin: Skin is warm, dry, intact. No obvious rashes or lesions on exposed extremities.  Nail beds are pink with no cyanosis or  clubbing.    EKG:  Results for orders placed or performed in visit on 24   POCT ECG    Impression    Atrial sensed ventricular paced rhythm        IMPRESSION:  Complete heart block with severe underlying conduction system disease.  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  She has been doing very well from a heart standpoint, she had a new echo with results as above, stable compared to prior echo  Her rhythm is paced on ECG today  Device function is normal on recent device interrogation  She is off full dose anticoagulation, supposed to be just on aspirin now (but this was stopped for some reason between April and now?? -> would restart), does have paroxysmal A-fib   No recent falls or other injuries  We will hold off on further cardiac imaging    James Valencia DO, [facility], CARLI  --------------------------------------------------------------------------------  TREADMILL STRESS  No results found for this or any previous visit.     ----------------------------------------------------------------------------------------------  NUCLEAR STRESS TEST: No results found for this or any previous visit.    Results for orders placed during the hospital encounter of 18    NM myocardial perfusion spect (rx stress and/or rest)    [facility]  [address] [place] [address] [phone]    Rest/Stress Gated SPECT Myocardial Perfusion Imaging After Regadenoson    Patient: ISABEL GAONA  MR number: [id number]  Account number: [id number]  : 1930  Age: 87 years  Gender: Female  Status:  Outpatient  Location: Stress lab  Height: 61 in  Weight: 126 lb  BP: 132/ 72 mmHg    Allergies: NO KNOWN ALLERGIES    Diagnosis: 794.31 - ABNORM ELECTROCARDIOGRAM, R07.9 - Chest pain, unspecified    Primary Physician:  London Farias DO  Technician:  Tanvi Meyer  RN:  Kiarra Salazar RN  Referring Physician:  London Farias DO  Group:  [facility]  Report Prepared By::  Tanvi Meyer  Interpreting Physician:  Tye Perea MD    INDICATIONS: Detection of Coronary artery disease.    HISTORY: The patient is a 87 year old  female. Chest pain status: chest pain. Coronary artery disease risk factors: family history of premature coronary artery disease and post-menopausal state. Cardiovascular history: none  significant. Previous test results: abnormal ECG and abnormal resting echocardiogram.    PHYSICAL EXAM: Baseline physical exam screening: no wheezes audible.    REST ECG: Normal sinus rhythm. Left anterior fasicular block.    PROCEDURE: The study was performed in the stress lab. A regadenoson infusion pharmacologic stress test was performed. Gated SPECT myocardial perfusion imaging was performed after stress. Systolic blood pressure was 132 mmHg, at the start  of the study. Diastolic blood pressure was 72 mmHg, at the start of the study. The heart rate was 74 bpm, at the start of the study. IV double checked.  Regadenoson protocol:  HR bpm SBP mmHg DBP mmHg Symptoms  Baseline 74 132 72 none  Immediate 97 130 70 flushing  2 min 93 168 80 none  4 min 92 144 80 none  No medications or fluids given. The patient also performed low level exercise.    STRESS SUMMARY: Duration of pharmacologic stress was 3 min. Maximal heart rate during stress was 97 bpm ( 81 % of maximal predicted heart rate). The rate-pressure product for the peak heart rate and blood pressure was 52470. There was no  chest pain during stress. The stress test was terminated due to protocol completion. Pre oxygen  saturation: 97 %. Peak oxygen saturation: 97 %. The stress ECG was negative for ischemia and normal. Arrhythmia during stress: isolated  premature ventricular beats.    ISOTOPE ADMINISTRATION:  Resting isotope administration Stress isotope administration  Agent Tetrofosmin Tetrofosmin  Dose 10.4 mCi 1430 mCi  Date 06/13/2018 06/13/2018  Injection time 12:06 13:15  Imaging time 12:41 13:49  Injection-image interval 35 min 34 min    The radiopharmaceutical was injected at the peak effect of pharmacologic stress.    MYOCARDIAL PERFUSION IMAGING:  The image quality was good. Left ventricular size was normal. The TID ratio was 0.87.    PERFUSION DEFECTS:  -  There were no perfusion defects.    GATED SPECT:  The calculated left ventricular ejection fraction was 68 %. Left ventricular ejection fraction was within normal limits by visual estimate. There was no left ventricular regional abnormality.    SUMMARY:  -  Stress results: Target heart rate was not achieved. There was no chest pain during stress.  -  ECG conclusions: The stress ECG was negative for ischemia and normal.  -  Perfusion imaging: There were no perfusion defects.  -  Gated SPECT: The calculated left ventricular ejection fraction was 68 %. Left ventricular ejection fraction was within normal limits by visual estimate. There was no left ventricular regional abnormality.    IMPRESSIONS: Normal study after pharmacologic vasodilation. Myocardial perfusion imaging was normal at rest and with stress.    Prepared and signed by    Tye Perea MD  Signed 06/13/2018 15:34:56      --------------------------------------------------------------------------------  CATH:  No results found for this or any previous visit.    --------------------------------------------------------------------------------  ECHO:   Results for orders placed during the hospital encounter of 02/09/21    Echo complete with contrast if indicated    Narrative  [facility]  [address]  [place] [address]  [phone]    Transthoracic Echocardiogram  Limited 2D, M-mode, Doppler, and Color Doppler    Study date:  2021    Patient: ISABEL GAONA  MR number: [id number]  Account number: [id number]  : 23-Dec-1930  Age: 89 years  Gender: Female  Status: Inpatient  Location: Emergency room  Height: 61 in  Weight: 128.7 lb  BP: 205/ 91 mmHg    Indications: Complete heart block/pre-pacemaker insertion    Diagnoses: I44.2 - Atrioventricular block, complete    Sonographer:  Sotero Schmidt [facility]  Primary Physician:  London Farias DO  Referring Physician:  Mike Cotto PA-C  Group:  [facility]  Interpreting Physician:  James Valencia D.O.    SUMMARY    LEFT VENTRICLE:  Systolic function was normal by visual assessment. Ejection fraction was estimated to be 55 %.  There were no regional wall motion abnormalities.  Wall thickness was mildly increased.  There was mild concentric hypertrophy.    LEFT ATRIUM:  The atrium was markedly dilated. LA Volume index 47 mL/m2.    RIGHT ATRIUM:  The atrium was moderately to markedly dilated.    MITRAL VALVE:  There was trace regurgitation.    AORTIC VALVE:  There was mild regurgitation.    TRICUSPID VALVE:  There was moderate regurgitation.    AORTA:  The root exhibited mild dilatation.  There was mild dilatation of the ascending aorta to 3.9 cm.    SUMMARY MEASUREMENTS  2D measurements:  Unspecified Anatomy:   %FS was 46.5 %.  Ao Diam was 3.6 cm.  EDV(Teich) was 125.4 ml.  EF(Teich) was 77.6 %.  ESV(Teich) was 28.1 ml.  HR_4Ch_Q was 32.4 BPM.  IVSd was 1 cm.  LA Diam was 3.3 cm.  LAAs A2C was 21.8 cm2.  LAAs A4C was 23.7  cm2.  LAESV A-L A2C was 66.9 ml.  LAESV A-L A4C was 81.2 ml.  LAESV Index (A-L) was 47.5 ml/m2.  LAESV MOD A2C was 62.5 ml.  LAESV MOD A4C was 76.5 ml.  LAESV(A-L) was 74.5 ml.  LAESV(MOD BP) was 69.7 ml.  LAESVInd MOD BP was 44.4 ml/m2.  LALs A2C was 6 cm.  LALs A4C was 5.9 cm.   LVCO_4Ch_Q was 1.9 L/min.  LVEF_4Ch_Q was 58.3 %.  LVIDd was 5.1 cm.  LVIDs was 2.7 cm.  LVLd_4Ch_Q was 7.6 cm.  LVLs_4Ch_Q was 6 cm.  LVPWd was 1.1 cm.  LVSV_4Ch_Q was 60.1 ml.  LVVED_4Ch_Q was  103.1 ml.  LVVES_4Ch_Q was 43 ml.  RAAs A4C was 18.8 cm2.  RAESV A-L was 55.9 ml.  RAESV MOD was 53.8 ml.  RALs was 5.4 cm.  RVIDd was 3.9 cm.  SV(Teich) was 97.3 ml.  CW measurements:  Unspecified Anatomy:   AR Dec [place] was 1.4 m/s2.  AR Dec Time was 3519.3 ms.  AR PHT was 1020.6 ms.  AR Vmax was 5.1 m/s.  AR maxPG was 102.9 mmHg.  AV Vmax was 1.9 m/s.  AV maxPG was 14.6 mmHg.  TR Vmax was 3.5 m/s.  TR maxPG was 49.7  mmHg.  MM measurements:  Unspecified Anatomy:   TAPSE was 3 cm.  PW measurements:  Unspecified Anatomy:   LVOT Vmax was 1.5 m/s.  LVOT maxPG was 8.6 mmHg.  MV A Riaz was 1.1 m/s.  MV Dec [place] was 5.3 m/s2.  MV DecT was 191.3 ms.  MV E Riaz was 1 m/s.  MV E/A Ratio was 0.9 .  MV PHT was 55.5 ms.  MVA By PHT was 4 cm2.    HISTORY: PRIOR HISTORY: Second-degree AV block, HTN    PROCEDURE: The procedure was performed in the emergency room. This was a routine study. The transthoracic approach was used. The study included limited 2D imaging, M-mode, limited spectral Doppler, and color Doppler. Image quality was  adequate.    LEFT VENTRICLE: Size was normal. Systolic function was normal by visual assessment. Ejection fraction was estimated to be 55 %. There were no regional wall motion abnormalities. Wall thickness was mildly increased. There was mild  concentric hypertrophy. DOPPLER: There was an increased relative contribution of atrial contraction to ventricular filling.    RIGHT VENTRICLE: The size was normal. Systolic function was normal. Wall thickness was normal.    LEFT ATRIUM: The atrium was markedly dilated. LA Volume index 47 mL/m2.    RIGHT ATRIUM: The atrium was moderately to markedly dilated.    MITRAL VALVE: Valve structure was normal. There was mild-moderate calcification of the anterior leaflet.  There was normal leaflet separation. DOPPLER: The transmitral velocity was within the normal range. There was no evidence for  stenosis. There was trace regurgitation.    AORTIC VALVE: The valve was trileaflet. Leaflets exhibited normal thickness, mild calcification, and normal cuspal separation. DOPPLER: Transaortic velocity was within the normal range. There was no evidence for stenosis. There was mild  regurgitation.    TRICUSPID VALVE: The valve structure was normal. There was normal leaflet separation. DOPPLER: The transtricuspid velocity was within the normal range. There was no evidence for stenosis. There was moderate regurgitation. Estimated peak PA  pressure was 53 mmHg. The findings suggest mild to moderate pulmonary hypertension.    PULMONIC VALVE: Leaflets exhibited normal thickness, no calcification, and normal cuspal separation. DOPPLER: The transpulmonic velocity was within the normal range. There was no regurgitation.    PERICARDIUM: There was no pericardial effusion. The pericardium was normal in appearance.    AORTA: The root exhibited mild dilatation. There was mild dilatation of the ascending aorta to 3.9 cm.    SYSTEMIC VEINS: IVC: The inferior vena cava was normal in size and course. Respirophasic changes were normal.    SYSTEM MEASUREMENT TABLES    2D  %FS: 46.5 %  Ao Diam: 3.6 cm  EDV(Teich): 125.4 ml  EF(Teich): 77.6 %  ESV(Teich): 28.1 ml  HR_4Ch_Q: 32.4 BPM  IVSd: 1 cm  LA Diam: 3.3 cm  LAAs A2C: 21.8 cm2  LAAs A4C: 23.7 cm2  LAESV A-L A2C: 66.9 ml  LAESV A-L A4C: 81.2 ml  LAESV Index (A-L): 47.5 ml/m2  LAESV MOD A2C: 62.5 ml  LAESV MOD A4C: 76.5 ml  LAESV(A-L): 74.5 ml  LAESV(MOD BP): 69.7 ml  LAESVInd MOD BP: 44.4 ml/m2  LALs A2C: 6 cm  LALs A4C: 5.9 cm  LVCO_4Ch_Q: 1.9 L/min  LVEF_4Ch_Q: 58.3 %  LVIDd: 5.1 cm  LVIDs: 2.7 cm  LVLd_4Ch_Q: 7.6 cm  LVLs_4Ch_Q: 6 cm  LVPWd: 1.1 cm  LVSV_4Ch_Q: 60.1 ml  LVVED_4Ch_Q: 103.1 ml  LVVES_4Ch_Q: 43 ml  RAAs A4C: 18.8 cm2  RAESV A-L: 55.9 ml  RAESV  MOD: 53.8 ml  RALs: 5.4 cm  RVIDd: 3.9 cm  SV(Teich): 97.3 ml    CW  AR Dec [place]: 1.4 m/s2  AR Dec Time: 3519.3 ms  AR PHT: 1020.6 ms  AR Vmax: 5.1 m/s  AR maxP.9 mmHg  AV Vmax: 1.9 m/s  AV maxP.6 mmHg  TR Vmax: 3.5 m/s  TR maxP.7 mmHg    MM  TAPSE: 3 cm    PW  LVOT Vmax: 1.5 m/s  LVOT maxP.6 mmHg  MV A Riaz: 1.1 m/s  MV Dec [place]: 5.3 m/s2  MV DecT: 191.3 ms  MV E Riaz: 1 m/s  MV E/A Ratio: 0.9  MV PHT: 55.5 ms  MVA By PHT: 4 cm2    [facility]    Prepared and electronically signed by    James Valencia D.O.  Signed 2021 16:19:18    No results found for this or any previous visit.    --------------------------------------------------------------------------------  HOLTER  No results found for this or any previous visit.    No results found for this or any previous visit.    --------------------------------------------------------------------------------  CAROTIDS  No results found for this or any previous visit.     --------------------------------------------------------------------------------  Diagnoses and all orders for this visit:    Complete heart block (HCC) -   PPM ( Medtronic) 2021  -     POCT ECG    Forgetfulness    Other orders  -     ARTIFICIAL TEAR OP       ======================================================    Past Medical History:   Diagnosis Date    Aortic regurgitation     Constipation     Discoid lupus erythematosus     Dysphagia     Esophagitis     Forgetfulness     Heart block AV second degree 2021    Transient elevated blood pressure     last assessed: 2017    Weight loss      Past Surgical History:   Procedure Laterality Date    APPENDECTOMY      CATARACT EXTRACTION      CHOLECYSTECTOMY      ESOPHAGOGASTRODUODENOSCOPY  2013    diagnostic- neg id have dilatation    EYE SURGERY      HYSTERECTOMY      LAPAROTOMY N/A 11/3/2023    Procedure: LAPAROTOMY EXPLORATORY, ILEOCECECTOMY;  Surgeon: Katlyn  MD Lonnie;  Location: [facility];  Service: General    TX ESOPHAGOGASTRODUODENOSCOPY TRANSORAL DIAGNOSTIC N/A 9/16/2016    Procedure: ESOPHAGOGASTRODUODENOSCOPY (EGD);  Surgeon: Vladislav Garcia MD;  Location: [facility];  Service: Gastroenterology    REPLACEMENT TOTAL KNEE Left 01/2007         Medications  Current Outpatient Medications   Medication Sig Dispense Refill    acetaminophen (TYLENOL) 325 mg tablet Take 2 tablets (650 mg total) by mouth every 4 (four) hours as needed for mild pain 30 tablet 0    amLODIPine (NORVASC) 5 mg tablet TAKE 1 TABLET BY MOUTH EVERY DAY IN THE MORNING 90 tablet 0    ARTIFICIAL TEAR OP       Cholecalciferol (VITAMIN D) 2000 units CAPS Take 1 capsule (2,000 Units total) by mouth daily  0    Multiple Vitamins-Minerals (PRESERVISION AREDS PO) Take by mouth.      risperiDONE (RisperDAL) 0.25 mg tablet Take 0.25 mg by mouth if needed      senna-docusate sodium (SENOKOT-S) 8.6-50 mg per tablet Take 1 tablet by mouth daily As needed      trospium chloride (SANCTURA) 20 mg tablet Take 20 mg by mouth in the morning      vitamin B-12 (VITAMIN B-12) 500 mcg tablet Take 500 mcg by mouth daily      aspirin 81 mg chewable tablet Chew 1 tablet (81 mg total) daily (Patient not taking: Reported on 11/18/2024) 90 tablet 3    Calcium Carb-Cholecalciferol (CALCIUM CARBONATE+VITAMIN D PO) Take 600 mg by mouth in the morning (Patient not taking: Reported on 11/5/2024)      fish oil 1,000 mg Take 1,000 mg by mouth daily. (Patient not taking: Reported on 11/18/2024)      multivitamin (THERAGRAN) TABS Take 1 tablet by mouth daily (Patient not taking: Reported on 11/5/2024)      Turmeric 500 MG CAPS Take 500 mg by mouth in the morning (Patient not taking: Reported on 11/5/2024)       No current facility-administered medications for this visit.        Allergies   Allergen Reactions    Gabapentin      dreams    Sertraline GI Intolerance     ' felt like a zombie'       Social History     Socioeconomic History     Marital status: /Civil Union     Spouse name: Not on file    Number of children: Not on file    Years of education: Not on file    Highest education level: Not on file   Occupational History    Not on file   Tobacco Use    Smoking status: Never    Smokeless tobacco: Never   Vaping Use    Vaping status: Never Used   Substance and Sexual Activity    Alcohol use: No    Drug use: No    Sexual activity: Not Currently   Other Topics Concern    Not on file   Social History Narrative    Not on file     Social Drivers of Health     Financial Resource Strain: Not on file   Food Insecurity: No Food Insecurity (11/6/2023)    Nursing - Inadequate Food Risk Classification     Worried About Running Out of Food in the Last Year: Never true     Ran Out of Food in the Last Year: Never true     Ran Out of Food in the Last Year: Not on file   Transportation Needs: No Transportation Needs (11/6/2023)    PRAPARE - Transportation     Lack of Transportation (Medical): No     Lack of Transportation (Non-Medical): No   Physical Activity: Not on file   Stress: Not on file   Social Connections: Not on file   Intimate Partner Violence: Not on file   Housing Stability: Unknown (11/6/2023)    Housing Stability Vital Sign     Unable to Pay for Housing in the Last Year: No     Number of Times Moved in the Last Year: Not on file     Homeless in the Last Year: No        Family History   Problem Relation Age of Onset    Diabetes Mother     Coronary artery disease Father        Lab Results   Component Value Date    WBC 7.45 11/07/2023    HGB 10.1 (L) 11/07/2023    HCT 33.9 (L) 11/07/2023    MCV 99 (H) 11/07/2023     11/07/2023      Lab Results   Component Value Date    SODIUM 143 02/08/2024    K 4.3 02/08/2024     02/08/2024    CO2 25 02/08/2024    BUN 30 (H) 02/08/2024    CREATININE 0.77 02/08/2024    GLUC 106 (H) 02/08/2024    CALCIUM 9.5 02/08/2024      Lab Results   Component Value Date    HGBA1C 5.9 (H) 05/19/2022      Lab  "Results   Component Value Date    CHOL 159 11/26/2013     Lab Results   Component Value Date    HDL 80 (H) 07/11/2017    HDL 69 11/26/2013     Lab Results   Component Value Date    LDLCALC 74 07/11/2017    LDLCALC 78 11/26/2013     Lab Results   Component Value Date    TRIG 48 07/11/2017    TRIG 61 11/26/2013     No results found for: \"CHOLHDL\"   Lab Results   Component Value Date    INR 1.61 (H) 11/03/2023    INR 1.7 11/02/2023    INR 1.70 (A) 11/02/2023    PROTIME 19.3 (H) 11/03/2023    PROTIME 62.2 (H) 10/31/2023    PROTIME 15.1 (H) 02/09/2021          Patient Active Problem List    Diagnosis Date Noted    Leg edema 12/21/2023    Intestinal obstruction (HCC) 11/03/2023    Urinary incontinence 08/10/2021    Pacemaker ( Feb 2021 ) 02/11/2021    Complete heart block (HCC) -   PPM ( Medtronic) Feb 2021 02/09/2021    Anxiety 08/14/2018    Gastric erosion, -  resolved 09/16/2016    Peripheral neuropathy 08/29/2016    Constipation 06/21/2016    Forgetfulness 06/21/2016    Left shoulder tendonitis 06/21/2016    Dysphagia - improved 06/16/2015    Discoid lupus erythematosus 06/01/2012    Osteoarthritis of knee 06/01/2012       Portions of the record may have been created with voice recognition software. Occasional wrong word or \"sound a like\" substitutions may have occurred due to the inherent limitations of voice recognition software. Read the chart carefully and recognize, using context, where substitutions have occurred.    James Valencia DO, FACC  11/18/2024 9:41 AM          "

## 2025-01-01 ENCOUNTER — RESULTS FOLLOW-UP (OUTPATIENT)
Dept: URGENT CARE | Age: OVER 89
End: 2025-01-01

## 2025-01-13 ENCOUNTER — REMOTE DEVICE CLINIC VISIT (OUTPATIENT)
Dept: CARDIOLOGY CLINIC | Facility: CLINIC | Age: OVER 89
End: 2025-01-13
Payer: MEDICARE

## 2025-01-13 ENCOUNTER — RESULTS FOLLOW-UP (OUTPATIENT)
Dept: CARDIOLOGY CLINIC | Facility: CLINIC | Age: OVER 89
End: 2025-01-13

## 2025-01-13 DIAGNOSIS — Z95.0 CARDIAC PACEMAKER IN SITU: Primary | ICD-10-CM

## 2025-01-13 PROCEDURE — 93294 REM INTERROG EVL PM/LDLS PM: CPT | Performed by: STUDENT IN AN ORGANIZED HEALTH CARE EDUCATION/TRAINING PROGRAM

## 2025-01-13 PROCEDURE — 93296 REM INTERROG EVL PM/IDS: CPT | Performed by: STUDENT IN AN ORGANIZED HEALTH CARE EDUCATION/TRAINING PROGRAM

## 2025-01-13 NOTE — PROGRESS NOTES
"Results for orders placed or performed in visit on 01/13/25   Cardiac EP device report    Narrative    MDT DC PM - ACTIVE SYSTEM IS MRI CONDITIONAL  CARELINK TRANSMISSION: BATTERY STATUS \"8 YRS.\" AP 18%  97%. ALL AVAILABLE LEAD PARAMETERS WITHIN NORMAL LIMITS. 1 FAST A/V & 76 AT/AF NOTED. 2% AF BURDEN. AVAIL EGRAMS SHOWING AF AND RVR. NO AC DUE TO FALLS. EF 55% (ECHO 2024); NO BBLOCKER. NORMAL DEVICE FUNCTION. NC         "

## 2025-01-30 DIAGNOSIS — I10 SYSTOLIC HYPERTENSION: ICD-10-CM

## 2025-01-30 RX ORDER — AMLODIPINE BESYLATE 5 MG/1
5 TABLET ORAL EVERY MORNING
Qty: 90 TABLET | Refills: 1 | Status: SHIPPED | OUTPATIENT
Start: 2025-01-30

## 2025-02-14 ENCOUNTER — APPOINTMENT (EMERGENCY)
Dept: RADIOLOGY | Facility: HOSPITAL | Age: OVER 89
End: 2025-02-14
Payer: MEDICARE

## 2025-02-14 ENCOUNTER — APPOINTMENT (OUTPATIENT)
Dept: RADIOLOGY | Facility: HOSPITAL | Age: OVER 89
End: 2025-02-14
Payer: MEDICARE

## 2025-02-14 ENCOUNTER — APPOINTMENT (EMERGENCY)
Dept: CT IMAGING | Facility: HOSPITAL | Age: OVER 89
End: 2025-02-14
Payer: MEDICARE

## 2025-02-14 ENCOUNTER — HOSPITAL ENCOUNTER (INPATIENT)
Facility: HOSPITAL | Age: OVER 89
LOS: 3 days | Discharge: NURSING, REHAB OR LONG-TERM CARE | End: 2025-02-18
Attending: EMERGENCY MEDICINE | Admitting: FAMILY MEDICINE
Payer: MEDICARE

## 2025-02-14 DIAGNOSIS — R41.0 CONFUSION AND DISORIENTATION: ICD-10-CM

## 2025-02-14 DIAGNOSIS — R00.0 TACHYCARDIA: Primary | ICD-10-CM

## 2025-02-14 DIAGNOSIS — G93.40 ACUTE ENCEPHALOPATHY: ICD-10-CM

## 2025-02-14 PROBLEM — I48.92 FLUTTER-FIBRILLATION (HCC): Status: ACTIVE | Noted: 2025-02-14

## 2025-02-14 PROBLEM — N17.9 AKI (ACUTE KIDNEY INJURY) (HCC): Status: ACTIVE | Noted: 2025-02-14

## 2025-02-14 PROBLEM — I48.91 FLUTTER-FIBRILLATION (HCC): Status: ACTIVE | Noted: 2025-02-14

## 2025-02-14 LAB
2HR DELTA HS TROPONIN: -5 NG/L
ALBUMIN SERPL BCG-MCNC: 4.6 G/DL (ref 3.5–5)
ALP SERPL-CCNC: 67 U/L (ref 34–104)
ALT SERPL W P-5'-P-CCNC: 27 U/L (ref 7–52)
ANION GAP SERPL CALCULATED.3IONS-SCNC: 9 MMOL/L (ref 4–13)
AST SERPL W P-5'-P-CCNC: 28 U/L (ref 13–39)
ATRIAL RATE: 125 BPM
ATRIAL RATE: 625 BPM
BACTERIA UR QL AUTO: ABNORMAL /HPF
BASOPHILS # BLD AUTO: 0.01 THOUSANDS/ΜL (ref 0–0.1)
BASOPHILS NFR BLD AUTO: 0 % (ref 0–1)
BILIRUB SERPL-MCNC: 0.96 MG/DL (ref 0.2–1)
BILIRUB UR QL STRIP: NEGATIVE
BUN SERPL-MCNC: 51 MG/DL (ref 5–25)
CALCIUM SERPL-MCNC: 9.7 MG/DL (ref 8.4–10.2)
CARDIAC TROPONIN I PNL SERPL HS: 37 NG/L (ref ?–50)
CARDIAC TROPONIN I PNL SERPL HS: 42 NG/L (ref ?–50)
CHLORIDE SERPL-SCNC: 107 MMOL/L (ref 96–108)
CLARITY UR: CLEAR
CO2 SERPL-SCNC: 25 MMOL/L (ref 21–32)
COLOR UR: YELLOW
CREAT SERPL-MCNC: 1.2 MG/DL (ref 0.6–1.3)
EOSINOPHIL # BLD AUTO: 0 THOUSAND/ΜL (ref 0–0.61)
EOSINOPHIL NFR BLD AUTO: 0 % (ref 0–6)
ERYTHROCYTE [DISTWIDTH] IN BLOOD BY AUTOMATED COUNT: 13.7 % (ref 11.6–15.1)
FLUAV AG UPPER RESP QL IA.RAPID: NEGATIVE
FLUAV RNA RESP QL NAA+PROBE: NEGATIVE
FLUBV AG UPPER RESP QL IA.RAPID: NEGATIVE
FLUBV RNA RESP QL NAA+PROBE: NEGATIVE
GFR SERPL CREATININE-BSD FRML MDRD: 38 ML/MIN/1.73SQ M
GLUCOSE SERPL-MCNC: 118 MG/DL (ref 65–140)
GLUCOSE UR STRIP-MCNC: NEGATIVE MG/DL
HCT VFR BLD AUTO: 37.1 % (ref 34.8–46.1)
HGB BLD-MCNC: 12.1 G/DL (ref 11.5–15.4)
HGB UR QL STRIP.AUTO: ABNORMAL
HYALINE CASTS #/AREA URNS LPF: ABNORMAL /LPF
IMM GRANULOCYTES # BLD AUTO: 0.04 THOUSAND/UL (ref 0–0.2)
IMM GRANULOCYTES NFR BLD AUTO: 0 % (ref 0–2)
KETONES UR STRIP-MCNC: NEGATIVE MG/DL
LACTATE SERPL-SCNC: 1.1 MMOL/L (ref 0.5–2)
LEUKOCYTE ESTERASE UR QL STRIP: NEGATIVE
LYMPHOCYTES # BLD AUTO: 0.99 THOUSANDS/ΜL (ref 0.6–4.47)
LYMPHOCYTES NFR BLD AUTO: 11 % (ref 14–44)
MAGNESIUM SERPL-MCNC: 2.4 MG/DL (ref 1.9–2.7)
MCH RBC QN AUTO: 29.7 PG (ref 26.8–34.3)
MCHC RBC AUTO-ENTMCNC: 32.6 G/DL (ref 31.4–37.4)
MCV RBC AUTO: 91 FL (ref 82–98)
MONOCYTES # BLD AUTO: 0.75 THOUSAND/ΜL (ref 0.17–1.22)
MONOCYTES NFR BLD AUTO: 8 % (ref 4–12)
MUCOUS THREADS UR QL AUTO: ABNORMAL
NEUTROPHILS # BLD AUTO: 7.36 THOUSANDS/ΜL (ref 1.85–7.62)
NEUTS SEG NFR BLD AUTO: 81 % (ref 43–75)
NITRITE UR QL STRIP: NEGATIVE
NON-SQ EPI CELLS URNS QL MICRO: ABNORMAL /HPF
NRBC BLD AUTO-RTO: 0 /100 WBCS
P AXIS: 82 DEGREES
P AXIS: 87 DEGREES
PH UR STRIP.AUTO: 5.5 [PH] (ref 4.5–8)
PLATELET # BLD AUTO: 164 THOUSANDS/UL (ref 149–390)
PLATELET # BLD AUTO: 193 THOUSANDS/UL (ref 149–390)
PMV BLD AUTO: 10.1 FL (ref 8.9–12.7)
PMV BLD AUTO: 9.9 FL (ref 8.9–12.7)
POTASSIUM SERPL-SCNC: 3.9 MMOL/L (ref 3.5–5.3)
PR INTERVAL: 192 MS
PROT SERPL-MCNC: 7.3 G/DL (ref 6.4–8.4)
PROT UR STRIP-MCNC: NEGATIVE MG/DL
QRS AXIS: -60 DEGREES
QRS AXIS: -60 DEGREES
QRSD INTERVAL: 132 MS
QRSD INTERVAL: 132 MS
QT INTERVAL: 320 MS
QT INTERVAL: 320 MS
QTC INTERVAL: 461 MS
QTC INTERVAL: 463 MS
RBC # BLD AUTO: 4.07 MILLION/UL (ref 3.81–5.12)
RBC #/AREA URNS AUTO: ABNORMAL /HPF
RSV RNA RESP QL NAA+PROBE: NEGATIVE
SARS-COV+SARS-COV-2 AG RESP QL IA.RAPID: NEGATIVE
SARS-COV-2 RNA RESP QL NAA+PROBE: NEGATIVE
SODIUM SERPL-SCNC: 141 MMOL/L (ref 135–147)
SP GR UR STRIP.AUTO: >=1.03 (ref 1–1.03)
T WAVE AXIS: 18 DEGREES
T WAVE AXIS: 79 DEGREES
TSH SERPL DL<=0.05 MIU/L-ACNC: 2.11 UIU/ML (ref 0.45–4.5)
UROBILINOGEN UR QL STRIP.AUTO: 0.2 E.U./DL
VENTRICULAR RATE: 125 BPM
VENTRICULAR RATE: 126 BPM
WBC # BLD AUTO: 9.15 THOUSAND/UL (ref 4.31–10.16)
WBC #/AREA URNS AUTO: ABNORMAL /HPF

## 2025-02-14 PROCEDURE — 96376 TX/PRO/DX INJ SAME DRUG ADON: CPT

## 2025-02-14 PROCEDURE — 83735 ASSAY OF MAGNESIUM: CPT | Performed by: INTERNAL MEDICINE

## 2025-02-14 PROCEDURE — 0241U HB NFCT DS VIR RESP RNA 4 TRGT: CPT | Performed by: INTERNAL MEDICINE

## 2025-02-14 PROCEDURE — 84443 ASSAY THYROID STIM HORMONE: CPT | Performed by: EMERGENCY MEDICINE

## 2025-02-14 PROCEDURE — 93010 ELECTROCARDIOGRAM REPORT: CPT | Performed by: INTERNAL MEDICINE

## 2025-02-14 PROCEDURE — 93010 ELECTROCARDIOGRAM REPORT: CPT | Performed by: STUDENT IN AN ORGANIZED HEALTH CARE EDUCATION/TRAINING PROGRAM

## 2025-02-14 PROCEDURE — 70450 CT HEAD/BRAIN W/O DYE: CPT

## 2025-02-14 PROCEDURE — 80053 COMPREHEN METABOLIC PANEL: CPT | Performed by: EMERGENCY MEDICINE

## 2025-02-14 PROCEDURE — 83605 ASSAY OF LACTIC ACID: CPT | Performed by: EMERGENCY MEDICINE

## 2025-02-14 PROCEDURE — 84484 ASSAY OF TROPONIN QUANT: CPT | Performed by: EMERGENCY MEDICINE

## 2025-02-14 PROCEDURE — 87804 INFLUENZA ASSAY W/OPTIC: CPT | Performed by: EMERGENCY MEDICINE

## 2025-02-14 PROCEDURE — 96374 THER/PROPH/DIAG INJ IV PUSH: CPT

## 2025-02-14 PROCEDURE — 36415 COLL VENOUS BLD VENIPUNCTURE: CPT | Performed by: EMERGENCY MEDICINE

## 2025-02-14 PROCEDURE — 99223 1ST HOSP IP/OBS HIGH 75: CPT | Performed by: INTERNAL MEDICINE

## 2025-02-14 PROCEDURE — 85025 COMPLETE CBC W/AUTO DIFF WBC: CPT | Performed by: EMERGENCY MEDICINE

## 2025-02-14 PROCEDURE — 93005 ELECTROCARDIOGRAM TRACING: CPT

## 2025-02-14 PROCEDURE — 81001 URINALYSIS AUTO W/SCOPE: CPT

## 2025-02-14 PROCEDURE — 96361 HYDRATE IV INFUSION ADD-ON: CPT

## 2025-02-14 PROCEDURE — 99285 EMERGENCY DEPT VISIT HI MDM: CPT

## 2025-02-14 PROCEDURE — 87811 SARS-COV-2 COVID19 W/OPTIC: CPT | Performed by: EMERGENCY MEDICINE

## 2025-02-14 PROCEDURE — 99285 EMERGENCY DEPT VISIT HI MDM: CPT | Performed by: EMERGENCY MEDICINE

## 2025-02-14 PROCEDURE — 85049 AUTOMATED PLATELET COUNT: CPT | Performed by: INTERNAL MEDICINE

## 2025-02-14 PROCEDURE — 71046 X-RAY EXAM CHEST 2 VIEWS: CPT

## 2025-02-14 RX ORDER — ASPIRIN 81 MG/1
81 TABLET, CHEWABLE ORAL DAILY
Status: DISCONTINUED | OUTPATIENT
Start: 2025-02-15 | End: 2025-02-18 | Stop reason: HOSPADM

## 2025-02-14 RX ORDER — METOPROLOL TARTRATE 1 MG/ML
5 INJECTION, SOLUTION INTRAVENOUS ONCE
Status: COMPLETED | OUTPATIENT
Start: 2025-02-14 | End: 2025-02-14

## 2025-02-14 RX ORDER — OXYBUTYNIN CHLORIDE 5 MG/1
5 TABLET ORAL 2 TIMES DAILY
Status: DISCONTINUED | OUTPATIENT
Start: 2025-02-14 | End: 2025-02-16

## 2025-02-14 RX ORDER — SODIUM CHLORIDE 9 MG/ML
50 INJECTION, SOLUTION INTRAVENOUS CONTINUOUS
Status: DISCONTINUED | OUTPATIENT
Start: 2025-02-14 | End: 2025-02-15

## 2025-02-14 RX ORDER — HEPARIN SODIUM 5000 [USP'U]/ML
5000 INJECTION, SOLUTION INTRAVENOUS; SUBCUTANEOUS EVERY 8 HOURS SCHEDULED
Status: DISCONTINUED | OUTPATIENT
Start: 2025-02-14 | End: 2025-02-17

## 2025-02-14 RX ORDER — ACETAMINOPHEN 325 MG/1
650 TABLET ORAL EVERY 4 HOURS PRN
Status: DISCONTINUED | OUTPATIENT
Start: 2025-02-14 | End: 2025-02-17

## 2025-02-14 RX ORDER — ONDANSETRON 2 MG/ML
4 INJECTION INTRAMUSCULAR; INTRAVENOUS EVERY 6 HOURS PRN
Status: DISCONTINUED | OUTPATIENT
Start: 2025-02-14 | End: 2025-02-18 | Stop reason: HOSPADM

## 2025-02-14 RX ORDER — AMOXICILLIN 250 MG
1 CAPSULE ORAL DAILY
Status: DISCONTINUED | OUTPATIENT
Start: 2025-02-15 | End: 2025-02-18 | Stop reason: HOSPADM

## 2025-02-14 RX ORDER — DIGOXIN 0.25 MG/ML
125 INJECTION INTRAMUSCULAR; INTRAVENOUS ONCE
Status: COMPLETED | OUTPATIENT
Start: 2025-02-14 | End: 2025-02-14

## 2025-02-14 RX ADMIN — METOROPROLOL TARTRATE 5 MG: 5 INJECTION, SOLUTION INTRAVENOUS at 15:30

## 2025-02-14 RX ADMIN — METOROPROLOL TARTRATE 5 MG: 5 INJECTION, SOLUTION INTRAVENOUS at 13:03

## 2025-02-14 RX ADMIN — SODIUM CHLORIDE 1000 ML: 0.9 INJECTION, SOLUTION INTRAVENOUS at 10:26

## 2025-02-14 RX ADMIN — DIGOXIN 125 MCG: 250 INJECTION, SOLUTION INTRAMUSCULAR; INTRAVENOUS; PARENTERAL at 21:13

## 2025-02-14 RX ADMIN — OXYBUTYNIN CHLORIDE 5 MG: 5 TABLET ORAL at 20:49

## 2025-02-14 RX ADMIN — Medication 12.5 MG: at 20:50

## 2025-02-14 RX ADMIN — HEPARIN SODIUM 5000 UNITS: 5000 INJECTION, SOLUTION INTRAVENOUS; SUBCUTANEOUS at 21:14

## 2025-02-14 RX ADMIN — SODIUM CHLORIDE 50 ML/HR: 0.9 INJECTION, SOLUTION INTRAVENOUS at 21:10

## 2025-02-14 NOTE — ED PROVIDER NOTES
Time reflects when diagnosis was documented in both MDM as applicable and the Disposition within this note       Time User Action Codes Description Comment    2/14/2025 12:32 PM Robinson Sheffield Add [R00.0] Tachycardia     2/14/2025  3:17 PM Robinson Sheffield Add [R41.0] Confusion and disorientation     2/14/2025  3:17 PM Robinson Sheffield Modify [R00.0] Tachycardia possible atrial fibrillation with RVR          ED Disposition       ED Disposition   Admit    Condition   Good    Date/Time   Fri Feb 14, 2025  3:16 PM    Comment   Case was discussed with Dr. Ortega and the patient's admission status was agreed to be Admission Status: observation status to the service of Dr. Ortega .               Assessment & Plan       Medical Decision Making  Based on my history and physical examination, I considered the following life or limb threatening conditions in my differential diagnosis:  occult CVA, occult MI, renal disease, occult infection, anemia, liver disease, Dysrhythmia, waxing and waning delirium.     Based on my clinical acumen, I will order the appropriate diagnostic testing to narrow my differential diagnosis and arrive at a final diagnosis.      Amount and/or Complexity of Data Reviewed  Labs: ordered. Decision-making details documented in ED Course.  Radiology: ordered. Decision-making details documented in ED Course.    Risk  Prescription drug management.  Decision regarding hospitalization.        ED Course as of 02/14/25 1614   Fri Feb 14, 2025   1137 Interrogation of PM pending   1152 CT head without contrast  Imaging reviewed and interpreted myself and concur with radiologist:   no acute findings   1247 PM interrogation, parameters are correct, no malfunction, its not pacing because underlying rate is fast.  I am consulting cardiology   1308 Delta 2hr hsTnI: -5  No ongoing chest pain or concern for ischemia   1435 UA negative for UTI       Medications   sodium chloride 0.9 % bolus 1,000 mL (0 mL  Intravenous Stopped 2/14/25 1126)   metoprolol (LOPRESSOR) injection 5 mg (5 mg Intravenous Given 2/14/25 1303)   metoprolol (LOPRESSOR) injection 5 mg (5 mg Intravenous Given 2/14/25 1530)       ED Risk Strat Scores                                              History of Present Illness       Chief Complaint   Patient presents with    Altered Mental Status     Per EMS, pt new onset of AMS, no hx dementia.        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    Transient elevated blood pressure     last assessed: 5/16/2017    Weight loss       Past Surgical History:   Procedure Laterality Date    APPENDECTOMY      CATARACT EXTRACTION      CHOLECYSTECTOMY      ESOPHAGOGASTRODUODENOSCOPY  06/2013    diagnostic- neg id have dilatation    EYE SURGERY      HYSTERECTOMY      LAPAROTOMY N/A 11/3/2023    Procedure: LAPAROTOMY EXPLORATORY, ILEOCECECTOMY;  Surgeon: Katlyn Fleming MD;  Location: [facility];  Service: General    [place] ESOPHAGOGASTRODUODENOSCOPY TRANSORAL DIAGNOSTIC N/A 9/16/2016    Procedure: ESOPHAGOGASTRODUODENOSCOPY (EGD);  Surgeon: Vladislav Gacria MD;  Location: [facility];  Service: Gastroenterology    REPLACEMENT TOTAL KNEE Left 01/2007      Family History   Problem Relation Age of Onset    Diabetes Mother     Coronary artery disease Father       Social History     Tobacco Use    Smoking status: Never    Smokeless tobacco: Never   Vaping Use    Vaping status: Never Used   Substance Use Topics    Alcohol use: No    Drug use: No      E-Cigarette/Vaping    E-Cigarette Use Never User       E-Cigarette/Vaping Substances    Nicotine No     THC No     CBD No     Flavoring No     Other No     Unknown No       I have reviewed and agree with the history as documented.     89 yo F brought to ED by ambulance from [facility], accompanied by her daughter for increased  confusion over the past few days.  No sudden onset, she has just been experiencing more confusion and disorientation, without complaints, so it was advised she be evaluated.  No fever reported, no vomiting reporting.  She has not c/o any pain, nor experienced any respiratory difficulty.      Past Medical History:  No date: Aortic regurgitation  No date: Constipation  No date: Discoid lupus erythematosus  No date: Dysphagia  No date: Esophagitis  No date: Forgetfulness  2/9/2021: Heart block AV second degree  No date: Transient elevated blood pressure      Comment:  last assessed: 5/16/2017  No date: Weight loss            Review of Systems        Objective       ED Triage Vitals [02/14/25 0952]   Temperature Pulse Blood Pressure Respirations SpO2 Patient Position - Orthostatic VS   98.7 °F (37.1 °C) (!) 126 (!) 157/110 16 98 % Lying      Temp Source Heart Rate Source BP Location FiO2 (%) Pain Score    Oral Monitor Left arm -- No Pain      Vitals      Date and Time Temp Pulse SpO2 Resp BP Pain Score FACES Pain Rating User   02/14/25 1400 -- 126 96 % 16 121/85 -- -- EC   02/14/25 1345 -- 124 96 % 16 119/87 -- -- EC   02/14/25 1330 -- 124 97 % 22 145/98 -- -- EC   02/14/25 1315 -- 124 97 % 23 147/97 -- -- EC   02/14/25 1230 -- 124 98 % 16 149/101 -- -- EC   02/14/25 1100 -- 124 98 % 21 144/103 -- -- EC   02/14/25 1026 -- 124 98 % 16 142/95 -- -- CO   02/14/25 0952 98.7 °F (37.1 °C) 126 98 % 16 157/110 No Pain -- CO            Physical Exam  Vitals and nursing note reviewed.   Constitutional:       General: She is not in acute distress.     Appearance: She is well-developed. She is not diaphoretic.      Comments: Elderly appearing, c/w recorded age    HENT:      Head: Normocephalic and atraumatic.      Right Ear: External ear normal.      Left Ear: External ear normal.      Nose: Nose normal.      Mouth/Throat:      Mouth: Mucous membranes are moist.   Eyes:      Conjunctiva/sclera: Conjunctivae normal.      Pupils:  Pupils are equal, round, and reactive to light.   Cardiovascular:      Rate and Rhythm: Regular rhythm. Tachycardia present.   Pulmonary:      Effort: Pulmonary effort is normal.   Abdominal:      Palpations: Abdomen is soft.      Tenderness: There is no abdominal tenderness.   Musculoskeletal:         General: Normal range of motion.      Cervical back: Normal range of motion and neck supple.   Skin:     General: Skin is warm and dry.      Capillary Refill: Capillary refill takes less than 2 seconds.      Findings: No rash.   Neurological:      Mental Status: She is alert and oriented to person, place, and time. She is confused.      GCS: GCS eye subscore is 4. GCS verbal subscore is 4. GCS motor subscore is 6.      Cranial Nerves: No cranial nerve deficit or dysarthria.      Comments: Oriented to person, hospital, after that, not aware of reason for transport, date or year   Psychiatric:         Behavior: Behavior normal.         Thought Content: Thought content normal.         Judgment: Judgment normal.         Results Reviewed       Procedure Component Value Units Date/Time    COVID/FLU/RSV [[id number]] Collected: 02/14/25 1534    Lab Status: In process Specimen: Nares from Nose Updated: 02/14/25 1536    TSH, 3rd generation with Free T4 reflex [[id number]] Collected: 02/14/25 1000    Lab Status: In process Specimen: Blood from Arm, Left Updated: 02/14/25 1528    Urine Microscopic [[id number]]  (Abnormal) Collected: 02/14/25 1248    Lab Status: Final result Specimen: Urine, Clean Catch Updated: 02/14/25 1334     RBC, UA 1-2 /hpf      WBC, UA 2-4 /hpf      Epithelial Cells Occasional /hpf      Bacteria, UA None Seen /hpf      MUCUS THREADS Occasional     Hyaline Casts, UA 3-5 /lpf     Urine Macroscopic, POC [[id number]]  (Abnormal) Collected: 02/14/25 1248    Lab Status: Final result Specimen: Urine Updated: 02/14/25 1250     Color, UA Yellow     Clarity, UA Clear     pH, UA 5.5     Leukocytes, UA Negative      Nitrite, UA Negative     Protein, UA Negative mg/dl      Glucose, UA Negative mg/dl      Ketones, UA Negative mg/dl      Urobilinogen, UA 0.2 E.U./dl      Bilirubin, UA Negative     Occult Blood, UA Trace     Specific Gravity, UA >=1.030    Narrative:      CLINITEK RESULT    HS Troponin I 2hr [[id number]]  (Normal) Collected: 02/14/25 1217    Lab Status: Final result Specimen: Blood from Arm, Left Updated: 02/14/25 1246     hs TnI 2hr 37 ng/L      Delta 2hr hsTnI -5 ng/L     FLU/COVID Rapid Antigen (30 min. TAT) - Preferred screening test in ED [[id number]]  (Normal) Collected: 02/14/25 1024    Lab Status: Final result Specimen: Nares from Nose Updated: 02/14/25 1219     SARS COV Rapid Antigen Negative     Influenza A Rapid Antigen Negative     Influenza B Rapid Antigen Negative    Narrative:      This test has been performed using the Meiyouidel Danielle 2 FLU+SARS Antigen test under the Emergency Use Authorization (EUA). This test has been validated by the  and verified by the performing laboratory. The Danielle uses lateral flow immunofluorescent sandwich assay to detect SARS-COV, Influenza A and Influenza B Antigen.     The Quidel Danielle 2 SARS Antigen test does not differentiate between SARS-CoV and SARS-CoV-2.     Negative results are presumptive and may be confirmed with a molecular assay, if necessary, for patient management. Negative results do not rule out SARS-CoV-2 or influenza infection and should not be used as the sole basis for treatment or patient management decisions. A negative test result may occur if the level of antigen in a sample is below the limit of detection of this test.     Positive results are indicative of the presence of viral antigens, but do not rule out bacterial infection or co-infection with other viruses.     All test results should be used as an adjunct to clinical observations and other information available to the provider.    FOR PEDIATRIC PATIENTS - copy/paste COVID  Guidelines URL to browser: [email or web address]    HS Troponin 0hr (reflex protocol) [[id number]]  (Normal) Collected: 02/14/25 1000    Lab Status: Final result Specimen: Blood from Arm, Left Updated: 02/14/25 1042     hs TnI 0hr 42 ng/L     Comprehensive metabolic panel [[id number]]  (Abnormal) Collected: 02/14/25 1000    Lab Status: Final result Specimen: Blood from Arm, Left Updated: 02/14/25 1036     Sodium 141 mmol/L      Potassium 3.9 mmol/L      Chloride 107 mmol/L      CO2 25 mmol/L      ANION GAP 9 mmol/L      BUN 51 mg/dL      Creatinine 1.20 mg/dL      Glucose 118 mg/dL      Calcium 9.7 mg/dL      AST 28 U/L      ALT 27 U/L      Alkaline Phosphatase 67 U/L      Total Protein 7.3 g/dL      Albumin 4.6 g/dL      Total Bilirubin 0.96 mg/dL      eGFR 38 ml/min/1.73sq m     Narrative:      National Kidney Disease Foundation guidelines for Chronic Kidney Disease (CKD):     Stage 1 with normal or high GFR (GFR > 90 mL/min/1.73 square meters)    Stage 2 Mild CKD (GFR = 60-89 mL/min/1.73 square meters)    Stage 3A Moderate CKD (GFR = 45-59 mL/min/1.73 square meters)    Stage 3B Moderate CKD (GFR = 30-44 mL/min/1.73 square meters)    Stage 4 Severe CKD (GFR = 15-29 mL/min/1.73 square meters)    Stage 5 End Stage CKD (GFR <15 mL/min/1.73 square meters)  Note: GFR calculation is accurate only with a steady state creatinine    Lactic acid, plasma (w/reflex if result > 2.0) [[id number]]  (Normal) Collected: 02/14/25 1000    Lab Status: Final result Specimen: Blood from Arm, Left Updated: 02/14/25 1035     LACTIC ACID 1.1 mmol/L     Narrative:      Result may be elevated if tourniquet was used during collection.    CBC and differential [[id number]]  (Abnormal) Collected: 02/14/25 1000    Lab Status: Final result Specimen: Blood from Arm, Left Updated: 02/14/25 1013     WBC 9.15 Thousand/uL      RBC 4.07 Million/uL      Hemoglobin 12.1 g/dL      Hematocrit 37.1 %      MCV  91 fL      MCH 29.7 pg      MCHC 32.6 g/dL      RDW 13.7 %      MPV 10.1 fL      Platelets 193 Thousands/uL      nRBC 0 /100 WBCs      Segmented % 81 %      Immature Grans % 0 %      Lymphocytes % 11 %      Monocytes % 8 %      Eosinophils Relative 0 %      Basophils Relative 0 %      Absolute Neutrophils 7.36 Thousands/µL      Absolute Immature Grans 0.04 Thousand/uL      Absolute Lymphocytes 0.99 Thousands/µL      Absolute Monocytes 0.75 Thousand/µL      Eosinophils Absolute 0.00 Thousand/µL      Basophils Absolute 0.01 Thousands/µL             CT head without contrast   Final Interpretation by Wesley Marie MD (02/14 1138)      No acute intracranial abnormality.                  Workstation performed: [id number]         XR chest pa and lateral    (Results Pending)       ECG 12 Lead Documentation Only    Date/Time: 2/14/2025 9:53 AM    Performed by: Robinson Sheffield MD  Authorized by: Robinson Sheffield MD    Rate:     ECG rate:  126  Rhythm:     Rhythm: atrial fibrillation and paced      Rhythm comment:  She has a pacemaker--unclear if she is outrunning it or if it pacing fast, interrogation to follow  Ectopy:     Ectopy: none    QRS:     QRS axis:  Normal    QRS intervals:  Normal  Conduction:     Conduction: normal        ED Medication and Procedure Management   Prior to Admission Medications   Prescriptions Last Dose Informant Patient Reported? Taking?   ARTIFICIAL TEAR OP   Yes Yes   Calcium Carb-Cholecalciferol (CALCIUM CARBONATE+VITAMIN D PO) Not Taking Family Member Yes No   Sig: Take 600 mg by mouth in the morning   Patient not taking: Reported on 11/5/2024   Cholecalciferol (VITAMIN D) 2000 units CAPS  Outside Facility (Specify), Family Member No Yes   Sig: Take 1 capsule (2,000 Units total) by mouth daily   Multiple Vitamins-Minerals (PRESERVISION AREDS PO)  Outside Facility (Specify), Family Member Yes Yes   Sig: Take by mouth.   Turmeric 500 MG CAPS Not Taking Outside Facility (Specify),  Family Member Yes No   Sig: Take 500 mg by mouth in the morning   Patient not taking: Reported on 11/5/2024   acetaminophen (TYLENOL) 325 mg tablet  Outside Facility (Specify), Family Member No Yes   Sig: Take 2 tablets (650 mg total) by mouth every 4 (four) hours as needed for mild pain   amLODIPine (NORVASC) 5 mg tablet   No Yes   Sig: TAKE 1 TABLET BY MOUTH EVERY DAY IN THE MORNING   aspirin 81 mg chewable tablet   No Yes   Sig: Chew 1 tablet (81 mg total) daily   fish oil 1,000 mg Not Taking Outside Facility (Specify), Family Member Yes No   Sig: Take 1,000 mg by mouth daily.   Patient not taking: Reported on 11/18/2024   multivitamin (THERAGRAN) TABS Not Taking Outside Facility (Specify), Family Member Yes No   Sig: Take 1 tablet by mouth daily   Patient not taking: Reported on 11/5/2024   risperiDONE (RisperDAL) 0.25 mg tablet   Yes Yes   Sig: Take 0.25 mg by mouth if needed   senna-docusate sodium (SENOKOT-S) 8.6-50 mg per tablet  Outside Facility (Specify), Family Member Yes Yes   Sig: Take 1 tablet by mouth daily As needed   trospium chloride (SANCTURA) 20 mg tablet  Outside Facility (Specify), Family Member Yes Yes   Sig: Take 20 mg by mouth in the morning   vitamin B-12 (VITAMIN B-12) 500 mcg tablet  Outside Facility (Specify), Family Member Yes Yes   Sig: Take 500 mcg by mouth daily      Facility-Administered Medications: None     Patient's Medications   Discharge Prescriptions    No medications on file     No discharge procedures on file.  ED SEPSIS DOCUMENTATION   Time reflects when diagnosis was documented in both MDM as applicable and the Disposition within this note       Time User Action Codes Description Comment    2/14/2025 12:32 PM Robinson Sheffield Add [R00.0] Tachycardia     2/14/2025  3:17 PM Robinson Sheffield Add [R41.0] Confusion and disorientation     2/14/2025  3:17 PM Robinson Sheffield Modify [R00.0] Tachycardia possible atrial fibrillation with RVR                 Robinson Medina  MD Nettie  02/14/25 1722

## 2025-02-14 NOTE — ASSESSMENT & PLAN NOTE
Baseline creatinine is 0.5-0.7  Creatinine on admit is 1.2  Given fluids in the ed. Will continue fluids at a slow rate  Check post void residuals

## 2025-02-14 NOTE — H&P
"H&P - Hospitalist   Name: Summer Blanton 89 y.o. female I MRN: [id number]  Unit/Bed#: ED-42 I Date of Admission: 2/14/2025   Date of Service: 2/14/2025 I Hospital Day: 0     Assessment & Plan  Flutter-fibrillation (HCC)  New onset of flutter/fib with rvr with history fo complete heart block s/p pacer  Will check tsh  R/o infection  Check echo  Start metoprolol as pt has pacer in place  Await cardiology recommendations   Dysphagia - improved  History of dysphagia but on regular diet  Will have speech eval  Complete heart block (HCC) -   PPM ( [employer]) Feb 2021  S/p dual chamber medtronic permanent pacer in 2021   Acute encephalopathy  Pt has had acute confusion at [facility]  Oriented x3 with confusion  In history she is noted to have forgetfulness- unclear if she had a dementia work up in the past but could be due to worsening dementia  However will need to r/o acute infection   Ct head negative  Ua negative  Covid/flu/rsv pending  Tsh pending  Check vitamin b12 rpr  Consider geriatric eval outpt if no infectious source    YEISON (acute kidney injury) (HCC)  Baseline creatinine is 0.5-0.7  Creatinine on admit is 1.2  Given fluids in the ed. Will continue fluids at a slow rate  Check post void residuals       VTE Pharmacologic Prophylaxis: VTE Score: 3 heparin unless it is decided that she will require eliquis   Code Status:drn/dni  Discussion with family: Updated  (daughter) at bedside.    Anticipated Length of Stay: Patient will be admitted on an observation basis with an anticipated length of stay of less than 2 midnights secondary to above .    History of Present Illness   Chief Complaint: change in mental status    Summer Blanton is a 89 y.o. female with a PMH of dysphagia, forgetfulness, complete heart block s/p pacer,  who presents with change of mental status. Pt resides at [facility] and was reported to \"not be her self and more confused\". She was oriented x3 on eval with confusion. " Daughter is at bedside. Pt was found to be in afib/flutter. 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    Transient elevated blood pressure     last assessed: 5/16/2017    Weight loss      Past Surgical History:   Procedure Laterality Date    APPENDECTOMY      CATARACT EXTRACTION      CHOLECYSTECTOMY      ESOPHAGOGASTRODUODENOSCOPY  06/2013    diagnostic- neg id have dilatation    EYE SURGERY      HYSTERECTOMY      LAPAROTOMY N/A 11/3/2023    Procedure: LAPAROTOMY EXPLORATORY, ILEOCECECTOMY;  Surgeon: Katlyn Fleming MD;  Location: [facility];  Service: General    [place] ESOPHAGOGASTRODUODENOSCOPY TRANSORAL DIAGNOSTIC N/A 9/16/2016    Procedure: ESOPHAGOGASTRODUODENOSCOPY (EGD);  Surgeon: Vladislav Garcia MD;  Location:  GI LAB;  Service: Gastroenterology    REPLACEMENT TOTAL KNEE Left 01/2007     Social History     Tobacco Use    Smoking status: Never    Smokeless tobacco: Never   Vaping Use    Vaping status: Never Used   Substance and Sexual Activity    Alcohol use: No    Drug use: No    Sexual activity: Not Currently     E-Cigarette/Vaping    E-Cigarette Use Never User      E-Cigarette/Vaping Substances    Nicotine No     THC No     CBD No     Flavoring No     Other No     Unknown No      Family history non-contributory  Social History:  Marital Status: /Civil Union       Meds/Allergies   I have reviewed home medications with patient personally.  Prior to Admission medications    Medication Sig Start Date End Date Taking? Authorizing Provider   acetaminophen (TYLENOL) 325 mg tablet Take 2 tablets (650 mg total) by mouth every 4 (four) hours as needed for mild pain 2/12/21  Yes Sj Betancourt, DO   amLODIPine (NORVASC) 5 mg tablet TAKE 1 TABLET BY MOUTH EVERY DAY IN THE  MORNING 1/30/25  Yes James Valencia, DO   ARTIFICIAL TEAR OP    Yes Historical Provider, MD   aspirin 81 mg chewable tablet Chew 1 tablet (81 mg total) daily 11/18/24  Yes James Valencia, DO   Cholecalciferol (VITAMIN D) 2000 units CAPS Take 1 capsule (2,000 Units total) by mouth daily 8/15/18  Yes London Farias, DO   Multiple Vitamins-Minerals (PRESERVISION AREDS PO) Take by mouth.   Yes Historical Provider, MD   risperiDONE (RisperDAL) 0.25 mg tablet Take 0.25 mg by mouth if needed   Yes Historical Provider, MD   senna-docusate sodium (SENOKOT-S) 8.6-50 mg per tablet Take 1 tablet by mouth daily As needed   Yes Historical Provider, MD   trospium chloride (SANCTURA) 20 mg tablet Take 20 mg by mouth in the morning   Yes Historical Provider, MD   vitamin B-12 (VITAMIN B-12) 500 mcg tablet Take 500 mcg by mouth daily   Yes Historical Provider, MD   Calcium Carb-Cholecalciferol (CALCIUM CARBONATE+VITAMIN D PO) Take 600 mg by mouth in the morning  Patient not taking: Reported on 11/5/2024    Historical Provider, MD   fish oil 1,000 mg Take 1,000 mg by mouth daily.  Patient not taking: Reported on 11/18/2024    Historical Provider, MD   multivitamin (THERAGRAN) TABS Take 1 tablet by mouth daily  Patient not taking: Reported on 11/5/2024    Historical Provider, MD   Turmeric 500 MG CAPS Take 500 mg by mouth in the morning  Patient not taking: Reported on 11/5/2024    Historical Provider, MD     Allergies   Allergen Reactions    Gabapentin      dreams    Sertraline GI Intolerance     ' felt like a zombie'       Objective :  Temp:  [98.7 °F (37.1 °C)] 98.7 °F (37.1 °C)  HR:  [124-126] 126  BP: (119-157)/() 121/85  Resp:  [16-23] 16  SpO2:  [96 %-98 %] 96 %  O2 Device: None (Room air)    Physical Exam  Constitutional:       Appearance: Normal appearance.   HENT:      Head: Normocephalic and atraumatic.   Eyes:      Extraocular Movements: Extraocular movements intact.      Pupils: Pupils are equal, round, and  reactive to light.   Cardiovascular:      Rate and Rhythm: Regular rhythm. Tachycardia present.      Heart sounds: No murmur heard.     No friction rub. No gallop.   Pulmonary:      Effort: Pulmonary effort is normal. No respiratory distress.      Breath sounds: Normal breath sounds. No wheezing, rhonchi or rales.   Abdominal:      General: Bowel sounds are normal. There is no distension.      Palpations: Abdomen is soft.      Tenderness: There is no abdominal tenderness. There is no guarding or rebound.   Musculoskeletal:      Right lower leg: No edema.      Left lower leg: No edema.   Neurological:      Mental Status: She is alert and oriented to person, place, and time.      Comments: With confusion at times          Lines/Drains:      Lab Results: I have reviewed the following results:  Results from last 7 days   Lab Units 02/14/25  1000   WBC Thousand/uL 9.15   HEMOGLOBIN g/dL 12.1   HEMATOCRIT % 37.1   PLATELETS Thousands/uL 193   SEGS PCT % 81*   LYMPHO PCT % 11*   MONO PCT % 8   EOS PCT % 0     Results from last 7 days   Lab Units 02/14/25  1000   SODIUM mmol/L 141   POTASSIUM mmol/L 3.9   CHLORIDE mmol/L 107   CO2 mmol/L 25   BUN mg/dL 51*   CREATININE mg/dL 1.20   ANION GAP mmol/L 9   CALCIUM mg/dL 9.7   ALBUMIN g/dL 4.6   TOTAL BILIRUBIN mg/dL 0.96   ALK PHOS U/L 67   ALT U/L 27   AST U/L 28   GLUCOSE RANDOM mg/dL 118             Lab Results   Component Value Date    HGBA1C 5.9 (H) 05/19/2022     Results from last 7 days   Lab Units 02/14/25  1000   LACTIC ACID mmol/L 1.1       Ct head reviewed  Cxr pending   Other Study Results Review: EKG was reviewed.

## 2025-02-14 NOTE — ASSESSMENT & PLAN NOTE
New onset of flutter/fib with rvr with history fo complete heart block s/p pacer  Will check tsh  R/o infection  Check echo  Start metoprolol as pt has pacer in place  Await cardiology recommendations

## 2025-02-14 NOTE — ASSESSMENT & PLAN NOTE
Pt has had acute confusion at [facility]  Oriented x3 with confusion  In history she is noted to have forgetfulness- unclear if she had a dementia work up in the past but could be due to worsening dementia  However will need to r/o acute infection   Ct head negative  Ua negative  Covid/flu/rsv pending  Tsh pending  Check vitamin b12 rpr  Consider geriatric eval outpt if no infectious source

## 2025-02-14 NOTE — CONSULTS
Consultation - Cardiology   Name: Summer Blanton 89 y.o. female I MRN: [id number]  Unit/Bed#: ED-42 I Date of Admission: 2/14/2025   Date of Service: 2/14/2025 I Hospital Day: 0   Consult to cardiology  Consult performed by: Shelley Loving MD  Consult ordered by: Robinson Sheffield MD      Physician Requesting Evaluation: Robinson Sheffield MD   Reason for Evaluation / Principal Problem: Tachycardia, atrial flutter with RVR    Assessment & Plan  Flutter-fibrillation (HCC)      Patient noted to be in atrial flutter rhythm with 212 conduction.  Has history of paroxysmal atrial fibrillation.  Clinically not in heart failure.  His frail and has significant fall risk.  Has been off of anticoagulation since November 2023 due to multiple falls including head injuries.  Has permanent pacemaker.  Pacemaker interrogation was not available for review at the time of reporting.  Renal function and electrolytes are normal.  Cardiac enzymes are negative.  There is no evidence of acute infection.  Thyroid function is normal.  Echocardiogram 10/4/2024 showed preserved EF of 55%, normal RV size and function, biatrial enlargement, calcified aortic valve without stenosis, thickened mitral valve with MAC and mild mitral regurgitation, mild TR and PI, mild pulmonary hypertension, dilated aortic root and ascending aorta, no pericardial effusion.    Patient being given digoxin 125 mcg x 1 and started on metoprolol tartrate 12.5 g twice daily.  Patient not a candidate for consideration for rhythm control and will continue to optimize rate control.  Can get additional 125 mcg of digoxin tomorrow morning if she remains tachycardic.  Can also increase metoprolol to tartrate if blood pressure is greater than 120 mmHg systolic.  Will obtain [employer] interrogation data for pacemaker to determine atrial flutter/fibrillation burden although it is not going to .  Advise discussion with family regarding goals of care.   Recommend  discharge back to nursing facility memory unit if heart rate is consistently less than 110 mmHg.  Does not need echocardiogram during current admission unless she is having hemodynamic disturbance.  Consider DVT prophylaxis during hospitalization.  Complete heart block (HCC) -   PPM ( Medtronic) Feb 2021  Has normal functioning pacemaker device.  Currently not in paced rhythm.  Although previous diagnosis was mentioned as complete heart block she has had ventricular demand pacing in the past.  Acute encephalopathy  Patient had acute confusion.  Is currently pleasantly confused.  No behavioral disturbance noted during interaction.  Appreciate medical team management.  Dysphagia - improved    YEISON (acute kidney injury) ([facility])  Acute kidney injury noted from her baseline.  Appears slightly dehydrated and prerenal rising creatinine.  Agree with hydration and repeat labs in AM.  Please contact the SecureChat role for the Cardiology service with any questions/concerns.    History of Present Illness   Summer Blanton is a 89 y.o. female who presents from her nursing facility with symptomatic increased confusion.  In the ER she is noted to be tachycardic with ECG consistent with atrial flutter with 221 conduction.  Her medical history significant for:  Paroxysmal atrial fibrillation and atrial flutter  History of heart block and symptomatic bradycardia status post dual-chamber pacemaker plantation in February 2021  Anxiety  Intermittent palpitations  Dementia with behavioral disturbance.  Move forward.  Borderline ectasia of the ascending aorta    Patient is pleasantly confused and is not able to provide detailed history.  Does report feeling comfortable without feeling of palpitation or shortness of breath.    Review of Systems   Unable to perform ROS: Dementia     Medical History Review: I have reviewed the patient's PMH, PSH, Social History, Family History, Meds, and Allergies     Objective :  Temp:  [98.7 °F  (37.1 °C)] 98.7 °F (37.1 °C)  HR:  [124-126] 126  BP: (121-157)/() 121/85  Resp:  [16-23] 16  SpO2:  [96 %-98 %] 96 %  O2 Device: None (Room air)  Orthostatic Blood Pressures      Flowsheet Row Most Recent Value   Blood Pressure 121/85 filed at 02/14/2025 1400   Patient Position - Orthostatic VS Sitting filed at 02/14/2025 1400          First Weight: Weight - Scale: 58.4 kg (128 lb 12 oz) (02/14/25 0952)  Vitals:    02/14/25 0952   Weight: 58.4 kg (128 lb 12 oz)       Physical Exam  Vitals reviewed.   Constitutional:       General: She is sleeping. She is not in acute distress.     Appearance: She is cachectic. She is ill-appearing.   Cardiovascular:      Rate and Rhythm: Tachycardia present. Rhythm irregular.      Heart sounds: Murmur heard.   Pulmonary:      Effort: No tachypnea, bradypnea, accessory muscle usage or respiratory distress.      Breath sounds: Normal breath sounds.   Musculoskeletal:      Right lower leg: No edema.      Left lower leg: No edema.   Skin:     General: Skin is warm and dry.   Neurological:      Mental Status: She is easily aroused.         Lab Results: I have reviewed the following results:CBC/BMP:   .     02/14/25  1000   WBC 9.15   HGB 12.1   HCT 37.1      SODIUM 141   K 3.9      CO2 25   BUN 51*   CREATININE 1.20   GLUC 118   MG 2.4      Results from last 7 days   Lab Units 02/14/25  1000   WBC Thousand/uL 9.15   HEMOGLOBIN g/dL 12.1   HEMATOCRIT % 37.1   PLATELETS Thousands/uL 193     Results from last 7 days   Lab Units 02/14/25  1000   POTASSIUM mmol/L 3.9   CHLORIDE mmol/L 107   CO2 mmol/L 25   BUN mg/dL 51*   CREATININE mg/dL 1.20   CALCIUM mg/dL 9.7         Lab Results   Component Value Date    HGBA1C 5.9 (H) 05/19/2022     Lab Results   Component Value Date    TROPONINI 0.36 (H) 02/10/2021       Imaging Results Review: I personally reviewed the following image studies in PACS and associated radiology reports: CT chest. My interpretation of the radiology  images/reports is: No significant pulmonary vascular congestion.  Formal read pending..  Other Study Results Review: EKG was reviewed.     VTE Prophylaxis: VTE covered by:  heparin (porcine), Subcutaneous

## 2025-02-15 LAB
ANION GAP SERPL CALCULATED.3IONS-SCNC: 6 MMOL/L (ref 4–13)
BUN SERPL-MCNC: 29 MG/DL (ref 5–25)
CALCIUM SERPL-MCNC: 9.1 MG/DL (ref 8.4–10.2)
CHLORIDE SERPL-SCNC: 108 MMOL/L (ref 96–108)
CO2 SERPL-SCNC: 27 MMOL/L (ref 21–32)
CREAT SERPL-MCNC: 0.71 MG/DL (ref 0.6–1.3)
ERYTHROCYTE [DISTWIDTH] IN BLOOD BY AUTOMATED COUNT: 13.5 % (ref 11.6–15.1)
GFR SERPL CREATININE-BSD FRML MDRD: 73 ML/MIN/1.73SQ M
GLUCOSE P FAST SERPL-MCNC: 90 MG/DL (ref 65–99)
GLUCOSE SERPL-MCNC: 90 MG/DL (ref 65–140)
HCT VFR BLD AUTO: 38.2 % (ref 34.8–46.1)
HGB BLD-MCNC: 12.1 G/DL (ref 11.5–15.4)
MCH RBC QN AUTO: 29.2 PG (ref 26.8–34.3)
MCHC RBC AUTO-ENTMCNC: 31.7 G/DL (ref 31.4–37.4)
MCV RBC AUTO: 92 FL (ref 82–98)
PLATELET # BLD AUTO: 193 THOUSANDS/UL (ref 149–390)
PMV BLD AUTO: 10.4 FL (ref 8.9–12.7)
POTASSIUM SERPL-SCNC: 3.8 MMOL/L (ref 3.5–5.3)
RBC # BLD AUTO: 4.15 MILLION/UL (ref 3.81–5.12)
SODIUM SERPL-SCNC: 141 MMOL/L (ref 135–147)
TREPONEMA PALLIDUM IGG+IGM AB [PRESENCE] IN SERUM OR PLASMA BY IMMUNOASSAY: NORMAL
VIT B12 SERPL-MCNC: 1987 PG/ML (ref 180–914)
WBC # BLD AUTO: 7.72 THOUSAND/UL (ref 4.31–10.16)

## 2025-02-15 PROCEDURE — 85027 COMPLETE CBC AUTOMATED: CPT | Performed by: INTERNAL MEDICINE

## 2025-02-15 PROCEDURE — 99232 SBSQ HOSP IP/OBS MODERATE 35: CPT | Performed by: FAMILY MEDICINE

## 2025-02-15 PROCEDURE — 92610 EVALUATE SWALLOWING FUNCTION: CPT | Performed by: SPEECH-LANGUAGE PATHOLOGIST

## 2025-02-15 PROCEDURE — 82607 VITAMIN B-12: CPT | Performed by: INTERNAL MEDICINE

## 2025-02-15 PROCEDURE — 86780 TREPONEMA PALLIDUM: CPT | Performed by: INTERNAL MEDICINE

## 2025-02-15 PROCEDURE — 99232 SBSQ HOSP IP/OBS MODERATE 35: CPT | Performed by: STUDENT IN AN ORGANIZED HEALTH CARE EDUCATION/TRAINING PROGRAM

## 2025-02-15 PROCEDURE — 80048 BASIC METABOLIC PNL TOTAL CA: CPT | Performed by: INTERNAL MEDICINE

## 2025-02-15 RX ORDER — DIGOXIN 0.25 MG/ML
125 INJECTION INTRAMUSCULAR; INTRAVENOUS ONCE
Status: COMPLETED | OUTPATIENT
Start: 2025-02-15 | End: 2025-02-15

## 2025-02-15 RX ORDER — METOPROLOL TARTRATE 25 MG/1
25 TABLET, FILM COATED ORAL EVERY 12 HOURS SCHEDULED
Status: DISCONTINUED | OUTPATIENT
Start: 2025-02-15 | End: 2025-02-16

## 2025-02-15 RX ORDER — DIGOXIN 125 MCG
125 TABLET ORAL EVERY OTHER DAY
Status: DISCONTINUED | OUTPATIENT
Start: 2025-02-16 | End: 2025-02-17

## 2025-02-15 RX ORDER — METOPROLOL TARTRATE 1 MG/ML
5 INJECTION, SOLUTION INTRAVENOUS EVERY 6 HOURS PRN
Status: DISCONTINUED | OUTPATIENT
Start: 2025-02-15 | End: 2025-02-18 | Stop reason: HOSPADM

## 2025-02-15 RX ORDER — ALPRAZOLAM 0.25 MG/1
0.25 TABLET ORAL
Status: DISCONTINUED | OUTPATIENT
Start: 2025-02-15 | End: 2025-02-18 | Stop reason: HOSPADM

## 2025-02-15 RX ADMIN — METOPROLOL TARTRATE 25 MG: 25 TABLET, FILM COATED ORAL at 21:11

## 2025-02-15 RX ADMIN — Medication 1000 UNITS: at 09:24

## 2025-02-15 RX ADMIN — ASPIRIN 81 MG CHEWABLE TABLET 81 MG: 81 TABLET CHEWABLE at 09:23

## 2025-02-15 RX ADMIN — OXYBUTYNIN CHLORIDE 5 MG: 5 TABLET ORAL at 09:24

## 2025-02-15 RX ADMIN — OXYBUTYNIN CHLORIDE 5 MG: 5 TABLET ORAL at 17:48

## 2025-02-15 RX ADMIN — Medication 1 TABLET: at 09:24

## 2025-02-15 RX ADMIN — DIGOXIN 125 MCG: 250 INJECTION, SOLUTION INTRAMUSCULAR; INTRAVENOUS; PARENTERAL at 13:26

## 2025-02-15 RX ADMIN — CYANOCOBALAMIN TAB 500 MCG 500 MCG: 500 TAB at 09:23

## 2025-02-15 RX ADMIN — METOPROLOL TARTRATE 25 MG: 25 TABLET, FILM COATED ORAL at 09:24

## 2025-02-15 RX ADMIN — ACETAMINOPHEN 650 MG: 325 TABLET, FILM COATED ORAL at 09:24

## 2025-02-15 RX ADMIN — HEPARIN SODIUM 5000 UNITS: 5000 INJECTION, SOLUTION INTRAVENOUS; SUBCUTANEOUS at 13:26

## 2025-02-15 RX ADMIN — SENNOSIDES AND DOCUSATE SODIUM 1 TABLET: 8.6; 5 TABLET ORAL at 09:24

## 2025-02-15 RX ADMIN — DIGOXIN 125 MCG: 250 INJECTION, SOLUTION INTRAMUSCULAR; INTRAVENOUS; PARENTERAL at 09:14

## 2025-02-15 RX ADMIN — HEPARIN SODIUM 5000 UNITS: 5000 INJECTION, SOLUTION INTRAVENOUS; SUBCUTANEOUS at 05:04

## 2025-02-15 RX ADMIN — METOROPROLOL TARTRATE 5 MG: 5 INJECTION, SOLUTION INTRAVENOUS at 22:38

## 2025-02-15 NOTE — ASSESSMENT & PLAN NOTE
Has normal functioning pacemaker device.  Currently not in paced rhythm.  Although previous diagnosis was mentioned as complete heart block she has had ventricular demand pacing in the past.   SUBJECTIVE:    Patient ID: Kevin Brasher is a 67 y.o. male. Chief Complaint   Patient presents with    Hypertension    Diabetes    Lower Back Pain       HPI: office visit  He is still struggling with the death of his wife. He is still having a lot of grief. He still having a lot of this. He says he just cannot seem to get over it. He is not had any significant chest pain or shortness of breath. He is having worsening arthritic pains. He said he is getting to the point that he really cannot do a lot of the things that he needs to do around the house. He says that is making him a little more anxious in general.  He says his blood sugars have been doing pretty good. He has not noticed that his blood pressures are really high but is not really been checking them like he should. He said he like to figure out what his wife would have done with the blood pressure monitor. He has not had any recent falls or injuries. Is not have any other medication problems that he can tell. Review of Systems   Constitutional: Positive for fatigue. Musculoskeletal: Positive for arthralgias, gait problem, joint swelling and myalgias. Psychiatric/Behavioral: The patient is nervous/anxious. All other systems reviewed and are negative. OBJECTIVE:  BP (!) 168/82   Pulse 71   Temp 97.5 °F (36.4 °C)   Resp 18   Ht 5' 11\" (1.803 m)   Wt 208 lb 4.8 oz (94.5 kg)   SpO2 98% Comment: ra  BMI 29.05 kg/m²    Wt Readings from Last 3 Encounters:   06/29/21 208 lb 4.8 oz (94.5 kg)   04/05/21 210 lb (95.3 kg)   12/29/20 213 lb 12.8 oz (97 kg)     BP Readings from Last 3 Encounters:   06/29/21 (!) 168/82   04/05/21 (!) 142/82   12/29/20 (!) 140/78      Pulse Readings from Last 3 Encounters:   06/29/21 71   04/05/21 78   12/29/20 74     Body mass index is 29.05 kg/m².    Resp Readings from Last 3 Encounters:   06/29/21 18   04/05/21 18   12/29/20 18     Past medical, surgical, family and social history were reviewed and updated with the patient. Physical Exam  Vitals and nursing note reviewed. Constitutional:       Appearance: He is well-developed. HENT:      Head: Normocephalic and atraumatic. Right Ear: Hearing, tympanic membrane and external ear normal.      Left Ear: Hearing, tympanic membrane and external ear normal.      Nose: Nose normal.      Mouth/Throat:      Pharynx: Uvula midline. Eyes:      General: Lids are normal.      Conjunctiva/sclera: Conjunctivae normal.      Pupils: Pupils are equal, round, and reactive to light. Neck:      Thyroid: No thyroid mass or thyromegaly. Vascular: No carotid bruit. Trachea: Trachea and phonation normal.   Cardiovascular:      Rate and Rhythm: Normal rate and regular rhythm. Pulses: Normal pulses. Heart sounds: Normal heart sounds, S1 normal and S2 normal. No murmur heard. No friction rub. No gallop. Pulmonary:      Effort: Pulmonary effort is normal.      Breath sounds: Normal breath sounds. Musculoskeletal:      Cervical back: Normal range of motion and neck supple. Lumbar back: Spasms and tenderness present. Decreased range of motion. Skin:     General: Skin is warm and dry. Neurological:      Mental Status: He is alert and oriented to person, place, and time. Mental status is at baseline. Psychiatric:         Attention and Perception: Attention and perception normal.         Mood and Affect: Mood is depressed. Affect is flat. Speech: Speech normal.         Behavior: Behavior normal. Behavior is cooperative. Thought Content: Thought content normal.         Cognition and Memory: Cognition and memory normal.         Judgment: Judgment normal.          No results found for requested labs within last 30 days.      Hemoglobin A1C (%)   Date Value   04/05/2021 7.2 (H)     Microscopic Examination (no units)   Date Value   08/21/2019 YES     Microalbumin, Random Urine (mg/dL)   Date Value   12/29/2020 <1.20     LDL Monitoring:      Please note: This chart was generated using Dragon dictation software. Although every effort was made to ensure the accuracy of this automated transcription, some errors in transcription may have occurred.

## 2025-02-15 NOTE — ASSESSMENT & PLAN NOTE
Patient is still in atrial flutter rhythm with 2:1 conduction.  She has history of paroxysmal atrial fibrillation.  Clinically not in heart failure.  His frail and has significant fall risk.  Has been off of anticoagulation since November 2023 due to multiple falls including head injuries.  She has permanent pacemaker.  2/14/25 interrogation report reviewed  Renal function and electrolytes are normal.  Cardiac enzymes are negative.  There is no evidence of acute infection.  Thyroid function is normal.  Echocardiogram 10/4/2024 showed preserved EF of 55%, normal RV size and function, biatrial enlargement, calcified aortic valve without stenosis, thickened mitral valve with MAC and mild mitral regurgitation, mild TR and PI, mild pulmonary hypertension, dilated aortic root and ascending aorta, no pericardial effusion.    Patient not a candidate for consideration for rhythm control and will continue to optimize rate control.  Got IV Digoxin and increased Lopressor to 25 mg q12h this morning. Monitor HR & BP response. Increase Lopressor as BP allows (SBP > 120 mmHg).  Check Digoxin level tomorrow AM.  2/14/25 [employer] interrogation data reviewed. Seems she has been in A-Fib/flutter for 9 days prior to admission.  Advise discussion with family regarding goals of care.  Recommend  discharge back to nursing facility memory unit if heart rate is consistently less than 110 bpm.  Does not need echocardiogram during current admission unless she is having hemodynamic disturbance.  Consider DVT prophylaxis during hospitalization.

## 2025-02-15 NOTE — PROGRESS NOTES
Progress Note - Cardiology   Name: Summer Blanton 89 y.o. female I MRN: [id number]  Unit/Bed#: E4 -01 I Date of Admission: 2/14/2025   Date of Service: 2/15/2025 I Hospital Day: 0   Assessment & Plan  Flutter-fibrillation (HCC)  Patient is still in atrial flutter rhythm with 2:1 conduction.  She has history of paroxysmal atrial fibrillation.  Clinically not in heart failure.  His frail and has significant fall risk.  Has been off of anticoagulation since November 2023 due to multiple falls including head injuries.  She has permanent pacemaker.  2/14/25 interrogation report reviewed  Renal function and electrolytes are normal.  Cardiac enzymes are negative.  There is no evidence of acute infection.  Thyroid function is normal.  Echocardiogram 10/4/2024 showed preserved EF of 55%, normal RV size and function, biatrial enlargement, calcified aortic valve without stenosis, thickened mitral valve with MAC and mild mitral regurgitation, mild TR and PI, mild pulmonary hypertension, dilated aortic root and ascending aorta, no pericardial effusion.    Patient not a candidate for consideration for rhythm control and will continue to optimize rate control.  Got IV Digoxin and increased Lopressor to 25 mg q12h this morning. Monitor HR & BP response. Increase Lopressor as BP allows (SBP > 120 mmHg).  Check Digoxin level tomorrow AM.  2/14/25 [employer] interrogation data reviewed. Seems she has been in A-Fib/flutter for 9 days prior to admission.  Advise discussion with family regarding goals of care.  Recommend  discharge back to nursing facility memory unit if heart rate is consistently less than 110 bpm.  Does not need echocardiogram during current admission unless she is having hemodynamic disturbance.  Consider DVT prophylaxis during hospitalization.  Complete heart block (HCC) -   PPM ( [employer]tr[employer]) Feb 2021  Has normal functioning pacemaker device.  Currently not in paced rhythm.  Although previous diagnosis was  mentioned as complete heart block she has had ventricular demand pacing in the past.  Acute encephalopathy  Patient had acute confusion.  Is currently pleasantly confused.  No behavioral disturbance noted during interaction.  Appreciate medical team management.  YEISON (acute kidney injury) (HCC)  Improving with hydration    Subjective: Patient is pleasantly confused and denies palpitations, chest pain or SOB. Wants to get washed up today. Wants to leave the hospital.    Objective:  Vitals:    02/15/25 0718   BP: (!) 152/104   Pulse: 95   Resp: 18   Temp: 97.5 °F (36.4 °C)   SpO2: 97%     Physical Exam  Vitals and nursing note reviewed.   Constitutional:       General: She is not in acute distress.  HENT:      Head: Normocephalic and atraumatic.      Nose: No congestion.      Comments: Not on o2     Mouth/Throat:      Mouth: Mucous membranes are moist.   Eyes:      Conjunctiva/sclera: Conjunctivae normal.   Neck:      Comments: - JVD  Cardiovascular:      Rate and Rhythm: Normal rate and regular rhythm.      Heart sounds: S1 normal and S2 normal. No murmur heard.  Pulmonary:      Effort: Pulmonary effort is normal.      Breath sounds: No wheezing, rhonchi or rales.   Abdominal:      General: Abdomen is flat.   Musculoskeletal:      Right lower leg: No edema.      Left lower leg: No edema.   Neurological:      General: No focal deficit present.      Mental Status: She is alert.      Comments: Grossly moving limbs   Psychiatric:      Comments: Confused not hallucinating     Data: Reviewed labs from this morning  Telemetry- aflutter w/ heart rate of 123 bpm    Layla Lopez PA-C

## 2025-02-15 NOTE — ASSESSMENT & PLAN NOTE
Has normal functioning pacemaker device.  Currently not in paced rhythm.  Although previous diagnosis was mentioned as complete heart block she has had ventricular demand pacing in the past.

## 2025-02-15 NOTE — ASSESSMENT & PLAN NOTE
Pt has had acute confusion at [facility]  Oriented x3 with confusion  In history she is noted to have forgetfulness- unclear if she had a dementia work up in the past but could be due to worsening dementia  However will need to r/o acute infection   Ct head negative  Ua negative  Covid/flu/rsv neg  Tsh nml  Check vitamin b12 rpr  Consider geriatric eval outpt if no infectious source

## 2025-02-15 NOTE — ASSESSMENT & PLAN NOTE
Patient noted to be in atrial flutter rhythm with 212 conduction.  Has history of paroxysmal atrial fibrillation.  Clinically not in heart failure.  His frail and has significant fall risk.  Has been off of anticoagulation since November 2023 due to multiple falls including head injuries.  Has permanent pacemaker.  Pacemaker interrogation was not available for review at the time of reporting.  Renal function and electrolytes are normal.  Cardiac enzymes are negative.  There is no evidence of acute infection.  Thyroid function is normal.  Echocardiogram 10/4/2024 showed preserved EF of 55%, normal RV size and function, biatrial enlargement, calcified aortic valve without stenosis, thickened mitral valve with MAC and mild mitral regurgitation, mild TR and PI, mild pulmonary hypertension, dilated aortic root and ascending aorta, no pericardial effusion.    Patient being given digoxin 125 mcg x 1 and started on metoprolol tartrate 12.5 g twice daily.  Patient not a candidate for consideration for rhythm control and will continue to optimize rate control.  Can get additional 125 mcg of digoxin tomorrow morning if she remains tachycardic.  Can also increase metoprolol to tartrate if blood pressure is greater than 120 mmHg systolic.  Will obtain [employer] interrogation data for pacemaker to determine atrial flutter/fibrillation burden although it is not going to .  Advise discussion with family regarding goals of care.  Recommend  discharge back to nursing facility memory unit if heart rate is consistently less than 110 mmHg.  Does not need echocardiogram during current admission unless she is having hemodynamic disturbance.  Consider DVT prophylaxis during hospitalization.

## 2025-02-15 NOTE — CASE MANAGEMENT
Case Management Assessment & Discharge Planning Note    Patient name Summer Blanton  Location [facility] /E4 -* MRN [id number]  : 1930 Date 2/15/2025       Current Admission Date: 2025  Current Admission Diagnosis:Flutter-fibrillation (HCC)   Patient Active Problem List    Diagnosis Date Noted Date Diagnosed    Acute encephalopathy 2025     Flutter-fibrillation (HCC) 2025     YEISON (acute kidney injury) (HCC) 2025     Leg edema 2023     Intestinal obstruction (HCC) 2023     Urinary incontinence 08/10/2021     Pacemaker ( 2021 ) 2021     Complete heart block ([facility]) -   PPM ( Medtronic) 2021     Anxiety 2018     Gastric erosion, -  resolved 2016     Peripheral neuropathy 2016     Constipation 2016     Forgetfulness 2016     Left shoulder tendonitis 2016     Dysphagia - improved 2015     Discoid lupus erythematosus 2012     Osteoarthritis of knee 2012       LOS (days): 0  Geometric Mean LOS (GMLOS) (days):   Days to GMLOS:     OBJECTIVE:              Current admission status: Observation  Referral Reason: Transport Set-up    Preferred Pharmacy:   [facility]/pharm[id number] - [place] [address] [address]  Phone: [phone] Fax: [phone]    Primary Care Provider: Self Self    Primary Insurance: MEDICARE  Secondary Insurance: BLUE CROSS    ASSESSMENT:  Active Health Care Proxies       Kirstin Espinosa Health Care Representative - Daughter   Primary Phone: [phone] (Mobile)  Home Phone: [phone]                 Advance Directives  Does patient have a Health Care POA?: Yes  Does patient have Advance Directives?: Yes  Advance Directives: Living will, Power of  for health care  Primary Contact: Jelly Fulton, [phone]         Readmission Root Cause  30 Day Readmission: No    Patient Information  Admitted from:: Facility  Mental  Status: Confused  During Assessment patient was accompanied by: Daughter  Assessment information provided by:: Daughter  Primary Caregiver: Other (Comment)  Caregiver's Name:: Veronica WONG  Caregiver's Relationship to Patient:: Facility Staff  Caregiver's Telephone Number:: Veronica WONG  Support Systems: Children  County of Residence: [place]  What city do you live in?: [place]  Home entry access options. Select all that apply.: Elevator  Type of Current Residence: Facility  Upon entering residence, is there a bedroom on the main floor (no further steps)?: Yes  Upon entering residence, is there a bathroom on the main floor (no further steps)?: Yes  Living Arrangements: Lives in Facility  Is patient a ?: No    Activities of Daily Living Prior to Admission  Functional Status: Assistance  Completes ADLs independently?: No  Level of ADL dependence: Assistance  Ambulates independently?: Yes  Does patient use assisted devices?: Yes  Assisted Devices (DME) used: Walker  Does patient currently own DME?: Yes  What DME does the patient currently own?: Walker  Does patient have a history of Outpatient Therapy (PT/OT)?: Yes  Does the patient have a history of Short-Term Rehab?: No  Does patient have a history of HHC?: Yes ([facility])  Does patient currently have HHC?: No         Patient Information Continued  Income Source: Pension/skilled nursing  Does patient have prescription coverage?: Yes  Does patient receive dialysis treatments?: No  Does patient have a history of substance abuse?: No  Does patient have a history of Mental Health Diagnosis?: Yes (Anxiety)  Is patient receiving treatment for mental health?: Yes  Has patient received inpatient treatment related to mental health in the last 2 years?: No         Means of Transportation  Means of Transport to Appts:: Family transport          DISCHARGE DETAILS:    Discharge planning discussed with:: Patient's daughter  Freedom of Choice: Yes  Comments - Freedom  of Choice: Return to [facility]  CM contacted family/caregiver?: Yes  Were Treatment Team discharge recommendations reviewed with patient/caregiver?: Yes  Did patient/caregiver verbalize understanding of patient care needs?: N/A- going to facility  Were patient/caregiver advised of the risks associated with not following Treatment Team discharge recommendations?: Yes    Contacts  Patient Contacts: Kirstin (Daughter)  Relationship to Patient:: Family  Contact Method: In Person  Reason/Outcome: Continuity of Care, Emergency Contact, Discharge Planning    Requested Home Health Care         Is the patient interested in HHC at discharge?: No    DME Referral Provided  Referral made for DME?: No         Would you like to participate in our [facility] service program?  : No - Declined    Treatment Team Recommendation: Facility Return  Discharge Destination Plan:: Facility Return  Transport at Discharge : Family               Accepting Facility Name, City & State : [facility]  Receiving Facility/Agency Phone Number: [phone]        CM met with patient and patient's daughter at bedside to introduce self and role with DC planning.  Patient resides at [facility], they assist with all ADL's and IADL's.  Patient ambulates with a RW.  Patient had [facility] in the past, no current HHC.  Family will transport patient at time of DC.  Family understands that facility may not be able to accommodate her return on Sunday, CM left them a voicemail to clarify.    CM placed PC to [facility] [phone], CM left voicemail requesting call back to discuss DC planning/ patient's return.

## 2025-02-15 NOTE — ASSESSMENT & PLAN NOTE
Patient had acute confusion.  Is currently pleasantly confused.  No behavioral disturbance noted during interaction.  Appreciate medical team management.

## 2025-02-15 NOTE — ASSESSMENT & PLAN NOTE
New onset of flutter/fib has paroxysmal atrial fibrillation with rvr with history fo complete heart block s/p pacer-His frail and has significant fall risk.  Has been off of anticoagulation since November 2023 due to multiple falls including head injuries.   Tsh nml   R/o infection for no none evident   Check echo- pending   Reviewed recommendations by cardiology and reviewed orders today rates are still in the 120s cardiology has increase metoprolol titrate to 25 mg p.o. twice daily blood pressure stable and gave another dose of IV digoxin on monitor on telemetry

## 2025-02-15 NOTE — ASSESSMENT & PLAN NOTE
Acute kidney injury noted from her baseline.  Appears slightly dehydrated and prerenal rising creatinine.  Agree with hydration and repeat labs in AM.

## 2025-02-15 NOTE — PLAN OF CARE

## 2025-02-15 NOTE — CASE MANAGEMENT
Case Management Discharge Planning Note    Patient name Summer Blanton  [facility] /E4 -* MRN [id number]  : 1930 Date 2/15/2025       Current Admission Date: 2025  Current Admission Diagnosis:Flutter-fibrillation (HCC)   Patient Active Problem List    Diagnosis Date Noted Date Diagnosed    Acute encephalopathy 2025     Flutter-fibrillation (HCC) 2025     YEISON (acute kidney injury) (HCC) 2025     Leg edema 2023     Intestinal obstruction (HCC) 2023     Urinary incontinence 08/10/2021     Pacemaker ( 2021 ) 2021     Complete heart block (HCC) -   PPM ( Medtronic) 2021     Anxiety 2018     Gastric erosion, -  resolved 2016     Peripheral neuropathy 2016     Constipation 2016     Forgetfulness 2016     Left shoulder tendonitis 2016     Dysphagia - improved 2015     Discoid lupus erythematosus 2012     Osteoarthritis of knee 2012       LOS (days): 0  Geometric Mean LOS (GMLOS) (days):   Days to GMLOS:     OBJECTIVE:            Current admission status: Inpatient   Preferred Pharmacy:   [facility]/pharmacy #[id number] - [place] - [address] [address]  Phone: [phone] Fax: [phone]    Primary Care Provider: Self Self    Primary Insurance: MEDICARE  Secondary Insurance: BLUE CROSS    DISCHARGE DETAILS:    CM received PC back from [facility] [phone], facility could accommodate patient's return tomorrow 2.16.25 prior to 1pm if stable. CM should call tomorrow if patient is discharging.

## 2025-02-15 NOTE — PROGRESS NOTES
Progress Note - Hospitalist   Name: Summer Blanton 89 y.o. female I MRN: [id number]  Unit/Bed#: E4 -01 I Date of Admission: 2/14/2025   Date of Service: 2/15/2025 I Hospital Day: 0    Assessment & Plan  Flutter-fibrillation (HCC)  New onset of flutter/fib has paroxysmal atrial fibrillation with rvr with history fo complete heart block s/p pacer-His frail and has significant fall risk.  Has been off of anticoagulation since November 2023 due to multiple falls including head injuries.   Tsh nml   R/o infection for no none evident   Check echo- pending   Reviewed recommendations by cardiology and reviewed orders today rates are still in the 120s cardiology has increase metoprolol titrate to 25 mg p.o. twice daily blood pressure stable and gave another dose of IV digoxin on monitor on telemetry  Dysphagia - improved  History of dysphagia but on regular diet  Will have speech eval  Complete heart block (HCC) -   PPM ( Medtronic) Feb 2021  S/p dual chamber medtronic permanent pacer in 2021   Acute encephalopathy  Pt has had acute confusion at [facility]  Oriented x3 with confusion  In history she is noted to have forgetfulness- unclear if she had a dementia work up in the past but could be due to worsening dementia  However will need to r/o acute infection   Ct head negative  Ua negative  Covid/flu/rsv neg  Tsh nml  Check vitamin b12 rpr  Consider geriatric eval outpt if no infectious source    YEISON (acute kidney injury) ([facility])  Baseline creatinine is 0.5-0.7  Creatinine on admit is 1.2  Resolved post fluids will discontinue    VTE Pharmacologic Prophylaxis: VTE Score: 3 Moderate Risk (Score 3-4) - Pharmacological DVT Prophylaxis Ordered: heparin.    Mobility:   Basic Mobility Inpatient Raw Score: 20  JH-HLM Goal: 6: Walk 10 steps or more  JH-HLM Achieved: 2: Bed activities/Dependent transfer  JH-HLM Goal achieved. Continue to encourage appropriate mobility.    Patient Centered Rounds: I performed bedside rounds  with nursing staff today.   Discussions with Specialists or Other Care Team Provider: Reviewed input by cardiology    Education and Discussions with Family / Patient: Patient will update family    Current Length of Stay: 0 day(s)  Current Patient Status: Inpatient   Certification Statement: The patient will continue to require additional inpatient hospital stay due to a flutter with RVR  Discharge Plan: Anticipate discharge in 24-48 hrs to home.    Code Status: Level 3 - DNAR and DNI    Subjective   Patient seen and examined denies any chest pain or shortness of breath complaining she wants to go urinate    Objective :  Temp:  [96.7 °F (35.9 °C)-97.7 °F (36.5 °C)] 97.5 °F (36.4 °C)  HR:  [] 95  BP: (119-152)/() 152/104  Resp:  [16-23] 18  SpO2:  [95 %-99 %] 97 %  O2 Device: None (Room air)    Body mass index is 23.55 kg/m².     Input and Output Summary (last 24 hours):     Intake/Output Summary (Last 24 hours) at 2/15/2025 1038  Last data filed at 2/14/2025 2030  Gross per 24 hour   Intake 1240 ml   Output --   Net 1240 ml       Physical Exam  Vitals and nursing note reviewed.   Constitutional:       General: She is not in acute distress.     Appearance: She is well-developed.   HENT:      Head: Normocephalic and atraumatic.   Eyes:      Conjunctiva/sclera: Conjunctivae normal.   Cardiovascular:      Rate and Rhythm: Regular rhythm. Tachycardia present.      Heart sounds: No murmur heard.  Pulmonary:      Effort: Pulmonary effort is normal. No respiratory distress.      Breath sounds: Normal breath sounds. No wheezing or rales.   Abdominal:      Palpations: Abdomen is soft.      Tenderness: There is no abdominal tenderness.   Musculoskeletal:         General: No swelling.      Cervical back: Neck supple.   Skin:     General: Skin is warm and dry.      Capillary Refill: Capillary refill takes less than 2 seconds.   Neurological:      Mental Status: She is alert.      Comments: Awake alert and oriented    Psychiatric:         Mood and Affect: Mood normal.           Lines/Drains:        Telemetry:  Telemetry Orders (From admission, onward)               24 Hour Telemetry Monitoring  Continuous x 24 Hours (Telem)        Expiring   Question:  Reason for 24 Hour Telemetry  Answer:  Arrhythmias requiring acute medical intervention / PPM or ICD malfunction                     Telemetry Reviewed: Atrial flutter. HR averaging 124  Indication for Continued Telemetry Use: Arrthymias requiring medical therapy               Lab Results: I have reviewed the following results:   Results from last 7 days   Lab Units 02/15/25  0444 02/14/25  2118 02/14/25  1000   WBC Thousand/uL 7.72  --  9.15   HEMOGLOBIN g/dL 12.1  --  12.1   HEMATOCRIT % 38.2  --  37.1   PLATELETS Thousands/uL 193   < > 193   SEGS PCT %  --   --  81*   LYMPHO PCT %  --   --  11*   MONO PCT %  --   --  8   EOS PCT %  --   --  0    < > = values in this interval not displayed.     Results from last 7 days   Lab Units 02/15/25  0444 02/14/25  1000   SODIUM mmol/L 141 141   POTASSIUM mmol/L 3.8 3.9   CHLORIDE mmol/L 108 107   CO2 mmol/L 27 25   BUN mg/dL 29* 51*   CREATININE mg/dL 0.71 1.20   ANION GAP mmol/L 6 9   CALCIUM mg/dL 9.1 9.7   ALBUMIN g/dL  --  4.6   TOTAL BILIRUBIN mg/dL  --  0.96   ALK PHOS U/L  --  67   ALT U/L  --  27   AST U/L  --  28   GLUCOSE RANDOM mg/dL 90 118                 Results from last 7 days   Lab Units 02/14/25  1000   LACTIC ACID mmol/L 1.1       Recent Cultures (last 7 days):         Imaging Results Review: No pertinent imaging studies reviewed.  Other Study Results Review: No additional pertinent studies reviewed.    Last 24 Hours Medication List:     Current Facility-Administered Medications:     acetaminophen (TYLENOL) tablet 650 mg, Q4H PRN    Artificial Tears Op Soln 2 drop, Q4H PRN    aspirin chewable tablet 81 mg, Daily    Cholecalciferol (VITAMIN D3) tablet 1,000 Units, Daily    cyanocobalamin (VITAMIN B-12) tablet 500 mcg,  Daily    heparin (porcine) subcutaneous injection 5,000 Units, Q8H JACOB **AND** [COMPLETED] Platelet count, Once    metoprolol tartrate (LOPRESSOR) tablet 25 mg, Q12H JACOB    multivitamin-minerals (CENTRUM) tablet 1 tablet, Daily    ondansetron (ZOFRAN) injection 4 mg, Q6H PRN    oxybutynin (DITROPAN) tablet 5 mg, BID    senna-docusate sodium (SENOKOT S) 8.6-50 mg per tablet 1 tablet, Daily    Administrative Statements   Today, Patient Was Seen By: Mikala Serrano MD  I have spent a total time of >35 minutes in caring for this patient on the day of the visit/encounter including Patient and family education, Documenting in the medical record, Reviewing / ordering tests, medicine, procedures  , and Communicating with other healthcare professionals .    **Please Note: This note may have been constructed using a voice recognition system.**

## 2025-02-15 NOTE — SPEECH THERAPY NOTE
"          Speech-Language Pathology Bedside Swallow Evaluation        Patient Name: Summer Blanton    Today's Date: 2/15/2025     Problem List  Patient Active Problem List   Diagnosis    Constipation    Discoid lupus erythematosus    Dysphagia - improved    Forgetfulness    Gastric erosion, -  resolved    Left shoulder tendonitis    Osteoarthritis of knee    Peripheral neuropathy    Anxiety    Complete heart block (HCC) -   PPM ( Medtronic) Feb 2021    Pacemaker ( Feb 2021 )    Urinary incontinence    Intestinal obstruction (HCC)    Leg edema    Acute encephalopathy    Flutter-fibrillation (HCC)    YEISON (acute kidney injury) (HCC)       Past Medical History  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    Transient elevated blood pressure     last assessed: 5/16/2017    Weight loss        Past Surgical History  Past Surgical History:   Procedure Laterality Date    APPENDECTOMY      CATARACT EXTRACTION      CHOLECYSTECTOMY      ESOPHAGOGASTRODUODENOSCOPY  06/2013    diagnostic- neg id have dilatation    EYE SURGERY      HYSTERECTOMY      LAPAROTOMY N/A 11/3/2023    Procedure: LAPAROTOMY EXPLORATORY, ILEOCECECTOMY;  Surgeon: Katlyn Fleming MD;  Location: [facility];  Service: General    [place] ESOPHAGOGASTRODUODENOSCOPY TRANSORAL DIAGNOSTIC N/A 9/16/2016    Procedure: ESOPHAGOGASTRODUODENOSCOPY (EGD);  Surgeon: Vladislav Garcia MD;  Location: [facility];  Service: Gastroenterology    REPLACEMENT TOTAL KNEE Left 01/2007         Current Medical Status  Pt is a 89 y.o. female who presented to [facility] with change of mental status. Pt resides at [facility] and was reported to \"not be her self and more confused\". She was oriented x3 on eval with confusion. Daughter is at bedside. Pt was found to be in afib/flutter.     SLP consult requested to assess the " swallow given reported h/o dysphagia.    Seen by GI in Nov- sxs are only once every few months with solid food. Prior manometry with non specific motility no c/w achalasia. EGD with areas of superficial gastric erosion. Esophagram with mild dysmotility.     Past medical history:   Please see H&P for details      Special Studies:  XR chest pa and lateral  Narrative: XR CHEST PA AND LATERAL    INDICATION: feeling ill, r/o pna.    COMPARISON: 1 view chest 2/12/2021    FINDINGS: Left chest wall intracardiac device with intact lead(s). No abandoned lead(s).    Patient's chin and neck partially obscures lung apices.    Visualized portions of the lungs are clear. No pneumothorax or pleural effusion.    Cardiac silhouette is enlarged.  Aortic calcification is present.    Severe bilateral glenohumeral osteoarthritis with remodeling of the femoral heads. Degenerative changes in the spine. Mild multilevel mid thoracic chronic compression fractures with slightly prominent kyphosis. Diffuse osteopenia.    Normal upper abdomen.  Impression: No radiographic evidence of acute intrathoracic process.    Workstation performed: [id number]    1/24/23 Esophagram: Mildly dilated esophagus with mild diffuse tertiary contractions, but otherwise normal motility and normal prompt emptying of contrast from the esophagus.       Swallow Information   Current Risks for Dysphagia & Aspiration: known history of dysphagia     Current Symptoms/Concerns:  none reported    Current Diet: regular diet and thin liquids      Baseline Diet: regular diet and thin liquids    Baseline Assessment   Behavior/Cognition: alert    Speech/Language Status: able to participate in conversation and able to follow commands    Patient Positioning: upright in bed    Swallow Mechanism Exam   Facial: symmetrical  Labial: WFL  Lingual: WFL  Velum: symmetrical  Mandible: adequate ROM  Dentition: adequate  Vocal quality:clear/adequate   Volitional Cough: strong/productive    Resp: RA    Consistencies Assessed and Performance   Consistencies Administered: thin liquids and hard solids  Specific materials administered included: open faced turkey sandwich and thin liquids    Oral Stage:   Mastication was adequate with the materials administered today.  Bolus formation and transfer were functional with nosignificant oral residue noted.  No overt s/s reduced oral control.    Pharyngeal Stage:   Swallowing initiation appeared prompt.  Laryngeal rise was palpated and judged to be within functional limits.  No coughing, throat clearing, change in vocal quality or respiratory status noted today.     Esophageal Concerns:  known hx of esophageal dysmotility    Summary   Pts oropharyngeal swallow function appears generally WFL at this time with the materials administered today. Cannot r/o retrograde aspiration risk given h/o esophageal dysphagia.     Recommendations: regular diet and thin liquids     Recommended Form of Meds:  as tolerated/desired       Aspiration precautions and compensatory swallowing strategies: upright posture, slow rate of feeding, small bites/sips, and alternating bites and sips    Results Reviewed with: patient and RN     Plan  No further SLP services indicated at this time    Keisha Castro M.S., CCC-SLP  Speech Language Pathologist   Available via Secure Chat  [place] #[id number]  [place] #[id number]

## 2025-02-16 LAB
ANION GAP SERPL CALCULATED.3IONS-SCNC: 6 MMOL/L (ref 4–13)
ATRIAL RATE: 82 BPM
BUN SERPL-MCNC: 30 MG/DL (ref 5–25)
CALCIUM SERPL-MCNC: 9.3 MG/DL (ref 8.4–10.2)
CHLORIDE SERPL-SCNC: 109 MMOL/L (ref 96–108)
CO2 SERPL-SCNC: 25 MMOL/L (ref 21–32)
CREAT SERPL-MCNC: 0.79 MG/DL (ref 0.6–1.3)
DIGOXIN SERPL-MCNC: 0.8 NG/ML (ref 0.8–2)
ERYTHROCYTE [DISTWIDTH] IN BLOOD BY AUTOMATED COUNT: 13.3 % (ref 11.6–15.1)
GFR SERPL CREATININE-BSD FRML MDRD: 64 ML/MIN/1.73SQ M
GLUCOSE SERPL-MCNC: 107 MG/DL (ref 65–140)
HCT VFR BLD AUTO: 36.2 % (ref 34.8–46.1)
HGB BLD-MCNC: 11.9 G/DL (ref 11.5–15.4)
MAGNESIUM SERPL-MCNC: 2.2 MG/DL (ref 1.9–2.7)
MCH RBC QN AUTO: 29.5 PG (ref 26.8–34.3)
MCHC RBC AUTO-ENTMCNC: 32.9 G/DL (ref 31.4–37.4)
MCV RBC AUTO: 90 FL (ref 82–98)
P AXIS: 69 DEGREES
PLATELET # BLD AUTO: 175 THOUSANDS/UL (ref 149–390)
PMV BLD AUTO: 9.6 FL (ref 8.9–12.7)
POTASSIUM SERPL-SCNC: 3.8 MMOL/L (ref 3.5–5.3)
PR INTERVAL: 166 MS
QRS AXIS: -81 DEGREES
QRSD INTERVAL: 166 MS
QT INTERVAL: 442 MS
QTC INTERVAL: 517 MS
RBC # BLD AUTO: 4.03 MILLION/UL (ref 3.81–5.12)
SODIUM SERPL-SCNC: 140 MMOL/L (ref 135–147)
T WAVE AXIS: 81 DEGREES
VENTRICULAR RATE: 82 BPM
WBC # BLD AUTO: 9.08 THOUSAND/UL (ref 4.31–10.16)

## 2025-02-16 PROCEDURE — 99232 SBSQ HOSP IP/OBS MODERATE 35: CPT | Performed by: PHYSICIAN ASSISTANT

## 2025-02-16 PROCEDURE — 93005 ELECTROCARDIOGRAM TRACING: CPT

## 2025-02-16 PROCEDURE — 99232 SBSQ HOSP IP/OBS MODERATE 35: CPT | Performed by: STUDENT IN AN ORGANIZED HEALTH CARE EDUCATION/TRAINING PROGRAM

## 2025-02-16 PROCEDURE — 85027 COMPLETE CBC AUTOMATED: CPT | Performed by: PHYSICIAN ASSISTANT

## 2025-02-16 PROCEDURE — 83735 ASSAY OF MAGNESIUM: CPT | Performed by: PHYSICIAN ASSISTANT

## 2025-02-16 PROCEDURE — 93010 ELECTROCARDIOGRAM REPORT: CPT | Performed by: STUDENT IN AN ORGANIZED HEALTH CARE EDUCATION/TRAINING PROGRAM

## 2025-02-16 PROCEDURE — 80162 ASSAY OF DIGOXIN TOTAL: CPT | Performed by: PHYSICIAN ASSISTANT

## 2025-02-16 PROCEDURE — 80048 BASIC METABOLIC PNL TOTAL CA: CPT | Performed by: PHYSICIAN ASSISTANT

## 2025-02-16 RX ORDER — METOPROLOL TARTRATE 25 MG/1
25 TABLET, FILM COATED ORAL EVERY 6 HOURS
Status: DISCONTINUED | OUTPATIENT
Start: 2025-02-16 | End: 2025-02-16

## 2025-02-16 RX ORDER — OLANZAPINE 10 MG/2ML
2.5 INJECTION, POWDER, FOR SOLUTION INTRAMUSCULAR ONCE
Status: COMPLETED | OUTPATIENT
Start: 2025-02-16 | End: 2025-02-16

## 2025-02-16 RX ORDER — METOPROLOL TARTRATE 25 MG/1
25 TABLET, FILM COATED ORAL EVERY 6 HOURS
Status: DISCONTINUED | OUTPATIENT
Start: 2025-02-16 | End: 2025-02-17

## 2025-02-16 RX ORDER — OLANZAPINE 10 MG/2ML
INJECTION, POWDER, FOR SOLUTION INTRAMUSCULAR
Status: COMPLETED
Start: 2025-02-16 | End: 2025-02-16

## 2025-02-16 RX ADMIN — METOROPROLOL TARTRATE 5 MG: 5 INJECTION, SOLUTION INTRAVENOUS at 03:41

## 2025-02-16 RX ADMIN — METOPROLOL TARTRATE 25 MG: 25 TABLET, FILM COATED ORAL at 16:04

## 2025-02-16 RX ADMIN — Medication 1000 UNITS: at 08:49

## 2025-02-16 RX ADMIN — OXYBUTYNIN CHLORIDE 5 MG: 5 TABLET ORAL at 08:49

## 2025-02-16 RX ADMIN — METOPROLOL TARTRATE 25 MG: 25 TABLET, FILM COATED ORAL at 08:49

## 2025-02-16 RX ADMIN — OLANZAPINE 2.5 MG: 10 INJECTION, POWDER, FOR SOLUTION INTRAMUSCULAR at 00:31

## 2025-02-16 RX ADMIN — OLANZAPINE 2.5 MG: 10 INJECTION, POWDER, FOR SOLUTION INTRAMUSCULAR at 06:25

## 2025-02-16 RX ADMIN — Medication 1 TABLET: at 08:49

## 2025-02-16 RX ADMIN — DIGOXIN 125 MCG: 125 TABLET ORAL at 08:49

## 2025-02-16 RX ADMIN — ASPIRIN 81 MG CHEWABLE TABLET 81 MG: 81 TABLET CHEWABLE at 08:49

## 2025-02-16 RX ADMIN — HEPARIN SODIUM 5000 UNITS: 5000 INJECTION, SOLUTION INTRAVENOUS; SUBCUTANEOUS at 13:49

## 2025-02-16 NOTE — PLAN OF CARE

## 2025-02-16 NOTE — PROGRESS NOTES
Cardiology Progress Note - Summer Blanton 89 y.o. female MRN: [id number]    Unit/Bed#: E4 -01 Encounter: [id number]      Assessment & Plan:    Atrial flutter/fibrillation (HCC)  -History of paroxysmal atrial fibrillation/flutter, presenting with atrial flutter with RVR with 2:1 conduction  - Received digoxin 125 mcg x 1 on 2/14 and 125 mcg x 2 on 2/15  - Continue with digoxin 125 mcg every other day  -Also on Lopressor 25 mg twice daily-> will increase to 25 mg every 6 hours    Complete heart block (HCC)  -Underwent Medtronic dual-chamber PPM in Feb 2021  - Normal device function on last interrogation on 1/13/2025    Acute encephalopathy  - Has underlying dementia and is currently pleasantly confused  - Further management per primary team    Dysphagia - improved    YEISON (acute kidney injury) (HCC)  - Baseline creatinine around 0.7, creatinine 1.2 on admission, now normalized  - Likely due to dehydration     Summary:  - patient remains in atrial fibrillation/flutter with RVR  - Continue with digoxin 125 mcg p.o. every other day, digoxin level therapeutic this morning, however may be falsely elevated due to IV dosing yesterday, will recheck dig level tomorrow morning  - Heart rates remain elevated, so will increase Lopressor to 25 mg every 6 hours  - Continue monitor on telemetry  - Once patient's heart rates remain less than 110, she would be stable from a cardiac standpoint for discharge    Subjective:   No significant events overnight.  She reports feeling okay this morning and has no complaints.  Unable to obtain rest of ROS due to dementia.    Objective:     Vitals: Blood pressure (!) 118/101, pulse 103, temperature (!) 96.8 °F (36 °C), temperature source Temporal, resp. rate 18, weight 58.4 kg (128 lb 12 oz), SpO2 96%., Body mass index is 23.55 kg/m².,   Orthostatic Blood Pressures      Flowsheet Row Most Recent Value   Blood Pressure 118/101 filed at 02/16/2025 0729   Patient Position - Orthostatic VS  Lying filed at 02/16/2025 0729              Intake/Output Summary (Last 24 hours) at 2/16/2025 1111  Last data filed at 2/15/2025 1301  Gross per 24 hour   Intake 180 ml   Output --   Net 180 ml           Physical Exam:    GEN: Summer Blanton appears well, alert and oriented x 1, pleasantly confused  HEENT: Mucous membranes moist, no scleral icterus, no conjunctival pallor  NECK: No elevated JVD  HEART: Tachycardic, irregularly irregular rhythm, normal S1 and S2, no significant audible murmur  LUNGS: Slightly decreased breath sounds at bases bilaterally, otherwise clear to auscultation  ABDOMEN: normal bowel sounds, soft, no tenderness, no distention  EXTREMITIES: peripheral pulses normal; no lower extremity edema   NEURO: no focal findings   SKIN: No lesions or rashes on exposed skin        Current Facility-Administered Medications:     acetaminophen (TYLENOL) tablet 650 mg, 650 mg, Oral, Q4H PRN, Norma Ambron, DO, 650 mg at 02/15/25 0924    ALPRAZolam (XANAX) tablet 0.25 mg, 0.25 mg, Oral, HS PRN, KAYLENE Odom    Artificial Tears Op Soln 2 drop, 2 drop, Both Eyes, Q4H PRN, Norma Ambron, DO    aspirin chewable tablet 81 mg, 81 mg, Oral, Daily, Norma Ambron, DO, 81 mg at 02/16/25 0849    Cholecalciferol (VITAMIN D3) tablet 1,000 Units, 1,000 Units, Oral, Daily, Norma Ambron, DO, 1,000 Units at 02/16/25 0849    cyanocobalamin (VITAMIN B-12) tablet 500 mcg, 500 mcg, Oral, Daily, Norma Ambron, DO, 500 mcg at 02/15/25 0923    digoxin (LANOXIN) tablet 125 mcg, 125 mcg, Oral, Every Other Day, Deandre Murray MD, 125 mcg at 02/16/25 0849    heparin (porcine) subcutaneous injection 5,000 Units, 5,000 Units, Subcutaneous, Q8H JACOB, 5,000 Units at 02/15/25 1326 **AND** [COMPLETED] Platelet count, , , Once, Norma Ambron, DO    metoprolol (LOPRESSOR) injection 5 mg, 5 mg, Intravenous, Q6H PRN, KAYLENE Odom, 5 mg at 02/16/25 0341    metoprolol tartrate (LOPRESSOR) tablet 25 mg, 25  mg, Oral, Q6H, Deandre Murray MD    multivitamin-minerals (CENTRUM) tablet 1 tablet, 1 tablet, Oral, Daily, Norma Ambron, DO, 1 tablet at 02/16/25 0849    ondansetron (ZOFRAN) injection 4 mg, 4 mg, Intravenous, Q6H PRN, Norma Garberron, DO    oxybutynin (DITROPAN) tablet 5 mg, 5 mg, Oral, BID, Norma Ambron, DO, 5 mg at 02/16/25 0849    senna-docusate sodium (SENOKOT S) 8.6-50 mg per tablet 1 tablet, 1 tablet, Oral, Daily, Norma Ambron, DO, 1 tablet at 02/15/25 0924    Labs & Results:    Lab Results   Component Value Date    TROPONINI 0.36 (H) 02/10/2021    TROPONINI 0.37 (H) 02/10/2021    TROPONINI 0.22 (H) 02/09/2021       Lab Results   Component Value Date    GLUCOSE 102 12/22/2015    CALCIUM 9.3 02/16/2025     12/22/2015    K 3.8 02/16/2025    CO2 25 02/16/2025     (H) 02/16/2025    BUN 30 (H) 02/16/2025    CREATININE 0.79 02/16/2025       Lab Results   Component Value Date    WBC 9.08 02/16/2025    HGB 11.9 02/16/2025    HCT 36.2 02/16/2025    MCV 90 02/16/2025     02/16/2025           Lab Results   Component Value Date    CHOL 159 11/26/2013     Lab Results   Component Value Date    HDL 80 (H) 07/11/2017    HDL 69 11/26/2013     Lab Results   Component Value Date    LDLCALC 74 07/11/2017    LDLCALC 78 11/26/2013     Lab Results   Component Value Date    TRIG 48 07/11/2017    TRIG 61 11/26/2013       Lab Results   Component Value Date    ALT 27 02/14/2025    AST 28 02/14/2025    ALKPHOS 67 02/14/2025         EKG personally reviewed by )Deandre Murray MD. No acute changes   TELE: Remains in A-fib/flutter with RVR, no other significant arrhythmias overnight

## 2025-02-16 NOTE — CONSULTS
Consultation - Geriatrics   Summer Blanton 89 y.o. female MRN: [id number]  Unit/Bed#: E4 -01 Encounter: [id number]      Assessment/Plan    Acute Encephalopathy  Patient presented to the hospital for altered mental status   Baseline mentation is alert and oriented x 2-3 and is forgetful at times per facility    Current cause considerations   Suspected to be multifactorial secondary to age, possible underlying cognitive impairment, new onset atrial fibrillation/flutter, YEISON, oxybutynin use (spoke with primary team yesterday to discontinue), chronic pain, vision impairment, hearing impairment, and hospitalization   UA on admission was negative for UTI  No leukocytosis noted on labs yesterday   Hemoglobin stable at 11.9 on labs yesterday    at patients facility noted the following  Patient at baseline with mild confusion   Had been experiencing hallucinations prior to admission and she was exit seeking which was not her baseline   She does not have any behaviors at baseline   Patient was noted to be confused over the weekend requiring prn zyprexa   Per chart review she has not required prn medication in the last 24 hours  Patient is lethargic on exam today   Nursing notes no acute issues overnight or this morning   Identify and treat reversible causes of confusion including infection, dehydration, electrolyte imbalance, anemia, hypoxia, urinary retention, constipation, pain, and sleep disturbance  Maintain delirium precautions   Provide redirection, reorientation, and distraction techniques  Maintain fall and safety precautions   Assist with ADLs/IADLs  Avoid deliriogenic medications such as tramadol, benzodiazepines, anticholinergics, benadryl  Treat pain using geriatric pain protocol   Encourage oral hydration and nutrition   Monitor for constipation and urinary retention   Implement sleep hygiene and limit night time interuptions   Maintain sleep-wake cycle   Encourage early  and frequent mobilization   Most recent EKG on 2/14/2025 revealed a QTc interval of 461  If all other interventions are unsuccessful for acute agitation and behaviors, can consider Zyprexa 2.5 mg IM Q 8 hours prn   Would avoid benzodiazepines such as Ativan if possible as these medications can cause/worsen delirium   Redirect and reorient as needed  Keep mentally, physically, and socially active    Cognitive Screening  Patient has a documented history of forgetfulness   Facility staff notes mild confusion at baseline   This has become worse over time  She had been hallucinating prior to admission which is not her baseline   She normally does not have any behaviors   Patient is lethargic on exam today   Most recent TSH on 2/14/2025 noted to be 2.113  Most recent vitamin B 12 level on 2/15/2024 noted to be 1987  Patient is on vitamin B 12 supplementation at baseline   Would recommend discontinuation of supplementation   CT of the head on 2/14/2025 revealed mild microangiopathic changes   Patient scored 26/30 on MMSE on 11/26/2019  Maintain delirium precautions as discussed below  Redirect and reorient as needed  Keep physically, mentally, and socially active     Deconditioning   Baseline function: needs assistance with ADLs and IADLs  Patient is at increased risk for deconditioning secondary to possible underlying cognitive impairment, new onset atrial fibrillation/flutter, YEISON, oxybutynin use (spoke with primary team yesterday to discontinue), chronic pain, vision impairment, hearing impairment, weakness, gait dysfunction, and hospitalization   Continue to optimize diet, hydration, and mobility for healing   GFR 60 on labs yesterday    Patient has no documented history of CKD  Baseline creatinine appears to be around 0.6-0.7  She was noted to have an YEISON on admission with a creatinine of 1.20  This has since resolved and her creatinine on labs yesterday was stable at 0.83  Avoid nephrotoxic medication and renal dose  medication   Keep hydrated   Monitor for signs and symptoms of infection, dehydration, DVT, and skin breakdown    Frailty   Clinical Frail Scale: 6- Moderately Frail  Need help with all outside activities  Need help with stairs and bathing  May need assistance with dressing  Most recent albumin on 2/14/2025 noted to be 4.6  Consider nutrition consult  Encourage protein supplementation     Ambulatory Dysfunction/Falls   notes no recent falls   She ambulates with a roller walker at baseline   PT/OT consulted to assist with strengthening/mobility and assist with discharge planning to appropriate level of care  Assess patient frequently for physical needs, encourage use of assistant devices as needed and directed by PT/OT  Identify cognitive and physical deficits and behaviors that affect risk of falls  Consider moving patient closer to nursing station to monitor more closely for impulsive behavior which may increase risk of falls  [place] fall and safety precautions   Educate patient/family on patient safety including physical limitations and importance of using call bell for assistance   Modify environment to reduce risk of injury including disconnecting from pole when not in use, ensuring adequate lighting in room and restroom, ensuring that path to restroom is clear and free of trip hazards  Out of bed as tolerated    Impaired Vision   Patient does have vision impairment   She wears glasses at baseline per facility records   Recommend use of corrective lenses at all appropriate times  Encourage adequate lighting and encourage use of assistance with ambulation  Keep personal belongings close to avoid reaching  Encourage appropriate footwear at all times  Recommend large font for printed materials provided to patient    Impaired Hearing   Patient does have hearing impairment   She does wear hearing aids per facility records  Hearing impairment strongly correlated with depression, cognitive  impairment, delirium and falls in the older adult  Use hearing aids or sound amplifier  Speak face to face  Use clear dictation and enunciation of words    Dentition/Appetite   Patient does not wear dentures   Patient does have a history of dysphagia   She is on a regular diet at baseline per facility records    notes patient has a fairly good appetite at baseline   Encourage use of dentures at all appropriate times  Ensure meal consistency is appropriate for all abilities   Consider nutrition consult   Continue aspiration precautions     Elimination   Patient is incontinent of bowel and bladder at times at baseline  She does have a documented history of urinary incontinence   Per review of facility records she is on trospium at baseline   Hospital does not carry this medication and she was ordered the [facility] formulary oxybutynin   Discussed with primary team yesterday after reviewing the chart that this may be contributing to patients confusion/agitation   Recommended discontinuation (this medication was discontinued yesterday by the primary team)  Patient also has a documented history of constipation   Facility staff notes no issues with constipation there  She does not appear to be on any scheduled medication for this at baseline   Last documented bowel movement on 2/15  Current bowel regimen includes   Senna-S 8.6-50 mg daily   Would recommend holding bowel regimen for loose stools (bowel movement on 2/15 documented as watery)  Monitor for constipation and urinary retention     Insomnia    at the facility notes no issues with sleep at baseline   She does not appear to be on any medication for sleep at baseline   Nursing notes she slept well last night and no acute issues or events were noted   First line is behavioral therapy   Avoid sedative hypnotics including benzodiazepines and benadryl  Encourage staying awake during the day   Encourage daytime activities and morning  exercise   Decrease or eliminate daytime naps   Avoid caffeine especially during late afternoon and evening hours  Establish a nighttime routine  Implement sleep hygiene and limit nighttime interruptions  Can consider melatonin 3 mg daily at bedtime for sleep if needed     Anxiety/Depression  Patient has a documented history of anxiety   Per review of facility records she does not appear to be on any scheduled medication for this   She does have an order for prn Risperidone at her facility    notes that she does not receive this every day but when she does get this it is not very helpful with her anxiety   She has not been followed with psychiatry   Patient may benefit from lexapro 5-10 mg daily to help with anxiety as an outpatient   Patient is lethargic on exam today   Continue supportive care     Atrial Flutter-Fibrillation   Patient presented to the hospital with altered mental status   She was found to be in atrial flutter with RVR with 2:1 conduction   Cardiology was consulted   She is status post digoxin and is recommended for daily dosing   She is also on lopressor   Medication regimen being adjusted by cardiology   Echo today revealed an EF of 55% with probable diastolic dysfunction  Cardiology continues to adjust medication regimen   Management per cardiology     Home Safety  Patient resides at [facility]   She needs assistance with ADLs and IADLs     Advanced Care Planning  Level 3 DNR     Home Medication Review   Medication list obtained from facility records   Acetaminophen 500 mg daily at bedtime   Acetaminophen 325 mg BID for pain  Amlodipine 5 mg daily   Artificial tears instill 1 drop into both eyes prn for dryness  Aspirin 81 mg daily   Loperimide 2 mg daily prn   Ondanestron 4 mg Q 8 hours prn   Preservision AREDS BID   Risperidone 0.25 mg daily prn for anxiety/paranoia  Senna-A 8.6-20 mg daily prn   Trospium 20 mg daily   Vitamin B 12 1000 mcg daily   Vitamin D 3 2000 units  "daily     I have personally reviewed this medication list with the patients pharmacy listed above.       History of Present Illness   Physician Requesting Consult: Rajani José *  Reason for Consult / Principal Problem: Acute encephalopathy  Hx and PE limited by: Lethargy     HPI: Summer Blanton is a 89 y.o. year old female who has complete heart block status post pacemaker, dysphagia, forgetfulness, and anxiety and presented to the hospital with altered mental status. She resides at [facility] and she was noted to be \"not herself and more confused.\" In the ER she was noted to be oriented x 3 with some confusion. She was found to be in atrial flutter-fibrillation. TSH was noted to be WNL. Cardiology was consulted and patient has been receiving Digoxin. Echo is currently pending. Additionally she was noted to have an YEISON on labs which has since resolved. The patient has been having periods of agitation and confusion requiring Zyprexa. Geriatrics is being consulted for acute encephalopathy.     Care was coordinated with Angélica the  at [facility]. She notes the patient is assist x 1 for ADLs. She has mild confusion at baseline but it has been getting increasingly worse over time. She is forgetful of time but is generally oriented to person and place. She has no behaviors at baseline. Angélica notes that prior to admission she was having visual hallucinations. She was seeing people coloring on her walls. She accused staff of trying to kill her and she was exit seeking. She kept calling her daughter asking to come and pick her up. They did do a UA at the facility which was negative. She typically ambulates with a roller walker. She has not had any recent falls. She does have vision and hearing impairments. She does not wear dentures. She has a fairly good appetite. She is incontinent at times. She usually sleeps well. She does have periods of anxiety. She was ordered risperidone as " needed though this has not helped with the anxiety. She is not receiving this every day. She has not been evaluated or seen by psychiatry there. They note that her anxiety has been more frequent and more heightened recently. She does have a history of chronic generalized pain and is on scheduled tylenol ATC. I did provide her an updated based on chart review. It appears cardiology may consider anticoagulation. She notes the patient had been on Coumadin in the past but this was discontinued in November 2023 by cardiology.     The patient was seen and evaluated today at the bedside for geriatric consult. She is noted to be lying in bed comfortably in no acute distress. She is lethargic on my exam today. She does not appear to be in any pain or discomfort.     Care was coordinated with patients nurse Bonny. She notes no acute issues or events overnight.     Care was also coordinated with Emily Garcia PA-C with internal medicine.        Inpatient consult to Gerontology  Consult performed by: KAYLENE Adams  Consult ordered by: Emily Garcia PA-C        Review of Systems   Unable to perform ROS: Mental status change (lethargic)       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    Transient elevated blood pressure     last assessed: 5/16/2017    Weight loss      Past Surgical History:   Procedure Laterality Date    APPENDECTOMY      CATARACT EXTRACTION      CHOLECYSTECTOMY      ESOPHAGOGASTRODUODENOSCOPY  06/2013    diagnostic- neg id have dilatation    EYE SURGERY      HYSTERECTOMY      LAPAROTOMY N/A 11/3/2023    Procedure: LAPAROTOMY EXPLORATORY, ILEOCECECTOMY;  Surgeon: Katlyn Fleming MD;  Location: [facility];  Service: General    AK ESOPHAGOGASTRODUODENOSCOPY TRANSORAL DIAGNOSTIC N/A 9/16/2016    Procedure:  "ESOPHAGOGASTRODUODENOSCOPY (EGD);  Surgeon: Vladislav Garcia MD;  Location: [facility];  Service: Gastroenterology    REPLACEMENT TOTAL KNEE Left 01/2007     Social History   Social History     Substance and Sexual Activity   Alcohol Use No     Social History     Substance and Sexual Activity   Drug Use No     Social History     Tobacco Use   Smoking Status Never   Smokeless Tobacco Never       Family History:   Family History   Problem Relation Age of Onset    Diabetes Mother     Coronary artery disease Father          Allergies   Allergen Reactions    Gabapentin      dreams    Sertraline GI Intolerance     ' felt like a zombie'       Objective   Vitals: Blood pressure 112/77, pulse 99, temperature (!) 97 °F (36.1 °C), temperature source Temporal, resp. rate 18, height 5' 2\" (1.575 m), weight 58.4 kg (128 lb 12 oz), SpO2 97%.,Body mass index is 23.55 kg/m².      Physical Exam  Vitals and nursing note reviewed.   Constitutional:       General: She is not in acute distress.     Appearance: She is not ill-appearing.   HENT:      Head: Normocephalic.   Cardiovascular:      Rate and Rhythm: Normal rate and regular rhythm.   Pulmonary:      Effort: Pulmonary effort is normal. No respiratory distress.   Abdominal:      General: Bowel sounds are normal. There is no distension.      Palpations: Abdomen is soft.   Musculoskeletal:      Right lower leg: No edema.      Left lower leg: No edema.   Skin:     General: Skin is warm and dry.   Neurological:      Mental Status: She is lethargic.         Lab Results:   Results from last 7 days   Lab Units 02/16/25  0806   WBC Thousand/uL 9.08   HEMOGLOBIN g/dL 11.9   HEMATOCRIT % 36.2   PLATELETS Thousands/uL 175        Results from last 7 days   Lab Units 02/16/25  0806 02/15/25  0444 02/14/25  1000   POTASSIUM mmol/L 3.8   < > 3.9   CHLORIDE mmol/L 109*   < > 107   CO2 mmol/L 25   < > 25   BUN mg/dL 30*   < > 51*   CREATININE mg/dL 0.79   < > 1.20   CALCIUM mg/dL 9.3   < > 9.7   ALK PHOS " "U/L  --   --  67   ALT U/L  --   --  27   AST U/L  --   --  28    < > = values in this interval not displayed.       Imaging Studies: Results Review Statement: I reviewed radiology reports from this admission including: chest xray and CT head.  EKG, Pathology, and Other Studies: Results Review Statement: I reviewed AM labs and EKG.  VTE Prophylaxis: VTE covered by:  heparin (porcine), Subcutaneous, 5,000 Units at 02/17/25 1312       Code Status: Level 3 - DNAR and DNI      Please note:  Voice-recognition software may have been used in the preparation of this document.  Occasional wrong word or \"sound-alike\" substitutions may have occurred due to the inherent limitations of voice recognition software.  Interpretation should be guided by context.    "

## 2025-02-16 NOTE — ASSESSMENT & PLAN NOTE
[facility]ed [facility] w/ acute confusion; noted to be AAO x 3 but confused   Noted h/o forgetfulness; unclear if she had a dementia work up in the past but could be due to worsening dementia  CT head negative  UA negative  Covid/flu/rsv negative  TSH WNL  Vit B12 benign   RPR negative   Required multiple doses of Zyprexa w/ agitation/concern for safety risks; suspect component of hospital induced delirium  Consult geriatrics

## 2025-02-16 NOTE — ASSESSMENT & PLAN NOTE
Recent Labs     02/14/25  1000 02/15/25  0444 02/16/25  0806   CREATININE 1.20 0.71 0.79     Baseline Cr 0.5-0.7; Cr on admit 1.2   Improved w/ IVF's   Monitor BMP  Avoid nephrotoxins/hypotension

## 2025-02-16 NOTE — PROGRESS NOTES
Progress Note - Hospitalist   Name: Summer Blanton 89 y.o. female I MRN: [id number]  Unit/Bed#: E4 -01 I Date of Admission: 2/14/2025   Date of Service: 2/16/2025 I Hospital Day: 1    Assessment & Plan  Flutter-fibrillation (HCC)  Presented to ED w/ c/o confusion per daughter; pt found to have atrial flutter with RVR with 2:1 conduction; h/o paroxysmal atrial fibrillation/flutter w/  complete heart block s/p pacer  TSH benign; no overt infection  ECHO pending   Cardiology following; appreciate recs  S/p digoxin 125 mcg x 1 on 2/14 and 125 mcg x 2 on 2/15  C/w digoxin 125 mcg qod   Dig level 0.8 on 02/14   C/w recently initiated Lopressor 25 mg; increased from BID to q 6 hrs dosing on 02/16  AC therapy d/c'd in 11/2023 w/ multiple falls & head injuries   Acute encephalopathy  [facility] w/ acute confusion; noted to be AAO x 3 but confused   Noted h/o forgetfulness; unclear if she had a dementia work up in the past but could be due to worsening dementia  CT head negative  UA negative  Covid/flu/rsv negative  TSH WNL  Vit B12 benign   RPR negative   Required multiple doses of Zyprexa w/ agitation/concern for safety risks; suspect component of hospital induced delirium  Consult geriatrics  YEISON (acute kidney injury) (HCC)  Recent Labs     02/14/25  1000 02/15/25  0444 02/16/25  0806   CREATININE 1.20 0.71 0.79     Baseline Cr 0.5-0.7; Cr on admit 1.2   Improved w/ IVF's   Monitor BMP  Avoid nephrotoxins/hypotension  Complete heart block (HCC) -   PPM ( [employer]tr[employer]) Feb 2021  S/p dual chamber medtronic permanent pacer in 2021   Dysphagia - improved  H/o dysphagia but on regular diet PTA  ST consulted     VTE Pharmacologic Prophylaxis: VTE Score: 3 High Risk (Score >/= 5) - Pharmacological DVT Prophylaxis Ordered: heparin. Sequential Compression Devices Ordered.    Mobility:   Basic Mobility Inpatient Raw Score: 20  JH-HLM Goal: 6: Walk 10 steps or more  JH-HLM Achieved: 7: Walk 25 feet or  more  JH-HLM Goal NOT achieved. Continue with multidisciplinary rounding and encourage appropriate mobility to improve upon JH-HLM goals.    Patient Centered Rounds: I performed bedside rounds with nursing staff today.   Discussions with Specialists or Other Care Team Provider: Plan of care reviewed with case management, nurse    Education and Discussions with Family / Patient: Updated  (daughter) via phone.    Current Length of Stay: 1 day(s)  Current Patient Status: Inpatient   Certification Statement: The patient will continue to require additional inpatient hospital stay due to A-fib with RVR, disoriented  Discharge Plan: Anticipate discharge in 24-48 hrs to prior assisted or independent living facility.    Code Status: Level 3 - DNAR and DNI    Subjective   Patient is sleeping after time of exam following receiving multiple doses of Zyprexa overnight.  Nursing reported patient being anxious, difficult to redirect, with baseline dementia being exacerbated by hospital course.    Objective :  Temp:  [96.4 °F (35.8 °C)-98.3 °F (36.8 °C)] 96.8 °F (36 °C)  HR:  [] 103  BP: ()/() 118/101  Resp:  [18-20] 18  SpO2:  [91 %-96 %] 96 %  O2 Device: None (Room air)    Body mass index is 23.55 kg/m².     Input and Output Summary (last 24 hours):     Intake/Output Summary (Last 24 hours) at 2/16/2025 0912  Last data filed at 2/15/2025 1301  Gross per 24 hour   Intake 180 ml   Output --   Net 180 ml       Physical Exam  Constitutional:       Comments: Sleeping at time of exam, arousable but subsequently sleepy; frail, cachectic, temporal wasting, thinning orbital fat pads, prominent chest bones/clavicle   HENT:      Head: Normocephalic.      Right Ear: External ear normal.      Left Ear: External ear normal.      Nose: Nose normal.      Mouth/Throat:      Pharynx: Oropharynx is clear.      Comments: Somewhat dry mucous membrane  Eyes:      Extraocular Movements: Extraocular movements intact.       Conjunctiva/sclera: Conjunctivae normal.   Cardiovascular:      Rate and Rhythm: Normal rate and regular rhythm.      Pulses: Normal pulses.      Heart sounds: Normal heart sounds.   Pulmonary:      Effort: Pulmonary effort is normal.      Breath sounds: Normal breath sounds.   Abdominal:      General: Abdomen is flat. Bowel sounds are normal. There is no distension.      Palpations: Abdomen is soft.      Tenderness: There is no abdominal tenderness. There is no guarding or rebound.   Musculoskeletal:         General: Swelling (Trace/scant lower extremity) present. Normal range of motion.      Cervical back: Normal range of motion.   Skin:     General: Skin is warm and dry.      Coloration: Skin is not jaundiced.      Findings: No bruising or lesion.   Neurological:      Mental Status: She is disoriented.       Telemetry:  Telemetry Orders (From admission, onward)               24 Hour Telemetry Monitoring  Continuous x 24 Hours (Telem)        Expiring   Question:  Reason for 24 Hour Telemetry  Answer:  Arrhythmias requiring acute medical intervention / PPM or ICD malfunction                     Telemetry Reviewed: Atrial fibrillation. HR averaging 90-115s  Indication for Continued Telemetry Use: Arrthymias requiring medical therapy               Lab Results: I have reviewed the following results:   Results from last 7 days   Lab Units 02/16/25  0806 02/14/25  2118 02/14/25  1000   WBC Thousand/uL 9.08   < > 9.15   HEMOGLOBIN g/dL 11.9   < > 12.1   HEMATOCRIT % 36.2   < > 37.1   PLATELETS Thousands/uL 175   < > 193   SEGS PCT %  --   --  81*   LYMPHO PCT %  --   --  11*   MONO PCT %  --   --  8   EOS PCT %  --   --  0    < > = values in this interval not displayed.     Results from last 7 days   Lab Units 02/16/25  0806 02/15/25  0444 02/14/25  1000   SODIUM mmol/L 140   < > 141   POTASSIUM mmol/L 3.8   < > 3.9   CHLORIDE mmol/L 109*   < > 107   CO2 mmol/L 25   < > 25   BUN mg/dL 30*   < > 51*   CREATININE mg/dL  0.79   < > 1.20   ANION GAP mmol/L 6   < > 9   CALCIUM mg/dL 9.3   < > 9.7   ALBUMIN g/dL  --   --  4.6   TOTAL BILIRUBIN mg/dL  --   --  0.96   ALK PHOS U/L  --   --  67   ALT U/L  --   --  27   AST U/L  --   --  28   GLUCOSE RANDOM mg/dL 107   < > 118    < > = values in this interval not displayed.                 Results from last 7 days   Lab Units 02/14/25  1000   LACTIC ACID mmol/L 1.1       Recent Cultures (last 7 days):         Imaging Results Review: I reviewed radiology reports from this admission including: chest xray.  Other Study Results Review: EKG was reviewed.     Last 24 Hours Medication List:     Current Facility-Administered Medications:     acetaminophen (TYLENOL) tablet 650 mg, Q4H PRN    ALPRAZolam (XANAX) tablet 0.25 mg, HS PRN    Artificial Tears Op Soln 2 drop, Q4H PRN    aspirin chewable tablet 81 mg, Daily    Cholecalciferol (VITAMIN D3) tablet 1,000 Units, Daily    cyanocobalamin (VITAMIN B-12) tablet 500 mcg, Daily    digoxin (LANOXIN) tablet 125 mcg, Every Other Day    heparin (porcine) subcutaneous injection 5,000 Units, Q8H JACOB **AND** [COMPLETED] Platelet count, Once    metoprolol (LOPRESSOR) injection 5 mg, Q6H PRN    metoprolol tartrate (LOPRESSOR) tablet 25 mg, Q12H JACOB    multivitamin-minerals (CENTRUM) tablet 1 tablet, Daily    ondansetron (ZOFRAN) injection 4 mg, Q6H PRN    oxybutynin (DITROPAN) tablet 5 mg, BID    senna-docusate sodium (SENOKOT S) 8.6-50 mg per tablet 1 tablet, Daily    Administrative Statements   Today, Patient Was Seen By: Emily Garcia PA-C  I have spent a total time of 45 minutes in caring for this patient on the day of the visit/encounter including Diagnostic results, Patient and family education, Impressions, Counseling / Coordination of care, Documenting in the medical record, Reviewing / ordering tests, medicine, procedures  , Obtaining or reviewing history  , and Communicating with other healthcare professionals .    **Please Note: This note may have  been constructed using a voice recognition system.**

## 2025-02-16 NOTE — ASSESSMENT & PLAN NOTE
Presented to ED w/ c/o confusion per daughter; pt found to have atrial flutter with RVR with 2:1 conduction; h/o paroxysmal atrial fibrillation/flutter w/  complete heart block s/p pacer  TSH benign; no overt infection  ECHO pending   Cardiology following; appreciate recs  S/p digoxin 125 mcg x 1 on 2/14 and 125 mcg x 2 on 2/15  C/w digoxin 125 mcg qod   Dig level 0.8 on 02/14   C/w recently initiated Lopressor 25 mg; increased from BID to q 6 hrs dosing on 02/16  AC therapy d/c'd in 11/2023 w/ multiple falls & head injuries

## 2025-02-16 NOTE — PLAN OF CARE

## 2025-02-17 ENCOUNTER — APPOINTMENT (INPATIENT)
Dept: NON INVASIVE DIAGNOSTICS | Facility: HOSPITAL | Age: OVER 89
End: 2025-02-17
Payer: MEDICARE

## 2025-02-17 LAB
ANION GAP SERPL CALCULATED.3IONS-SCNC: 7 MMOL/L (ref 4–13)
AORTIC ROOT: 3.6 CM
AORTIC VALVE MEAN VELOCITY: 7.7 M/S
ASCENDING AORTA: 4.3 CM
AV LVOT MEAN GRADIENT: 2 MMHG
AV LVOT PEAK GRADIENT: 3 MMHG
AV MEAN PRESS GRAD SYS DOP V1V2: 3 MMHG
AV PEAK GRADIENT: 5 MMHG
AV VELOCITY RATIO: 1.02
AV VMAX SYS DOP: 1.13 M/S
BSA FOR ECHO PROCEDURE: 1.58 M2
BUN SERPL-MCNC: 38 MG/DL (ref 5–25)
CALCIUM SERPL-MCNC: 9.3 MG/DL (ref 8.4–10.2)
CHLORIDE SERPL-SCNC: 109 MMOL/L (ref 96–108)
CO2 SERPL-SCNC: 25 MMOL/L (ref 21–32)
CREAT SERPL-MCNC: 0.83 MG/DL (ref 0.6–1.3)
DOP CALC AO VTI: 20.41 CM
DOP CALC LVOT PEAK VEL VTI: 20.75 CM
DOP CALC LVOT PEAK VEL: 0.89 M/S
E WAVE DECELERATION TIME: 246 MS
FRACTIONAL SHORTENING: 40 (ref 28–44)
GFR SERPL CREATININE-BSD FRML MDRD: 60 ML/MIN/1.73SQ M
GLUCOSE SERPL-MCNC: 115 MG/DL (ref 65–140)
INTERVENTRICULAR SEPTUM IN DIASTOLE (PARASTERNAL SHORT AXIS VIEW): 1.3 CM
INTERVENTRICULAR SEPTUM: 1.3 CM (ref 0.6–1.1)
LAAS-AP2: 20.9 CM2
LAAS-AP4: 26.7 CM2
LEFT ATRIUM SIZE: 2.7 CM
LEFT ATRIUM VOLUME (MOD BIPLANE): 71 ML
LEFT ATRIUM VOLUME INDEX (MOD BIPLANE): 44.7 ML/M2
LEFT INTERNAL DIMENSION IN SYSTOLE: 2.4 CM (ref 2.1–4)
LEFT VENTRICULAR INTERNAL DIMENSION IN DIASTOLE: 4 CM (ref 3.5–6)
LEFT VENTRICULAR POSTERIOR WALL IN END DIASTOLE: 1.4 CM
LEFT VENTRICULAR STROKE VOLUME: 51 ML
LVSV (TEICH): 51 ML
MAGNESIUM SERPL-MCNC: 2.4 MG/DL (ref 1.9–2.7)
MV PEAK E VEL: 87 CM/S
MV STENOSIS PRESSURE HALF TIME: 71 MS
MV VALVE AREA P 1/2 METHOD: 3.1
POTASSIUM SERPL-SCNC: 4.5 MMOL/L (ref 3.5–5.3)
RIGHT ATRIAL 2D VOLUME: 48 ML
RIGHT ATRIUM AREA SYSTOLE A4C: 17.3 CM2
RIGHT VENTRICLE ID DIMENSION: 4 CM
SL CV LEFT ATRIUM LENGTH A2C: 6.3 CM
SL CV LV EF: 55
SL CV PED ECHO LEFT VENTRICLE DIASTOLIC VOLUME (MOD BIPLANE) 2D: 71 ML
SL CV PED ECHO LEFT VENTRICLE SYSTOLIC VOLUME (MOD BIPLANE) 2D: 20 ML
SODIUM SERPL-SCNC: 141 MMOL/L (ref 135–147)
TR MAX PG: 25 MMHG
TR PEAK VELOCITY: 2.5 M/S
TRICUSPID ANNULAR PLANE SYSTOLIC EXCURSION: 1.7 CM
TRICUSPID VALVE PEAK REGURGITATION VELOCITY: 2.49 M/S

## 2025-02-17 PROCEDURE — 93306 TTE W/DOPPLER COMPLETE: CPT | Performed by: INTERNAL MEDICINE

## 2025-02-17 PROCEDURE — 99232 SBSQ HOSP IP/OBS MODERATE 35: CPT | Performed by: PHYSICIAN ASSISTANT

## 2025-02-17 PROCEDURE — 83735 ASSAY OF MAGNESIUM: CPT | Performed by: PHYSICIAN ASSISTANT

## 2025-02-17 PROCEDURE — 99232 SBSQ HOSP IP/OBS MODERATE 35: CPT | Performed by: INTERNAL MEDICINE

## 2025-02-17 PROCEDURE — 93306 TTE W/DOPPLER COMPLETE: CPT

## 2025-02-17 PROCEDURE — 99223 1ST HOSP IP/OBS HIGH 75: CPT

## 2025-02-17 PROCEDURE — 80048 BASIC METABOLIC PNL TOTAL CA: CPT | Performed by: PHYSICIAN ASSISTANT

## 2025-02-17 RX ORDER — AMIODARONE HYDROCHLORIDE 200 MG/1
200 TABLET ORAL
Status: DISCONTINUED | OUTPATIENT
Start: 2025-02-17 | End: 2025-02-18 | Stop reason: HOSPADM

## 2025-02-17 RX ORDER — METOPROLOL TARTRATE 25 MG/1
25 TABLET, FILM COATED ORAL 2 TIMES DAILY
Status: DISCONTINUED | OUTPATIENT
Start: 2025-02-17 | End: 2025-02-18 | Stop reason: HOSPADM

## 2025-02-17 RX ORDER — ACETAMINOPHEN 325 MG/1
650 TABLET ORAL EVERY 6 HOURS SCHEDULED
Status: DISCONTINUED | OUTPATIENT
Start: 2025-02-17 | End: 2025-02-18 | Stop reason: HOSPADM

## 2025-02-17 RX ADMIN — METOPROLOL TARTRATE 25 MG: 25 TABLET, FILM COATED ORAL at 18:15

## 2025-02-17 RX ADMIN — AMIODARONE HYDROCHLORIDE 200 MG: 200 TABLET ORAL at 15:30

## 2025-02-17 RX ADMIN — Medication 1000 UNITS: at 10:38

## 2025-02-17 RX ADMIN — ASPIRIN 81 MG CHEWABLE TABLET 81 MG: 81 TABLET CHEWABLE at 10:38

## 2025-02-17 RX ADMIN — ACETAMINOPHEN 650 MG: 325 TABLET, FILM COATED ORAL at 13:12

## 2025-02-17 RX ADMIN — APIXABAN 2.5 MG: 2.5 TABLET, FILM COATED ORAL at 18:15

## 2025-02-17 RX ADMIN — ACETAMINOPHEN 650 MG: 325 TABLET, FILM COATED ORAL at 23:01

## 2025-02-17 RX ADMIN — AMIODARONE HYDROCHLORIDE 200 MG: 200 TABLET ORAL at 13:12

## 2025-02-17 RX ADMIN — SENNOSIDES AND DOCUSATE SODIUM 1 TABLET: 8.6; 5 TABLET ORAL at 10:38

## 2025-02-17 RX ADMIN — Medication 1 TABLET: at 10:38

## 2025-02-17 RX ADMIN — HEPARIN SODIUM 5000 UNITS: 5000 INJECTION, SOLUTION INTRAVENOUS; SUBCUTANEOUS at 13:12

## 2025-02-17 RX ADMIN — CYANOCOBALAMIN TAB 500 MCG 500 MCG: 500 TAB at 10:38

## 2025-02-17 RX ADMIN — METOPROLOL TARTRATE 25 MG: 25 TABLET, FILM COATED ORAL at 10:38

## 2025-02-17 NOTE — ASSESSMENT & PLAN NOTE
Presented [facility] w/ acute confusion; noted to be AAO x 3 but confused   Noted h/o forgetfulness; unclear if she had a dementia work up in the past but could be due to worsening dementia  CT head negative  UA negative  Covid/flu/rsv negative  TSH WNL  Vit B12 elevated; PTA supplement d/c'd     RPR negative   Required multiple doses of Zyprexa w/ agitation/concern for safety risks; suspect component of hospital induced delirium  Geriatrics consulted  C/w  Zyprexa 2.5 mg IM Q 8 hours prn   Oxybutynin discontinued  Mentation seems near baseline today

## 2025-02-17 NOTE — ASSESSMENT & PLAN NOTE
Recent Labs     02/15/25  0444 02/16/25  0806 02/17/25  0451   CREATININE 0.71 0.79 0.83     Baseline Cr 0.5-0.7; Cr on admit 1.2   Improved w/ IVF's   Monitor BMP  Avoid nephrotoxins/hypotension

## 2025-02-17 NOTE — ASSESSMENT & PLAN NOTE
Presented to ED w/ c/o confusion per daughter; pt found to have atrial flutter with RVR with 2:1 conduction; h/o paroxysmal atrial fibrillation/flutter w/ complete heart block s/p pacer  TSH benign; no overt infection  ECHO (02/16):   EF 55%  Probable diastolic dysfunction  LA moderately dilated, RA mild dilation  Mild AR, mild MR, mild/moderate TR  Cardiology following; appreciate recs  S/p digoxin 125 mcg x 1 on 2/14 and 125 mcg x 2 on 2/15; d/c;d on 20/17  Amiodarone initiated on 02/17, c/w 200 mg BID x 7 days, then 200 mg q day   Lopressor reduced to 25 mg BID on 02/17   AC therapy, reviewed w/ cards   Previously d/c'd in 11/2023 w/ multiple falls & head injuries   Continue Eliquis 2.5 mg BID while hospitalized  D/c on Aspirin 81 mg q day

## 2025-02-17 NOTE — PROGRESS NOTES
Progress Note - Cardiology   Name: Summer Blanton 89 y.o. female I MRN: [id number]  Unit/Bed#: E4 -01 I Date of Admission: 2/14/2025   Date of Service: 2/17/2025 I Hospital Day: 2    Assessment & Plan  Flutter-fibrillation (HCC)  Patient  noted to have atrial flutter with 221 conduction and rapid ventricular response on admission.  Has previous history of atrial fibrillation.  His frail and has significant fall risk.  Has been off of anticoagulation since November 2023 due to multiple falls including head injuries.  She has permanent pacemaker.  2/14/25 interrogation report reviewed  Renal function and electrolytes are normal.  Cardiac enzymes are negative.  There is no evidence of acute infection.  Thyroid function is normal.  Echocardiogram 10/4/2024 showed preserved EF of 55%, normal RV size and function, biatrial enlargement, calcified aortic valve without stenosis, thickened mitral valve with MAC and mild mitral regurgitation, mild TR and PI, mild pulmonary hypertension, dilated aortic root and ascending aorta, no pericardial effusion.    TODAY (02/17/25):   Converted to sinus rhythm last night.      Continues to maintain sinus rhythm this morning.  Has sinus bradycardia.  Current cardiac medications include aspirin 81 mg + digoxin 125 mcg every other day + metoprolol tartrate 25 mg every 6 hours.  He is not on chronic anticoagulation due to prior history of GI bleeding and fall risk.    Appears to be pleasantly confused.  Is dehydrated and has protein calorie malnutrition.  No evidence of decompensated heart failure.  Elevated BUN and chloride otherwise normal chemistry.  Digoxin level 0.8 yesterday morning.  2/14/25 adflyer interrogation data reviewed. Seems she has been in A-Fib/flutter for 9 days prior to admission.    Normal TSH.  Begin amiodarone 200 mg twice daily for 7 days and then 200 mg daily.  Lowering the dose of metoprolol to 25 mg p.o. twice daily.  Will discontinue digoxin.  As long as  patient is monitored in the hospital recommend initiating anticoagulation therapy with Eliquis 2.5 mg twice daily.  If patient is going to be in a monitored setting when she is discharged then would continue otherwise can discontinue at that time.  Can hold off on echocardiogram at this time.  Will need close monitoring for amiodarone related side effects/toxicities.  Schedule of monitoring as outlined below.    RECOMMENDED ROUTINE MONITORING FOR PATIENTS RECEIVING AMIODARONE  Thyroid function tests--- Baseline and every 6 mo   Electrolytes, serum creatinine --- Baseline and as indicated   Chest radiograph--- Baseline and annually   Pulmonary function tests, with Dlco -- Baseline and for unexplained dyspnea or new chest radiographic findings.  Ophthalmologic evaluation--- If visual impairment or for symptoms   ECG--- Baseline and annually       Complete heart block (HCC) -   PPM ( [employer]) Feb 2021  Has normal functioning pacemaker device.  Currently not in paced rhythm.  Although previous diagnosis was mentioned as complete heart block she has had ventricular demand pacing in the past.  Acute encephalopathy  Patient had acute confusion.  Is currently pleasantly confused.  No behavioral disturbance noted during interaction.  Appreciate medical team management.  YEISON (acute kidney injury) (HCC)  Improving with hydration  Dysphagia - improved      Subjective   Chief Complaint: Follow-up for atrial flutter with 221 conduction rapid ventricular response, paroxysmal atrial fibrillation, presence of pacemaker and other comorbidities.    Interval developments noted.  Patient remains confused.  Converted to sinus rhythm last night.  Is unable to provide history.  According to her daughter who was in the room patient did not recognize her today and is speaking little bit incoherently.    Objective :  Temp:  [97 °F (36.1 °C)-99.7 °F (37.6 °C)] 97.7 °F (36.5 °C)  HR:  [] 55  BP: ()/(70-96) 153/77  Resp:  [18-20]  18  SpO2:  [95 %-98 %] 98 %  O2 Device: None (Room air)  Orthostatic Blood Pressures      Flowsheet Row Most Recent Value   Blood Pressure 153/77 filed at 02/17/2025 0700   Patient Position - Orthostatic VS Lying filed at 02/17/2025 0700          First Weight: Weight - Scale: 58.4 kg (128 lb 12 oz) (02/14/25 0952)  Vitals:    02/14/25 0952   Weight: 58.4 kg (128 lb 12 oz)     Physical Exam  Constitutional:       Appearance: She is cachectic.   Neck:      Vascular: No JVD.   Cardiovascular:      Rate and Rhythm: Regular rhythm. Bradycardia present. No extrasystoles are present.     Heart sounds: Normal heart sounds.      No systolic murmur is present.   Pulmonary:      Effort: No tachypnea.      Breath sounds: Normal air entry. Decreased breath sounds present.   Abdominal:      Palpations: Abdomen is soft.   Musculoskeletal:      Right lower leg: No edema.      Left lower leg: No edema.   Neurological:      Mental Status: She is easily aroused. She is lethargic.         Lab Results: I have reviewed the following results:CBC/BMP:   .     02/17/25 0451   SODIUM 141   K 4.5   *   CO2 25   BUN 38*   CREATININE 0.83   GLUC 115   MG 2.4      Results from last 7 days   Lab Units 02/16/25  0806 02/15/25  0444 02/14/25  2118 02/14/25  1000   WBC Thousand/uL 9.08 7.72  --  9.15   HEMOGLOBIN g/dL 11.9 12.1  --  12.1   HEMATOCRIT % 36.2 38.2  --  37.1   PLATELETS Thousands/uL 175 193 164 193     Results from last 7 days   Lab Units 02/17/25 0451 02/16/25  0806 02/15/25  0444   POTASSIUM mmol/L 4.5 3.8 3.8   CHLORIDE mmol/L 109* 109* 108   CO2 mmol/L 25 25 27   BUN mg/dL 38* 30* 29*   CREATININE mg/dL 0.83 0.79 0.71   CALCIUM mg/dL 9.3 9.3 9.1         Lab Results   Component Value Date    HGBA1C 5.9 (H) 05/19/2022     Lab Results   Component Value Date    TROPONINI 0.36 (H) 02/10/2021       Imaging Results Review: I personally reviewed the following image studies in PACS and associated radiology reports: chest xray and  MRI brain. My interpretation of the radiology images/reports is: Chest x-ray shows no acute cardiopulmonary process.  CT head did not identify significant abnormality.  Chest x-ray shows    VTE Pharmacologic Prophylaxis: VTE covered by:  heparin (porcine), Subcutaneous, 5,000 Units at 02/16/25 5692

## 2025-02-17 NOTE — PROGRESS NOTES
Progress Note - Hospitalist   Name: Summer Blanton 89 y.o. female I MRN: [id number]  Unit/Bed#: E4 -01 I Date of Admission: 2/14/2025   Date of Service: 2/17/2025 I Hospital Day: 2    Assessment & Plan  Flutter-fibrillation (HCC)  Presented to ED w/ c/o confusion per daughter; pt found to have atrial flutter with RVR with 2:1 conduction; h/o paroxysmal atrial fibrillation/flutter w/ complete heart block s/p pacer  TSH benign; no overt infection  ECHO (02/16):   EF 55%  Probable diastolic dysfunction  LA moderately dilated, RA mild dilation  Mild AR, mild MR, mild/moderate TR  Cardiology following; appreciate recs  S/p digoxin 125 mcg x 1 on 2/14 and 125 mcg x 2 on 2/15; d/c;d on 20/17  Amiodarone initiated on 02/17, c/w 200 mg BID x 7 days, then 200 mg q day   Lopressor reduced to 25 mg BID on 02/17   AC therapy, reviewed w/ cards   Previously d/c'd in 11/2023 w/ multiple falls & head injuries   Continue Eliquis 2.5 mg BID while hospitalized  D/c on Aspirin 81 mg q day   Acute encephalopathy  [facility]ed from [facility] w/ acute confusion; noted to be AAO x 3 but confused   Noted h/o forgetfulness; unclear if she had a dementia work up in the past but could be due to worsening dementia  CT head negative  UA negative  Covid/flu/rsv negative  TSH WNL  Vit B12 elevated; PTA supplement d/c'd     RPR negative   Required multiple doses of Zyprexa w/ agitation/concern for safety risks; suspect component of hospital induced delirium  Geriatrics consulted  C/w  Zyprexa 2.5 mg IM Q 8 hours prn   Oxybutynin discontinued  Mentation seems near baseline today   YEISON (acute kidney injury) (HCC)  Recent Labs     02/15/25  0444 02/16/25  0806 02/17/25  0451   CREATININE 0.71 0.79 0.83     Baseline Cr 0.5-0.7; Cr on admit 1.2   Improved w/ IVF's   Monitor BMP  Avoid nephrotoxins/hypotension  Complete heart block (HCC) -   PPM ( [employer]) Feb 2021  S/p dual chamber medtronic permanent pacer in 2021   Dysphagia - improved  H/o  dysphagia but on regular diet PTA  ST consulted     VTE Pharmacologic Prophylaxis: VTE Score: 3 Moderate Risk (Score 3-4) - Pharmacological DVT Prophylaxis Ordered: heparin.    Mobility:   Basic Mobility Inpatient Raw Score: 20  JH-HLM Goal: 6: Walk 10 steps or more  JH-HLM Achieved: 7: Walk 25 feet or more  JH-HLM Goal achieved. Continue to encourage appropriate mobility.    Patient Centered Rounds: I performed bedside rounds with nursing staff today.   Discussions with Specialists or Other Care Team Provider: Plan of care reviewed with geriatric medicine, cardiology, case management, nursing    Education and Discussions with Family / Patient: Updated  (daughter) at bedside.    Current Length of Stay: 2 day(s)  Current Patient Status: Inpatient   Certification Statement: The patient will continue to require additional inpatient hospital stay due to pending PT/OT & rate control  Discharge Plan: Anticipate discharge tomorrow to discharge location to be determined pending rehab evaluations.    Code Status: Level 3 - DNAR and DNI    Subjective   Nursing has noes particular events to report overnight.  Daughter is noting that the patient is in generalized discomfort and is very easily agitated by positional changes.  Patient currently has no complaints at time of exam.    Objective :  Temp:  [97 °F (36.1 °C)-99.7 °F (37.6 °C)] 97.7 °F (36.5 °C)  HR:  [] 55  BP: ()/(70-96) 153/77  Resp:  [18-20] 18  SpO2:  [95 %-98 %] 98 %  O2 Device: None (Room air)    Body mass index is 23.55 kg/m².     Input and Output Summary (last 24 hours):     Intake/Output Summary (Last 24 hours) at 2/17/2025 1101  Last data filed at 2/16/2025 1800  Gross per 24 hour   Intake 240 ml   Output --   Net 240 ml       Physical Exam  Constitutional:       Comments: Sleeping at time of exam, arousable but subsequently sleepy; frail, cachectic, temporal wasting, thinning orbital fat pads, prominent chest bones/clavicle   HENT:       Head: Normocephalic.      Right Ear: External ear normal.      Left Ear: External ear normal.      Nose: Nose normal.      Mouth/Throat:      Pharynx: Oropharynx is clear.      Comments: Somewhat dry mucous membrane  Eyes:      Extraocular Movements: Extraocular movements intact.      Conjunctiva/sclera: Conjunctivae normal.   Cardiovascular:      Rate and Rhythm: Normal rate and regular rhythm.      Pulses: Normal pulses.      Heart sounds: Normal heart sounds.   Pulmonary:      Effort: Pulmonary effort is normal.      Breath sounds: Normal breath sounds.   Abdominal:      General: Abdomen is flat. Bowel sounds are normal. There is no distension.      Palpations: Abdomen is soft.      Tenderness: There is no abdominal tenderness. There is no guarding or rebound.   Musculoskeletal:         General: Swelling (Trace/scant lower extremity) present. Normal range of motion.      Cervical back: Normal range of motion.   Skin:     General: Skin is warm and dry.      Coloration: Skin is not jaundiced.      Findings: No bruising or lesion.   Neurological:      Mental Status: Mental status is at baseline. She is disoriented.      Comments: Answers basic questions but does not know where she is at for which daughter is in the room   Psychiatric:         Mood and Affect: Mood normal.         Behavior: Behavior normal.         Lab Results: I have reviewed the following results:   Results from last 7 days   Lab Units 02/16/25  0806 02/14/25  2118 02/14/25  1000   WBC Thousand/uL 9.08   < > 9.15   HEMOGLOBIN g/dL 11.9   < > 12.1   HEMATOCRIT % 36.2   < > 37.1   PLATELETS Thousands/uL 175   < > 193   SEGS PCT %  --   --  81*   LYMPHO PCT %  --   --  11*   MONO PCT %  --   --  8   EOS PCT %  --   --  0    < > = values in this interval not displayed.     Results from last 7 days   Lab Units 02/17/25  0451 02/15/25  0444 02/14/25  1000   SODIUM mmol/L 141   < > 141   POTASSIUM mmol/L 4.5   < > 3.9   CHLORIDE mmol/L 109*   < > 107    CO2 mmol/L 25   < > 25   BUN mg/dL 38*   < > 51*   CREATININE mg/dL 0.83   < > 1.20   ANION GAP mmol/L 7   < > 9   CALCIUM mg/dL 9.3   < > 9.7   ALBUMIN g/dL  --   --  4.6   TOTAL BILIRUBIN mg/dL  --   --  0.96   ALK PHOS U/L  --   --  67   ALT U/L  --   --  27   AST U/L  --   --  28   GLUCOSE RANDOM mg/dL 115   < > 118    < > = values in this interval not displayed.                 Results from last 7 days   Lab Units 02/14/25  1000   LACTIC ACID mmol/L 1.1       Recent Cultures (last 7 days):         Imaging Results Review: I reviewed radiology reports from this admission including: chest xray.  Other Study Results Review: EKG was reviewed.     Last 24 Hours Medication List:     Current Facility-Administered Medications:     acetaminophen (TYLENOL) tablet 650 mg, Q4H PRN    ALPRAZolam (XANAX) tablet 0.25 mg, HS PRN    Artificial Tears Op Soln 2 drop, Q4H PRN    aspirin chewable tablet 81 mg, Daily    Cholecalciferol (VITAMIN D3) tablet 1,000 Units, Daily    cyanocobalamin (VITAMIN B-12) tablet 500 mcg, Daily    digoxin (LANOXIN) tablet 125 mcg, Every Other Day    heparin (porcine) subcutaneous injection 5,000 Units, Q8H JACOB **AND** [COMPLETED] Platelet count, Once    metoprolol (LOPRESSOR) injection 5 mg, Q6H PRN    metoprolol tartrate (LOPRESSOR) tablet 25 mg, Q6H    multivitamin-minerals (CENTRUM) tablet 1 tablet, Daily    ondansetron (ZOFRAN) injection 4 mg, Q6H PRN    senna-docusate sodium (SENOKOT S) 8.6-50 mg per tablet 1 tablet, Daily    Administrative Statements   Today, Patient Was Seen By: Emily Garcia PA-C  I have spent a total time of 45 minutes in caring for this patient on the day of the visit/encounter including Diagnostic results, Prognosis, Risks and benefits of tx options, Importance of tx compliance, Impressions, Documenting in the medical record, Reviewing/placing orders in the medical record (including tests, medications, and/or procedures), Obtaining or reviewing history  , and  Communicating with other healthcare professionals .    **Please Note: This note may have been constructed using a voice recognition system.**

## 2025-02-17 NOTE — PLAN OF CARE
Problem: Potential for Falls  Goal: Patient will remain free of falls  Description: INTERVENTIONS:  - Educate patient/family on patient safety including physical limitations  - Instruct patient to call for assistance with activity   - Consult OT/PT to assist with strengthening/mobility   - Keep Call bell within reach  - Keep bed low and locked with side rails adjusted as appropriate  - Keep care items and personal belongings within reach  - Initiate and maintain comfort rounds  - Make Fall Risk Sign visible to staff  - Offer Toileting every 2 Hours, in advance of need  - Initiate/Maintain bed alarm  - Obtain necessary fall risk management equipment: bed alarm  - Apply yellow socks and bracelet for high fall risk patients  - Consider moving patient to room near nurses station  Outcome: Progressing     Problem: PAIN - ADULT  Goal: Verbalizes/displays adequate comfort level or baseline comfort level  Description: Interventions:  - Encourage patient to monitor pain and request assistance  - Assess pain using appropriate pain scale  - Administer analgesics based on type and severity of pain and evaluate response  - Implement non-pharmacological measures as appropriate and evaluate response  - Consider cultural and social influences on pain and pain management  - Notify physician/advanced practitioner if interventions unsuccessful or patient reports new pain  Outcome: Progressing     Problem: INFECTION - ADULT  Goal: Absence or prevention of progression during hospitalization  Description: INTERVENTIONS:  - Assess and monitor for signs and symptoms of infection  - Monitor lab/diagnostic results  - Monitor all insertion sites, i.e. indwelling lines, tubes, and drains  - Monitor endotracheal if appropriate and nasal secretions for changes in amount and color  - [place] appropriate cooling/warming therapies per order  - Administer medications as ordered  - Instruct and encourage patient and family to use good hand  hygiene technique  - Identify and instruct in appropriate isolation precautions for identified infection/condition  Outcome: Progressing  Goal: Absence of fever/infection during neutropenic period  Description: INTERVENTIONS:  - Monitor WBC    Outcome: Progressing     Problem: SAFETY ADULT  Goal: Patient will remain free of falls  Description: INTERVENTIONS:  - Educate patient/family on patient safety including physical limitations  - Instruct patient to call for assistance with activity   - Consult OT/PT to assist with strengthening/mobility   - Keep Call bell within reach  - Keep bed low and locked with side rails adjusted as appropriate  - Keep care items and personal belongings within reach  - Initiate and maintain comfort rounds  - Make Fall Risk Sign visible to staff  - Offer Toileting every 2 Hours, in advance of need  - Initiate/Maintain bed alarm  - Obtain necessary fall risk management equipment: bed alarm  - Apply yellow socks and bracelet for high fall risk patients  - Consider moving patient to room near nurses station  Outcome: Progressing  Goal: Maintain or return to baseline ADL function  Description: INTERVENTIONS:  -  Assess patient's ability to carry out ADLs; assess patient's baseline for ADL function and identify physical deficits which impact ability to perform ADLs (bathing, care of mouth/teeth, toileting, grooming, dressing, etc.)  - Assess/evaluate cause of self-care deficits   - Assess range of motion  - Assess patient's mobility; develop plan if impaired  - Assess patient's need for assistive devices and provide as appropriate  - Encourage maximum independence but intervene and supervise when necessary  - Involve family in performance of ADLs  - Assess for home care needs following discharge   - Consider OT consult to assist with ADL evaluation and planning for discharge  - Provide patient education as appropriate  Outcome: Progressing  Goal: Maintains/Returns to pre admission functional  level  Description: INTERVENTIONS:  - Perform AM-PAC 6 Click Basic Mobility/ Daily Activity assessment daily.  - Set and communicate daily mobility goal to care team and patient/family/caregiver.   - Collaborate with rehabilitation services on mobility goals if consulted  - Stand patient 3 times a day  - Ambulate patient 3 times a day  - Out of bed to chair 3 times a day   - Out of bed for meals 3 times a day  - Out of bed for toileting  - Record patient progress and toleration of activity level   Outcome: Progressing     Problem: DISCHARGE PLANNING  Goal: Discharge to home or other facility with appropriate resources  Description: INTERVENTIONS:  - Identify barriers to discharge w/patient and caregiver  - Arrange for needed discharge resources and transportation as appropriate  - Identify discharge learning needs (meds, wound care, etc.)  - Arrange for interpretive services to assist at discharge as needed  - Refer to Case Management Department for coordinating discharge planning if the patient needs post-hospital services based on physician/advanced practitioner order or complex needs related to functional status, cognitive ability, or social support system  Outcome: Progressing     Problem: Knowledge Deficit  Goal: Patient/family/caregiver demonstrates understanding of disease process, treatment plan, medications, and discharge instructions  Description: Complete learning assessment and assess knowledge base.  Interventions:  - Provide teaching at level of understanding  - Provide teaching via preferred learning methods  Outcome: Progressing     Problem: CARDIOVASCULAR - ADULT  Goal: Absence of cardiac dysrhythmias or at baseline rhythm  Description: INTERVENTIONS:  - Continuous cardiac monitoring, vital signs, obtain 12 lead EKG if ordered  - Administer antiarrhythmic and heart rate control medications as ordered  - Monitor electrolytes and administer replacement therapy as ordered  Outcome: Progressing      Problem: Potential for Falls  Goal: Patient will remain free of falls  Description: INTERVENTIONS:  - Educate patient/family on patient safety including physical limitations  - Instruct patient to call for assistance with activity   - Consult OT/PT to assist with strengthening/mobility   - Keep Call bell within reach  - Keep bed low and locked with side rails adjusted as appropriate  - Keep care items and personal belongings within reach  - Initiate and maintain comfort rounds  - Make Fall Risk Sign visible to staff  - Offer Toileting every 2 Hours, in advance of need  - Initiate/Maintain bed alarm  - Obtain necessary fall risk management equipment: bed alarm  - Apply yellow socks and bracelet for high fall risk patients  - Consider moving patient to room near nurses station  Outcome: Progressing     Problem: PAIN - ADULT  Goal: Verbalizes/displays adequate comfort level or baseline comfort level  Description: Interventions:  - Encourage patient to monitor pain and request assistance  - Assess pain using appropriate pain scale  - Administer analgesics based on type and severity of pain and evaluate response  - Implement non-pharmacological measures as appropriate and evaluate response  - Consider cultural and social influences on pain and pain management  - Notify physician/advanced practitioner if interventions unsuccessful or patient reports new pain  Outcome: Progressing     Problem: INFECTION - ADULT  Goal: Absence or prevention of progression during hospitalization  Description: INTERVENTIONS:  - Assess and monitor for signs and symptoms of infection  - Monitor lab/diagnostic results  - Monitor all insertion sites, i.e. indwelling lines, tubes, and drains  - Monitor endotracheal if appropriate and nasal secretions for changes in amount and color  - [place] appropriate cooling/warming therapies per order  - Administer medications as ordered  - Instruct and encourage patient and family to use good hand  level  Description: INTERVENTIONS:  - Perform AM-PAC 6 Click Basic Mobility/ Daily Activity assessment daily.  - Set and communicate daily mobility goal to care team and patient/family/caregiver.   - Collaborate with rehabilitation services on mobility goals if consulted  - Stand patient 3 times a day  - Ambulate patient 3 times a day  - Out of bed to chair 3 times a day   - Out of bed for meals 3 times a day  - Out of bed for toileting  - Record patient progress and toleration of activity level   Outcome: Progressing     Problem: DISCHARGE PLANNING  Goal: Discharge to home or other facility with appropriate resources  Description: INTERVENTIONS:  - Identify barriers to discharge w/patient and caregiver  - Arrange for needed discharge resources and transportation as appropriate  - Identify discharge learning needs (meds, wound care, etc.)  - Arrange for interpretive services to assist at discharge as needed  - Refer to Case Management Department for coordinating discharge planning if the patient needs post-hospital services based on physician/advanced practitioner order or complex needs related to functional status, cognitive ability, or social support system  Outcome: Progressing     Problem: Knowledge Deficit  Goal: Patient/family/caregiver demonstrates understanding of disease process, treatment plan, medications, and discharge instructions  Description: Complete learning assessment and assess knowledge base.  Interventions:  - Provide teaching at level of understanding  - Provide teaching via preferred learning methods  Outcome: Progressing     Problem: CARDIOVASCULAR - ADULT  Goal: Absence of cardiac dysrhythmias or at baseline rhythm  Description: INTERVENTIONS:  - Continuous cardiac monitoring, vital signs, obtain 12 lead EKG if ordered  - Administer antiarrhythmic and heart rate control medications as ordered  - Monitor electrolytes and administer replacement therapy as ordered  Outcome: Progressing

## 2025-02-17 NOTE — PLAN OF CARE

## 2025-02-17 NOTE — ASSESSMENT & PLAN NOTE
Patient  noted to have atrial flutter with 221 conduction and rapid ventricular response on admission.  Has previous history of atrial fibrillation.  His frail and has significant fall risk.  Has been off of anticoagulation since November 2023 due to multiple falls including head injuries.  She has permanent pacemaker.  2/14/25 interrogation report reviewed  Renal function and electrolytes are normal.  Cardiac enzymes are negative.  There is no evidence of acute infection.  Thyroid function is normal.  Echocardiogram 10/4/2024 showed preserved EF of 55%, normal RV size and function, biatrial enlargement, calcified aortic valve without stenosis, thickened mitral valve with MAC and mild mitral regurgitation, mild TR and PI, mild pulmonary hypertension, dilated aortic root and ascending aorta, no pericardial effusion.    TODAY (02/17/25):   Converted to sinus rhythm last night.      Continues to maintain sinus rhythm this morning.  Has sinus bradycardia.  Current cardiac medications include aspirin 81 mg + digoxin 125 mcg every other day + metoprolol tartrate 25 mg every 6 hours.  He is not on chronic anticoagulation due to prior history of GI bleeding and fall risk.    Appears to be pleasantly confused.  Is dehydrated and has protein calorie malnutrition.  No evidence of decompensated heart failure.  Elevated BUN and chloride otherwise normal chemistry.  Digoxin level 0.8 yesterday morning.  2/14/25 [employer] interrogation data reviewed. Seems she has been in A-Fib/flutter for 9 days prior to admission.    Normal TSH.  Begin amiodarone 200 mg twice daily for 7 days and then 200 mg daily.  Lowering the dose of metoprolol to 25 mg p.o. twice daily.  Will discontinue digoxin.  As long as patient is monitored in the hospital recommend initiating anticoagulation therapy with Eliquis 2.5 mg twice daily.  If patient is going to be in a monitored setting when she is discharged then would continue otherwise can discontinue at  that time.  Can hold off on echocardiogram at this time.  Will need close monitoring for amiodarone related side effects/toxicities.  Schedule of monitoring as outlined below.    RECOMMENDED ROUTINE MONITORING FOR PATIENTS RECEIVING AMIODARONE  Thyroid function tests--- Baseline and every 6 mo   Electrolytes, serum creatinine --- Baseline and as indicated   Chest radiograph--- Baseline and annually   Pulmonary function tests, with Dlco -- Baseline and for unexplained dyspnea or new chest radiographic findings.  Ophthalmologic evaluation--- If visual impairment or for symptoms   ECG--- Baseline and annually

## 2025-02-18 VITALS
TEMPERATURE: 97.9 F | OXYGEN SATURATION: 98 % | BODY MASS INDEX: 23.55 KG/M2 | DIASTOLIC BLOOD PRESSURE: 64 MMHG | HEIGHT: 62 IN | RESPIRATION RATE: 18 BRPM | SYSTOLIC BLOOD PRESSURE: 139 MMHG | HEART RATE: 51 BPM | WEIGHT: 128 LBS

## 2025-02-18 DIAGNOSIS — I48.92 FLUTTER-FIBRILLATION (HCC): Primary | ICD-10-CM

## 2025-02-18 DIAGNOSIS — I48.91 FLUTTER-FIBRILLATION (HCC): Primary | ICD-10-CM

## 2025-02-18 LAB
ANION GAP SERPL CALCULATED.3IONS-SCNC: 7 MMOL/L (ref 4–13)
BUN SERPL-MCNC: 31 MG/DL (ref 5–25)
CALCIUM SERPL-MCNC: 9.7 MG/DL (ref 8.4–10.2)
CHLORIDE SERPL-SCNC: 106 MMOL/L (ref 96–108)
CO2 SERPL-SCNC: 28 MMOL/L (ref 21–32)
CREAT SERPL-MCNC: 0.71 MG/DL (ref 0.6–1.3)
GFR SERPL CREATININE-BSD FRML MDRD: 73 ML/MIN/1.73SQ M
GLUCOSE SERPL-MCNC: 105 MG/DL (ref 65–140)
MAGNESIUM SERPL-MCNC: 2.4 MG/DL (ref 1.9–2.7)
POTASSIUM SERPL-SCNC: 4 MMOL/L (ref 3.5–5.3)
SODIUM SERPL-SCNC: 141 MMOL/L (ref 135–147)

## 2025-02-18 PROCEDURE — 99233 SBSQ HOSP IP/OBS HIGH 50: CPT

## 2025-02-18 PROCEDURE — 97162 PT EVAL MOD COMPLEX 30 MIN: CPT

## 2025-02-18 PROCEDURE — 83735 ASSAY OF MAGNESIUM: CPT | Performed by: PHYSICIAN ASSISTANT

## 2025-02-18 PROCEDURE — 97166 OT EVAL MOD COMPLEX 45 MIN: CPT

## 2025-02-18 PROCEDURE — 99239 HOSP IP/OBS DSCHRG MGMT >30: CPT | Performed by: INTERNAL MEDICINE

## 2025-02-18 PROCEDURE — 80048 BASIC METABOLIC PNL TOTAL CA: CPT | Performed by: PHYSICIAN ASSISTANT

## 2025-02-18 RX ORDER — METOPROLOL TARTRATE 25 MG/1
25 TABLET, FILM COATED ORAL EVERY 12 HOURS SCHEDULED
Qty: 60 TABLET | Refills: 0 | Status: SHIPPED | OUTPATIENT
Start: 2025-02-18

## 2025-02-18 RX ORDER — AMIODARONE HYDROCHLORIDE 200 MG/1
TABLET ORAL
Qty: 74 TABLET | Refills: 0
Start: 2025-02-18 | End: 2025-03-27

## 2025-02-18 RX ORDER — AMIODARONE HYDROCHLORIDE 200 MG/1
TABLET ORAL
Qty: 74 TABLET | Refills: 0
Start: 2025-02-18 | End: 2025-02-18

## 2025-02-18 RX ORDER — METOPROLOL TARTRATE 25 MG/1
25 TABLET, FILM COATED ORAL 2 TIMES DAILY
Qty: 60 TABLET | Refills: 0 | Status: SHIPPED
Start: 2025-02-18 | End: 2025-02-18

## 2025-02-18 RX ORDER — AMIODARONE HYDROCHLORIDE 200 MG/1
TABLET ORAL
Qty: 44 TABLET | Refills: 0 | Status: SHIPPED | OUTPATIENT
Start: 2025-02-18 | End: 2025-03-27

## 2025-02-18 RX ORDER — AMIODARONE HYDROCHLORIDE 200 MG/1
TABLET ORAL
Qty: 74 TABLET | Refills: 0 | Status: SHIPPED
Start: 2025-02-18 | End: 2025-02-18

## 2025-02-18 RX ORDER — METOPROLOL TARTRATE 25 MG/1
25 TABLET, FILM COATED ORAL 2 TIMES DAILY
Qty: 60 TABLET | Refills: 0
Start: 2025-02-18

## 2025-02-18 RX ADMIN — APIXABAN 2.5 MG: 2.5 TABLET, FILM COATED ORAL at 10:42

## 2025-02-18 RX ADMIN — ACETAMINOPHEN 650 MG: 325 TABLET, FILM COATED ORAL at 13:16

## 2025-02-18 RX ADMIN — SENNOSIDES AND DOCUSATE SODIUM 1 TABLET: 8.6; 5 TABLET ORAL at 10:43

## 2025-02-18 RX ADMIN — ASPIRIN 81 MG CHEWABLE TABLET 81 MG: 81 TABLET CHEWABLE at 10:43

## 2025-02-18 RX ADMIN — AMIODARONE HYDROCHLORIDE 200 MG: 200 TABLET ORAL at 13:16

## 2025-02-18 RX ADMIN — Medication 1000 UNITS: at 10:43

## 2025-02-18 RX ADMIN — Medication 1 TABLET: at 10:44

## 2025-02-18 RX ADMIN — AMIODARONE HYDROCHLORIDE 200 MG: 200 TABLET ORAL at 10:43

## 2025-02-18 RX ADMIN — METOPROLOL TARTRATE 25 MG: 25 TABLET, FILM COATED ORAL at 10:42

## 2025-02-18 NOTE — PLAN OF CARE
Problem: OCCUPATIONAL THERAPY ADULT  Goal: Performs self-care activities at highest level of function for planned discharge setting.  See evaluation for individualized goals.  Description: Treatment Interventions: ADL retraining, Functional transfer training, UE strengthening/ROM, Endurance training, Cognitive reorientation, Equipment evaluation/education, Compensatory technique education, Continued evaluation, Energy conservation, Activityengagement          See flowsheet documentation for full assessment, interventions and recommendations.   Outcome: Progressing  Note: Limitation: Decreased ADL status, Decreased UE ROM, Decreased Safe judgement during ADL, Decreased cognition, Decreased endurance, Decreased self-care trans, Decreased high-level ADLs  Prognosis: Fair  Assessment: Patient is a 89 y.o. year old female seen for OT eval s/p admit to [facility] on 2/14/2025 with Altered mental status,   Tachycardia, Confusion and disorientation . OT consulted to assess ADLs/IADLs/functional mobility and assist w/ D/C planning.  Pt's CLOF as follows: eating/grooming: Anne and supervision , UB ADLs: supervision , LB ADLs: [place], toileting: [place], bed mobility: supervision , functional transfers: supervision , functional mobility: supervision , sitting/standing tolerance: ~3 mins. Pt would benefit from continued skilled OT while in acute setting to address deficits as defined above and to maximize (I) w/ ADLs/functional mobility. Occupational performance areas to address include: grooming, bathing/shower, toilet hygiene, dressing, socialization, health maintenance, functional mobility, community mobility, clothing management, and social participation. Patient demonstrates the following deficits impacting occupational performance: weakness, decreased UE ROM, decreased strength , decreased balance, decreased activity tolerance, limited functional reach, impaired memory, impaired problem solving, decreased safety awareness,  increased body habitus , decreased cardiovascular endurance, and decreased skin integrity . These impairments, as well at pt’s difficulty performing ADLs, difficulty performing IADLs, difficulty performing transfers/mobility, limited insight into deficits, fall risk , functional decline , increased reliance on DME , advanced age, and multiple admissions , limit pt’s ability to safely engage in all baseline areas of occupation. Based on the aforementioned evaluation, functional performance deficits, and assessments, pt has been identified as a moderate complexity evaluation. At this time, recommendation for pt to receive post-acute rehabilitation services at a Level III (minimum resource intensity) due to above deficits and CLOF. OT will continue to follow pt 2-3x/wk to address the goals listed below to  w/in 10-14 days.     Rehab Resource Intensity Level, OT: III (Minimum Resource Intensity)

## 2025-02-18 NOTE — PROGRESS NOTES
Progress Note - Geriatric Medicine   Summer Blanton 89 y.o. female MRN: [id number]  Unit/Bed#: E4 -01 Encounter: [id number]      Assessment/Plan:    Acute Encephalopathy  Patient presented to the hospital for altered mental status   Baseline mentation is alert and oriented x 2-3 and is forgetful at times per facility    Current cause considerations   Suspected to be multifactorial secondary to age, possible underlying cognitive impairment, new onset atrial fibrillation/flutter, YEISON, oxybutynin use (spoke with primary team on 2/16 to discontinue), chronic pain, vision impairment, hearing impairment, and hospitalization   UA on admission was negative for UTI  No leukocytosis noted on 2/16  Hemoglobin stable at 11.9 on 2/16   at patients facility noted the following  Patient at baseline with mild confusion   Had been experiencing hallucinations prior to admission and she was exit seeking which was not her baseline   She does not have any behaviors at baseline   Patient was noted to be confused over the weekend requiring prn zyprexa   Per chart review she has not required prn medication in the last 24 hours  Patient appears to be improved since coming off the oxybutynin   She is more alert and awake today   She is alert and oriented x 3 on my exam today   She is pleasant, calm, and cooperative   Nursing notes no acute issues overnight or this morning   Identify and treat reversible causes of confusion including infection, dehydration, electrolyte imbalance, anemia, hypoxia, urinary retention, constipation, pain, and sleep disturbance  Maintain delirium precautions   Provide redirection, reorientation, and distraction techniques  Maintain fall and safety precautions   Assist with ADLs/IADLs  Avoid deliriogenic medications such as tramadol, benzodiazepines, anticholinergics, benadryl  Treat pain using geriatric pain protocol   Encourage oral hydration and nutrition   Monitor for  constipation and urinary retention   Implement sleep hygiene and limit night time interuptions   Maintain sleep-wake cycle   Encourage early and frequent mobilization   Most recent EKG on 2/14/2025 revealed a QTc interval of 461  If all other interventions are unsuccessful for acute agitation and behaviors, can consider Zyprexa 2.5 mg IM Q 8 hours prn   Would avoid benzodiazepines such as Ativan if possible as these medications can cause/worsen delirium   Redirect and reorient as needed  Keep mentally, physically, and socially active    Cognitive Screening  Patient has a documented history of forgetfulness   Facility staff notes mild confusion at baseline   This has become worse over time  She had been hallucinating prior to admission which is not her baseline   She normally does not have any behaviors   Patient is alert and oriented x 3 on my exam today   She is pleasant, calm, and cooperative   Most recent TSH on 2/14/2025 noted to be 2.113  Most recent vitamin B 12 level on 2/15/2024 noted to be 1987  Patient is on vitamin B 12 supplementation at baseline   Would recommend discontinuation of supplementation   CT of the head on 2/14/2025 revealed mild microangiopathic changes   Patient scored 26/30 on MMSE on 11/26/2019  Maintain delirium precautions as discussed below  Redirect and reorient as needed  Keep physically, mentally, and socially active     Deconditioning   Patient is at increased risk for deconditioning secondary to possible underlying cognitive impairment, new onset atrial fibrillation/flutter, YEISON, oxybutynin use (spoke with primary team yesterday to discontinue), chronic pain, vision impairment, hearing impairment, weakness, gait dysfunction, and hospitalization   Continue to optimize diet, hydration, and mobility for healing   GFR 73 on labs today     Patient has no documented history of CKD  Baseline creatinine appears to be around 0.6-0.7  She was noted to have an YEISON on admission with a  creatinine of 1.20  This has since resolved and her creatinine on labs yesterday was stable at 0.71  Avoid nephrotoxic medication and renal dose medication   Keep hydrated   Monitor for signs and symptoms of infection, dehydration, DVT, and skin breakdown    Frailty   Clinical Frail Scale: 6- Moderately Frail  Need help with all outside activities  Need help with stairs and bathing  May need assistance with dressing  Most recent albumin on 2/14/2025 noted to be 4.6  Consider nutrition consult  Encourage protein supplementation     Ambulatory Dysfunction/Falls   notes no recent falls   She ambulates with a roller walker at baseline   PT/OT consulted to assist with strengthening/mobility and assist with discharge planning to appropriate level of care  Assess patient frequently for physical needs, encourage use of assistant devices as needed and directed by PT/OT  Identify cognitive and physical deficits and behaviors that affect risk of falls  Consider moving patient closer to nursing station to monitor more closely for impulsive behavior which may increase risk of falls  [place] fall and safety precautions   Educate patient/family on patient safety including physical limitations and importance of using call bell for assistance   Modify environment to reduce risk of injury including disconnecting from pole when not in use, ensuring adequate lighting in room and restroom, ensuring that path to restroom is clear and free of trip hazards  Out of bed as tolerated    Impaired Vision   Patient does have vision impairment   She wears glasses at baseline per facility records   Recommend use of corrective lenses at all appropriate times  Encourage adequate lighting and encourage use of assistance with ambulation  Keep personal belongings close to avoid reaching  Encourage appropriate footwear at all times  Recommend large font for printed materials provided to patient    Impaired Hearing   Patient does have  hearing impairment   She does wear hearing aids per facility records  Hearing impairment strongly correlated with depression, cognitive impairment, delirium and falls in the older adult  Use hearing aids or sound amplifier  Speak face to face  Use clear dictation and enunciation of words    Dentition/Appetite   Patient does not wear dentures   Patient does have a history of dysphagia   She is on a regular diet at baseline per facility records    notes patient has a fairly good appetite at baseline   Patient is asking when lunch is coming as she is hungry   Ensure meal consistency is appropriate for all abilities   Consider nutrition consult   Continue aspiration precautions     Elimination   Patient is incontinent of bowel and bladder at times at baseline  She does have a documented history of urinary incontinence   Per review of facility records she is on trospium at baseline   Hospital does not carry this medication and she was ordered the hospital formulary oxybutynin   Discussed with primary team yesterday after reviewing the chart that this may be contributing to patients confusion/agitation   Recommended discontinuation (this medication was discontinued on 2/16 by the primary team)  Patient also has a documented history of constipation   Facility staff notes no issues with constipation there  She does not appear to be on any scheduled medication for this at baseline   Last documented bowel movement on 2/15  Current bowel regimen includes   Senna-S 8.6-50 mg daily   Would recommend holding bowel regimen for loose stools (bowel movement on 2/15 documented as watery)  Monitor for constipation and urinary retention     Insomnia    at the facility notes no issues with sleep at baseline   She does not appear to be on any medication for sleep at baseline   Nursing notes she slept well last night and no acute issues or events were noted   First line is behavioral therapy   Avoid  sedative hypnotics including benzodiazepines and benadryl  Encourage staying awake during the day   Encourage daytime activities and morning exercise   Decrease or eliminate daytime naps   Avoid caffeine especially during late afternoon and evening hours  Establish a nighttime routine  Implement sleep hygiene and limit nighttime interruptions  Can consider melatonin 3 mg daily at bedtime for sleep if needed     Anxiety/Depression  Patient has a documented history of anxiety   Per review of facility records she does not appear to be on any scheduled medication for this   She does have an order for prn Risperidone at her facility    notes that she does not receive this every day but when she does get this it is not very helpful with her anxiety   She has not been followed with psychiatry   Patient may benefit from lexapro 5-10 mg daily to help with anxiety as an outpatient   Patient is lethargic on exam today   Continue supportive care     Atrial Flutter-Fibrillation   Patient presented to the hospital with altered mental status   She was found to be in atrial flutter with RVR with 2:1 conduction   Cardiology was consulted   She is status post digoxin and is had been recommended for daily dosing   This has since been discontinued   She is now on amiodarone and lopressor   Medication regimen being adjusted by cardiology   Echo yesterday revealed an EF of 55% with probable diastolic dysfunction  Cardiology continues to adjust medication regimen   Management per cardiology       Subjective:   The patient is being seen and evaluated today at the bedside for geriatric follow-up. She is noted to be lying in bed comfortably in no acute distress. She is alert and oriented x 3 on my exam today. She does have some word finding difficulties at times. She states she has been sleeping a lot lately. She is currently denying pain. She denies chest pain and shortness of breath. She states she is feeling hungry and is  "inquiring about lunch. She notes she slept well last night.     Care was coordinated with patients nurse Annette. She notes no acute issues or events overnight.       Review of Systems   Constitutional:  Positive for activity change. Negative for appetite change and fatigue.   HENT:  Positive for hearing loss. Negative for dental problem.    Eyes:  Positive for visual disturbance (glasses).   Respiratory:  Negative for cough and shortness of breath.    Cardiovascular:  Negative for chest pain and leg swelling.   Gastrointestinal:  Negative for abdominal distention and abdominal pain.   Genitourinary:  Negative for difficulty urinating and dysuria.   Musculoskeletal:  Positive for gait problem. Negative for arthralgias.   Skin:  Negative for color change and pallor.   Neurological:  Positive for weakness. Negative for speech difficulty.   Psychiatric/Behavioral:  Negative for agitation, confusion and sleep disturbance. The patient is not nervous/anxious.          Objective:     Vitals: Blood pressure 139/64, pulse (!) 51, temperature 97.9 °F (36.6 °C), temperature source Temporal, resp. rate 18, height 5' 2\" (1.575 m), weight 58.1 kg (128 lb), SpO2 98%.,Body mass index is 23.41 kg/m².      Intake/Output Summary (Last 24 hours) at 2/18/2025 1328  Last data filed at 2/17/2025 2121  Gross per 24 hour   Intake 120 ml   Output --   Net 120 ml       Current Medications: Reviewed    Physical Exam:   Physical Exam  Vitals and nursing note reviewed.   Constitutional:       General: She is not in acute distress.     Appearance: She is not ill-appearing.   HENT:      Head: Normocephalic.      Mouth/Throat:      Mouth: Mucous membranes are dry.   Eyes:      General: No scleral icterus.     Conjunctiva/sclera: Conjunctivae normal.   Cardiovascular:      Rate and Rhythm: Normal rate and regular rhythm.   Pulmonary:      Effort: Pulmonary effort is normal. No respiratory distress.   Abdominal:      General: Bowel sounds are " "normal. There is no distension.      Palpations: Abdomen is soft.      Tenderness: There is no abdominal tenderness.   Musculoskeletal:         General: No swelling or tenderness.   Skin:     General: Skin is warm and dry.   Neurological:      General: No focal deficit present.      Mental Status: She is alert and oriented to person, place, and time. Mental status is at baseline.          Invasive Devices       Line  Duration             IABP 472 days                    Lab, Imaging and other studies: Results Review Statement: I reviewed AM labs.    Please note:  Voice-recognition software may have been used in the preparation of this document.  Occasional wrong word or \"sound-alike\" substitutions may have occurred due to the inherent limitations of voice recognition software.  Interpretation should be guided by context.    "

## 2025-02-18 NOTE — PHYSICAL THERAPY NOTE
"             PHYSICAL THERAPY EVALUATION          Patient Name: Summer Blanton  Today's Date: 2/18/2025 02/18/25 0840   PT Last Visit   PT Visit Date 02/18/25   Note Type   Note type Evaluation   Pain Assessment   Pain Assessment Tool 0-10   Pain Score No Pain   Restrictions/Precautions   Other Precautions Cognitive;Chair Alarm;Bed Alarm;Telemetry;Fall Risk   Home Living   Type of Home Assisted living   Home Layout One level   Bathroom Shower/Tub Walk-in shower   Bathroom Toilet Raised   Bathroom Equipment Grab bars in shower;Grab bars around toilet   Home Equipment Walker   Additional Comments per chart review, from Veronica WONG   Prior Function   Level of [place] Needs assistance with ADLs;Needs assistance with IADLS   Lives With Alone   Receives Help From Other (Comment)  (staff)   Falls in the last 6 months 0   Comments per chart review pt gets A for ADLs, IADLs. pt reports only getting A for showers. states recently using RW   General   Additional Pertinent History pt admitted 2/14/25 for flutter-fibrillation. activity as tolerated orders. PMHx significant for forgetfulness, OA, PM, urinary incontinence   Cognition   Overall Cognitive Status Impaired   Arousal/Participation Cooperative   Orientation Level Oriented to person;Oriented to place  (\[facility] and specific place w options, oriented to month only not year \"2052\")   Memory Decreased recall of recent events;Decreased short term memory   Following Commands Follows one step commands without difficulty   Comments hx forgetfulness   Bed Mobility   Supine to Sit 5  Supervision   Additional items HOB elevated;Bedrails;Increased time required   Transfers   Sit to Stand 5  Supervision   Additional items Increased time required;Verbal cues;Other  (RW)   Stand to Sit 5  Supervision   Additional items Increased time required;Armrests;Verbal cues;Other  (RW)   Additional Comments cues for technique, direction   Ambulation/Elevation   Gait pattern " Excessively slow   Gait Assistance 5  Supervision   Additional items Assist x 1   Assistive Device Rolling walker;None   Distance 200' (about 15' w no AD)   Balance   Static Standing Fair  (w RW)   Dynamic Standing Fair -   Ambulatory Fair -  (w RW)   Endurance Deficit   Endurance Deficit No   Activity Tolerance   Activity Tolerance Patient tolerated treatment well   Medical Staff Made Aware Sabrina OT   Assessment   Prognosis Good   Assessment Summer Blanton is a 89 y.o. female admitted to [facility] on 2/14/2025 for Flutter-fibrillation (HCC). PT was consulted and pt was seen on 2/18/2025 for mobility assessment and d/c planning. Pt presents w high fall risk, telemetry. Unreliable historian however per chart review gets A for ADLs, IADLs. Uses RW to ambulate. Pt is currently functioning at a S for bed mobility, transfers and ambulation w RW. Pt demonstrated ability to ambulate community distances. Able to progress to walking without an AD; noted increased in gait deviations. Would benefit from continued use of RW however may need prompting to remember as pt forgetful during session. Overall appears to be functioning at baseline. The patient's AM-PAC Basic Mobility Inpatient Short Form Raw Score is 18. A Raw score of greater than 16 suggests the patient may benefit from discharge to home. Would benefit from continued mobilization via nsg/restorative.   Barriers to Discharge None   Plan   PT Frequency   (d/c PT: maintain on restorative)   Discharge Recommendation   Rehab Resource Intensity Level, PT No post-acute rehabilitation needs   AM-PAC Basic Mobility Inpatient   Turning in Flat Bed Without Bedrails 3   Lying on Back to Sitting on Edge of Flat Bed Without Bedrails 3   Moving Bed to Chair 3   Standing Up From Chair Using Arms 3   Walk in Room 3   Climb 3-5 Stairs With Railing 3   Basic Mobility Inpatient Raw Score 18   Basic Mobility Standardized Score 41.05   [facility] Level Of Mobility   [facility] Goal 6:  Walk 10 steps or more   -[facility] Achieved 7: Walk 25 feet or more   End of Consult   Patient Position at End of Consult Bedside chair;Bed/Chair alarm activated;All needs within reach     History: co - morbidities including age, use of assistive device, assist for adl's/IADL's, cognition, current experience including fall risk  Exam: impairments in systems including multiple body structures involved; neuromuscular (balance, gait, transfers), cognition; activity limitations  (difficulties executing an action); participation restrictions (problems associated w involvement in life situations), AM-PAC  Clinical: stable/unpredictable  Complexity: moderate    Aracelis Martin, PT

## 2025-02-18 NOTE — OCCUPATIONAL THERAPY NOTE
Occupational Therapy Evaluation     Patient Name: Summer Blanton  Today's Date: 2/18/2025  Problem List  Principal Problem:    Flutter-fibrillation (HCC)  Active Problems:    Dysphagia - improved    Complete heart block (HCC) -   PPM ( Medtronic) Feb 2021    Acute encephalopathy    YEISON (acute kidney injury) (Formerly [facility])    Past Medical History  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    Transient elevated blood pressure     last assessed: 5/16/2017    Weight loss      Past Surgical History  Past Surgical History:   Procedure Laterality Date    APPENDECTOMY      CATARACT EXTRACTION      CHOLECYSTECTOMY      ESOPHAGOGASTRODUODENOSCOPY  06/2013    diagnostic- neg id have dilatation    EYE SURGERY      HYSTERECTOMY      LAPAROTOMY N/A 11/3/2023    Procedure: LAPAROTOMY EXPLORATORY, ILEOCECECTOMY;  Surgeon: Katlyn Fleming MD;  Location: [facility];  Service: General    WI ESOPHAGOGASTRODUODENOSCOPY TRANSORAL DIAGNOSTIC N/A 9/16/2016    Procedure: ESOPHAGOGASTRODUODENOSCOPY (EGD);  Surgeon: Vladislav Garcia MD;  Location: [facility];  Service: Gastroenterology    REPLACEMENT TOTAL KNEE Left 01/2007 02/18/25 0825   OT Last Visit   OT Visit Date 02/18/25   Note Type   Note type Evaluation   Pain Assessment   Pain Assessment Tool 0-10   Pain Score No Pain   Restrictions/Precautions   Weight Bearing Precautions Per Order No   Other Precautions Cognitive;Chair Alarm;Bed Alarm;Fall Risk;Telemetry   Home Living   Type of Home Assisted living  ([facility])   Home Layout One level;Performs ADLs on one level;Able to live on main level with bedroom/bathroom;Other (Comment)  (0 CARMELINA)   Bathroom Shower/Tub Walk-in shower   Bathroom Toilet Raised   Bathroom Equipment Grab bars in shower;Grab bars around toilet   Bathroom Accessibility Accessible via walker   Home  "Equipment Walker;Cane   Additional Comments pt unreliable historian, info gathered through chart review   Prior Function   Level of [place] Needs assistance with ADLs;Needs assistance with IADLS;Independent with functional mobility  (RW at baseline)   Lives With Alone   Receives Help From Other (Comment)  (staff provides A for ADL/IADL, meals, and cleaning)   IADLs Family/Friend/Other provides transportation;Family/Friend/Other provides meals;Family/Friend/Other provides medication management   Falls in the last 6 months 0   Vocational Retired   Comments pt unreliable historian, info gathered through chart review   Lifestyle   Autonomy Pt unreliable historian, info gathered via chart review. PTA, required (A) with ADLs, required (A) with IADLs, and completed functional mobility/transfers with Jose utilizing RW .  (-) Patient has had 0 falls in the last 6 months. Patient lives in a 1 story care home with 0 CARMELINA .  Pt can live on one level, bedroom on 1st floor, bathroom on 1st floor, walk-in shower, raised toilet.  Pt home DME includes: Grab Bars and Rolling Walker.   Reciprocal Relationships other residents   Service to Others retired   Intrinsic Gratification bingo   General   Additional Pertinent History Comorbidities affecting pt’s functional performance include a significant PMH of: Aortic regurgitation, Discoid lupus erythematosus, Dysphagia, YEISON,  Heart block AV second degree. Patient with active OT orders and activity orders for Activity as tolerated.   Family/Caregiver Present No   Additional General Comments Co treatment with PT secondary to complex medical condition of pt, possible A of 2 required to achieve and maintain transitional movements, requiring the need of skilled therapeutic intervention of 2 therapists to achieve delivery of services.   Subjective   Subjective \"I just started using a walker and sometimes I fall because I forget it\"   ADL   Where Assessed Edge of bed   Eating Assistance 6  " Modified independent   Grooming Assistance 5  Supervision/Setup   UB Bathing Assistance 5  Supervision/Setup   LB Bathing Assistance 4  Minimal Assistance   UB Dressing Assistance 5  Supervision/Setup   LB Dressing Assistance 4  Minimal Assistance   Toileting Assistance  4  Minimal Assistance   Functional Assistance 5  Supervision/Setup   Bed Mobility   Supine to Sit 5  Supervision   Additional items HOB elevated;Bedrails;Increased time required;Verbal cues   Transfers   Sit to Stand 5  Supervision   Additional items Bedrails;Increased time required;Verbal cues;Other  (RW)   Stand to Sit 5  Supervision   Additional items Armrests;Increased time required;Verbal cues;Other  (RW)   Functional Mobility   Functional Mobility 5  Supervision   Additional Comments unlimitted household distances   Additional items Rolling walker   Balance   Static Sitting Fair +   Dynamic Sitting Fair   Static Standing Fair  (RW)   Dynamic Standing Fair -  (RW)   Ambulatory Fair -  (RW)   Activity Tolerance   Activity Tolerance Patient tolerated treatment well;Patient limited by fatigue   Medical Staff Made Aware PT   Nurse Made Aware RN   RUE Assessment   RUE Assessment X  (Limitted with proximal ROM~90deg, but all other planes WFL)   LUE Assessment   LUE Assessment X  (Limitted with proximal ROM~90deg, but all other planes WFL)   Hand Function   Gross Motor Coordination Functional   Fine Motor Coordination Functional   Sensation   Light Touch No apparent deficits   Proprioception   Proprioception No apparent deficits   Vision-Basic Assessment   Current Vision Wears glasses all the time   Vision - Complex Assessment   Ocular Range of Motion Intact   Additional Comments scans all visual fields   Psychosocial   Psychosocial (WDL) WDL   Patient Behaviors/Mood Cooperative   Perception   Inattention/Neglect Appears intact   Cognition   Overall Cognitive Status Impaired   Arousal/Participation Alert;Cooperative   Attention Attends with cues to  redirect   Orientation Level Oriented to person;Oriented to place;Oriented to time  (pt oriented to time and place after second trial. Intitially reports the year as 2052 and the place as [place], unable to identify it as a hospital.)   Memory Decreased recall of biographical information;Decreased short term memory;Decreased recall of recent events   Following Commands Follows one step commands without difficulty   Comments h/o cog deficits   Assessment   Limitation Decreased ADL status;Decreased UE ROM;Decreased Safe judgement during ADL;Decreased cognition;Decreased endurance;Decreased self-care trans;Decreased high-level ADLs   Prognosis Fair   Assessment Patient is a 89 y.o. year old female seen for OT eval s/p admit to [facility] on 2/14/2025 with Altered mental status,   Tachycardia, Confusion and disorientation . OT consulted to assess ADLs/IADLs/functional mobility and assist w/ D/C planning.  Pt's CLOF as follows: eating/grooming: Anne and supervision , UB ADLs: supervision , LB ADLs: Hayden, toileting: Hayden, bed mobility: supervision , functional transfers: supervision , functional mobility: supervision , sitting/standing tolerance: ~3 mins. Pt would benefit from continued skilled OT while in acute setting to address deficits as defined above and to maximize (I) w/ ADLs/functional mobility. Occupational performance areas to address include: grooming, bathing/shower, toilet hygiene, dressing, socialization, health maintenance, functional mobility, community mobility, clothing management, and social participation. Patient demonstrates the following deficits impacting occupational performance: weakness, decreased UE ROM, decreased strength , decreased balance, decreased activity tolerance, limited functional reach, impaired memory, impaired problem solving, decreased safety awareness, increased body habitus , decreased cardiovascular endurance, and decreased skin integrity . These impairments, as well at pt’s  difficulty performing ADLs, difficulty performing IADLs, difficulty performing transfers/mobility, limited insight into deficits, fall risk , functional decline , increased reliance on DME , advanced age, and multiple admissions , limit pt’s ability to safely engage in all baseline areas of occupation. Based on the aforementioned evaluation, functional performance deficits, and assessments, pt has been identified as a moderate complexity evaluation. At this time, recommendation for pt to receive post-acute rehabilitation services at a Level III (minimum resource intensity) due to above deficits and CLOF. OT will continue to follow pt 2-3x/wk to address the goals listed below to  w/in 10-14 days.   Goals   Patient Goals to go back to [facility] Time Frame 10-   Plan   Treatment Interventions ADL retraining;Functional transfer training;UE strengthening/ROM;Endurance training;Cognitive reorientation;Equipment evaluation/education;Compensatory technique education;Continued evaluation;Energy conservation;Activityengagement   Goal Expiration Date 25   OT Frequency 2-3x/wk   Discharge Recommendation   Rehab Resource Intensity Level, OT III (Minimum Resource Intensity)   AM-PAC Daily Activity Inpatient   Lower Body Dressing 3   Bathing 3   Toileting 3   Upper Body Dressing 3   Grooming 3   Eating 3   Daily Activity Raw Score 18   Daily Activity Standardized Score (Calc for Raw Score >=11) 38.66   AM-PAC Applied Cognition Inpatient   Following a Speech/Presentation 3   Understanding Ordinary Conversation 3   Taking Medications 1   Remembering Where Things Are Placed or Put Away 1   Remembering List of 4-5 Errands 1   Taking Care of Complicated Tasks 1   Applied Cognition Raw Score 10   Applied Cognition Standardized Score 24.98   OT goals to be met in 10-14 days:  Pt will attend to ongoing cognitive assessment w/ G participation for safe d/c planning   Pt will accurately identify their name, date, and location  with 1-2 prompts.   Pt will be Mod I for UB dressing/bathing to increase independence with ADLs.   Pt will be S for LB dressing/bathing using LHAE prn to increase independence with ADLs.    Pt will complete toileting routine w/ S  using G safety techniques   Pt will improve dynamic standing balance to GOOD to perform grooming tasks standing at sink   Pt will increase activity tolerance to 10 mins to increase engagement in ADLs/IADLs and leisure tasks  Pt will improve functional transfers to Mod I, utilizing DME as appropriate, w/ G safety techniques to increase engagement in ADL/IADL tasks  Pt will be Mod I  w/ functional mobility,utilizing DME as appropriate, in a home-like environment to improve participation in ADL/IADLs and leisure activities    Miranda Redd  OTS

## 2025-02-18 NOTE — PLAN OF CARE
Problem: Potential for Falls  Goal: Patient will remain free of falls  Description: INTERVENTIONS:  - Educate patient/family on patient safety including physical limitations  - Instruct patient to call for assistance with activity   - Consult OT/PT to assist with strengthening/mobility   - Keep Call bell within reach  - Keep bed low and locked with side rails adjusted as appropriate  - Keep care items and personal belongings within reach  - Initiate and maintain comfort rounds  - Make Fall Risk Sign visible to staff  - Offer Toileting every 2 Hours, in advance of need  - Initiate/Maintain bed alarm  - Obtain necessary fall risk management equipment: bed alarm  - Apply yellow socks and bracelet for high fall risk patients  - Consider moving patient to room near nurses station  Outcome: Progressing     Problem: PAIN - ADULT  Goal: Verbalizes/displays adequate comfort level or baseline comfort level  Description: Interventions:  - Encourage patient to monitor pain and request assistance  - Assess pain using appropriate pain scale  - Administer analgesics based on type and severity of pain and evaluate response  - Implement non-pharmacological measures as appropriate and evaluate response  - Consider cultural and social influences on pain and pain management  - Notify physician/advanced practitioner if interventions unsuccessful or patient reports new pain  Outcome: Progressing     Problem: INFECTION - ADULT  Goal: Absence or prevention of progression during hospitalization  Description: INTERVENTIONS:  - Assess and monitor for signs and symptoms of infection  - Monitor lab/diagnostic results  - Monitor all insertion sites, i.e. indwelling lines, tubes, and drains  - Monitor endotracheal if appropriate and nasal secretions for changes in amount and color  - [place] appropriate cooling/warming therapies per order  - Administer medications as ordered  - Instruct and encourage patient and family to use good hand  hygiene technique  - Identify and instruct in appropriate isolation precautions for identified infection/condition  Outcome: Progressing  Goal: Absence of fever/infection during neutropenic period  Description: INTERVENTIONS:  - Monitor WBC    Outcome: Progressing     Problem: SAFETY ADULT  Goal: Patient will remain free of falls  Description: INTERVENTIONS:  - Educate patient/family on patient safety including physical limitations  - Instruct patient to call for assistance with activity   - Consult OT/PT to assist with strengthening/mobility   - Keep Call bell within reach  - Keep bed low and locked with side rails adjusted as appropriate  - Keep care items and personal belongings within reach  - Initiate and maintain comfort rounds  - Make Fall Risk Sign visible to staff  - Offer Toileting every 2 Hours, in advance of need  - Initiate/Maintain bed alarm  - Obtain necessary fall risk management equipment: bed alarm  - Apply yellow socks and bracelet for high fall risk patients  - Consider moving patient to room near nurses station  Outcome: Progressing  Goal: Maintain or return to baseline ADL function  Description: INTERVENTIONS:  -  Assess patient's ability to carry out ADLs; assess patient's baseline for ADL function and identify physical deficits which impact ability to perform ADLs (bathing, care of mouth/teeth, toileting, grooming, dressing, etc.)  - Assess/evaluate cause of self-care deficits   - Assess range of motion  - Assess patient's mobility; develop plan if impaired  - Assess patient's need for assistive devices and provide as appropriate  - Encourage maximum independence but intervene and supervise when necessary  - Involve family in performance of ADLs  - Assess for home care needs following discharge   - Consider OT consult to assist with ADL evaluation and planning for discharge  - Provide patient education as appropriate  Outcome: Progressing  Goal: Maintains/Returns to pre admission functional  level  Description: INTERVENTIONS:  - Perform AM-PAC 6 Click Basic Mobility/ Daily Activity assessment daily.  - Set and communicate daily mobility goal to care team and patient/family/caregiver.   - Collaborate with rehabilitation services on mobility goals if consulted  - Stand patient 3 times a day  - Ambulate patient 3 times a day  - Out of bed to chair 3 times a day   - Out of bed for meals 3 times a day  - Out of bed for toileting  - Record patient progress and toleration of activity level   Outcome: Progressing     Problem: DISCHARGE PLANNING  Goal: Discharge to home or other facility with appropriate resources  Description: INTERVENTIONS:  - Identify barriers to discharge w/patient and caregiver  - Arrange for needed discharge resources and transportation as appropriate  - Identify discharge learning needs (meds, wound care, etc.)  - Arrange for interpretive services to assist at discharge as needed  - Refer to Case Management Department for coordinating discharge planning if the patient needs post-hospital services based on physician/advanced practitioner order or complex needs related to functional status, cognitive ability, or social support system  Outcome: Progressing     Problem: Knowledge Deficit  Goal: Patient/family/caregiver demonstrates understanding of disease process, treatment plan, medications, and discharge instructions  Description: Complete learning assessment and assess knowledge base.  Interventions:  - Provide teaching at level of understanding  - Provide teaching via preferred learning methods  Outcome: Progressing     Problem: CARDIOVASCULAR - ADULT  Goal: Absence of cardiac dysrhythmias or at baseline rhythm  Description: INTERVENTIONS:  - Continuous cardiac monitoring, vital signs, obtain 12 lead EKG if ordered  - Administer antiarrhythmic and heart rate control medications as ordered  - Monitor electrolytes and administer replacement therapy as ordered  Outcome: Progressing      Problem: Prexisting or High Potential for Compromised Skin Integrity  Goal: Skin integrity is maintained or improved  Description: INTERVENTIONS:  - Identify patients at risk for skin breakdown  - Assess and monitor skin integrity  - Assess and monitor nutrition and hydration status  - Monitor labs   - Assess for incontinence   - Turn and reposition patient  - Assist with mobility/ambulation  - Relieve pressure over bony prominences  - Avoid friction and shearing  - Provide appropriate hygiene as needed including keeping skin clean and dry  - Evaluate need for skin moisturizer/barrier cream  - Collaborate with interdisciplinary team   - Patient/family teaching  - Consider wound care consult   Outcome: Progressing

## 2025-02-18 NOTE — NURSING NOTE
Patient discharged, stable at this time.  IV removed.  AVS reviewed with patient and daughter.  Called [facility], shift nurse to call back.  Medications sent to [facility] pharmacy, patient also has paper scripts.  Wheeled out in wheelchair by PCA.  No belongings left in room, no paperwork left in chart.

## 2025-02-19 PROBLEM — G93.41 METABOLIC ENCEPHALOPATHY: Status: ACTIVE | Noted: 2025-02-19

## 2025-02-19 NOTE — ASSESSMENT & PLAN NOTE
Recent Labs     02/17/25  0451 02/18/25  0601   CREATININE 0.83 0.71     Baseline Cr 0.5-0.7; Cr on admit 1.2   Improved w/ IVF's   Monitor BMP  Avoid nephrotoxins/hypotension

## 2025-02-19 NOTE — DISCHARGE SUMMARY
Discharge Summary - Hospitalist   Name: Summer Blanton 89 y.o. female I MRN: [id number]  Unit/Bed#: E4 -01 I Date of Admission: 2/14/2025   Date of Service: 2/19/2025 I Hospital Day: 3     Assessment & Plan  Flutter-fibrillation (HCC)  Presented to ED w/ c/o confusion per daughter; pt found to have atrial flutter with RVR with 2:1 conduction; h/o paroxysmal atrial fibrillation/flutter w/ complete heart block s/p pacer  TSH benign; no overt infection  ECHO (02/16):   EF 55%  Probable diastolic dysfunction  LA moderately dilated, RA mild dilation  Mild AR, mild MR, mild/moderate TR  Cardiology following; appreciate recs  S/p digoxin 125 mcg x 1 on 2/14 and 125 mcg x 2 on 2/15; d/c;d on 20/17  Amiodarone initiated on 02/17, c/w 200 mg BID x 7 days, then 200 mg q day   Lopressor reduced to 25 mg BID on 02/17   AC therapy, reviewed w/ cards   Previously d/c'd in 11/2023 w/ multiple falls & head injuries   Continue Eliquis 2.5 mg BID while hospitalized  D/c on Aspirin 81 mg q day   Acute encephalopathy  Presented from [facility] w/ acute confusion; noted to be AAO x 3 but confused   Noted h/o forgetfulness; unclear if she had a dementia work up in the past but could be due to worsening dementia  CT head negative  UA negative  Covid/flu/rsv negative  TSH WNL  Vit B12 elevated; PTA supplement d/c'd     RPR negative   Required multiple doses of Zyprexa w/ agitation/concern for safety risks; suspect component of hospital induced delirium  Geriatrics consulted  C/w  Zyprexa 2.5 mg IM Q 8 hours prn   Oxybutynin discontinued  Mentation seems near baseline today   YEISON (acute kidney injury) (HCC)  Recent Labs     02/17/25  0451 02/18/25  0601   CREATININE 0.83 0.71     Baseline Cr 0.5-0.7; Cr on admit 1.2   Improved w/ IVF's   Monitor BMP  Avoid nephrotoxins/hypotension  Complete heart block (HCC) -   PPM ( Medtronic) Feb 2021  S/p dual chamber medtronic permanent pacer in 2021   Dysphagia - improved  H/o dysphagia but  "on regular diet PTA  ST consulted   Metabolic encephalopathy  Metabolic encephalopathy in the setting of YEISON and dehydration admission improved with IV fluids     Medical Problems       Resolved Problems  Date Reviewed: 2/19/2025   None       Discharging Physician / Practitioner: Bill Alcazar DO  PCP: Self Self  Admission Date:   Admission Orders (From admission, onward)       Ordered        02/15/25 1032  INPATIENT ADMISSION  Once            02/14/25 1539  Place in Observation  Once                          Discharge Date: 02/19/25        Reason for Admission: Altered mental status    Hospital Course:   Summer Blanton is a 89 y.o. female patient who originally presented to the hospital on 2/14/2025 due to altered mental status also with new diagnosis of A-fib/flutter.  Patient improved with conservative management with fluid hydration, she was eval by cardiology who started her on amiodarone on which she was discharged on, she was on Eliquis during her hospital stay but was discharged on aspirin due to concerns over bleeding risk.          Please see above list of diagnoses and related plan for additional information.     Condition at Discharge: stable    Discharge Day Visit / Exam:   Subjective: Patient denied any acute complaints at time of discharge  Vitals: Blood Pressure: 139/64 (02/18/25 1040)  Pulse: (!) 51 (02/18/25 1040)  Temperature: 97.9 °F (36.6 °C) (02/18/25 1040)  Temp Source: Temporal (02/18/25 1040)  Respirations: 18 (02/18/25 1040)  Height: 5' 2\" (157.5 cm) (02/17/25 0930)  Weight - Scale: 58.1 kg (128 lb) (02/17/25 0930)  SpO2: 98 % (02/18/25 1040)  Physical Exam  Vitals and nursing note reviewed.   Constitutional:       General: She is not in acute distress.     Appearance: She is well-developed. She is not toxic-appearing or diaphoretic.   HENT:      Head: Normocephalic and atraumatic.   Eyes:      General: No scleral icterus.     Conjunctiva/sclera: Conjunctivae normal.   Cardiovascular:    "   Rate and Rhythm: Normal rate and regular rhythm.      Heart sounds: No murmur heard.     No friction rub. No gallop.   Pulmonary:      Effort: Pulmonary effort is normal. No respiratory distress.      Breath sounds: Normal breath sounds. No stridor. No wheezing, rhonchi or rales.   Chest:      Chest wall: No tenderness.   Abdominal:      General: There is no distension.      Palpations: Abdomen is soft. There is no mass.      Tenderness: There is no abdominal tenderness. There is no guarding or rebound.      Hernia: No hernia is present.   Musculoskeletal:         General: No swelling or tenderness.      Cervical back: Neck supple.   Skin:     General: Skin is warm and dry.      Capillary Refill: Capillary refill takes less than 2 seconds.   Neurological:      Mental Status: She is alert.   Psychiatric:         Mood and Affect: Mood normal.          Discussion with Family: Updated  (daughter) at bedside.    Discharge instructions/Information to patient and family:   See after visit summary for information provided to patient and family.      Provisions for Follow-Up Care:  See after visit summary for information related to follow-up care and any pertinent home health orders.      Mobility at time of Discharge:   Basic Mobility Inpatient Raw Score: 18  JH-HLM Goal: 6: Walk 10 steps or more  JH-HLM Achieved: 7: Walk 25 feet or more  HLM Goal achieved. Continue to encourage appropriate mobility.     Disposition:   Home with VNA Services (Reminder: Complete face to face encounter)    Planned Readmission: no    Discharge Medications:  See after visit summary for reconciled discharge medications provided to patient and/or family.      Administrative Statements   Discharge Statement:  I have spent a total time of  minutes in caring for this patient on the day of the visit/encounter. .    **Please Note: This note may have been constructed using a voice recognition system**

## 2025-02-19 NOTE — ASSESSMENT & PLAN NOTE
Metabolic encephalopathy in the setting of YEISON and dehydration admission improved with IV fluids

## 2025-03-12 DIAGNOSIS — I48.92 FLUTTER-FIBRILLATION (HCC): ICD-10-CM

## 2025-03-12 DIAGNOSIS — I48.91 FLUTTER-FIBRILLATION (HCC): ICD-10-CM

## 2025-03-14 RX ORDER — METOPROLOL TARTRATE 25 MG/1
25 TABLET, FILM COATED ORAL EVERY 12 HOURS SCHEDULED
Qty: 180 TABLET | Refills: 1 | OUTPATIENT
Start: 2025-03-14

## 2025-03-21 DIAGNOSIS — I48.92 FLUTTER-FIBRILLATION (HCC): ICD-10-CM

## 2025-03-21 DIAGNOSIS — I48.91 FLUTTER-FIBRILLATION (HCC): ICD-10-CM

## 2025-03-24 DIAGNOSIS — I48.92 FLUTTER-FIBRILLATION (HCC): ICD-10-CM

## 2025-03-24 DIAGNOSIS — I48.91 FLUTTER-FIBRILLATION (HCC): ICD-10-CM

## 2025-03-24 RX ORDER — METOPROLOL TARTRATE 25 MG/1
25 TABLET, FILM COATED ORAL 2 TIMES DAILY
Qty: 60 TABLET | Refills: 0 | OUTPATIENT
Start: 2025-03-24

## 2025-03-25 RX ORDER — AMIODARONE HYDROCHLORIDE 200 MG/1
TABLET ORAL
Qty: 44 TABLET | Refills: 0 | OUTPATIENT
Start: 2025-03-25

## 2025-04-14 ENCOUNTER — REMOTE DEVICE CLINIC VISIT (OUTPATIENT)
Dept: CARDIOLOGY CLINIC | Facility: CLINIC | Age: OVER 89
End: 2025-04-14
Payer: MEDICARE

## 2025-04-14 ENCOUNTER — RESULTS FOLLOW-UP (OUTPATIENT)
Dept: NON INVASIVE DIAGNOSTICS | Facility: HOSPITAL | Age: OVER 89
End: 2025-04-14

## 2025-04-14 DIAGNOSIS — I44.30 AVB (ATRIOVENTRICULAR BLOCK): ICD-10-CM

## 2025-04-14 DIAGNOSIS — I48.91 ATRIAL FIBRILLATION, UNSPECIFIED TYPE (HCC): Primary | ICD-10-CM

## 2025-04-14 PROCEDURE — 93296 REM INTERROG EVL PM/IDS: CPT | Performed by: STUDENT IN AN ORGANIZED HEALTH CARE EDUCATION/TRAINING PROGRAM

## 2025-04-14 PROCEDURE — 93294 REM INTERROG EVL PM/LDLS PM: CPT | Performed by: STUDENT IN AN ORGANIZED HEALTH CARE EDUCATION/TRAINING PROGRAM

## 2025-04-14 NOTE — PROGRESS NOTES
Results for orders placed or performed in visit on 04/14/25   Cardiac EP device report    Narrative    MDT DC PM - ACTIVE SYSTEM IS MRI CONDITIONAL  CARELINK TRANSMISSION: BATTERY VOLTAGE ADEQUATE (6.8 YRS). AP: 78%. : 87.6% (MVP-OFF~AVB). ALL AVAILABLE LEAD PARAMETERS WITHIN NORMAL LIMITS. 694 AT/AF EPISODES W/ AVAIL EGMS SHOWING AF, MAX DURATION 22 MINS 21 SECS. AF BURDEN: 5.4%. PT DOES NOT TAKE AC PER DR. QUINONEZ NOTE off of anticoagulation since November 2023 due to multiple falls including head injuries. PT TAKES AMIODARONE, METOPROLOL TART, ASA 81MG. EF: 55% (ECHO 2/17/25). NORMAL DEVICE FUNCTION. CH

## 2025-05-31 ENCOUNTER — APPOINTMENT (EMERGENCY)
Dept: CT IMAGING | Facility: HOSPITAL | Age: OVER 89
DRG: 291 | End: 2025-05-31
Payer: MEDICARE

## 2025-05-31 ENCOUNTER — HOSPITAL ENCOUNTER (EMERGENCY)
Facility: HOSPITAL | Age: OVER 89
Discharge: HOME | DRG: 291 | End: 2025-05-31
Attending: EMERGENCY MEDICINE | Admitting: EMERGENCY MEDICINE
Payer: MEDICARE

## 2025-05-31 VITALS
SYSTOLIC BLOOD PRESSURE: 171 MMHG | WEIGHT: 139.33 LBS | RESPIRATION RATE: 16 BRPM | TEMPERATURE: 98.3 F | BODY MASS INDEX: 25.48 KG/M2 | DIASTOLIC BLOOD PRESSURE: 84 MMHG | HEART RATE: 51 BPM | OXYGEN SATURATION: 94 %
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
## 2025-05-31 DIAGNOSIS — I10 HYPERTENSION: ICD-10-CM

## 2025-05-31 DIAGNOSIS — R68.89 FORGETFULNESS: ICD-10-CM

## 2025-05-31 DIAGNOSIS — Z95.0 PACEMAKER: ICD-10-CM

## 2025-05-31 DIAGNOSIS — S09.90XA CLOSED HEAD INJURY, INITIAL ENCOUNTER: Primary | ICD-10-CM

## 2025-05-31 PROCEDURE — 99285 EMERGENCY DEPT VISIT HI MDM: CPT

## 2025-05-31 PROCEDURE — 70450 CT HEAD/BRAIN W/O DYE: CPT

## 2025-05-31 PROCEDURE — 72125 CT NECK SPINE W/O DYE: CPT

## 2025-05-31 PROCEDURE — 99284 EMERGENCY DEPT VISIT MOD MDM: CPT | Performed by: EMERGENCY MEDICINE

## 2025-05-31 NOTE — ED PROVIDER NOTES
Time reflects when diagnosis was documented in both MDM as applicable and the Disposition within this note       Time User Action Codes Description Comment    5/31/2025  2:14 PM Cullen Osman Add [S09.90XA] Closed head injury, initial encounter     5/31/2025  2:15 PM Cullen Osman Add [W19.XXXA] Fall from standing, initial encounter     5/31/2025  2:15 PM Cullen Osman Add [R68.89] Forgetfulness     5/31/2025  2:15 PM Cullen Osman Add [Z95.0] Pacemaker ( Feb 2021 )     5/31/2025  2:15 PM Cullen Osman Add [I10] Hypertension           ED Disposition       ED Disposition   Discharge    Condition   Stable    Date/Time   Sat May 31, 2025  2:14 PM    Comment   Summer Blanton discharge to home/self care.                   Assessment & Plan       Medical Decision Making  Chemical fall with head trauma-will CT to rule out skull fracture, CNS bleed, CT cervical spine rule out fracture.  Asymptomatic hypertension, no medical workup is indicated.    Amount and/or Complexity of Data Reviewed  Radiology: ordered.        ED Course as of 05/31/25 1416   Sat May 31, 2025   1413 Trauma workup reviewed and benign.  Patient is appropriate discharged home.       Medications - No data to display    ED Risk Strat Scores                    No data recorded        SBIRT 22yo+      Flowsheet Row Most Recent Value   Initial Alcohol Screen: US AUDIT-C     1. How often do you have a drink containing alcohol? 0 Filed at: 05/31/2025 1236   2. How many drinks containing alcohol do you have on a typical day you are drinking?  0 Filed at: 05/31/2025 1236   3a. Male UNDER 65: How often do you have five or more drinks on one occasion? 0 Filed at: 05/31/2025 1236   3b. FEMALE Any Age, or MALE 65+: How often do you have 4 or more drinks on one occassion? 0 Filed at: 05/31/2025 1236   Audit-C Score 0 Filed at: 05/31/2025 1236   [place]: How many times in the past year have you...    Used an illegal drug or used a prescription medication for non-medical  reasons? Never Filed at: 05/31/2025 1236                            History of Present Illness       Chief Complaint   Patient presents with    Fall     Patient arrives via EMS from [facility] after unwitnessed fall. Per EMS, patient became unhappy that another resident was in her room and was trying to get that resident out of her room when she fell. Staff was alerted quickly after event. Staff reports bruising to under left eye. L arm is swollen from previous fall about 3 weeks ago. ?LOC -thinners. Per EMS, patient is at baseline cognition. Patient with no complaints. C collar in place for precaution.        Past Medical History[1]   Past Surgical History[2]   Family History[3]   Social History[4]   E-Cigarette/Vaping    E-Cigarette Use Never User       E-Cigarette/Vaping Substances    Nicotine No     THC No     CBD No     Flavoring No     Other No     Unknown No       I have reviewed and agree with the history as documented.     89-year-old female presents for evaluation of fall.  Patient was screaming at a resident when she tripped and fell forward striking her head off the ground.  No loss of consciousness.  Patient offers no complaints at this time.      History provided by:  Patient and EMS personnel  History limited by:  Dementia  Fall      Review of Systems   Unable to perform ROS: Dementia           Objective       ED Triage Vitals   Temperature Pulse Blood Pressure Respirations SpO2 Patient Position - Orthostatic VS   05/31/25 1239 05/31/25 1234 05/31/25 1234 05/31/25 1234 05/31/25 1234 05/31/25 1234   98.3 °F (36.8 °C) 66 168/83 18 95 % Sitting      Temp Source Heart Rate Source BP Location FiO2 (%) Pain Score    05/31/25 1239 05/31/25 1234 05/31/25 1234 -- 05/31/25 1234    Oral Monitor Right arm  No Pain      Vitals      Date and Time Temp Pulse SpO2 Resp BP Pain Score FACES Pain Rating User   05/31/25 1400 -- 51 94 % 16 171/84 -- -- KH   05/31/25 1239 98.3 °F (36.8 °C) -- -- -- -- -- -- EK   05/31/25  1234 -- 66 95 % 18 168/83 No Pain -- EK            Physical Exam  Vitals and nursing note reviewed.   Constitutional:       Appearance: She is well-developed.   HENT:      Head: Normocephalic and atraumatic.     Eyes:      Comments: Patient has painless full range of motion in both her eyes. Normal visual fields. No hyphema noted.   Neck:      Trachea: No tracheal deviation.      Comments: Patient is nontender palpation over her cervical, thoracic, lumbar spines. There is no step-offs, no deformities.  Cardiovascular:      Comments: No JVD noted. Heart sounds are normal. Regular rate rate and rhythm. Symmetric strong distal pulses.  Pulmonary:      Effort: Pulmonary effort is normal.      Breath sounds: Normal breath sounds.   Chest:      Chest wall: No tenderness.   Abdominal:      Comments: No external signs of trauma noted on the abdomen/pelvis. Patient is nontender palpation of the abdomen. There is no rebound, guarding, CVA tenderness. Abdomen is nondistended. Pelvis stable, nttp.     Musculoskeletal:      Comments: Right upper extremity has full range of motion without pain. There is no tenderness palpation noted. Patient has no external signs of trauma. Patient is neurovascularly intact in this extremity. Left upper extremity has full range of motion without pain. There is no tenderness palpation noted. Patient has no external signs of trauma. Patient is neurovascularly intact in this extremity. Right lower extremity has full range of motion without pain. There is no tenderness palpation noted. Patient has no external signs of trauma. Patient is neurovascularly intact in this extremity. Left Lower  extremity has full range of motion without pain. There is no tenderness palpation noted. Patient has no external signs of trauma. Patient is neurovascularly intact in this extremity.     Skin:     Comments: No external signs of trauma.     Neurological:      Comments: Alert and oriented ×3. Normal mental status  exam. Normal cranial nerves II through XII. Normal sensation and strength throughout. Normal cerebellar exam. GCS 15.   Psychiatric:         Behavior: Behavior normal.         Judgment: Judgment normal.         Results Reviewed       None            CT head without contrast   Final Interpretation by Pallav N Shah, MD (05/31 1405)      No intracranial hemorrhage or calvarial fracture.      Mild chronic microangiopathic changes.                        Resident: PABLO Medina I, the attending radiologist, have reviewed the images and agree with the final report above.      Workstation performed: [id number]         CT cervical spine without contrast   Final Interpretation by Giovani Wilcox MD (05/31 4166)      1.  No acute cervical spine fracture or traumatic malalignment.   2.  Small left pleural effusion, new from 4/23/2024                  Workstation performed: [id number]             Procedures    ED Medication and Procedure Management   Prior to Admission Medications   Prescriptions Last Dose Informant Patient Reported? Taking?   ARTIFICIAL TEAR OP   Yes No   Cholecalciferol (VITAMIN D) 2000 units CAPS  Outside Facility (Specify), Family Member No No   Sig: Take 1 capsule (2,000 Units total) by mouth daily   Multiple Vitamins-Minerals (PRESERVISION AREDS PO)  Outside Facility (Specify), Family Member Yes No   Sig: Take by mouth.   amiodarone 200 mg tablet   No No   Sig: Take 1 tablet (200 mg total) by mouth 2 (two) times a day for 7 days, THEN 1 tablet (200 mg total) daily.   amiodarone 200 mg tablet   No No   Sig: Take 1 tablet (200 mg total) by mouth 2 (two) times a day for 7 days, THEN 1 tablet (200 mg total) daily.   aspirin 81 mg chewable tablet   No No   Sig: Chew 1 tablet (81 mg total) daily   metoprolol tartrate (LOPRESSOR) 25 mg tablet   No No   Sig: Take 1 tablet (25 mg total) by mouth 2 (two) times a day   metoprolol tartrate (LOPRESSOR) 25 mg tablet   No No   Sig: Take 1 tablet (25 mg total) by mouth  every 12 (twelve) hours   senna-docusate sodium (SENOKOT-S) 8.6-50 mg per tablet  Outside Facility (Specify), Family Member Yes No   Sig: Take 1 tablet by mouth daily As needed   vitamin B-12 (VITAMIN B-12) 500 mcg tablet  Outside Facility (Specify), Family Member Yes No   Sig: Take 500 mcg by mouth daily      Facility-Administered Medications: None     Current Discharge Medication List        CONTINUE these medications which have NOT CHANGED    Details   amiodarone 200 mg tablet Take 1 tablet (200 mg total) by mouth 2 (two) times a day for 7 days, THEN 1 tablet (200 mg total) daily.  Qty: 74 tablet, Refills: 0    Associated Diagnoses: Tachycardia      amiodarone 200 mg tablet Take 1 tablet (200 mg total) by mouth 2 (two) times a day for 7 days, THEN 1 tablet (200 mg total) daily.  Qty: 44 tablet, Refills: 0    Associated Diagnoses: Flutter-fibrillation (HCC)      ARTIFICIAL TEAR OP       aspirin 81 mg chewable tablet Chew 1 tablet (81 mg total) daily  Qty: 90 tablet, Refills: 3    Associated Diagnoses: Systolic hypertension      Cholecalciferol (VITAMIN D) 2000 units CAPS Take 1 capsule (2,000 Units total) by mouth daily  Refills: 0    Associated Diagnoses: Vitamin D deficiency      !! metoprolol tartrate (LOPRESSOR) 25 mg tablet Take 1 tablet (25 mg total) by mouth 2 (two) times a day  Qty: 60 tablet, Refills: 0    Associated Diagnoses: Tachycardia      !! metoprolol tartrate (LOPRESSOR) 25 mg tablet Take 1 tablet (25 mg total) by mouth every 12 (twelve) hours  Qty: 60 tablet, Refills: 0    Associated Diagnoses: Flutter-fibrillation (HCC)      Multiple Vitamins-Minerals (PRESERVISION AREDS PO) Take by mouth.      senna-docusate sodium (SENOKOT-S) 8.6-50 mg per tablet Take 1 tablet by mouth daily As needed      vitamin B-12 (VITAMIN B-12) 500 mcg tablet Take 500 mcg by mouth daily       !! - Potential duplicate medications found. Please discuss with provider.        No discharge procedures on file.  ED SEPSIS  DOCUMENTATION   Time reflects when diagnosis was documented in both MDM as applicable and the Disposition within this note       Time User Action Codes Description Comment    5/31/2025  2:14 PM Cullen Osman Add [S09.90XA] Closed head injury, initial encounter     5/31/2025  2:15 PM Cullen Osman Add [W19.XXXA] Fall from standing, initial encounter     5/31/2025  2:15 PM Cullen Osman Add [R68.89] Forgetfulness     5/31/2025  2:15 PM Cullen Osman Add [Z95.0] Pacemaker ( Feb 2021 )     5/31/2025  2:15 PM Cullen Osman Add [I10] Hypertension                    [1]   Past Medical History:  Diagnosis Date    Aortic regurgitation     Constipation     Discoid lupus erythematosus     Dysphagia     Esophagitis     Forgetfulness     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    Transient elevated blood pressure     last assessed: 5/16/2017    Weight loss    [2]   Past Surgical History:  Procedure Laterality Date    APPENDECTOMY      CATARACT EXTRACTION      CHOLECYSTECTOMY      ESOPHAGOGASTRODUODENOSCOPY  06/2013    diagnostic- neg id have dilatation    EYE SURGERY      HYSTERECTOMY      LAPAROTOMY N/A 11/3/2023    Procedure: LAPAROTOMY EXPLORATORY, ILEOCECECTOMY;  Surgeon: Katlyn Fleming MD;  Location: [facility];  Service: General    NE ESOPHAGOGASTRODUODENOSCOPY TRANSORAL DIAGNOSTIC N/A 9/16/2016    Procedure: ESOPHAGOGASTRODUODENOSCOPY (EGD);  Surgeon: Vladislav Garcia MD;  Location: [facility];  Service: Gastroenterology    REPLACEMENT TOTAL KNEE Left 01/2007   [3]   Family History  Problem Relation Name Age of Onset    Diabetes Mother      Coronary artery disease Father     [4]   Social History  Tobacco Use    Smoking status: Never    Smokeless tobacco: Never   Vaping Use    Vaping status: Never Used   Substance Use Topics    Alcohol use: No    Drug use: No        Cullen Osman MD  05/31/25 7688

## 2025-06-01 ENCOUNTER — APPOINTMENT (EMERGENCY)
Dept: RADIOLOGY | Age: OVER 89
DRG: 291 | End: 2025-06-01
Payer: MEDICARE

## 2025-06-01 ENCOUNTER — OFFICE VISIT (OUTPATIENT)
Dept: URGENT CARE | Age: OVER 89
End: 2025-06-01
Payer: MEDICARE

## 2025-06-01 VITALS
TEMPERATURE: 98.1 F | HEART RATE: 82 BPM | DIASTOLIC BLOOD PRESSURE: 74 MMHG | OXYGEN SATURATION: 99 % | SYSTOLIC BLOOD PRESSURE: 169 MMHG | RESPIRATION RATE: 20 BRPM

## 2025-06-01 DIAGNOSIS — R07.81 RIB PAIN: Primary | ICD-10-CM

## 2025-06-01 DIAGNOSIS — R07.81 RIB PAIN: ICD-10-CM

## 2025-06-01 PROCEDURE — 71101 X-RAY EXAM UNILAT RIBS/CHEST: CPT

## 2025-06-01 PROCEDURE — 99213 OFFICE O/P EST LOW 20 MIN: CPT | Performed by: STUDENT IN AN ORGANIZED HEALTH CARE EDUCATION/TRAINING PROGRAM

## 2025-06-01 PROCEDURE — G0463 HOSPITAL OUTPT CLINIC VISIT: HCPCS | Performed by: STUDENT IN AN ORGANIZED HEALTH CARE EDUCATION/TRAINING PROGRAM

## 2025-06-01 RX ORDER — LIDOCAINE 50 MG/G
1 PATCH TOPICAL DAILY
Qty: 7 PATCH | Refills: 0 | Status: SHIPPED | OUTPATIENT
Start: 2025-06-01 | End: 2025-06-08

## 2025-06-01 RX ORDER — ACETAMINOPHEN 500 MG
500 TABLET ORAL EVERY 8 HOURS PRN
Qty: 30 TABLET | Refills: 0 | Status: SHIPPED | OUTPATIENT
Start: 2025-06-01 | End: 2025-06-08

## 2025-06-01 NOTE — PATIENT INSTRUCTIONS
Use lidocaine patch - apply 1 patch and may be left on for up 10 12 hours and then removed. Apply 1 patch every 24 hours. Tylenol 500 mg every 8 hours as needed for pain. Go to the ED for shortness of breath, fever, unrelenting pain.

## 2025-06-01 NOTE — PROGRESS NOTES
[facility]        NAME: Summer Blanton is a 89 y.o. female  : 1930    MRN: [id number]  DATE: Anabell 3, 2025  TIME: 8:43 AM    Assessment and Plan   Rib pain [R07.81]  1. Rib pain  XR ribs left w pa chest min 3 views    lidocaine (Lidoderm) 5 %    acetaminophen (TYLENOL) 500 mg tablet        No fracture seen on xray. Will treat symptomatically. ED precautions reviewed.    Addendum: Patient has left sided opacity, contacted and sent to ED.    Patient Instructions       Follow up with PCP in 3-5 days.  Proceed to  ER if symptoms worsen.    If tests have been performed at [facility], our office will contact you with results if changes need to be made to the care plan discussed with you at the visit.  You can review your full results on [facility] MyChart.    Chief Complaint     Chief Complaint   Patient presents with    Rib Injury     Was seen yesterday in the ER for an unwitnessed fall. Patient was cleared but this morning is complaining of left rib pain. She is a resident of [facility] and they suggested a xray of the left chest.          History of Present Illness       Chest Pain   This is a new problem. The current episode started today. The onset quality is gradual. The problem occurs intermittently. The problem has been gradually improving. Pain location: left sided lower ribcageanterolateral. Pertinent negatives include no abdominal pain, fever, hemoptysis, leg pain, shortness of breath or vomiting. The pain is aggravated by movement (palpation). She has tried nothing for the symptoms. The treatment provided no relief. Prior workup: none.       Review of Systems   Review of Systems   Constitutional:  Negative for fever.   Respiratory:  Negative for hemoptysis and shortness of breath.    Cardiovascular:  Positive for chest pain.   Gastrointestinal:  Negative for abdominal pain and vomiting.       As stated in HPI      Current Medications     Current Medications[1]    Current Allergies      Allergies as of 06/01/2025 - Reviewed 06/01/2025   Allergen Reaction Noted    Gabapentin  08/14/2018    Sertraline GI Intolerance 11/29/2019            The following portions of the patient's history were reviewed and updated as appropriate: allergies, current medications, past family history, past medical history, past social history, past surgical history and problem list.     Past Medical History[2]    Past Surgical History[3]    Family History[4]      Medications have been verified.        Objective   /74   Pulse 82   Temp 98.1 °F (36.7 °C) (Tympanic)   Resp 20   LMP  (LMP Unknown)   SpO2 99%   No LMP recorded (lmp unknown). Patient is postmenopausal.       Physical Exam     Physical Exam  Constitutional:       Appearance: She is well-developed.   HENT:      Head: Normocephalic and atraumatic.      Right Ear: External ear normal.      Left Ear: External ear normal.     Cardiovascular:      Rate and Rhythm: Normal rate.   Pulmonary:      Effort: Pulmonary effort is normal. No respiratory distress.      Breath sounds: Normal breath sounds.   Chest:      Chest wall: Tenderness (left-sided ribs 6-8 along mid clavicular to midaxillary) present.     Neurological:      General: No focal deficit present.      Mental Status: She is oriented to person, place, and time.     Psychiatric:         Speech: Speech normal.         Behavior: Behavior normal.                        [1] No current facility-administered medications for this visit.    Current Outpatient Medications:     acetaminophen (TYLENOL) 500 mg tablet, Take 1 tablet (500 mg total) by mouth every 8 (eight) hours as needed for mild pain or moderate pain for up to 7 days, Disp: 30 tablet, Rfl: 0    ARTIFICIAL TEAR OP, , Disp: , Rfl:     aspirin 81 mg chewable tablet, Chew 1 tablet (81 mg total) daily, Disp: 90 tablet, Rfl: 3    Cholecalciferol (VITAMIN D) 2000 units CAPS, Take 1 capsule (2,000 Units total) by mouth daily, Disp: , Rfl: 0    lidocaine  (Lidoderm) 5 %, Apply 1 patch topically daily over 12 hours for 7 days Remove & Discard patch within 12 hours or as directed by MD, Disp: 7 patch, Rfl: 0    metoprolol tartrate (LOPRESSOR) 25 mg tablet, Take 1 tablet (25 mg total) by mouth 2 (two) times a day, Disp: 60 tablet, Rfl: 0    metoprolol tartrate (LOPRESSOR) 25 mg tablet, Take 1 tablet (25 mg total) by mouth every 12 (twelve) hours, Disp: 60 tablet, Rfl: 0    Multiple Vitamins-Minerals (PRESERVISION AREDS PO), Take by mouth, Disp: , Rfl:     senna-docusate sodium (SENOKOT-S) 8.6-50 mg per tablet, Take 1 tablet by mouth in the morning. As needed., Disp: , Rfl:     vitamin B-12 (VITAMIN B-12) 500 mcg tablet, Take 500 mcg by mouth in the morning. (Patient not taking: Reported on 6/2/2025), Disp: , Rfl:     amiodarone 200 mg tablet, Take 1 tablet (200 mg total) by mouth 2 (two) times a day for 7 days, THEN 1 tablet (200 mg total) daily., Disp: 74 tablet, Rfl: 0    amiodarone 200 mg tablet, Take 1 tablet (200 mg total) by mouth 2 (two) times a day for 7 days, THEN 1 tablet (200 mg total) daily. (Patient taking differently: Take 1 tablet (200 mg total) by mouth daily), Disp: 44 tablet, Rfl: 0    loperamide (IMODIUM) 2 mg capsule, Take 2 mg by mouth 3 (three) times a day, Disp: , Rfl:     ondansetron (ZOFRAN) 4 mg tablet, Take 4 mg by mouth every 8 (eight) hours as needed for nausea or vomiting, Disp: , Rfl:     risperiDONE (RisperDAL) 0.25 mg tablet, Take 0.25 mg by mouth daily, Disp: , Rfl:     Facility-Administered Medications Ordered in Other Visits:     acetaminophen (TYLENOL) tablet 975 mg, 975 mg, Oral, Q8H PRN, Carlos A Hare PA-C    aluminum-magnesium hydroxide-simethicone (MAALOX) oral suspension 30 mL, 30 mL, Oral, Q6H PRN, Carlos A Hare PA-C    amiodarone tablet 200 mg, 200 mg, Oral, Daily, Carlos A Hare PA-C    aspirin chewable tablet 81 mg, 81 mg, Oral, Daily, Carlos A Hare PA-C    enoxaparin (LOVENOX) subcutaneous injection 40 mg, 40 mg, Subcutaneous,  Daily, Carlos A Hare PA-C    furosemide (LASIX) injection 40 mg, 40 mg, Intravenous, Daily, Carlos A Hare PA-C    metoprolol tartrate (LOPRESSOR) tablet 25 mg, 25 mg, Oral, Q12H JACOB, Carlos A Hare PA-C    ondansetron (ZOFRAN) injection 4 mg, 4 mg, Intravenous, Q6H PRN, Carlos A Hare PA-C    polyethylene glycol (MIRALAX) packet 17 g, 17 g, Oral, Daily PRN, Carlos A Hare PA-C    potassium chloride (Klor-Con M20) CR tablet 40 mEq, 40 mEq, Oral, Once, Carlos A Hare PA-C  [2]   Past Medical History:  Diagnosis Date    Aortic regurgitation     Constipation     Discoid lupus erythematosus     Dysphagia     Esophagitis     Forgetfulness     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    Transient elevated blood pressure     last assessed: 5/16/2017    Weight loss    [3]   Past Surgical History:  Procedure Laterality Date    APPENDECTOMY      CATARACT EXTRACTION      CHOLECYSTECTOMY      ESOPHAGOGASTRODUODENOSCOPY  06/2013    diagnostic- neg id have dilatation    EYE SURGERY      HYSTERECTOMY      LAPAROTOMY N/A 11/3/2023    Procedure: LAPAROTOMY EXPLORATORY, ILEOCECECTOMY;  Surgeon: Katlyn Fleming MD;  Location: [facility];  Service: General    [place] ESOPHAGOGASTRODUODENOSCOPY TRANSORAL DIAGNOSTIC N/A 9/16/2016    Procedure: ESOPHAGOGASTRODUODENOSCOPY (EGD);  Surgeon: Vladislav Garcia MD;  [facility];  Service: Gastroenterology    REPLACEMENT TOTAL KNEE Left 01/2007   [4]   Family History  Problem Relation Name Age of Onset    Diabetes Mother      Coronary artery disease Father

## 2025-06-02 ENCOUNTER — HOSPITAL ENCOUNTER (INPATIENT)
Facility: HOSPITAL | Age: OVER 89
LOS: 3 days | Discharge: HOME HEALTH | DRG: 291 | End: 2025-06-06
Attending: EMERGENCY MEDICINE
Payer: MEDICARE

## 2025-06-02 ENCOUNTER — TELEPHONE (OUTPATIENT)
Dept: URGENT CARE | Age: OVER 89
End: 2025-06-02

## 2025-06-02 ENCOUNTER — APPOINTMENT (EMERGENCY)
Dept: CT IMAGING | Facility: HOSPITAL | Age: OVER 89
DRG: 291 | End: 2025-06-02
Payer: MEDICARE

## 2025-06-02 DIAGNOSIS — I48.91 FLUTTER-FIBRILLATION (HCC): ICD-10-CM

## 2025-06-02 DIAGNOSIS — F41.9 ANXIETY: ICD-10-CM

## 2025-06-02 DIAGNOSIS — I10 HYPERTENSION, UNSPECIFIED TYPE: ICD-10-CM

## 2025-06-02 DIAGNOSIS — R09.02 HYPOXIA: ICD-10-CM

## 2025-06-02 DIAGNOSIS — R68.89 FORGETFULNESS: ICD-10-CM

## 2025-06-02 DIAGNOSIS — I50.33 ACUTE ON CHRONIC HEART FAILURE WITH PRESERVED EJECTION FRACTION (HFPEF) (HCC): ICD-10-CM

## 2025-06-02 DIAGNOSIS — S20.212A RIB CONTUSION, LEFT, INITIAL ENCOUNTER: Primary | ICD-10-CM

## 2025-06-02 DIAGNOSIS — I48.92 FLUTTER-FIBRILLATION (HCC): ICD-10-CM

## 2025-06-02 DIAGNOSIS — J81.0 ACUTE PULMONARY EDEMA (HCC): ICD-10-CM

## 2025-06-02 DIAGNOSIS — R07.81 RIB PAIN: Primary | ICD-10-CM

## 2025-06-02 PROBLEM — E87.6 HYPOKALEMIA: Status: ACTIVE | Noted: 2025-06-02

## 2025-06-02 PROBLEM — Z95.0 PACEMAKER: Status: RESOLVED | Noted: 2021-02-11 | Resolved: 2025-06-02

## 2025-06-02 PROBLEM — G93.40 ACUTE ENCEPHALOPATHY: Status: RESOLVED | Noted: 2025-02-14 | Resolved: 2025-06-02

## 2025-06-02 PROBLEM — J90 BILATERAL PLEURAL EFFUSION: Status: ACTIVE | Noted: 2025-06-02

## 2025-06-02 PROBLEM — K56.609 INTESTINAL OBSTRUCTION (HCC): Status: RESOLVED | Noted: 2023-11-03 | Resolved: 2025-06-02

## 2025-06-02 PROBLEM — R32 URINARY INCONTINENCE: Status: RESOLVED | Noted: 2021-08-10 | Resolved: 2025-06-02

## 2025-06-02 PROBLEM — J96.01 ACUTE HYPOXIC RESPIRATORY FAILURE (HCC): Status: ACTIVE | Noted: 2025-06-02

## 2025-06-02 PROBLEM — N17.9 AKI (ACUTE KIDNEY INJURY) (HCC): Status: RESOLVED | Noted: 2025-02-14 | Resolved: 2025-06-02

## 2025-06-02 PROBLEM — G93.41 METABOLIC ENCEPHALOPATHY: Status: RESOLVED | Noted: 2025-02-19 | Resolved: 2025-06-02

## 2025-06-02 PROBLEM — R60.0 LEG EDEMA: Status: RESOLVED | Noted: 2023-12-21 | Resolved: 2025-06-02

## 2025-06-02 LAB
ABO GROUP BLD: NORMAL
ANION GAP SERPL CALCULATED.3IONS-SCNC: 10 MMOL/L (ref 4–13)
APTT PPP: 35 SECONDS (ref 23–34)
BASOPHILS # BLD AUTO: 0.04 THOUSANDS/ÂΜL (ref 0–0.1)
BASOPHILS NFR BLD AUTO: 1 % (ref 0–1)
BLD GP AB SCN SERPL QL: NEGATIVE
BUN SERPL-MCNC: 23 MG/DL (ref 5–25)
CALCIUM SERPL-MCNC: 9 MG/DL (ref 8.4–10.2)
CHLORIDE SERPL-SCNC: 107 MMOL/L (ref 96–108)
CO2 SERPL-SCNC: 27 MMOL/L (ref 21–32)
CREAT SERPL-MCNC: 0.85 MG/DL (ref 0.6–1.3)
EOSINOPHIL # BLD AUTO: 0.04 THOUSAND/ÂΜL (ref 0–0.61)
EOSINOPHIL NFR BLD AUTO: 1 % (ref 0–6)
ERYTHROCYTE [DISTWIDTH] IN BLOOD BY AUTOMATED COUNT: 14.6 % (ref 11.6–15.1)
GFR SERPL CREATININE-BSD FRML MDRD: 58 ML/MIN/1.73SQ M
GLUCOSE SERPL-MCNC: 97 MG/DL (ref 65–140)
HCT VFR BLD AUTO: 34.9 % (ref 34.8–46.1)
HGB BLD-MCNC: 11.5 G/DL (ref 11.5–15.4)
IMM GRANULOCYTES # BLD AUTO: 0.02 THOUSAND/UL (ref 0–0.2)
IMM GRANULOCYTES NFR BLD AUTO: 0 % (ref 0–2)
INR PPP: 1.24 (ref 0.85–1.19)
LYMPHOCYTES # BLD AUTO: 0.91 THOUSANDS/ÂΜL (ref 0.6–4.47)
LYMPHOCYTES NFR BLD AUTO: 12 % (ref 14–44)
MCH RBC QN AUTO: 30.8 PG (ref 26.8–34.3)
MCHC RBC AUTO-ENTMCNC: 33 G/DL (ref 31.4–37.4)
MCV RBC AUTO: 94 FL (ref 82–98)
MONOCYTES # BLD AUTO: 0.78 THOUSAND/ÂΜL (ref 0.17–1.22)
MONOCYTES NFR BLD AUTO: 11 % (ref 4–12)
NEUTROPHILS # BLD AUTO: 5.54 THOUSANDS/ÂΜL (ref 1.85–7.62)
NEUTS SEG NFR BLD AUTO: 75 % (ref 43–75)
NRBC BLD AUTO-RTO: 0 /100 WBCS
PLATELET # BLD AUTO: 166 THOUSANDS/UL (ref 149–390)
PMV BLD AUTO: 9.9 FL (ref 8.9–12.7)
POTASSIUM SERPL-SCNC: 3.1 MMOL/L (ref 3.5–5.3)
PROTHROMBIN TIME: 15.7 SECONDS (ref 12.3–15)
RBC # BLD AUTO: 3.73 MILLION/UL (ref 3.81–5.12)
RH BLD: POSITIVE
SODIUM SERPL-SCNC: 144 MMOL/L (ref 135–147)
SPECIMEN EXPIRATION DATE: NORMAL
WBC # BLD AUTO: 7.33 THOUSAND/UL (ref 4.31–10.16)

## 2025-06-02 PROCEDURE — 86850 RBC ANTIBODY SCREEN: CPT | Performed by: EMERGENCY MEDICINE

## 2025-06-02 PROCEDURE — 85730 THROMBOPLASTIN TIME PARTIAL: CPT | Performed by: EMERGENCY MEDICINE

## 2025-06-02 PROCEDURE — 99223 1ST HOSP IP/OBS HIGH 75: CPT

## 2025-06-02 PROCEDURE — 85025 COMPLETE CBC W/AUTO DIFF WBC: CPT | Performed by: EMERGENCY MEDICINE

## 2025-06-02 PROCEDURE — 74177 CT ABD & PELVIS W/CONTRAST: CPT

## 2025-06-02 PROCEDURE — 96365 THER/PROPH/DIAG IV INF INIT: CPT

## 2025-06-02 PROCEDURE — 80048 BASIC METABOLIC PNL TOTAL CA: CPT | Performed by: EMERGENCY MEDICINE

## 2025-06-02 PROCEDURE — 86901 BLOOD TYPING SEROLOGIC RH(D): CPT | Performed by: EMERGENCY MEDICINE

## 2025-06-02 PROCEDURE — 86900 BLOOD TYPING SEROLOGIC ABO: CPT | Performed by: EMERGENCY MEDICINE

## 2025-06-02 PROCEDURE — 85610 PROTHROMBIN TIME: CPT | Performed by: EMERGENCY MEDICINE

## 2025-06-02 PROCEDURE — 71260 CT THORAX DX C+: CPT

## 2025-06-02 PROCEDURE — 99285 EMERGENCY DEPT VISIT HI MDM: CPT | Performed by: EMERGENCY MEDICINE

## 2025-06-02 PROCEDURE — 36415 COLL VENOUS BLD VENIPUNCTURE: CPT | Performed by: EMERGENCY MEDICINE

## 2025-06-02 PROCEDURE — 99285 EMERGENCY DEPT VISIT HI MDM: CPT

## 2025-06-02 RX ORDER — ONDANSETRON 4 MG/1
1 TABLET, FILM COATED ORAL EVERY 8 HOURS PRN
COMMUNITY

## 2025-06-02 RX ORDER — ASPIRIN 81 MG/1
81 TABLET, CHEWABLE ORAL DAILY
Status: DISCONTINUED | OUTPATIENT
Start: 2025-06-03 | End: 2025-06-06 | Stop reason: HOSPADM

## 2025-06-02 RX ORDER — RISPERIDONE 0.25 MG/1
0.25 TABLET ORAL DAILY
COMMUNITY
End: 2025-06-06

## 2025-06-02 RX ORDER — AMIODARONE HYDROCHLORIDE 200 MG/1
200 TABLET ORAL DAILY
Status: DISCONTINUED | OUTPATIENT
Start: 2025-06-03 | End: 2025-06-06 | Stop reason: HOSPADM

## 2025-06-02 RX ORDER — METOPROLOL TARTRATE 25 MG/1
25 TABLET, FILM COATED ORAL EVERY 12 HOURS SCHEDULED
Status: DISCONTINUED | OUTPATIENT
Start: 2025-06-03 | End: 2025-06-05

## 2025-06-02 RX ORDER — ACETAMINOPHEN 10 MG/ML
1000 INJECTION, SOLUTION INTRAVENOUS ONCE
Status: COMPLETED | OUTPATIENT
Start: 2025-06-02 | End: 2025-06-02

## 2025-06-02 RX ORDER — POTASSIUM CHLORIDE 1500 MG/1
40 TABLET, EXTENDED RELEASE ORAL ONCE
Status: DISCONTINUED | OUTPATIENT
Start: 2025-06-02 | End: 2025-06-04

## 2025-06-02 RX ORDER — LOPERAMIDE HYDROCHLORIDE 2 MG/1
1 CAPSULE ORAL 4 TIMES DAILY PRN
COMMUNITY

## 2025-06-02 RX ORDER — MAGNESIUM HYDROXIDE/ALUMINUM HYDROXICE/SIMETHICONE 120; 1200; 1200 MG/30ML; MG/30ML; MG/30ML
30 SUSPENSION ORAL EVERY 6 HOURS PRN
Status: DISCONTINUED | OUTPATIENT
Start: 2025-06-02 | End: 2025-06-06 | Stop reason: HOSPADM

## 2025-06-02 RX ORDER — FUROSEMIDE 10 MG/ML
40 INJECTION INTRAMUSCULAR; INTRAVENOUS DAILY
Status: DISCONTINUED | OUTPATIENT
Start: 2025-06-02 | End: 2025-06-03

## 2025-06-02 RX ORDER — ACETAMINOPHEN 325 MG/1
975 TABLET ORAL EVERY 8 HOURS PRN
Status: DISCONTINUED | OUTPATIENT
Start: 2025-06-02 | End: 2025-06-06 | Stop reason: HOSPADM

## 2025-06-02 RX ORDER — ENOXAPARIN SODIUM 100 MG/ML
40 INJECTION SUBCUTANEOUS DAILY
Status: DISCONTINUED | OUTPATIENT
Start: 2025-06-03 | End: 2025-06-06 | Stop reason: HOSPADM

## 2025-06-02 RX ORDER — POLYETHYLENE GLYCOL 3350 17 G/17G
17 POWDER, FOR SOLUTION ORAL DAILY PRN
Status: DISCONTINUED | OUTPATIENT
Start: 2025-06-02 | End: 2025-06-06 | Stop reason: HOSPADM

## 2025-06-02 RX ORDER — ONDANSETRON 2 MG/ML
4 INJECTION INTRAMUSCULAR; INTRAVENOUS EVERY 6 HOURS PRN
Status: DISCONTINUED | OUTPATIENT
Start: 2025-06-02 | End: 2025-06-06 | Stop reason: HOSPADM

## 2025-06-02 RX ADMIN — IOHEXOL 100 ML: 350 INJECTION, SOLUTION INTRAVENOUS at 16:50

## 2025-06-02 RX ADMIN — SODIUM CHLORIDE 250 ML: 0.9 INJECTION, SOLUTION INTRAVENOUS at 16:06

## 2025-06-02 RX ADMIN — ACETAMINOPHEN 1000 MG: 10 INJECTION INTRAVENOUS at 16:08
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
## 2025-06-02 NOTE — TELEPHONE ENCOUNTER
Xray of left ribs- Limited for rib fracture due to lack of oblique images and overlapping densities. No gross fracture is seen though fracture cannot be excluded on these images.     Small left pleural effusion and overlying opacity, atelectasis versus pneumonia. Hemothorax is not excluded.     This would be better evaluated with CT if there is continued clinical concern.    Patient was called and Vm was left to call us as soon as she gets this.    Patient needs to go to ED for further evaluation.    Will try again in the next hour

## 2025-06-02 NOTE — DISCHARGE INSTRUCTIONS
Call your family doctor, let them know you were in the ER, you should be seen in the office for further evaluation and management if your discomfort persists.     You should use the incentive spirometer every 2 hours while awake for the next 2 weeks.    Return to the ER for worsening shortness of breath.

## 2025-06-03 ENCOUNTER — APPOINTMENT (INPATIENT)
Dept: NON INVASIVE DIAGNOSTICS | Facility: HOSPITAL | Age: OVER 89
DRG: 291 | End: 2025-06-03
Payer: MEDICARE

## 2025-06-03 PROBLEM — I50.33 ACUTE ON CHRONIC HEART FAILURE WITH PRESERVED EJECTION FRACTION (HFPEF) (HCC): Status: ACTIVE | Noted: 2025-06-03

## 2025-06-03 PROBLEM — Z86.79 HISTORY OF COMPLETE HEART BLOCK: Status: ACTIVE | Noted: 2025-06-03

## 2025-06-03 PROBLEM — I50.32 CHRONIC HEART FAILURE WITH PRESERVED EJECTION FRACTION (HFPEF) (HCC): Status: ACTIVE | Noted: 2025-06-03

## 2025-06-03 LAB
ANION GAP SERPL CALCULATED.3IONS-SCNC: 9 MMOL/L (ref 4–13)
AORTIC ROOT: 4 CM
AORTIC VALVE MEAN VELOCITY: 8.3 M/S
ASCENDING AORTA: 4.4 CM
AV AREA BY CONTINUOUS VTI: 2.4 CM2
AV AREA PEAK VELOCITY: 2.4 CM2
AV LVOT MEAN GRADIENT: 3 MMHG
AV LVOT PEAK GRADIENT: 5 MMHG
AV MEAN PRESS GRAD SYS DOP V1V2: 3 MMHG
AV ORIFICE AREA US: 2.35 CM2
AV PEAK GRADIENT: 7 MMHG
AV REGURGITATION PRESSURE HALF TIME: 714 MS
AV VELOCITY RATIO: 0.83
AV VMAX SYS DOP: 1.29 M/S
BNP SERPL-MCNC: 546 PG/ML (ref 0–100)
BSA FOR ECHO PROCEDURE: 1.64 M2
BUN SERPL-MCNC: 16 MG/DL (ref 5–25)
CALCIUM SERPL-MCNC: 8.1 MG/DL (ref 8.4–10.2)
CHLORIDE SERPL-SCNC: 106 MMOL/L (ref 96–108)
CO2 SERPL-SCNC: 25 MMOL/L (ref 21–32)
CREAT SERPL-MCNC: 0.65 MG/DL (ref 0.6–1.3)
DOP CALC AO VTI: 27.33 CM
DOP CALC LVOT AREA: 2.83 CM2
DOP CALC LVOT CARDIAC INDEX: 1.96 L/MIN/M2
DOP CALC LVOT CARDIAC OUTPUT: 3.22 L/MIN
DOP CALC LVOT DIAMETER: 1.9 CM
DOP CALC LVOT PEAK VEL VTI: 22.66 CM
DOP CALC LVOT PEAK VEL: 1.09 M/S
DOP CALC LVOT STROKE INDEX: 37.2 ML/M2
DOP CALC LVOT STROKE VOLUME: 64.22
E WAVE DECELERATION TIME: 305 MS
E/A RATIO: 1.49
ERYTHROCYTE [DISTWIDTH] IN BLOOD BY AUTOMATED COUNT: 14.6 % (ref 11.6–15.1)
GFR SERPL CREATININE-BSD FRML MDRD: 76 ML/MIN/1.73SQ M
GLUCOSE P FAST SERPL-MCNC: 84 MG/DL (ref 65–99)
GLUCOSE SERPL-MCNC: 84 MG/DL (ref 65–140)
HCT VFR BLD AUTO: 32.7 % (ref 34.8–46.1)
HGB BLD-MCNC: 10.6 G/DL (ref 11.5–15.4)
LEFT VENTRICLE DIASTOLIC VOLUME (MOD BIPLANE): 88 ML
LEFT VENTRICLE DIASTOLIC VOLUME INDEX (MOD BIPLANE): 53.7 ML/M2
LEFT VENTRICLE SYSTOLIC VOLUME (MOD BIPLANE): 49 ML
LEFT VENTRICLE SYSTOLIC VOLUME INDEX (MOD BIPLANE): 29.9 ML/M2
LV EF BIPLANE MOD: 45 %
LV EF US.2D.A4C+ESTIMATED: 42 %
MCH RBC QN AUTO: 30.6 PG (ref 26.8–34.3)
MCHC RBC AUTO-ENTMCNC: 32.4 G/DL (ref 31.4–37.4)
MCV RBC AUTO: 95 FL (ref 82–98)
MV E'TISSUE VEL-LAT: 5 CM/S
MV E'TISSUE VEL-SEP: 4 CM/S
MV PEAK A VEL: 0.47 M/S
MV PEAK E VEL: 70 CM/S
MV STENOSIS PRESSURE HALF TIME: 88 MS
MV VALVE AREA P 1/2 METHOD: 2.5
PLATELET # BLD AUTO: 154 THOUSANDS/UL (ref 149–390)
PMV BLD AUTO: 10.1 FL (ref 8.9–12.7)
POTASSIUM SERPL-SCNC: 3 MMOL/L (ref 3.5–5.3)
RBC # BLD AUTO: 3.46 MILLION/UL (ref 3.81–5.12)
SINOTUBULAR JUNCTION: 3.2 CM
SL CV AV DECELERATION TIME RETROGRADE: 2463 MS
SL CV AV PEAK GRADIENT RETROGRADE: 85 MMHG
SL CV SINUS OF VALSALVA 2D: 4 CM
SODIUM SERPL-SCNC: 140 MMOL/L (ref 135–147)
STJ: 3.2 CM
TR MAX PG: 52 MMHG
TR PEAK VELOCITY: 3.6 M/S
TRICUSPID VALVE PEAK REGURGITATION VELOCITY: 3.62 M/S
WBC # BLD AUTO: 7.62 THOUSAND/UL (ref 4.31–10.16)

## 2025-06-03 PROCEDURE — 93308 TTE F-UP OR LMTD: CPT | Performed by: INTERNAL MEDICINE

## 2025-06-03 PROCEDURE — 85027 COMPLETE CBC AUTOMATED: CPT

## 2025-06-03 PROCEDURE — 36415 COLL VENOUS BLD VENIPUNCTURE: CPT

## 2025-06-03 PROCEDURE — 99233 SBSQ HOSP IP/OBS HIGH 50: CPT | Performed by: STUDENT IN AN ORGANIZED HEALTH CARE EDUCATION/TRAINING PROGRAM

## 2025-06-03 PROCEDURE — 93325 DOPPLER ECHO COLOR FLOW MAPG: CPT

## 2025-06-03 PROCEDURE — 83880 ASSAY OF NATRIURETIC PEPTIDE: CPT

## 2025-06-03 PROCEDURE — 93321 DOPPLER ECHO F-UP/LMTD STD: CPT | Performed by: INTERNAL MEDICINE

## 2025-06-03 PROCEDURE — 93321 DOPPLER ECHO F-UP/LMTD STD: CPT

## 2025-06-03 PROCEDURE — 99223 1ST HOSP IP/OBS HIGH 75: CPT | Performed by: INTERNAL MEDICINE

## 2025-06-03 PROCEDURE — 80048 BASIC METABOLIC PNL TOTAL CA: CPT

## 2025-06-03 PROCEDURE — 93308 TTE F-UP OR LMTD: CPT

## 2025-06-03 PROCEDURE — 93325 DOPPLER ECHO COLOR FLOW MAPG: CPT | Performed by: INTERNAL MEDICINE

## 2025-06-03 RX ORDER — HYDRALAZINE HYDROCHLORIDE 20 MG/ML
10 INJECTION INTRAMUSCULAR; INTRAVENOUS EVERY 4 HOURS PRN
Status: DISCONTINUED | OUTPATIENT
Start: 2025-06-03 | End: 2025-06-06 | Stop reason: HOSPADM

## 2025-06-03 RX ORDER — FUROSEMIDE 10 MG/ML
20 INJECTION INTRAMUSCULAR; INTRAVENOUS
Status: DISCONTINUED | OUTPATIENT
Start: 2025-06-03 | End: 2025-06-05

## 2025-06-03 RX ORDER — FUROSEMIDE 10 MG/ML
40 INJECTION INTRAMUSCULAR; INTRAVENOUS
Status: DISCONTINUED | OUTPATIENT
Start: 2025-06-03 | End: 2025-06-03

## 2025-06-03 RX ORDER — POTASSIUM CHLORIDE 1500 MG/1
40 TABLET, EXTENDED RELEASE ORAL EVERY 4 HOURS
Status: COMPLETED | OUTPATIENT
Start: 2025-06-03 | End: 2025-06-03

## 2025-06-03 RX ORDER — RISPERIDONE 0.25 MG/1
0.25 TABLET ORAL DAILY
Status: DISCONTINUED | OUTPATIENT
Start: 2025-06-04 | End: 2025-06-06 | Stop reason: HOSPADM

## 2025-06-03 RX ADMIN — METOPROLOL TARTRATE 25 MG: 25 TABLET, FILM COATED ORAL at 21:20

## 2025-06-03 RX ADMIN — METOPROLOL TARTRATE 25 MG: 25 TABLET, FILM COATED ORAL at 10:13

## 2025-06-03 RX ADMIN — ENOXAPARIN SODIUM 40 MG: 40 INJECTION SUBCUTANEOUS at 10:15

## 2025-06-03 RX ADMIN — AMIODARONE HYDROCHLORIDE 200 MG: 200 TABLET ORAL at 10:13

## 2025-06-03 RX ADMIN — ACETAMINOPHEN 975 MG: 325 TABLET, FILM COATED ORAL at 23:46

## 2025-06-03 RX ADMIN — FUROSEMIDE 40 MG: 10 INJECTION, SOLUTION INTRAVENOUS at 10:14

## 2025-06-03 RX ADMIN — POTASSIUM CHLORIDE 40 MEQ: 1500 TABLET, EXTENDED RELEASE ORAL at 10:14

## 2025-06-03 RX ADMIN — POTASSIUM CHLORIDE 40 MEQ: 1500 TABLET, EXTENDED RELEASE ORAL at 13:59

## 2025-06-03 RX ADMIN — FUROSEMIDE 20 MG: 10 INJECTION, SOLUTION INTRAVENOUS at 16:00

## 2025-06-03 RX ADMIN — ASPIRIN 81 MG CHEWABLE TABLET 81 MG: 81 TABLET CHEWABLE at 10:13

## 2025-06-03 NOTE — ASSESSMENT & PLAN NOTE
- TTE 02/17/25: LVEF 55%, probable DD, RV systolic function is low normal, moderate LAE, mild ARMANDO, mild AR, aortic valve sclerosis, moderate MAC, mild MR, mild to moderate TR  - BNP pending   - CT chest/abdomen/pelvis: Mild pulmonary edema type pattern with moderate pleural effusions   - Neurohormonal Blockade:   -- beta blocker: lopressor 25 mg BID  -- home diuretic: none   -- inpatient diuretic: IV lasix 20 mg BID

## 2025-06-03 NOTE — ASSESSMENT & PLAN NOTE
With frequent falls   CT noting age-indeterminate right 3rd-5th rib fractures    Plan:  Multimodal analgesia   Encourage OOB/spirometry

## 2025-06-03 NOTE — ASSESSMENT & PLAN NOTE
Found to be hypoxic, short of breath  Labs: K 3.1, WBC 7, Hgb 11.5  Imaging: CT chest age-indeterminate right anterior 3rd-5th rib fracture, mild pulmonary edema with bilateral pleural effusions   TTE 02/2025 LVEF 55%, biatrial dilation mild AR, mild MR, mild/mod TR  Unclear cause possible acute diastolic heart failure    Plan:  Diuresis: IV lasix 40mg daily  Check TTE   Respiratory hygiene, incentive spirometry, continuous pulse ox, wean oxygen as tolerated, encourage OOB  Cardiac diet, daily weights, I&O, monitor and replenish electrolytes

## 2025-06-03 NOTE — ASSESSMENT & PLAN NOTE
89-year-old female presented with shortness of breath   In the setting of pulmonary edema, pleural effusions  TTE 02/2025 LVEF 55%, biatrial dilation mild AR, mild MR, mild/mod TR  Unclear cause possible acute diastolic heart failure  Continue diuresis with IV Lasix  Limited 2D echo ordered  Cardiology consulted

## 2025-06-03 NOTE — ASSESSMENT & PLAN NOTE
- rate control: lopressor 25 mg BID  - rhythm control: amiodarone 200 mg daily   - AC: none, discontinued in 20234 due to frequent fall ans risk of internal bleeding. Take ASA 81 mg daily

## 2025-06-03 NOTE — ASSESSMENT & PLAN NOTE
Alert/oriented x2 on exam, ?history of dementia but no formal diagnosis    Plan:  Delirium precautions

## 2025-06-03 NOTE — CONSULTS
Consultation - Cardiology   Name: Summer Blanton 89 y.o. female I MRN: [id number]  Unit/Bed#: [place] [address] -01 I Date of Admission: 6/2/2025   Date of Service: 6/3/2025 I Hospital Day: 0   Inpatient consult to Cardiology  Consult performed by: Magaly Nelson PA-C  Consult ordered by: Shorty Blount MD        Physician Requesting Evaluation: Shorty Blount MD   Reason for Evaluation / Principal Problem: bilateral pleural effusions         TODAY (06/03/25):   Suspect acute HFpEF. Noted to have hypokalemia on labs. Agree with IV lasix 20 mg BID and can titrate the dose as long as K, kidney function, and BP remain stable. BNP pending. Keep K> 4 and Mg > 2  Will order a limited echo to assess function and for any progression of valvular disease.  BP mildly elevated. Suspect this will improve with diuresis over time.   Continue lopressor and amiodarone.  Further recommendations after echo   Strict I/O's and daily weights.       Assessment & Plan  Acute pulmonary edema (HCC)  Acute on chronic heart failure with preserved ejection fraction (HFpEF) (HCC)  Bilateral pleural effusion  - TTE 02/17/25: LVEF 55%, probable DD, RV systolic function is low normal, moderate LAE, mild ARMANDO, mild AR, aortic valve sclerosis, moderate MAC, mild MR, mild to moderate TR  - BNP pending   - CT chest/abdomen/pelvis: Mild pulmonary edema type pattern with moderate pleural effusions   - Neurohormonal Blockade:   -- beta blocker: lopressor 25 mg BID  -- home diuretic: none   -- inpatient diuretic: IV lasix 20 mg BID  Flutter-fibrillation (HCC)  - rate control: lopressor 25 mg BID  - rhythm control: amiodarone 200 mg daily   - AC: none, discontinued in 20234 due to frequent fall ans risk of internal bleeding. Take ASA 81 mg daily   History of complete heart block  - s/p [employer] DC PM in February 2021 with lower pacing rate set to 50 bpm  Rib pain  - recent fall with age-indeterminate right anterior third through fifth rib  fractures    Hypokalemia  - admission K 3.0  Hypoxia  - noted to be SOB and placed on 2-3L of oxyen  Forgetfulness    History Of Present Illness:  Summer Blanton is a 89 year old with PMH of atrial fibrillation/flutter not on AC, history of complete heart block s/p MDT DC PPM, and frequent falls who presented to  ED 06/02/25 complaining of rib pain. Prior to coming to the ED she was seen at [facility] ED 05/31/25 for an unwitnessed fall and felt medically stable to be discharged. After this visit, she was seen at [facility] urgent care 06/01/25 with increased rib pain and CXR showed small left pleural effusion. Given this findings, she was recommended ER evaluation. Initial workup in the ED with labs concerning for hypokalemia, CT scan with rib fractures and moderate effusions, and elevated blood pressures.     History provided by her daughters. She has baseline forgetfulness on exam and resides at [facility]. Daughter states over the past few weeks they had noticed she had trouble fitting her shoes on and her upper extremities seemed more swollen than normal. ED note reviewed as she was noted to be hypoxic walking around and placed on oxygen. She otherwise does not complain of any other symptoms. Family notes she does not have the best appetite and does not drink a lot of fluid throughput the days. She admits that she does get excited/anxious sometimes which causes he to have SOB. Denies current chest pain. Per chart review she has about a 10 lb weight gain since her admission in February.     Rhythm History: paroxysmal atrial fibrillation/flutter     Primary Cardiologist: Dr. Valencia     ROS:  Review of Systems   Constitutional:  Positive for unexpected weight change. Negative for chills.   Respiratory:  Positive for shortness of breath.    Cardiovascular:  Negative for chest pain and leg swelling.   Gastrointestinal:  Negative for abdominal distention and abdominal pain.   Genitourinary:  Negative  for difficulty urinating.   Neurological:  Negative for dizziness and light-headedness.        Past Medical History:        Past Medical History[1] Past Surgical History[2]    Family History:     Family History[3]     Social History:       Social History[4]     Allergy:        Allergies[5]    Medications:       Prior to Admission medications    Medication Sig Start Date End Date Taking? Authorizing Provider   acetaminophen (TYLENOL) 500 mg tablet Take 1 tablet (500 mg total) by mouth every 8 (eight) hours as needed for mild pain or moderate pain for up to 7 days 6/1/25 6/8/25 Yes Enmanuel Marin, DO   amiodarone 200 mg tablet Take 1 tablet (200 mg total) by mouth 2 (two) times a day for 7 days, THEN 1 tablet (200 mg total) daily.  Patient taking differently: Take 1 tablet (200 mg total) by mouth daily 2/18/25 6/2/25 Yes Bill Alcazar, DO   ARTIFICIAL TEAR OP    Yes Historical Provider, MD   aspirin 81 mg chewable tablet Chew 1 tablet (81 mg total) daily 11/18/24  Yes James Valencia, DO   Cholecalciferol (VITAMIN D) 2000 units CAPS Take 1 capsule (2,000 Units total) by mouth daily 8/15/18  Yes London Farias, DO   loperamide (IMODIUM) 2 mg capsule Take 2 mg by mouth 3 (three) times a day   Yes Historical Provider, MD   metoprolol tartrate (LOPRESSOR) 25 mg tablet Take 1 tablet (25 mg total) by mouth 2 (two) times a day 2/18/25  Yes Bill Alcazar,    metoprolol tartrate (LOPRESSOR) 25 mg tablet Take 1 tablet (25 mg total) by mouth every 12 (twelve) hours 2/18/25  Yes Bill Alcazar, DO   Multiple Vitamins-Minerals (PRESERVISION AREDS PO) Take by mouth   Yes Historical Provider, MD   ondansetron (ZOFRAN) 4 mg tablet Take 4 mg by mouth every 8 (eight) hours as needed for nausea or vomiting   Yes Historical Provider, MD   risperiDONE (RisperDAL) 0.25 mg tablet Take 0.25 mg by mouth daily   Yes Historical Provider, MD   senna-docusate sodium (SENOKOT-S) 8.6-50 mg per tablet Take 1 tablet by mouth in the morning. As  needed.   Yes Historical Provider, MD   amiodarone 200 mg tablet Take 1 tablet (200 mg total) by mouth 2 (two) times a day for 7 days, THEN 1 tablet (200 mg total) daily. 2/18/25 3/27/25  Bill Alcazar,    lidocaine (Lidoderm) 5 % Apply 1 patch topically daily over 12 hours for 7 days Remove & Discard patch within 12 hours or as directed by MD 6/1/25 6/8/25  Enmanuel Marin,    vitamin B-12 (VITAMIN B-12) 500 mcg tablet Take 500 mcg by mouth in the morning.  Patient not taking: Reported on 6/2/2025    Historical Provider, MD       Vitals:    06/03/25 0400 06/03/25 0500 06/03/25 1011 06/03/25 1058   BP: 165/73 151/65 (!) 178/80 (!) 171/85   BP Location: Right arm Right arm Right arm    Patient Position:       Pulse: 56 55 (!) 54 55   Resp: 16 16 16 20   Temp:    (!) 97.2 °F (36.2 °C)   TempSrc:       SpO2: 96% 95% 95% 93%   Weight:       Height:           Exam:  Physical Exam  Vitals and nursing note reviewed.   Constitutional:       General: She is not in acute distress.     Appearance: Normal appearance. She is not ill-appearing.      Interventions: Nasal cannula in place.   HENT:      Head: Normocephalic and atraumatic.      Nose: Nose normal.      Mouth/Throat:      Mouth: Mucous membranes are moist.     Eyes:      General: No scleral icterus.    Neck:      Vascular: No JVD.     Cardiovascular:      Rate and Rhythm: Normal rate and regular rhythm.      Pulses:           Radial pulses are 2+ on the right side and 2+ on the left side.      Heart sounds: No murmur heard.  Pulmonary:      Effort: Pulmonary effort is normal. No respiratory distress.      Breath sounds: No stridor. Examination of the right-lower field reveals decreased breath sounds. Examination of the left-lower field reveals decreased breath sounds. Decreased breath sounds present. No wheezing, rhonchi or rales.   Abdominal:      General: Abdomen is flat.     Musculoskeletal:         General: No swelling. Normal range of motion.      Cervical  back: Normal range of motion.      Right lower leg: No edema.      Left lower leg: No edema.     Skin:     General: Skin is warm and dry.      Capillary Refill: Capillary refill takes less than 2 seconds.     Neurological:      Mental Status: She is alert. Mental status is at baseline.     Psychiatric:         Thought Content: Thought content normal.         Cognition and Memory: Memory is impaired.          DATA:      Holter:  Device interrogation 04/14/25   MDT DC PM - ACTIVE SYSTEM IS MRI CONDITIONAL   CARELINK TRANSMISSION: BATTERY VOLTAGE ADEQUATE (6.8 YRS). AP: 78%. : 87.6% (MVP-OFF~AVB). ALL AVAILABLE LEAD PARAMETERS WITHIN NORMAL LIMITS. 694 AT/AF EPISODES W/ AVAIL EGMS SHOWING AF, MAX DURATION 22 MINS 21 SECS. AF BURDEN: 5.4%. PT DOES NOT TAKE AC PER DR. QUINONEZ NOTE off of anticoagulation since November 2023 due to multiple falls including head injuries. PT TAKES AMIODARONE, METOPROLOL TART, ASA 81MG. EF: 55% (ECHO 2/17/25). NORMAL DEVICE FUNCTION.     Echocardiogram:  02/17/25    Left Ventricle: Left ventricular cavity size is normal. There is mild to moderate concentric hypertrophy. The left ventricular ejection fraction is 55% by visual estimation. Systolic function is normal. There is probable diastolic dysfunction.  Diastolic staging is precluded by the presence of arrhythmia.    Right Ventricle: Right ventricular cavity size is borderline dilated. Systolic function is low normal. A pacer wire is present.    Left Atrium: The atrium is moderately dilated.    Right Atrium: The atrium is mildly dilated.    Aortic Valve: There is mild regurgitation. There is aortic valve sclerosis.    Mitral Valve: There is moderate calcification. The leaflets exhibit normal mobility. There is mild annular calcification. There is mild regurgitation.    Tricuspid Valve: There is mild to moderate regurgitation. Pulmonary artery systolic pressures are estimated at 40 mmHg.    Weights:    Wt Readings from Last 10 Encounters:    06/03/25 63 kg (139 lb)   05/31/25 63.2 kg (139 lb 5.3 oz)   02/17/25 58.1 kg (128 lb)   11/18/24 57.2 kg (126 lb)   11/05/24 55.2 kg (121 lb 9.6 oz)   10/04/24 54.4 kg (120 lb)   05/02/24 54.4 kg (120 lb)   04/23/24 59.3 kg (130 lb 11.7 oz)   12/21/23 56.2 kg (124 lb)   11/22/23 63 kg (139 lb)            Lab Studies:               Results from last 7 days   Lab Units 06/03/25  0556 06/02/25  1605   WBC Thousand/uL 7.62 7.33   HEMOGLOBIN g/dL 10.6* 11.5   HEMATOCRIT % 32.7* 34.9   PLATELETS Thousands/uL 154 166     Results from last 7 days   Lab Units 06/03/25  0556 06/02/25  1605   POTASSIUM mmol/L 3.0* 3.1*   CHLORIDE mmol/L 106 107   CO2 mmol/L 25 27   BUN mg/dL 16 23   CREATININE mg/dL 0.65 0.85   CALCIUM mg/dL 8.1* 9.0       Magaly Nelson PA-C               [1]   Past Medical History:  Diagnosis Date    Aortic regurgitation     Constipation     Discoid lupus erythematosus     Dysphagia     Esophagitis     Forgetfulness     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    Transient elevated blood pressure     last assessed: 5/16/2017    Weight loss    [2]   Past Surgical History:  Procedure Laterality Date    APPENDECTOMY      CATARACT EXTRACTION      CHOLECYSTECTOMY      ESOPHAGOGASTRODUODENOSCOPY  06/2013    diagnostic- neg id have dilatation    EYE SURGERY      HYSTERECTOMY      LAPAROTOMY N/A 11/3/2023    Procedure: LAPAROTOMY EXPLORATORY, ILEOCECECTOMY;  Surgeon: Katlyn Fleming MD;  Location: [facility];  Service: General    IN ESOPHAGOGASTRODUODENOSCOPY TRANSORAL DIAGNOSTIC N/A 9/16/2016    Procedure: ESOPHAGOGASTRODUODENOSCOPY (EGD);  Surgeon: Vladislav Garcia MD;  Location: [facility];  Service: Gastroenterology    REPLACEMENT TOTAL KNEE Left 01/2007   [3]   Family History  Problem Relation Name Age of Onset    Diabetes Mother      Coronary artery disease Father     [4]   Social History  Socioeconomic History    Marital status: /Civil Union   Tobacco Use    Smoking status: Never    Smokeless tobacco:  Never   Vaping Use    Vaping status: Never Used   Substance and Sexual Activity    Alcohol use: Not Currently    Drug use: No    Sexual activity: Not Currently     Social Drivers of Health     Food Insecurity: No Food Insecurity (6/3/2025)    Nursing - Inadequate Food Risk Classification     Ran Out of Food in the Last Year: Never true   Transportation Needs: No Transportation Needs (6/3/2025)    Nursing - Transportation Risk Classification     Lack of Transportation: No   Intimate Partner Violence: Unknown (6/3/2025)    Nursing IPS     Physically Hurt by Someone: No     Hurt or Threatened by Someone: No   Housing Stability: Unknown (6/3/2025)    Nursing: Inadequate Housing Risk Classification     Unable to Pay for Housing in the Last Year: No     Has Housin   [5]   Allergies  Allergen Reactions    Gabapentin      dreams    Sertraline GI Intolerance     ' felt like a zombie'

## 2025-06-03 NOTE — ASSESSMENT & PLAN NOTE
With frequent falls   CT without acute fractures noted, age-indeterminate rib fracture on right 3rd-5th  Continue Tylenol as needed

## 2025-06-03 NOTE — H&P
H&P - Hospitalist   Name: Summer Blanton 89 y.o. female I MRN: [id number]  Unit/Bed#: [facility]-02 I Date of Admission: 6/2/2025   Date of Service: 6/2/2025 I Hospital Day: 0     Assessment & Plan  Acute pulmonary edema (HCC)  Hypoxia  Found to be hypoxic, short of breath  Labs: K 3.1, WBC 7, Hgb 11.5  Imaging: CT chest age-indeterminate right anterior 3rd-5th rib fracture, mild pulmonary edema with bilateral pleural effusions   TTE 02/2025 LVEF 55%, biatrial dilation mild AR, mild MR, mild/mod TR  Unclear cause possible acute diastolic heart failure    Plan:  Diuresis: IV lasix 40mg daily  Check TTE   Respiratory hygiene, incentive spirometry, continuous pulse ox, wean oxygen as tolerated, encourage OOB  Cardiac diet, daily weights, I&O, monitor and replenish electrolytes   Bilateral pleural effusion  Suspect transudative secondary to possible diastolic heart failure    Plan:  Monitor with diuresis   May require thoracentesis   Flutter-fibrillation (HCC)  Noted, intermittent transient bradycardia   EKG reviewed v-paced HR 80    Plan:  Continue PTA amiodarone 200mg daily, lopressor 25mg BID  AC previously d'c due to falls/head injury now on ASA 81mg daily  Forgetfulness  Alert/oriented x2 on exam, ?history of dementia but no formal diagnosis    Plan:  Delirium precautions  Hypokalemia  Noted     Plan:  Replenish and monitor BMP  Rib pain  With frequent falls   CT noting age-indeterminate right 3rd-5th rib fractures    Plan:  Multimodal analgesia   Encourage OOB/spirometry    VTE Pharmacologic Prophylaxis: VTE Score: 5 High Risk (Score >/= 5) - Pharmacological DVT Prophylaxis Ordered: enoxaparin (Lovenox). Sequential Compression Devices Ordered.  Code Status: Prior   Discussion with family:     Anticipated Length of Stay: Patient will be admitted on an observation basis with an anticipated length of stay of less than 2 midnights secondary to hypoxia.    History of Present Illness   Chief Complaint:   Chief Complaint    Patient presents with    Rib Pain     Pt had fall on Saturday and went to urgent care. Instructed to come to ED for further testing d/t rib pain.      Summer Blanton is a 89 y.o. female with a PMH of forgetfullness, HTN, AFlutter who presents with rib pain.   History obtained from patient, chart review and discussion with ED attending. She presents tonight for evaluation of rib pain. Had a fall on Saturday and was evaluated at urgent care but continued to have pain prompting evaluation. Continues to have intermittent pain on her chest but improved with pain medications. Does get occasionally short of breath when walking around. Was found to be hypoxic while walking in the ED. She is oriented to herself, place and some events. Otherwise has no further complaints. Reports compliance with prescribed medications. No tobacco or chronic alcohol use.     Review of Systems   Constitutional:  Negative for chills and fever.   Respiratory:  Positive for shortness of breath. Negative for cough.    Cardiovascular:  Positive for chest pain. Negative for palpitations.   Gastrointestinal:  Negative for abdominal pain, diarrhea, nausea and vomiting.   Neurological:  Negative for syncope.   All other systems reviewed and are negative.      Historical Information   Past Medical History[1]  Past Surgical History[2]  Social History[3]  E-Cigarette/Vaping    E-Cigarette Use Never User      E-Cigarette/Vaping Substances    Nicotine No     THC No     CBD No     Flavoring No     Other No     Unknown No      Family History[4]  Social History:  Marital Status: /Civil Union   Occupation:   Patient Pre-hospital Living Situation: Long Term Care Facility  Patient Pre-hospital Level of Mobility: walks with walker  Patient Pre-hospital Diet Restrictions:     Meds/Allergies   I have reviewed home medications using recent Epic encounter.  Prior to Admission medications    Medication Sig Start Date End Date Taking? Authorizing Provider    acetaminophen (TYLENOL) 500 mg tablet Take 1 tablet (500 mg total) by mouth every 8 (eight) hours as needed for mild pain or moderate pain for up to 7 days 6/1/25 6/8/25 Yes Enmanuel Marin, DO   amiodarone 200 mg tablet Take 1 tablet (200 mg total) by mouth 2 (two) times a day for 7 days, THEN 1 tablet (200 mg total) daily.  Patient taking differently: Take 1 tablet (200 mg total) by mouth daily 2/18/25 6/2/25 Yes Bill Alcazar, DO   ARTIFICIAL TEAR OP    Yes Historical Provider, MD   aspirin 81 mg chewable tablet Chew 1 tablet (81 mg total) daily 11/18/24  Yes James Valencia, DO   Cholecalciferol (VITAMIN D) 2000 units CAPS Take 1 capsule (2,000 Units total) by mouth daily 8/15/18  Yes London Farias, DO   loperamide (IMODIUM) 2 mg capsule Take 2 mg by mouth 3 (three) times a day   Yes Historical Provider, MD   metoprolol tartrate (LOPRESSOR) 25 mg tablet Take 1 tablet (25 mg total) by mouth 2 (two) times a day 2/18/25  Yes Bill Alcazar,    metoprolol tartrate (LOPRESSOR) 25 mg tablet Take 1 tablet (25 mg total) by mouth every 12 (twelve) hours 2/18/25  Yes Bill Alcazar, DO   Multiple Vitamins-Minerals (PRESERVISION AREDS PO) Take by mouth   Yes Historical Provider, MD   ondansetron (ZOFRAN) 4 mg tablet Take 4 mg by mouth every 8 (eight) hours as needed for nausea or vomiting   Yes Historical Provider, MD   risperiDONE (RisperDAL) 0.25 mg tablet Take 0.25 mg by mouth daily   Yes Historical Provider, MD   senna-docusate sodium (SENOKOT-S) 8.6-50 mg per tablet Take 1 tablet by mouth in the morning. As needed.   Yes Historical Provider, MD   amiodarone 200 mg tablet Take 1 tablet (200 mg total) by mouth 2 (two) times a day for 7 days, THEN 1 tablet (200 mg total) daily. 2/18/25 3/27/25  Bill Alcazar DO   lidocaine (Lidoderm) 5 % Apply 1 patch topically daily over 12 hours for 7 days Remove & Discard patch within 12 hours or as directed by MD 6/1/25 6/8/25  Enmanuel Marin, DO   vitamin B-12 (VITAMIN B-12)  500 mcg tablet Take 500 mcg by mouth in the morning.  Patient not taking: Reported on 6/2/2025    Historical Provider, MD     Allergies   Allergen Reactions    Gabapentin      dreams    Sertraline GI Intolerance     ' felt like a zombie'       Objective :  Temp:  [98.2 °F (36.8 °C)] 98.2 °F (36.8 °C)  HR:  [53-62] 56  BP: (157-188)/(78-91) 175/78  Resp:  [18] 18  SpO2:  [93 %-96 %] 94 %  O2 Device: Nasal cannula  Nasal Cannula O2 Flow Rate (L/min):  [3 L/min] 3 L/min    Physical Exam  Vitals and nursing note reviewed.   Constitutional:       General: She is not in acute distress.     Appearance: She is well-developed.   HENT:      Head: Normocephalic and atraumatic.     Eyes:      Conjunctiva/sclera: Conjunctivae normal.       Cardiovascular:      Rate and Rhythm: Normal rate and regular rhythm.      Heart sounds: No murmur heard.  Pulmonary:      Effort: Pulmonary effort is normal. No respiratory distress.      Breath sounds: Normal breath sounds.   Abdominal:      Palpations: Abdomen is soft.      Tenderness: There is no abdominal tenderness.     Musculoskeletal:         General: No swelling.      Cervical back: Neck supple.      Right lower leg: No edema.      Left lower leg: No edema.     Skin:     General: Skin is warm and dry.      Capillary Refill: Capillary refill takes less than 2 seconds.     Neurological:      Mental Status: She is alert.      Comments: Oriented to self, place, some events   Psychiatric:         Mood and Affect: Mood normal.          Lines/Drains:            Lab Results: I have reviewed the following results:  Results from last 7 days   Lab Units 06/02/25  1605   WBC Thousand/uL 7.33   HEMOGLOBIN g/dL 11.5   HEMATOCRIT % 34.9   PLATELETS Thousands/uL 166   SEGS PCT % 75   LYMPHO PCT % 12*   MONO PCT % 11   EOS PCT % 1     Results from last 7 days   Lab Units 06/02/25  1605   SODIUM mmol/L 144   POTASSIUM mmol/L 3.1*   CHLORIDE mmol/L 107   CO2 mmol/L 27   BUN mg/dL 23   CREATININE mg/dL  0.85   ANION GAP mmol/L 10   CALCIUM mg/dL 9.0   GLUCOSE RANDOM mg/dL 97     Results from last 7 days   Lab Units 06/02/25  1605   INR  1.24*         Lab Results   Component Value Date    HGBA1C 5.9 (H) 05/19/2022           Imaging Results Review: I personally reviewed the following image studies in PACS and associated radiology reports: CT chest. My interpretation of the radiology images/reports is: age-indeterminate right anterior 3rd-5th rib fracture, mild pulmonary edema with bilateral pleural effusions .  Other Study Results Review: EKG was personally reviewed and my interpretation is: Paced rhythm. HR 80..    Administrative Statements     ** Please Note: This note has been constructed using a voice recognition system. **         [1]   Past Medical History:  Diagnosis Date    Aortic regurgitation     Constipation     Discoid lupus erythematosus     Dysphagia     Esophagitis     Forgetfulness     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    Transient elevated blood pressure     last assessed: 5/16/2017    Weight loss    [2]   Past Surgical History:  Procedure Laterality Date    APPENDECTOMY      CATARACT EXTRACTION      CHOLECYSTECTOMY      ESOPHAGOGASTRODUODENOSCOPY  06/2013    diagnostic- neg id have dilatation    EYE SURGERY      HYSTERECTOMY      LAPAROTOMY N/A 11/3/2023    Procedure: LAPAROTOMY EXPLORATORY, ILEOCECECTOMY;  Surgeon: Katlyn Fleming MD;  Location: [facility];  Service: General    [place] ESOPHAGOGASTRODUODENOSCOPY TRANSORAL DIAGNOSTIC N/A 9/16/2016    Procedure: ESOPHAGOGASTRODUODENOSCOPY (EGD);  Surgeon: Vladislav Garcia MD;  Location: [facility];  Service: Gastroenterology    REPLACEMENT TOTAL KNEE Left 01/2007   [3]   Social History  Tobacco Use    Smoking status: Never    Smokeless tobacco: Never   Vaping Use    Vaping status: Never Used   Substance and Sexual Activity    Alcohol use: No    Drug use: No    Sexual activity: Not Currently   [4]   Family History  Problem Relation Name Age of Onset     Diabetes Mother      Coronary artery disease Father

## 2025-06-03 NOTE — ASSESSMENT & PLAN NOTE
Noted, intermittent transient bradycardia   EKG reviewed v-paced HR 80    Plan:  Continue PTA amiodarone 200mg daily, lopressor 25mg BID  AC previously d'c due to falls/head injury now on ASA 81mg daily

## 2025-06-03 NOTE — ASSESSMENT & PLAN NOTE
Suspect transudative secondary to possible diastolic heart failure    Plan:  Monitor with diuresis   May require thoracentesis

## 2025-06-03 NOTE — PLAN OF CARE
Problem: RESPIRATORY - ADULT  Goal: Achieves optimal ventilation and oxygenation  Description: INTERVENTIONS:  - Assess for changes in respiratory status  - Assess for changes in mentation and behavior  - Position to facilitate oxygenation and minimize respiratory effort  - Oxygen administered by appropriate delivery if ordered  - Initiate smoking cessation education as indicated  - Encourage broncho-pulmonary hygiene including cough, deep breathe, Incentive Spirometry  - Assess the need for suctioning and aspirate as needed  - Assess and instruct to report SOB or any respiratory difficulty  - Respiratory Therapy support as indicated  Outcome: Progressing     Problem: MUSCULOSKELETAL - ADULT  Goal: Maintain or return mobility to safest level of function  Description: INTERVENTIONS:  - Assess patient's ability to carry out ADLs; assess patient's baseline for ADL function and identify physical deficits which impact ability to perform ADLs (bathing, care of mouth/teeth, toileting, grooming, dressing, etc.)  - Assess/evaluate cause of self-care deficits   - Assess range of motion  - Assess patient's mobility  - Assess patient's need for assistive devices and provide as appropriate  - Encourage maximum independence but intervene and supervise when necessary  - Involve family in performance of ADLs  - Assess for home care needs following discharge   - Consider OT consult to assist with ADL evaluation and planning for discharge  - Provide patient education as appropriate  Outcome: Progressing  Goal: Maintain proper alignment of affected body part  Description: INTERVENTIONS:  - Support, maintain and protect limb and body alignment  - Provide patient/ family with appropriate education  Outcome: Progressing

## 2025-06-03 NOTE — ASSESSMENT & PLAN NOTE
Wt Readings from Last 3 Encounters:   06/03/25 63 kg (139 lb)   05/31/25 63.2 kg (139 lb 5.3 oz)   02/17/25 58.1 kg (128 lb)     Suspected diastolic heart failure  Previous echo reviewed, cardiology recommending limited echo  Continue diuresis with IV Lasix 20 twice daily, had good output in the ED  Previous dry weight noted to be 128 pounds, continue to monitor I's and O's, daily weights  Replete potassium, monitor daily labs

## 2025-06-03 NOTE — ASSESSMENT & PLAN NOTE
EKG showing ventricular paced  Continue PTA amiodarone 200mg daily, lopressor 25mg BID  Continue aspirin 81 mg daily is, not on anticoagulation due to history of falls

## 2025-06-03 NOTE — PROGRESS NOTES
Progress Note - Hospitalist   Name: Summer Blanton 89 y.o. female I MRN: [id number]  Unit/Bed#: [place] [address] -01 I Date of Admission: 6/2/2025   Date of Service: 6/3/2025 I Hospital Day: 0     Assessment & Plan  Acute pulmonary edema (HCC)  Hypoxia  89-year-old female presented with shortness of breath   In the setting of pulmonary edema, pleural effusions  TTE 02/2025 LVEF 55%, biatrial dilation mild AR, mild MR, mild/mod TR  Unclear cause possible acute diastolic heart failure  Continue diuresis with IV Lasix  Limited 2D echo ordered  Cardiology consulted  Bilateral pleural effusion  Suspect transudative secondary to possible diastolic heart failure  Monitor with diuresis   Flutter-fibrillation ([facility])  EKG showing ventricular paced  Continue PTA amiodarone 200mg daily, lopressor 25mg BID  Continue aspirin 81 mg daily is, not on anticoagulation due to history of falls  Hypokalemia  Replete and monitor  Check magnesium  Rib pain  With frequent falls   CT without acute fractures noted, age-indeterminate rib fracture on right 3rd-5th  Continue Tylenol as needed  History of complete heart block  S/p Medtronic pacemaker  Acute on chronic heart failure with preserved ejection fraction (HFpEF) ([facility])  Wt Readings from Last 3 Encounters:   06/03/25 63 kg (139 lb)   05/31/25 63.2 kg (139 lb 5.3 oz)   02/17/25 58.1 kg (128 lb)     Suspected diastolic heart failure  Previous echo reviewed, cardiology recommending limited echo  Continue diuresis with IV Lasix 20 twice daily, had good output in the ED  Previous dry weight noted to be 128 pounds, continue to monitor I's and O's, daily weights  Replete potassium, monitor daily labs      VTE Pharmacologic Prophylaxis:   Pharmacologic: Enoxaparin (Lovenox)  Mechanical VTE Prophylaxis in Place: Yes    Current Length of Stay: 0 day(s)    Current Patient Status: Inpatient   Certification Statement: The patient will continue to require additional inpatient hospital stay due to IV  diuresis    Discharge Plan: pending    Code Status: Level 3 - DNAR and DNI      Subjective:   Overall doing well. Reports her breathing has improved. Denies any fevers, CP or SOB.    Objective:     Vitals:   Temp (24hrs), Av.9 °F (36.6 °C), Min:97.2 °F (36.2 °C), Max:98.2 °F (36.8 °C)    Temp:  [97.2 °F (36.2 °C)-98.2 °F (36.8 °C)] 97.2 °F (36.2 °C)  HR:  [53-62] 60  Resp:  [16-20] 20  BP: (120-188)/(65-91) 120/77  SpO2:  [93 %-96 %] 93 %  Body mass index is 25.42 kg/m².     Input and Output Summary (last 24 hours):       Intake/Output Summary (Last 24 hours) at 6/3/2025 1335  Last data filed at 6/3/2025 1306  Gross per 24 hour   Intake 470 ml   Output 1130 ml   Net -660 ml       Physical Exam:     Physical Exam  Vitals and nursing note reviewed.   HENT:      Head: Normocephalic.     Eyes:      Conjunctiva/sclera: Conjunctivae normal.       Cardiovascular:      Rate and Rhythm: Normal rate.   Pulmonary:      Effort: Pulmonary effort is normal.      Breath sounds: Rales present. No wheezing.   Abdominal:      General: Bowel sounds are normal. There is no distension.      Palpations: Abdomen is soft.     Musculoskeletal:         General: No swelling.      Right lower leg: No edema.      Left lower leg: No edema.     Skin:     General: Skin is warm and dry.     Neurological:      General: No focal deficit present.      Mental Status: She is alert. Mental status is at baseline.       Additional Data:     Labs:    Results from last 7 days   Lab Units 25  0556 25  1605   WBC Thousand/uL 7.62 7.33   HEMOGLOBIN g/dL 10.6* 11.5   HEMATOCRIT % 32.7* 34.9   PLATELETS Thousands/uL 154 166   SEGS PCT %  --  75   LYMPHO PCT %  --  12*   MONO PCT %  --  11   EOS PCT %  --  1     Results from last 7 days   Lab Units 25  0556   SODIUM mmol/L 140   POTASSIUM mmol/L 3.0*   CHLORIDE mmol/L 106   CO2 mmol/L 25   BUN mg/dL 16   CREATININE mg/dL 0.65   ANION GAP mmol/L 9   CALCIUM mg/dL 8.1*   GLUCOSE RANDOM mg/dL 84      Results from last 7 days   Lab Units 06/02/25  1605   INR  1.24*                       * I Have Reviewed All Lab Data Listed Above.  * Additional Pertinent Lab Tests Reviewed: All Labs For Current Hospital Admission Reviewed    Mobility:  Basic Mobility Inpatient Raw Score: 17  -[facility] Goal: 5: Stand one or more mins  -[facility] Achieved: 7: Walk 25 feet or more    Lines:     Invasive Devices       Peripheral Intravenous Line  Duration             Peripheral IV 06/02/25 Left Antecubital <1 day              Line  Duration             IABP 577 days                     Imaging:    Imaging Reports Reviewed Today Include:     none    Recent Cultures (last 7 days):           Last 24 Hours Medication List:   Current Facility-Administered Medications   Medication Dose Route Frequency Provider Last Rate    acetaminophen  975 mg Oral Q8H PRN Carlos A Hare, PA-C      aluminum-magnesium hydroxide-simethicone  30 mL Oral Q6H PRN Carlos A Hare, PA-C      amiodarone  200 mg Oral Daily Carlos A Hare, PA-C      aspirin  81 mg Oral Daily Carlos A Hare, PA-C      enoxaparin  40 mg Subcutaneous Daily Carlos A Hare, PA-C      furosemide  20 mg Intravenous BID (diuretic) Shorty Blount MD      hydrALAZINE  10 mg Intravenous Q4H PRN Shorty Blount MD      metoprolol tartrate  25 mg Oral Q12H JACOB Carlos Ayaya Hare, PA-C      ondansetron  4 mg Intravenous Q6H PRN Carlos Ayaya Hare, PA-C      polyethylene glycol  17 g Oral Daily PRN Carlos Ayaya Hare, PA-C      potassium chloride  40 mEq Oral Once Carlos A Ananya, PA-C      potassium chloride  40 mEq Oral Q4H Shorty Blount MD      [START ON 6/4/2025] risperiDONE  0.25 mg Oral Daily Shorty Blount MD          Today, Patient Was Seen By: Shorty Blount MD    ** Please Note: Dictation voice to text software may have been used in the creation of this document. **

## 2025-06-03 NOTE — ED NOTES
Pt ambulated to restroom using walker. Moderate assistance required using restroom. Pt assisted with myra care at this time.      Bertha Parish RN  06/02/25 2004

## 2025-06-03 NOTE — ED NOTES
Patient given meal + is eating at this time. No difficulties with feeding or swallowing observed.      Gill Prasad RN  06/02/25 5807

## 2025-06-04 LAB
ANION GAP SERPL CALCULATED.3IONS-SCNC: 7 MMOL/L (ref 4–13)
BUN SERPL-MCNC: 15 MG/DL (ref 5–25)
CALCIUM SERPL-MCNC: 8.6 MG/DL (ref 8.4–10.2)
CHLORIDE SERPL-SCNC: 104 MMOL/L (ref 96–108)
CO2 SERPL-SCNC: 30 MMOL/L (ref 21–32)
CREAT SERPL-MCNC: 0.81 MG/DL (ref 0.6–1.3)
ERYTHROCYTE [DISTWIDTH] IN BLOOD BY AUTOMATED COUNT: 14.4 % (ref 11.6–15.1)
GFR SERPL CREATININE-BSD FRML MDRD: 62 ML/MIN/1.73SQ M
GLUCOSE SERPL-MCNC: 93 MG/DL (ref 65–140)
HCT VFR BLD AUTO: 34.9 % (ref 34.8–46.1)
HGB BLD-MCNC: 11.5 G/DL (ref 11.5–15.4)
MAGNESIUM SERPL-MCNC: 2 MG/DL (ref 1.9–2.7)
MCH RBC QN AUTO: 30.8 PG (ref 26.8–34.3)
MCHC RBC AUTO-ENTMCNC: 33 G/DL (ref 31.4–37.4)
MCV RBC AUTO: 94 FL (ref 82–98)
PLATELET # BLD AUTO: 162 THOUSANDS/UL (ref 149–390)
PMV BLD AUTO: 10.6 FL (ref 8.9–12.7)
POTASSIUM SERPL-SCNC: 3.3 MMOL/L (ref 3.5–5.3)
RBC # BLD AUTO: 3.73 MILLION/UL (ref 3.81–5.12)
SODIUM SERPL-SCNC: 141 MMOL/L (ref 135–147)
WBC # BLD AUTO: 8.02 THOUSAND/UL (ref 4.31–10.16)

## 2025-06-04 PROCEDURE — 83735 ASSAY OF MAGNESIUM: CPT | Performed by: STUDENT IN AN ORGANIZED HEALTH CARE EDUCATION/TRAINING PROGRAM

## 2025-06-04 PROCEDURE — 80048 BASIC METABOLIC PNL TOTAL CA: CPT | Performed by: STUDENT IN AN ORGANIZED HEALTH CARE EDUCATION/TRAINING PROGRAM

## 2025-06-04 PROCEDURE — 99232 SBSQ HOSP IP/OBS MODERATE 35: CPT | Performed by: INTERNAL MEDICINE

## 2025-06-04 PROCEDURE — 85027 COMPLETE CBC AUTOMATED: CPT | Performed by: STUDENT IN AN ORGANIZED HEALTH CARE EDUCATION/TRAINING PROGRAM

## 2025-06-04 PROCEDURE — 99232 SBSQ HOSP IP/OBS MODERATE 35: CPT

## 2025-06-04 RX ORDER — POTASSIUM CHLORIDE 1500 MG/1
40 TABLET, EXTENDED RELEASE ORAL DAILY
Status: COMPLETED | OUTPATIENT
Start: 2025-06-04 | End: 2025-06-05

## 2025-06-04 RX ADMIN — METOPROLOL TARTRATE 25 MG: 25 TABLET, FILM COATED ORAL at 07:31

## 2025-06-04 RX ADMIN — ENOXAPARIN SODIUM 40 MG: 40 INJECTION SUBCUTANEOUS at 07:31

## 2025-06-04 RX ADMIN — RISPERIDONE 0.25 MG: 0.25 TABLET, FILM COATED ORAL at 07:31

## 2025-06-04 RX ADMIN — AMIODARONE HYDROCHLORIDE 200 MG: 200 TABLET ORAL at 07:31

## 2025-06-04 RX ADMIN — FUROSEMIDE 20 MG: 10 INJECTION, SOLUTION INTRAVENOUS at 07:31

## 2025-06-04 RX ADMIN — ASPIRIN 81 MG CHEWABLE TABLET 81 MG: 81 TABLET CHEWABLE at 07:31

## 2025-06-04 RX ADMIN — FUROSEMIDE 20 MG: 10 INJECTION, SOLUTION INTRAVENOUS at 17:33

## 2025-06-04 RX ADMIN — POTASSIUM CHLORIDE 40 MEQ: 1500 TABLET, EXTENDED RELEASE ORAL at 09:23

## 2025-06-04 NOTE — PROGRESS NOTES
Progress Note - Hospitalist   Name: Summer Blanton 89 y.o. female I MRN: [id number]  Unit/Bed#: [place] [address] -01 I Date of Admission: 6/2/2025   Date of Service: 6/4/2025 I Hospital Day: 1    Assessment & Plan  Acute on chronic heart failure with preserved ejection fraction (HFpEF) ([facility])  Wt Readings from Last 3 Encounters:   06/04/25 63.1 kg (139 lb 0.4 oz)   05/31/25 63.2 kg (139 lb 5.3 oz)   02/17/25 58.1 kg (128 lb)     Previous echo reviewed, cardiology recommending limited echo  Continue diuresis with IV Lasix 20 twice daily, had good output in the ED  Despite diuresis, the patient's weight has not changed.  IV diuretics will be continued.  Replete potassium, monitor daily labs  Hypoxia  Secondary to CHF  Bilateral pleural effusion  Suspect this is secondary to diastolic heart failure  Monitor with diuresis   Flutter-fibrillation ([facility])  EKG showing ventricular paced  Continue PTA amiodarone 200mg daily, lopressor 25mg BID  Continue aspirin 81 mg daily is, not on anticoagulation due to history of falls  Hypokalemia  Replete and monitor  Check magnesium  Rib pain  With frequent falls   CT without acute fractures noted, age-indeterminate rib fracture on right 3rd-5th  Continue Tylenol as needed  History of complete heart block  S/p Medtronic pacemaker    The patient remains significantly overweight.  IV diuresis will be continued.  Continued hospitalization is necessary for this purpose.     Subjective:  The patient feels pretty well.  She did not sleep too well.  She denies any chest pain, shortness of breath, abdominal pain, nausea, or vomiting.  She is eating pretty well.    Physical Exam:   Temp:  [97.2 °F (36.2 °C)-97.6 °F (36.4 °C)] 97.2 °F (36.2 °C)  HR:  [50-60] 55  BP: (120-178)/(61-85) 160/85  Resp:  [16-20] 16  SpO2:  [93 %-97 %] 93 %  O2 Device: Nasal cannula  Nasal Cannula O2 Flow Rate (L/min):  [1.5 L/min-3 L/min] 1.5 L/min    Gen: Well-developed, well-nourished, in no distress.  Neck: Supple.   No lymphadenopathy or goiter.  Heart: Regular rhythm.  I heard no murmur, gallop, or rub.  Lungs: Clear to auscultation and percussion.  Breath sounds are diminished at the bases.  I heard no wheezing, rales, or rhonchi.  Abd: Soft with active bowel sounds.  No mass or tenderness.  Extremities: +1 edema.  No clubbing or cyanosis.  No calf tenderness.  Neuro: Alert and oriented.  No focal sign.  Skin: Warm and dry.      LABS:   CBC:   Lab Results   Component Value Date    WBC 8.02 06/04/2025    HGB 11.5 06/04/2025    HCT 34.9 06/04/2025    MCV 94 06/04/2025     06/04/2025    RBC 3.73 (L) 06/04/2025    MCH 30.8 06/04/2025    MCHC 33.0 06/04/2025    RDW 14.4 06/04/2025    MPV 10.6 06/04/2025   , CMP:   Lab Results   Component Value Date    SODIUM 141 06/04/2025    K 3.3 (L) 06/04/2025     06/04/2025    CO2 30 06/04/2025    BUN 15 06/04/2025    CREATININE 0.81 06/04/2025    CALCIUM 8.6 06/04/2025    EGFR 62 06/04/2025         VTE Pharmacologic Prophylaxis: Enoxaparin (Lovenox)  VTE Mechanical Prophylaxis: sequential compression device

## 2025-06-04 NOTE — PLAN OF CARE
Problem: Potential for Falls  Goal: Patient will remain free of falls  Description: INTERVENTIONS:  - Educate patient/family on patient safety including physical limitations  - Instruct patient to call for assistance with activity   - Consider consulting OT/PT to assist with strengthening/mobility based on AM PAC & JH-HLM score  - Consult OT/PT to assist with strengthening/mobility   - Keep Call bell within reach  - Keep bed low and locked with side rails adjusted as appropriate  - Keep care items and personal belongings within reach  - Initiate and maintain comfort rounds  - Make Fall Risk Sign visible to staff  - Offer Toileting every 2 Hours, in advance of need  - Initiate/Maintain bed alarm  - Obtain necessary fall risk management equipment: alarms  - Apply yellow socks and bracelet for high fall risk patients  - Consider moving patient to room near nurses station  Outcome: Progressing     Problem: Prexisting or High Potential for Compromised Skin Integrity  Goal: Skin integrity is maintained or improved  Description: INTERVENTIONS:  - Identify patients at risk for skin breakdown  - Assess and monitor skin integrity including under and around medical devices   - Assess and monitor nutrition and hydration status  - Monitor labs  - Assess for incontinence   - Turn and reposition patient  - Assist with mobility/ambulation  - Relieve pressure over emelia prominences   - Avoid friction and shearing  - Provide appropriate hygiene as needed including keeping skin clean and dry  - Evaluate need for skin moisturizer/barrier cream  - Collaborate with interdisciplinary team  - Patient/family teaching  - Consider wound care consult    Assess:  - Review [place] scale daily  - Clean and moisturize skin every shift  - Inspect skin when repositioning, toileting, and assisting with ADLS  - Assess under medical devices such as Raheel every shift  - Assess extremities for adequate circulation and sensation     Bed Management:  -  Have minimal linens on bed & keep smooth, unwrinkled  - Change linens as needed when moist or perspiring  - Avoid sitting or lying in one position for more than 2 hours while in bed?Keep HOB at 30degrees   - Toileting:  - Offer bedside commode  - Assess for incontinence every shift  - Use incontinent care products after each incontinent episode such as foam cleanser    Activity:  - Mobilize patient 3 times a day  - Encourage activity and walks on unit  - Encourage or provide ROM exercises   - Turn and reposition patient every 2 Hours  - Use appropriate equipment to lift or move patient in bed  - Instruct/ Assist with weight shifting every shift when out of bed in chair  - Consider limitation of chair time 2 hour intervals    Skin Care:  - Avoid use of baby powder, tape, friction and shearing, hot water or constrictive clothing  - Relieve pressure over bony prominences using Mapilex  - Do not massage red bony areas    Next Steps:  - Teach patient strategies to minimize risks such as skin breakdown  - Consider consults to  interdisciplinary teams such as wound care  Outcome: Progressing     Problem: PAIN - ADULT  Goal: Verbalizes/displays adequate comfort level or baseline comfort level  Description: Interventions:  - Encourage patient to monitor pain and request assistance  - Assess pain using appropriate pain scale  - Administer analgesics as ordered based on type and severity of pain and evaluate response  - Implement non-pharmacological measures as appropriate and evaluate response  - Consider cultural and social influences on pain and pain management  - Notify physician/advanced practitioner if interventions unsuccessful or patient reports new pain  - Educate patient/family on pain management process including their role and importance of  reporting pain   - Provide non-pharmacologic/complimentary pain relief interventions  Outcome: Progressing     Problem: INFECTION - ADULT  Goal: Absence or prevention of  progression during hospitalization  Description: INTERVENTIONS:  - Assess and monitor for signs and symptoms of infection  - Monitor lab/diagnostic results  - Monitor all insertion sites, i.e. indwelling lines, tubes, and drains  - Monitor endotracheal if appropriate and nasal secretions for changes in amount and color  - [place] appropriate cooling/warming therapies per order  - Administer medications as ordered  - Instruct and encourage patient and family to use good hand hygiene technique  - Identify and instruct in appropriate isolation precautions for identified infection/condition  Outcome: Progressing  Goal: Absence of fever/infection during neutropenic period  Description: INTERVENTIONS:  - Monitor WBC  - Perform strict hand hygiene  - Limit to healthy visitors only  - No plants, dried, fresh or silk flowers with weeks in patient room  Outcome: Progressing     Problem: SAFETY ADULT  Goal: Patient will remain free of falls  Description: INTERVENTIONS:  - Educate patient/family on patient safety including physical limitations  - Instruct patient to call for assistance with activity   - Consider consulting OT/PT to assist with strengthening/mobility based on AM PAC & JH-HLM score  - Consult OT/PT to assist with strengthening/mobility   - Keep Call bell within reach  - Keep bed low and locked with side rails adjusted as appropriate  - Keep care items and personal belongings within reach  - Initiate and maintain comfort rounds  - Make Fall Risk Sign visible to staff  - Offer Toileting every 2 Hours, in advance of need  - Initiate/Maintain bed alarm  - Obtain necessary fall risk management equipment: alarms  - Apply yellow socks and bracelet for high fall risk patients  - Consider moving patient to room near nurses station  Outcome: Progressing  Goal: Maintain or return to baseline ADL function  Description: INTERVENTIONS:  -  Assess patient's ability to carry out ADLs; assess patient's baseline for ADL function  and identify physical deficits which impact ability to perform ADLs (bathing, care of mouth/teeth, toileting, grooming, dressing, etc.)  - Assess/evaluate cause of self-care deficits   - Assess range of motion  - Assess patient's mobility; develop plan if impaired  - Assess patient's need for assistive devices and provide as appropriate  - Encourage maximum independence but intervene and supervise when necessary  - Involve family in performance of ADLs  - Assess for home care needs following discharge   - Consider OT consult to assist with ADL evaluation and planning for discharge  - Provide patient education as appropriate  - Monitor functional capacity and physical performance, use of AM PAC & JH-HLM   - Monitor gait, balance and fatigue with ambulation     Outcome: Progressing  Goal: Maintains/Returns to pre admission functional level  Description: INTERVENTIONS:  - Perform AM-PAC 6 Click Basic Mobility/ Daily Activity assessment daily.  - Set and communicate daily mobility goal to care team and patient/family/caregiver.   - Collaborate with rehabilitation services on mobility goals if consulted  - Perform Range of Motion 3 times a day.  - Reposition patient every 2 hours.  - Dangle patient 3 times a day  - Stand patient 3 times a day  - Ambulate patient 3 times a day  - Out of bed to chair 3 times a day   - Out of bed for meals 3 times a day  - Out of bed for toileting  - Record patient progress and toleration of activity level   Outcome: Progressing     Problem: DISCHARGE PLANNING  Goal: Discharge to home or other facility with appropriate resources  Description: INTERVENTIONS:  - Identify barriers to discharge w/patient and caregiver  - Arrange for needed discharge resources and transportation as appropriate  - Identify discharge learning needs (meds, wound care, etc.)  - Arrange for interpretive services to assist at discharge as needed  - Refer to Case Management Department for coordinating discharge  planning if the patient needs post-hospital services based on physician/advanced practitioner order or complex needs related to functional status, cognitive ability, or social support system  Outcome: Progressing     Problem: Knowledge Deficit  Goal: Patient/family/caregiver demonstrates understanding of disease process, treatment plan, medications, and discharge instructions  Description: Complete learning assessment and assess knowledge base.  Interventions:  - Provide teaching at level of understanding  - Provide teaching via preferred learning methods  Outcome: Progressing     Problem: RESPIRATORY - ADULT  Goal: Achieves optimal ventilation and oxygenation  Description: INTERVENTIONS:  - Assess for changes in respiratory status  - Assess for changes in mentation and behavior  - Position to facilitate oxygenation and minimize respiratory effort  - Oxygen administered by appropriate delivery if ordered  - Initiate smoking cessation education as indicated  - Encourage broncho-pulmonary hygiene including cough, deep breathe, Incentive Spirometry  - Assess the need for suctioning and aspirate as needed  - Assess and instruct to report SOB or any respiratory difficulty  - Respiratory Therapy support as indicated  Outcome: Progressing     Problem: MUSCULOSKELETAL - ADULT  Goal: Maintain or return mobility to safest level of function  Description: INTERVENTIONS:  - Assess patient's ability to carry out ADLs; assess patient's baseline for ADL function and identify physical deficits which impact ability to perform ADLs (bathing, care of mouth/teeth, toileting, grooming, dressing, etc.)  - Assess/evaluate cause of self-care deficits   - Assess range of motion  - Assess patient's mobility  - Assess patient's need for assistive devices and provide as appropriate  - Encourage maximum independence but intervene and supervise when necessary  - Involve family in performance of ADLs  - Assess for home care needs following  discharge   - Consider OT consult to assist with ADL evaluation and planning for discharge  - Provide patient education as appropriate  Outcome: Progressing  Goal: Maintain proper alignment of affected body part  Description: INTERVENTIONS:  - Support, maintain and protect limb and body alignment  - Provide patient/ family with appropriate education  Outcome: Progressing

## 2025-06-04 NOTE — PLAN OF CARE
Problem: Potential for Falls  Goal: Patient will remain free of falls  Description: INTERVENTIONS:  - Educate patient/family on patient safety including physical limitations  - Instruct patient to call for assistance with activity   - Consider consulting OT/PT to assist with strengthening/mobility based on AM PAC & JH-HLM score  - Consult OT/PT to assist with strengthening/mobility   - Keep Call bell within reach  - Keep bed low and locked with side rails adjusted as appropriate  - Keep care items and personal belongings within reach  - Initiate and maintain comfort rounds  - Make Fall Risk Sign visible to staff  - Offer Toileting every 2 Hours, in advance of need  - Initiate/Maintain bed alarm  - Obtain necessary fall risk management equipment: alarms  - Apply yellow socks and bracelet for high fall risk patients  - Consider moving patient to room near nurses station  Outcome: Progressing     Problem: Prexisting or High Potential for Compromised Skin Integrity  Goal: Skin integrity is maintained or improved  Description: INTERVENTIONS:  - Identify patients at risk for skin breakdown  - Assess and monitor skin integrity including under and around medical devices   - Assess and monitor nutrition and hydration status  - Monitor labs  - Assess for incontinence   - Turn and reposition patient  - Assist with mobility/ambulation  - Relieve pressure over emelia prominences   - Avoid friction and shearing  - Provide appropriate hygiene as needed including keeping skin clean and dry  - Evaluate need for skin moisturizer/barrier cream  - Collaborate with interdisciplinary team  - Patient/family teaching  - Consider wound care consult    Assess:  - Review [place] scale daily  - Clean and moisturize skin every shift  - Inspect skin when repositioning, toileting, and assisting with ADLS  - Assess under medical devices such as Raheel every shift  - Assess extremities for adequate circulation and sensation     Bed Management:  -  Have minimal linens on bed & keep smooth, unwrinkled  - Change linens as needed when moist or perspiring  - Avoid sitting or lying in one position for more than 2 hours while in bed?Keep HOB at 30degrees   - Toileting:  - Offer bedside commode  - Assess for incontinence every shift  - Use incontinent care products after each incontinent episode such as foam cleanser    Activity:  - Mobilize patient 3 times a day  - Encourage activity and walks on unit  - Encourage or provide ROM exercises   - Turn and reposition patient every 2 Hours  - Use appropriate equipment to lift or move patient in bed  - Instruct/ Assist with weight shifting every shift when out of bed in chair  - Consider limitation of chair time 2 hour intervals    Skin Care:  - Avoid use of baby powder, tape, friction and shearing, hot water or constrictive clothing  - Relieve pressure over bony prominences using Mapilex  - Do not massage red bony areas    Next Steps:  - Teach patient strategies to minimize risks such as skin breakdown  - Consider consults to  interdisciplinary teams such as wound care  Outcome: Progressing     Problem: PAIN - ADULT  Goal: Verbalizes/displays adequate comfort level or baseline comfort level  Description: Interventions:  - Encourage patient to monitor pain and request assistance  - Assess pain using appropriate pain scale  - Administer analgesics as ordered based on type and severity of pain and evaluate response  - Implement non-pharmacological measures as appropriate and evaluate response  - Consider cultural and social influences on pain and pain management  - Notify physician/advanced practitioner if interventions unsuccessful or patient reports new pain  - Educate patient/family on pain management process including their role and importance of  reporting pain   - Provide non-pharmacologic/complimentary pain relief interventions  Outcome: Progressing     Problem: INFECTION - ADULT  Goal: Absence or prevention of  progression during hospitalization  Description: INTERVENTIONS:  - Assess and monitor for signs and symptoms of infection  - Monitor lab/diagnostic results  - Monitor all insertion sites, i.e. indwelling lines, tubes, and drains  - Monitor endotracheal if appropriate and nasal secretions for changes in amount and color  - [place] appropriate cooling/warming therapies per order  - Administer medications as ordered  - Instruct and encourage patient and family to use good hand hygiene technique  - Identify and instruct in appropriate isolation precautions for identified infection/condition  Outcome: Progressing  Goal: Absence of fever/infection during neutropenic period  Description: INTERVENTIONS:  - Monitor WBC  - Perform strict hand hygiene  - Limit to healthy visitors only  - No plants, dried, fresh or silk flowers with weeks in patient room  Outcome: Progressing     Problem: SAFETY ADULT  Goal: Patient will remain free of falls  Description: INTERVENTIONS:  - Educate patient/family on patient safety including physical limitations  - Instruct patient to call for assistance with activity   - Consider consulting OT/PT to assist with strengthening/mobility based on AM PAC & JH-HLM score  - Consult OT/PT to assist with strengthening/mobility   - Keep Call bell within reach  - Keep bed low and locked with side rails adjusted as appropriate  - Keep care items and personal belongings within reach  - Initiate and maintain comfort rounds  - Make Fall Risk Sign visible to staff  - Offer Toileting every 2 Hours, in advance of need  - Initiate/Maintain bed alarm  - Obtain necessary fall risk management equipment: alarms  - Apply yellow socks and bracelet for high fall risk patients  - Consider moving patient to room near nurses station  Outcome: Progressing  Goal: Maintain or return to baseline ADL function  Description: INTERVENTIONS:  -  Assess patient's ability to carry out ADLs; assess patient's baseline for ADL function  and identify physical deficits which impact ability to perform ADLs (bathing, care of mouth/teeth, toileting, grooming, dressing, etc.)  - Assess/evaluate cause of self-care deficits   - Assess range of motion  - Assess patient's mobility; develop plan if impaired  - Assess patient's need for assistive devices and provide as appropriate  - Encourage maximum independence but intervene and supervise when necessary  - Involve family in performance of ADLs  - Assess for home care needs following discharge   - Consider OT consult to assist with ADL evaluation and planning for discharge  - Provide patient education as appropriate  - Monitor functional capacity and physical performance, use of AM PAC & JH-HLM   - Monitor gait, balance and fatigue with ambulation    Outcome: Progressing  Goal: Maintains/Returns to pre admission functional level  Description: INTERVENTIONS:  - Perform AM-PAC 6 Click Basic Mobility/ Daily Activity assessment daily.  - Set and communicate daily mobility goal to care team and patient/family/caregiver.   - Collaborate with rehabilitation services on mobility goals if consulted  - Perform Range of Motion 3 times a day.  - Reposition patient every 2 hours.  - Dangle patient 3 times a day  - Stand patient 3 times a day  - Ambulate patient 3 times a day  - Out of bed to chair 3 times a day   - Out of bed for meals 3 times a day  - Out of bed for toileting  - Record patient progress and toleration of activity level   Outcome: Progressing     Problem: DISCHARGE PLANNING  Goal: Discharge to home or other facility with appropriate resources  Description: INTERVENTIONS:  - Identify barriers to discharge w/patient and caregiver  - Arrange for needed discharge resources and transportation as appropriate  - Identify discharge learning needs (meds, wound care, etc.)  - Arrange for interpretive services to assist at discharge as needed  - Refer to Case Management Department for coordinating discharge planning  if the patient needs post-hospital services based on physician/advanced practitioner order or complex needs related to functional status, cognitive ability, or social support system  Outcome: Progressing     Problem: Knowledge Deficit  Goal: Patient/family/caregiver demonstrates understanding of disease process, treatment plan, medications, and discharge instructions  Description: Complete learning assessment and assess knowledge base.  Interventions:  - Provide teaching at level of understanding  - Provide teaching via preferred learning methods  Outcome: Progressing     Problem: RESPIRATORY - ADULT  Goal: Achieves optimal ventilation and oxygenation  Description: INTERVENTIONS:  - Assess for changes in respiratory status  - Assess for changes in mentation and behavior  - Position to facilitate oxygenation and minimize respiratory effort  - Oxygen administered by appropriate delivery if ordered  - Initiate smoking cessation education as indicated  - Encourage broncho-pulmonary hygiene including cough, deep breathe, Incentive Spirometry  - Assess the need for suctioning and aspirate as needed  - Assess and instruct to report SOB or any respiratory difficulty  - Respiratory Therapy support as indicated  Outcome: Progressing     Problem: MUSCULOSKELETAL - ADULT  Goal: Maintain or return mobility to safest level of function  Description: INTERVENTIONS:  - Assess patient's ability to carry out ADLs; assess patient's baseline for ADL function and identify physical deficits which impact ability to perform ADLs (bathing, care of mouth/teeth, toileting, grooming, dressing, etc.)  - Assess/evaluate cause of self-care deficits   - Assess range of motion  - Assess patient's mobility  - Assess patient's need for assistive devices and provide as appropriate  - Encourage maximum independence but intervene and supervise when necessary  - Involve family in performance of ADLs  - Assess for home care needs following discharge   -  Consider OT consult to assist with ADL evaluation and planning for discharge  - Provide patient education as appropriate  Outcome: Progressing  Goal: Maintain proper alignment of affected body part  Description: INTERVENTIONS:  - Support, maintain and protect limb and body alignment  - Provide patient/ family with appropriate education  Outcome: Progressing

## 2025-06-04 NOTE — ASSESSMENT & PLAN NOTE
- TTE 02/17/25: LVEF 55%, probable DD, RV systolic function is low normal, moderate LAE, mild ARMANDO, mild AR, aortic valve sclerosis, moderate MAC, mild MR, mild to moderate TR  - CT chest/abdomen/pelvis: Mild pulmonary edema type pattern with moderate pleural effusions   - Neurohormonal Blockade:   -- beta blocker: lopressor 25 mg BID  -- home diuretic: none   -- inpatient diuretic: IV lasix 20 mg BID   no concerns

## 2025-06-04 NOTE — ASSESSMENT & PLAN NOTE
Wt Readings from Last 3 Encounters:   06/04/25 63.1 kg (139 lb 0.4 oz)   05/31/25 63.2 kg (139 lb 5.3 oz)   02/17/25 58.1 kg (128 lb)     Previous echo reviewed, cardiology recommending limited echo  Continue diuresis with IV Lasix 20 twice daily, had good output in the ED  Despite diuresis, the patient's weight has not changed.  IV diuretics will be continued.  Replete potassium, monitor daily labs

## 2025-06-04 NOTE — ASSESSMENT & PLAN NOTE
- rate control: lopressor 25 mg BID  - rhythm control: amiodarone 200 mg daily   - AC: none, discontinued in 2023 due to frequent fall and risk of internal bleeding. Takes ASA 81 mg daily

## 2025-06-04 NOTE — ASSESSMENT & PLAN NOTE
- TTE 02/17/25: LVEF 55%, probable DD, RV systolic function is low normal, moderate LAE, mild ARMANDO, mild AR, aortic valve sclerosis, moderate MAC, mild MR, mild to moderate TR  - CT chest/abdomen/pelvis: Mild pulmonary edema type pattern with moderate pleural effusions   - Neurohormonal Blockade:   -- beta blocker: lopressor 25 mg BID  -- home diuretic: none   -- inpatient diuretic: IV lasix 20 mg BID

## 2025-06-04 NOTE — PROGRESS NOTES
Progress Note - Cardiology   Name: Summer Blanton 89 y.o. female I MRN: [id number]  Unit/Bed#: [place] [address] -01 I Date of Admission: 6/2/2025   Date of Service: 6/4/2025 I Hospital Day: 1     TODAY's PLAN (06/04/25):   She seems to be improving with the IV diuretics  Will continue with IV Lasix 20 IV twice daily  Continue with metoprolol, amiodarone  Follow urine output Daily weights renal function electrolytes and replete as needed to keep potassium greater than 4 magnesium greater than 2         Assessment & Plan  Acute on chronic heart failure with preserved ejection fraction (HFpEF) (HCC)  Acute pulmonary edema (HCC)  Bilateral pleural effusion  - TTE 02/17/25: LVEF 55%, probable DD, RV systolic function is low normal, moderate LAE, mild ARMANDO, mild AR, aortic valve sclerosis, moderate MAC, mild MR, mild to moderate TR  - CT chest/abdomen/pelvis: Mild pulmonary edema type pattern with moderate pleural effusions   - Neurohormonal Blockade:   -- beta blocker: lopressor 25 mg BID  -- home diuretic: none   -- inpatient diuretic: IV lasix 20 mg BID  Flutter-fibrillation (HCC)  - rate control: lopressor 25 mg BID  - rhythm control: amiodarone 200 mg daily   - AC: none, discontinued in 2023 due to frequent fall and risk of internal bleeding. Takes ASA 81 mg daily   History of complete heart block  - s/p [employer] DC PM in February 2021 with lower pacing rate set to 50 bpm  Rib pain  - recent fall with age-indeterminate right anterior third through fifth rib fractures    Hypokalemia  - admission K 3.0  Hypoxia  - noted to be SOB and placed on 2-3L of oxyen  Forgetfulness      Subjective   Dong well, still with some SOB, no LE edema    Objective :  Temp:  [97.2 °F (36.2 °C)-97.6 °F (36.4 °C)] 97.2 °F (36.2 °C)  HR:  [50-60] 55  BP: (120-160)/(61-85) 160/85  Resp:  [16-20] 16  SpO2:  [93 %-97 %] 93 %  O2 Device: Nasal cannula  Nasal Cannula O2 Flow Rate (L/min):  [1.5 L/min] 1.5 L/min  Orthostatic Blood Pressures       Flowsheet Row Most Recent Value   Blood Pressure 160/85 filed at 06/04/2025 0724   Patient Position - Orthostatic VS Lying filed at 06/04/2025 0724          First Weight: Weight - Scale: 63 kg (139 lb) (06/03/25 0251)  Vitals:    06/03/25 1515 06/04/25 0530   Weight: 63 kg (139 lb) 63.1 kg (139 lb 0.4 oz)     Physical Exam  Constitutional: awake, alert and oriented, in no acute distress, no obvious deformities  Head: Normocephalic, without obvious abnormality, atraumatic  Eyes: conjunctivae clear and moist. Sclera anicteric.  No xanthelasmas. Pupils equal bilaterally.  Extraocular motions are full.  Ear nose mouth and throat: ears are symmetrical bilaterally, hearing appears to be equal bilaterally, no nasal discharge or epistaxis, oropharynx is clear with moist mucous membranes  Neck:  Trachea is midline, neck is supple, no thyromegaly or significant lymphadenopathy, there is full range of motion.  Lungs: decreased BS BL  Heart: Regular rhythm with a Normal heart rate, S1, S2 normal, No Murmur, no click, rub or gallop, No lower extremity edema  Abdomen: soft, non-tender; bowel sounds normal; no masses,  no organomegaly  Psychiatric:  Patient is oriented to time, place, person, mood/affect is negative for depression, anxiety, agitation, appears to have appropriate insight  Skin: Skin is warm, dry, intact. No obvious rashes or lesions on exposed extremities.  Nail beds are pink with no cyanosis or clubbing.      Lab Results: I have reviewed the following results:  Results from last 7 days   Lab Units 06/04/25  0502 06/03/25  0556 06/02/25  1605   WBC Thousand/uL 8.02 7.62 7.33   HEMOGLOBIN g/dL 11.5 10.6* 11.5   HEMATOCRIT % 34.9 32.7* 34.9   PLATELETS Thousands/uL 162 154 166     Results from last 7 days   Lab Units 06/04/25  0502 06/03/25  0556 06/02/25  1605   POTASSIUM mmol/L 3.3* 3.0* 3.1*   CHLORIDE mmol/L 104 106 107   CO2 mmol/L 30 25 27   BUN mg/dL 15 16 23   CREATININE mg/dL 0.81 0.65 0.85   CALCIUM  mg/dL 8.6 8.1* 9.0     Results from last 7 days   Lab Units 06/02/25  1605   INR  1.24*   PTT seconds 35*     Lab Results   Component Value Date    HGBA1C 5.9 (H) 05/19/2022     Lab Results   Component Value Date    TROPONINI 0.36 (H) 02/10/2021

## 2025-06-05 ENCOUNTER — TELEPHONE (OUTPATIENT)
Dept: PSYCHIATRY | Facility: CLINIC | Age: OVER 89
End: 2025-06-05

## 2025-06-05 ENCOUNTER — HOME HEALTH ADMISSION (OUTPATIENT)
Dept: HOME HEALTH SERVICES | Facility: HOME HEALTHCARE | Age: OVER 89
End: 2025-06-05
Payer: MEDICARE

## 2025-06-05 LAB
ANION GAP SERPL CALCULATED.3IONS-SCNC: 10 MMOL/L (ref 4–13)
BUN SERPL-MCNC: 15 MG/DL (ref 5–25)
CALCIUM SERPL-MCNC: 8.8 MG/DL (ref 8.4–10.2)
CHLORIDE SERPL-SCNC: 102 MMOL/L (ref 96–108)
CO2 SERPL-SCNC: 28 MMOL/L (ref 21–32)
CREAT SERPL-MCNC: 0.71 MG/DL (ref 0.6–1.3)
GFR SERPL CREATININE-BSD FRML MDRD: 73 ML/MIN/1.73SQ M
GLUCOSE SERPL-MCNC: 98 MG/DL (ref 65–140)
MAGNESIUM SERPL-MCNC: 2 MG/DL (ref 1.9–2.7)
POTASSIUM SERPL-SCNC: 3.5 MMOL/L (ref 3.5–5.3)
SODIUM SERPL-SCNC: 140 MMOL/L (ref 135–147)

## 2025-06-05 PROCEDURE — 80048 BASIC METABOLIC PNL TOTAL CA: CPT | Performed by: INTERNAL MEDICINE

## 2025-06-05 PROCEDURE — 99222 1ST HOSP IP/OBS MODERATE 55: CPT | Performed by: STUDENT IN AN ORGANIZED HEALTH CARE EDUCATION/TRAINING PROGRAM

## 2025-06-05 PROCEDURE — 99232 SBSQ HOSP IP/OBS MODERATE 35: CPT

## 2025-06-05 PROCEDURE — 83735 ASSAY OF MAGNESIUM: CPT

## 2025-06-05 RX ORDER — FUROSEMIDE 20 MG/1
20 TABLET ORAL DAILY
Status: DISCONTINUED | OUTPATIENT
Start: 2025-06-05 | End: 2025-06-06 | Stop reason: HOSPADM

## 2025-06-05 RX ADMIN — POTASSIUM CHLORIDE 40 MEQ: 1500 TABLET, EXTENDED RELEASE ORAL at 08:43

## 2025-06-05 RX ADMIN — ASPIRIN 81 MG CHEWABLE TABLET 81 MG: 81 TABLET CHEWABLE at 08:43

## 2025-06-05 RX ADMIN — ENOXAPARIN SODIUM 40 MG: 40 INJECTION SUBCUTANEOUS at 08:43

## 2025-06-05 RX ADMIN — METOPROLOL TARTRATE 37.5 MG: 25 TABLET, FILM COATED ORAL at 21:50

## 2025-06-05 RX ADMIN — RISPERIDONE 0.25 MG: 0.25 TABLET, FILM COATED ORAL at 08:43

## 2025-06-05 RX ADMIN — AMIODARONE HYDROCHLORIDE 200 MG: 200 TABLET ORAL at 08:43

## 2025-06-05 RX ADMIN — METOPROLOL TARTRATE 25 MG: 25 TABLET, FILM COATED ORAL at 08:43

## 2025-06-05 RX ADMIN — FUROSEMIDE 20 MG: 10 INJECTION, SOLUTION INTRAVENOUS at 08:43

## 2025-06-05 NOTE — ASSESSMENT & PLAN NOTE
Alert/oriented x2 on exam, ?history of dementia but no formal diagnosis    Plan:  Delirium precautions  Outpatient follow up with pcp.

## 2025-06-05 NOTE — PROGRESS NOTES
Progress Note - Cardiology   Name: Summer Blanton 89 y.o. female I MRN: [id number]  Unit/Bed#: [place] [address] -01 I Date of Admission: 6/2/2025   Date of Service: 6/5/2025 I Hospital Day: 2     TODAY's PLAN (06/05/25):   She appears euvolemic today  Will stop IV diuretics  Will place on p.o. Lasix 20 mg p.o. daily  I spoke with her daughters, I stressed the need for low-sodium diet.  Her daughters state that she put salt on everything at home, but this has to change  Continue with metoprolol but will increase dose to 37.5 mg p.o. twice daily as her blood pressure remains elevated  Continue with amiodarone, aspirin  Could be discharged late this afternoon if otherwise stable, versus monitor overnight on p.o. diuretics and plan for DC tomorrow.  Patient would prefer DC today she states         Assessment & Plan  Acute on chronic heart failure with preserved ejection fraction (HFpEF) (HCC)  Acute pulmonary edema (HCC)  Bilateral pleural effusion  - TTE 02/17/25: LVEF 55%, probable DD, RV systolic function is low normal, moderate LAE, mild ARMANDO, mild AR, aortic valve sclerosis, moderate MAC, mild MR, mild to moderate TR  - CT chest/abdomen/pelvis: Mild pulmonary edema type pattern with moderate pleural effusions     Echo May 2025  Mildly increased left ventricular wall thickness, low normal to borderline reduced left ventricular systolic function, EF around 50%.  Grade 2 diastolic dysfunction.  Estimated left ventricular filling pressure is increased.  Normal right ventricle size and systolic function.  Severe left atrial cavity enlargement, moderate right atrial cavity enlargement.  Aortic valve sclerosis, mild aortic valve regurgitation.  Mitral valve annular calcification.  Mitral valve exposed, mild mitral valve regurgitation.  Moderate to severe tricuspid valve regurgitation.  Mild pulmonic valve regurgitation.  Moderate approaching severe pulmonary hypertension.  Estimated RVSP/PASP is 62 mmHg.  Trace pericardial  effusion.  At least moderate left-sided pleural effusion.  Dilated aortic root and ascending thoracic aortic aneurysm measuring 4.3 cm.    - Neurohormonal Blockade:   -- beta blocker: lopressor 25 mg BID  -- home diuretic: none   -- inpatient diuretic: IV lasix 20 mg BID  Flutter-fibrillation (HCC)  - rate control: lopressor 25 mg BID  - rhythm control: amiodarone 200 mg daily   - AC: none, discontinued in 2023 due to frequent fall and risk of internal bleeding. Takes ASA 81 mg daily   History of complete heart block  - s/p [employer] DC PM in February 2021 with lower pacing rate set to 50 bpm  Rib pain  - recent fall with age-indeterminate right anterior third through fifth rib fractures    Hypokalemia  - admission K 3.0  Hypoxia  - noted to be SOB and placed on 2-3L of oxyen  Forgetfulness    Anxiety      Subjective   States that her breathing has improved, denies any shortness of breath today    Objective :  Temp:  [97 °F (36.1 °C)-97.2 °F (36.2 °C)] 97.1 °F (36.2 °C)  HR:  [50-56] 56  BP: (121-165)/(76-82) 165/82  Resp:  [16-18] 17  SpO2:  [94 %-95 %] 95 %  O2 Device: None (Room air)  Nasal Cannula O2 Flow Rate (L/min):  [1.5 L/min] 1.5 L/min  Orthostatic Blood Pressures      Flowsheet Row Most Recent Value   Blood Pressure 165/82 filed at 06/05/2025 0726   Patient Position - Orthostatic VS Lying filed at 06/05/2025 0726          First Weight: Weight - Scale: 63 kg (139 lb) (06/03/25 0251)  Vitals:    06/04/25 0530 06/05/25 0543   Weight: 63.1 kg (139 lb 0.4 oz) 51.4 kg (113 lb 5.1 oz)     Physical Exam  Constitutional: awake, alert and oriented, in no acute distress, no obvious deformities  Head: Normocephalic, without obvious abnormality, atraumatic  Eyes: conjunctivae clear and moist. Sclera anicteric. No xanthelasmas. Pupils equal bilaterally. Extraocular motions are full.  Ear nose mouth and throat: ears are symmetrical bilaterally, hearing appears to be equal bilaterally, no nasal discharge or epistaxis,  oropharynx is clear with moist mucous membranes  Neck: Trachea is midline, neck is supple, no thyromegaly or significant lymphadenopathy, there is full range of motion.  Lungs: clear to auscultation bilaterally, no wheezes, no rales, no rhonchi, no accessory muscle use, breathing is nonlabored  Heart: regular rate and rhythm, S1, S2 normal, no murmur, no click, no rub and no gallop, no lower extremity edema  Abdomen: soft, non-tender; bowel sounds normal; no masses, no organomegaly  Psychiatric: Patient is oriented to time, place, person, mood/affect is negative for depression, anxiety, agitation, appears to have appropriate insight  Skin: Skin is warm, dry, intact. No obvious rashes or lesions on exposed extremities. Nail beds are pink with no cyanosis or clubbing.      Lab Results: I have reviewed the following results:  Results from last 7 days   Lab Units 06/04/25  0502 06/03/25  0556 06/02/25  1605   WBC Thousand/uL 8.02 7.62 7.33   HEMOGLOBIN g/dL 11.5 10.6* 11.5   HEMATOCRIT % 34.9 32.7* 34.9   PLATELETS Thousands/uL 162 154 166     Results from last 7 days   Lab Units 06/05/25  0443 06/04/25  0502 06/03/25  0556   POTASSIUM mmol/L 3.5 3.3* 3.0*   CHLORIDE mmol/L 102 104 106   CO2 mmol/L 28 30 25   BUN mg/dL 15 15 16   CREATININE mg/dL 0.71 0.81 0.65   CALCIUM mg/dL 8.8 8.6 8.1*     Results from last 7 days   Lab Units 06/02/25  1605   INR  1.24*   PTT seconds 35*     Lab Results   Component Value Date    HGBA1C 5.9 (H) 05/19/2022     Lab Results   Component Value Date    TROPONINI 0.36 (H) 02/10/2021             VTE Pharmacologic Prophylaxis:   VTE Mechanical Prophylaxis:

## 2025-06-05 NOTE — ASSESSMENT & PLAN NOTE
With frequent falls   CT without acute fractures noted, age-indeterminate rib fracture on right 3rd-5th  Continue Tylenol as needed  PT/OT eval due to recurrent falls

## 2025-06-05 NOTE — ASSESSMENT & PLAN NOTE
Wt Readings from Last 3 Encounters:   06/05/25 51.4 kg (113 lb 5.1 oz)   05/31/25 63.2 kg (139 lb 5.3 oz)   02/17/25 58.1 kg (128 lb)     Previous echo reviewed, cardiology recommending limited echo  Continue diuresis with IV Lasix 20 twice daily, had good output in the ED  Despite diuresis, the patient's weight has not changed.  IV diuretics will be continued.  Replete potassium, monitor daily labs  Cardiology consult: continue with iv diuretics at this time.   Diuresed well, anticipate transition to po diuretics.

## 2025-06-05 NOTE — ASSESSMENT & PLAN NOTE
- TTE 02/17/25: LVEF 55%, probable DD, RV systolic function is low normal, moderate LAE, mild ARMANDO, mild AR, aortic valve sclerosis, moderate MAC, mild MR, mild to moderate TR  - CT chest/abdomen/pelvis: Mild pulmonary edema type pattern with moderate pleural effusions     Echo May 2025  Mildly increased left ventricular wall thickness, low normal to borderline reduced left ventricular systolic function, EF around 50%.  Grade 2 diastolic dysfunction.  Estimated left ventricular filling pressure is increased.  Normal right ventricle size and systolic function.  Severe left atrial cavity enlargement, moderate right atrial cavity enlargement.  Aortic valve sclerosis, mild aortic valve regurgitation.  Mitral valve annular calcification.  Mitral valve exposed, mild mitral valve regurgitation.  Moderate to severe tricuspid valve regurgitation.  Mild pulmonic valve regurgitation.  Moderate approaching severe pulmonary hypertension.  Estimated RVSP/PASP is 62 mmHg.  Trace pericardial effusion.  At least moderate left-sided pleural effusion.  Dilated aortic root and ascending thoracic aortic aneurysm measuring 4.3 cm.    - Neurohormonal Blockade:   -- beta blocker: lopressor 25 mg BID  -- home diuretic: none   -- inpatient diuretic: IV lasix 20 mg BID

## 2025-06-05 NOTE — TELEPHONE ENCOUNTER
Consult placed on [facility] queue at 1047 to be seen by an [facility] psychiatrist.

## 2025-06-05 NOTE — PLAN OF CARE
Problem: Potential for Falls  Goal: Patient will remain free of falls  Description: INTERVENTIONS:  - Educate patient/family on patient safety including physical limitations  - Instruct patient to call for assistance with activity   - Consider consulting OT/PT to assist with strengthening/mobility based on AM PAC & JH-HLM score  - Consult OT/PT to assist with strengthening/mobility   - Keep Call bell within reach  - Keep bed low and locked with side rails adjusted as appropriate  - Keep care items and personal belongings within reach  - Initiate and maintain comfort rounds  - Make Fall Risk Sign visible to staff  - Offer Toileting every 2 Hours, in advance of need  - Initiate/Maintain bed alarm  - Obtain necessary fall risk management equipment: alarms  - Apply yellow socks and bracelet for high fall risk patients  - Consider moving patient to room near nurses station  Outcome: Progressing     Problem: Prexisting or High Potential for Compromised Skin Integrity  Goal: Skin integrity is maintained or improved  Description: INTERVENTIONS:  - Identify patients at risk for skin breakdown  - Assess and monitor skin integrity including under and around medical devices   - Assess and monitor nutrition and hydration status  - Monitor labs  - Assess for incontinence   - Turn and reposition patient  - Assist with mobility/ambulation  - Relieve pressure over emelia prominences   - Avoid friction and shearing  - Provide appropriate hygiene as needed including keeping skin clean and dry  - Evaluate need for skin moisturizer/barrier cream  - Collaborate with interdisciplinary team  - Patient/family teaching  - Consider wound care consult    Assess:  - Review Abebe scale daily  - Clean and moisturize skin every shift  - Inspect skin when repositioning, toileting, and assisting with ADLS  - Assess under medical devices such as Raheel every shift  - Assess extremities for adequate circulation and sensation     Bed Management:  -  Have minimal linens on bed & keep smooth, unwrinkled  - Change linens as needed when moist or perspiring  - Avoid sitting or lying in one position for more than 2 hours while in bed?Keep HOB at 30degrees   - Toileting:  - Offer bedside commode  - Assess for incontinence every shift  - Use incontinent care products after each incontinent episode such as foam cleanser    Activity:  - Mobilize patient 3 times a day  - Encourage activity and walks on unit  - Encourage or provide ROM exercises   - Turn and reposition patient every 2 Hours  - Use appropriate equipment to lift or move patient in bed  - Instruct/ Assist with weight shifting every shift when out of bed in chair  - Consider limitation of chair time 2 hour intervals    Skin Care:  - Avoid use of baby powder, tape, friction and shearing, hot water or constrictive clothing  - Relieve pressure over bony prominences using Mapilex  - Do not massage red bony areas    Next Steps:  - Teach patient strategies to minimize risks such as skin breakdown  - Consider consults to  interdisciplinary teams such as wound care  Outcome: Progressing     Problem: PAIN - ADULT  Goal: Verbalizes/displays adequate comfort level or baseline comfort level  Description: Interventions:  - Encourage patient to monitor pain and request assistance  - Assess pain using appropriate pain scale  - Administer analgesics as ordered based on type and severity of pain and evaluate response  - Implement non-pharmacological measures as appropriate and evaluate response  - Consider cultural and social influences on pain and pain management  - Notify physician/advanced practitioner if interventions unsuccessful or patient reports new pain  - Educate patient/family on pain management process including their role and importance of  reporting pain   - Provide non-pharmacologic/complimentary pain relief interventions  Outcome: Progressing     Problem: INFECTION - ADULT  Goal: Absence or prevention of  progression during hospitalization  Description: INTERVENTIONS:  - Assess and monitor for signs and symptoms of infection  - Monitor lab/diagnostic results  - Monitor all insertion sites, i.e. indwelling lines, tubes, and drains  - Monitor endotracheal if appropriate and nasal secretions for changes in amount and color  - Chuckey appropriate cooling/warming therapies per order  - Administer medications as ordered  - Instruct and encourage patient and family to use good hand hygiene technique  - Identify and instruct in appropriate isolation precautions for identified infection/condition  Outcome: Progressing  Goal: Absence of fever/infection during neutropenic period  Description: INTERVENTIONS:  - Monitor WBC  - Perform strict hand hygiene  - Limit to healthy visitors only  - No plants, dried, fresh or silk flowers with weeks in patient room  Outcome: Progressing     Problem: SAFETY ADULT  Goal: Patient will remain free of falls  Description: INTERVENTIONS:  - Educate patient/family on patient safety including physical limitations  - Instruct patient to call for assistance with activity   - Consider consulting OT/PT to assist with strengthening/mobility based on AM PAC & JH-HLM score  - Consult OT/PT to assist with strengthening/mobility   - Keep Call bell within reach  - Keep bed low and locked with side rails adjusted as appropriate  - Keep care items and personal belongings within reach  - Initiate and maintain comfort rounds  - Make Fall Risk Sign visible to staff  - Offer Toileting every 2 Hours, in advance of need  - Initiate/Maintain bed alarm  - Obtain necessary fall risk management equipment: alarms  - Apply yellow socks and bracelet for high fall risk patients  - Consider moving patient to room near nurses station  Outcome: Progressing  Goal: Maintain or return to baseline ADL function  Description: INTERVENTIONS:  -  Assess patient's ability to carry out ADLs; assess patient's baseline for ADL function  and identify physical deficits which impact ability to perform ADLs (bathing, care of mouth/teeth, toileting, grooming, dressing, etc.)  - Assess/evaluate cause of self-care deficits   - Assess range of motion  - Assess patient's mobility; develop plan if impaired  - Assess patient's need for assistive devices and provide as appropriate  - Encourage maximum independence but intervene and supervise when necessary  - Involve family in performance of ADLs  - Assess for home care needs following discharge   - Consider OT consult to assist with ADL evaluation and planning for discharge  - Provide patient education as appropriate  - Monitor functional capacity and physical performance, use of AM PAC & JH-HLM   - Monitor gait, balance and fatigue with ambulation    Outcome: Progressing  Goal: Maintains/Returns to pre admission functional level  Description: INTERVENTIONS:  - Perform AM-PAC 6 Click Basic Mobility/ Daily Activity assessment daily.  - Set and communicate daily mobility goal to care team and patient/family/caregiver.   - Collaborate with rehabilitation services on mobility goals if consulted  - Perform Range of Motion 3 times a day.  - Reposition patient every 2 hours.  - Dangle patient 3 times a day  - Stand patient 3 times a day  - Ambulate patient 3 times a day  - Out of bed to chair 3 times a day   - Out of bed for meals 3 times a day  - Out of bed for toileting  - Record patient progress and toleration of activity level   Outcome: Progressing     Problem: DISCHARGE PLANNING  Goal: Discharge to home or other facility with appropriate resources  Description: INTERVENTIONS:  - Identify barriers to discharge w/patient and caregiver  - Arrange for needed discharge resources and transportation as appropriate  - Identify discharge learning needs (meds, wound care, etc.)  - Arrange for interpretive services to assist at discharge as needed  - Refer to Case Management Department for coordinating discharge planning  if the patient needs post-hospital services based on physician/advanced practitioner order or complex needs related to functional status, cognitive ability, or social support system  Outcome: Progressing     Problem: Knowledge Deficit  Goal: Patient/family/caregiver demonstrates understanding of disease process, treatment plan, medications, and discharge instructions  Description: Complete learning assessment and assess knowledge base.  Interventions:  - Provide teaching at level of understanding  - Provide teaching via preferred learning methods  Outcome: Progressing     Problem: RESPIRATORY - ADULT  Goal: Achieves optimal ventilation and oxygenation  Description: INTERVENTIONS:  - Assess for changes in respiratory status  - Assess for changes in mentation and behavior  - Position to facilitate oxygenation and minimize respiratory effort  - Oxygen administered by appropriate delivery if ordered  - Initiate smoking cessation education as indicated  - Encourage broncho-pulmonary hygiene including cough, deep breathe, Incentive Spirometry  - Assess the need for suctioning and aspirate as needed  - Assess and instruct to report SOB or any respiratory difficulty  - Respiratory Therapy support as indicated  Outcome: Progressing     Problem: MUSCULOSKELETAL - ADULT  Goal: Maintain or return mobility to safest level of function  Description: INTERVENTIONS:  - Assess patient's ability to carry out ADLs; assess patient's baseline for ADL function and identify physical deficits which impact ability to perform ADLs (bathing, care of mouth/teeth, toileting, grooming, dressing, etc.)  - Assess/evaluate cause of self-care deficits   - Assess range of motion  - Assess patient's mobility  - Assess patient's need for assistive devices and provide as appropriate  - Encourage maximum independence but intervene and supervise when necessary  - Involve family in performance of ADLs  - Assess for home care needs following discharge   -  Consider OT consult to assist with ADL evaluation and planning for discharge  - Provide patient education as appropriate  Outcome: Progressing  Goal: Maintain proper alignment of affected body part  Description: INTERVENTIONS:  - Support, maintain and protect limb and body alignment  - Provide patient/ family with appropriate education  Outcome: Progressing

## 2025-06-05 NOTE — PROGRESS NOTES
Progress Note - Hospitalist   Name: Summer Blanton 89 y.o. female I MRN: [id number]  Unit/Bed#: [place] [address] -01 I Date of Admission: 6/2/2025   Date of Service: 6/5/2025 I Hospital Day: 2    Assessment & Plan  Acute on chronic heart failure with preserved ejection fraction (HFpEF) ([facility])  Wt Readings from Last 3 Encounters:   06/05/25 51.4 kg (113 lb 5.1 oz)   05/31/25 63.2 kg (139 lb 5.3 oz)   02/17/25 58.1 kg (128 lb)     Previous echo reviewed, cardiology recommending limited echo  Continue diuresis with IV Lasix 20 twice daily, had good output in the ED  Despite diuresis, the patient's weight has not changed.  IV diuretics will be continued.  Replete potassium, monitor daily labs  Cardiology consult: continue with iv diuretics at this time.   Diuresed well, anticipate transition to po diuretics.   Hypoxia  Secondary to CHF  Now weaned to room air  Bilateral pleural effusion  Suspect this is secondary to diastolic heart failure  Monitor with diuresis   Anxiety  Patient with history of anxiety. At home, was taking risperidone 0.25 mg qd prn.   Discussed with daughters at bedside, would like to have psych consult to discuss medications/adjustments. Patient currently on risperidone 0.25 mg qd while admitted.   Psych consult  Flutter-fibrillation ([facility])  EKG showing ventricular paced  Continue PTA amiodarone 200mg daily, lopressor 25mg BID  Continue aspirin 81 mg daily is, not on anticoagulation due to history of falls  Hypokalemia  Replete and monitor  Check magnesium  Rib pain  With frequent falls   CT without acute fractures noted, age-indeterminate rib fracture on right 3rd-5th  Continue Tylenol as needed  PT/OT eval due to recurrent falls  History of complete heart block  S/p Medtronic pacemaker    VTE Pharmacologic Prophylaxis: VTE Score: 5 High Risk (Score >/= 5) - Pharmacological DVT Prophylaxis Ordered: enoxaparin (Lovenox). Sequential Compression Devices Ordered.    Mobility:   Basic Mobility Inpatient  Raw Score: 17  JH-HLM Goal: 5: Stand one or more mins  JH-HLM Achieved: 6: Walk 10 steps or more  JH-HLM Goal achieved. Continue to encourage appropriate mobility.    Patient Centered Rounds: I performed bedside rounds with nursing staff today.   Discussions with Specialists or Other Care Team Provider: nursing, case management, will discuss with cardiology    Education and Discussions with Family / Patient: Updated  (daughters) at bedside.    Current Length of Stay: 2 day(s)  Current Patient Status: Inpatient   Certification Statement: The patient will continue to require additional inpatient hospital stay due to chf exacerbation, hypoxia, pleural effusions, IV diuretics  Discharge Plan: Anticipate discharge in 24-48 hrs to discharge location to be determined pending rehab evaluations.    Code Status: Level 3 - DNAR and DNI    Subjective   Seen and examined today, said that she is feeling better today. Said that her breathing is better. She is trying to use the incentive spirometry but said she is having a little difficulty with this. No chest pain or SOB at this time. Said that her leg swelling is improved. Discussed with daughters, wanted to have patient up and moving around along with psych consult to discuss medications for patient. No other complaints at this time.     Objective :  Temp:  [97 °F (36.1 °C)-97.2 °F (36.2 °C)] 97.1 °F (36.2 °C)  HR:  [50-56] 56  BP: (121-165)/(76-82) 165/82  Resp:  [16-18] 17  SpO2:  [94 %-95 %] 95 %  O2 Device: None (Room air)  Nasal Cannula O2 Flow Rate (L/min):  [1.5 L/min] 1.5 L/min    Body mass index is 20.73 kg/m².     Input and Output Summary (last 24 hours):     Intake/Output Summary (Last 24 hours) at 6/5/2025 0918  Last data filed at 6/5/2025 0928  Gross per 24 hour   Intake 200 ml   Output 2150 ml   Net -1950 ml       Physical Exam  Vitals reviewed.   Constitutional:       General: She is not in acute distress.     Appearance: She is ill-appearing. She is  not toxic-appearing.   HENT:      Head: Normocephalic and atraumatic.      Mouth/Throat:      Mouth: Mucous membranes are moist.     Cardiovascular:      Rate and Rhythm: Normal rate and regular rhythm.      Heart sounds: No murmur heard.  Pulmonary:      Effort: No respiratory distress.      Breath sounds: No stridor. No wheezing.      Comments: Saturating well on room air, decreased breath sounds bilaterally  Abdominal:      General: There is no distension.      Palpations: Abdomen is soft. There is no mass.      Tenderness: There is no abdominal tenderness.     Musculoskeletal:      Right lower leg: Edema present.      Left lower leg: Edema present.      Comments: Trace edema     Skin:     General: Skin is warm and dry.     Neurological:      Mental Status: She is alert and oriented to person, place, and time.     Psychiatric:         Mood and Affect: Mood normal.         Behavior: Behavior normal.           Lines/Drains:              Lab Results: I have reviewed the following results:   Results from last 7 days   Lab Units 06/04/25  0502 06/03/25  0556 06/02/25  1605   WBC Thousand/uL 8.02   < > 7.33   HEMOGLOBIN g/dL 11.5   < > 11.5   HEMATOCRIT % 34.9   < > 34.9   PLATELETS Thousands/uL 162   < > 166   SEGS PCT %  --   --  75   LYMPHO PCT %  --   --  12*   MONO PCT %  --   --  11   EOS PCT %  --   --  1    < > = values in this interval not displayed.     Results from last 7 days   Lab Units 06/05/25  0443   SODIUM mmol/L 140   POTASSIUM mmol/L 3.5   CHLORIDE mmol/L 102   CO2 mmol/L 28   BUN mg/dL 15   CREATININE mg/dL 0.71   ANION GAP mmol/L 10   CALCIUM mg/dL 8.8   GLUCOSE RANDOM mg/dL 98     Results from last 7 days   Lab Units 06/02/25  1605   INR  1.24*                   Recent Cultures (last 7 days):         Imaging Results Review: I reviewed radiology reports from this admission including: CT chest and CT abdomen/pelvis.  Other Study Results Review: No additional pertinent studies reviewed.    Last 24  Hours Medication List:     Current Facility-Administered Medications:     acetaminophen (TYLENOL) tablet 975 mg, Q8H PRN    aluminum-magnesium hydroxide-simethicone (MAALOX) oral suspension 30 mL, Q6H PRN    amiodarone tablet 200 mg, Daily    aspirin chewable tablet 81 mg, Daily    enoxaparin (LOVENOX) subcutaneous injection 40 mg, Daily    furosemide (LASIX) injection 20 mg, BID (diuretic)    hydrALAZINE (APRESOLINE) injection 10 mg, Q4H PRN    metoprolol tartrate (LOPRESSOR) tablet 25 mg, Q12H JACOB    ondansetron (ZOFRAN) injection 4 mg, Q6H PRN    polyethylene glycol (MIRALAX) packet 17 g, Daily PRN    risperiDONE (RisperDAL) tablet 0.25 mg, Daily    Administrative Statements   Today, Patient Was Seen By: Kitty Hopson PA-C    **Please Note: This note may have been constructed using a voice recognition system.**

## 2025-06-05 NOTE — CASE MANAGEMENT
Case Management Discharge Planning Note    Patient name Summer Blanton  Location [place] [address] /[address] 2 M* MRN [id number]  : 1930 Date 2025       Current Admission Date: 2025  Current Admission Diagnosis:Acute on chronic heart failure with preserved ejection fraction (HFpEF) (H[facility])   Patient Active Problem List    Diagnosis Date Noted    History of complete heart block 2025    Acute on chronic heart failure with preserved ejection fraction (HFpEF) (HCC) 2025    Acute pulmonary edema (HCC) 2025    Bilateral pleural effusion 2025    Hypokalemia 2025    Hypoxia 2025    Rib pain 2025    Flutter-fibrillation (HCC) 2025    Complete heart block (HCC) -   PPM ( Medtronic) 2021    Anxiety 2018    Peripheral neuropathy 2016    Forgetfulness 2016    Discoid lupus erythematosus 2012      LOS (days): 2  Geometric Mean LOS (GMLOS) (days):   Days to GMLOS:     OBJECTIVE:  Risk of Unplanned Readmission Score: 17.58         Current admission status: Inpatient   Preferred Pharmacy:   [facility]/pharmacy #[id number] [facility] [address] [place] [id number]  Phone: [phone] Fax: [phone]    Primary Care Provider: Self Self    Primary Insurance: MEDICARE  Secondary Insurance: BLUE CROSS    DISCHARGE DETAILS:          Comments - Freedom of Choice: CM provided patient's daughter (Nella) with HHC (SN) list for [facility]. CM reserved in [facility] and updated patient's discharge plan.      Other Referral/Resources/Interventions Provided:  Interventions: HHC, Other (Specify)  Referral Comments: [facility]HC (SN) [facility], Pending IP therapy recommendations -- 'Heal at home' program       Additional Comments: CM provided patient's daughter (Nella) with HHC (SN) list for [facility]. CM reserved in [facility] and updated patient's discharge plan.

## 2025-06-05 NOTE — ASSESSMENT & PLAN NOTE
Patient with history of anxiety. At home, was taking risperidone 0.25 mg qd prn.   Discussed with daughters at bedside, would like to have psych consult to discuss medications/adjustments. Patient currently on risperidone 0.25 mg qd while admitted.   Psych consult

## 2025-06-05 NOTE — CASE MANAGEMENT
Case Management Assessment & Discharge Planning Note    Patient name Summer Blanton  Location [place] [address] /South 2 M* MRN [id number]  : 1930 Date 2025       Current Admission Date: 2025  Current Admission Diagnosis:Acute on chronic heart failure with preserved ejection fraction (HFpEF) (HCC)   Patient Active Problem List    Diagnosis Date Noted    History of complete heart block 2025    Acute on chronic heart failure with preserved ejection fraction (HFpEF) (HCC) 2025    Acute pulmonary edema (HCC) 2025    Bilateral pleural effusion 2025    Hypokalemia 2025    Hypoxia 2025    Rib pain 2025    Flutter-fibrillation (HCC) 2025    Complete heart block (HCC) -   PPM ( [employer]) 2021    Anxiety 2018    Peripheral neuropathy 2016    Forgetfulness 2016    Discoid lupus erythematosus 2012      LOS (days): 2  Geometric Mean LOS (GMLOS) (days):   Days to GMLOS:     OBJECTIVE:    Risk of Unplanned Readmission Score: 17.54         Current admission status: Inpatient       Preferred Pharmacy:   [facility]/pharmacy #[id number] - [place], [place] - [address] [address]  Phone: [phone] Fax: [phone]    Primary Care Provider: Self Self    Primary Insurance: MEDICARE  Secondary Insurance: BLUE CROSS    ASSESSMENT:  Active Health Care Proxies    There are no active Health Care Proxies on file.       Advance Directives  Does patient have a Health Care POA?: Yes (Patient's three daughters)  Does patient have Advance Directives?: Yes  Advance Directives: Living will  Primary Contact: [place],Robyn (Daughter)  [phone] (Mobile)      Readmission Root Cause  30 Day Readmission: No    Patient Information  Admitted from:: Facility ([facility])  Mental Status: Confused  During Assessment patient was accompanied by: Daughter, Son (3 daughters at bedside)  Assessment  information provided by:: Patient  Primary Caregiver: Self  Support Systems: Home care staff, Family members, Children, Son, Daughter  County [place] Residence: [place]  What city do you live in?: [place]  Home entry access options. Select all that apply.: No steps to enter home  Type of Current Residence:  ([facility])  Living Arrangements: Lives in Facility  Is patient a ?: No    Activities of Daily Living Prior to Admission  Functional Status: Independent  Completes ADLs independently?: Yes  Ambulates independently?: Yes  Does patient use assisted devices?: Yes  Assisted Devices (DME) used: Walker  Does patient currently own DME?: Yes  What DME does the patient currently own?: Walker  Does patient have a history of Outpatient Therapy (PT/OT)?: No  Does the patient have a history of Short-Term Rehab?: No  Does patient have a history of HHC?: No  Does patient currently have HHC?: No    Patient Information Continued  Income Source: SSI/SSD  Does patient have prescription coverage?: Yes ([employer], [email or web address]  Can the patient afford their medications and any related supplies (such as glucometers or test strips)?: Yes  Does patient receive dialysis treatments?: No  Does patient have a history of substance abuse?: No  Does patient have a history of Mental Health Diagnosis?: Yes (Anxiety. pending IP psychiatric consultation)  Is patient receiving treatment for mental health?: No. Treatment options were provided.  Has patient received inpatient treatment related to mental health in the last 2 years?: No      Means of Transportation  Means of Transport to Appts:: Family transport ([employer] transportation)    DISCHARGE DETAILS:    Discharge planning discussed with:: Patient's daughters at bedside  [place] of Choice: Yes  Comments - Freedom of Choice: CM reviewed pending inpatient therapy recommendations; family agreeable. CM reviewed patient eligible for HHC (SN) and Heal at Home program.  MARIE  contacted family/caregiver?: Yes (Patient's three daughters at bedside.)  Were Treatment Team discharge recommendations reviewed with patient/caregiver?: Yes  Did patient/caregiver verbalize understanding of patient care needs?: N/A- going to facility       Contacts  Patient Contacts: Kirstin (Daughter)  Relationship to Patient:: Family  Contact Method: In Person  Reason/Outcome: Continuity of Care, Emergency Contact, Discharge Planning         DME Referral Provided  Referral made for DME?: No    Other Referral/Resources/Interventions Provided:  Interventions: Facility Return, Other (Specify), HHC  Referral Comments: HHC (SN), pending recommendations -- 'Heal at home' program         Treatment Team Recommendation: Facility Return, Home with Home Health Care, Other (Heal at home program)  Discharge Destination Plan:: Facility Return, Home with Home Health Care, Other (Heal at home program)           Additional Comments: CM met with patient and patient's three daughters, Esther, Kirstin and Nella at bedside to complete assessment. Family confirmed patient resides in the dementia unit at [facility]. Family requested CM to add all children to patient's file including 3 daughters and 1 son. CM reviewed requested and patient pending inpatient therapy recommendations, family agreeable. CM reviewed patient eligbility criteria for  HHC (SN) and 'Heal at home' program with SHIMON BATISTA. Family agreeable. CM made referral in [place] and following for pending inpatient therapy recommendations. Family denies patient use of oxygen at night or at baseline. Family denies any further CM needs at this time; CM to follow for further discharge planning.

## 2025-06-05 NOTE — PLAN OF CARE
Problem: Potential for Falls  Goal: Patient will remain free of falls  Description: INTERVENTIONS:  - Educate patient/family on patient safety including physical limitations  - Instruct patient to call for assistance with activity   - Consider consulting OT/PT to assist with strengthening/mobility based on AM PAC & JH-HLM score  - Consult OT/PT to assist with strengthening/mobility   - Keep Call bell within reach  - Keep bed low and locked with side rails adjusted as appropriate  - Keep care items and personal belongings within reach  - Initiate and maintain comfort rounds  - Make Fall Risk Sign visible to staff  - Offer Toileting every 2 Hours, in advance of need  - Initiate/Maintain bed alarm  - Obtain necessary fall risk management equipment: alarms  - Apply yellow socks and bracelet for high fall risk patients  - Consider moving patient to room near nurses station  6/5/2025 0949 by Karen Villalobos RN  Outcome: Progressing  6/5/2025 0949 by Karen Villalobos RN  Outcome: Progressing     Problem: Prexisting or High Potential for Compromised Skin Integrity  Goal: Skin integrity is maintained or improved  Description: INTERVENTIONS:  - Identify patients at risk for skin breakdown  - Assess and monitor skin integrity including under and around medical devices   - Assess and monitor nutrition and hydration status  - Monitor labs  - Assess for incontinence   - Turn and reposition patient  - Assist with mobility/ambulation  - Relieve pressure over emelia prominences   - Avoid friction and shearing  - Provide appropriate hygiene as needed including keeping skin clean and dry  - Evaluate need for skin moisturizer/barrier cream  - Collaborate with interdisciplinary team  - Patient/family teaching  - Consider wound care consult    Assess:  - Review Abebe scale daily  - Clean and moisturize skin every shift  - Inspect skin when repositioning, toileting, and assisting with ADLS  - Assess under medical devices such as Raheel every  shift  - Assess extremities for adequate circulation and sensation     Bed Management:  - Have minimal linens on bed & keep smooth, unwrinkled  - Change linens as needed when moist or perspiring  - Avoid sitting or lying in one position for more than 2 hours while in bed?Keep HOB at 30degrees   - Toileting:  - Offer bedside commode  - Assess for incontinence every shift  - Use incontinent care products after each incontinent episode such as foam cleanser    Activity:  - Mobilize patient 3 times a day  - Encourage activity and walks on unit  - Encourage or provide ROM exercises   - Turn and reposition patient every 2 Hours  - Use appropriate equipment to lift or move patient in bed  - Instruct/ Assist with weight shifting every shift when out of bed in chair  - Consider limitation of chair time 2 hour intervals    Skin Care:  - Avoid use of baby powder, tape, friction and shearing, hot water or constrictive clothing  - Relieve pressure over bony prominences using Mapilex  - Do not massage red bony areas    Next Steps:  - Teach patient strategies to minimize risks such as skin breakdown  - Consider consults to  interdisciplinary teams such as wound care  6/5/2025 0949 by Karen Villalobos RN  Outcome: Progressing  6/5/2025 0949 by Karen Villalobos RN  Outcome: Progressing     Problem: PAIN - ADULT  Goal: Verbalizes/displays adequate comfort level or baseline comfort level  Description: Interventions:  - Encourage patient to monitor pain and request assistance  - Assess pain using appropriate pain scale  - Administer analgesics as ordered based on type and severity of pain and evaluate response  - Implement non-pharmacological measures as appropriate and evaluate response  - Consider cultural and social influences on pain and pain management  - Notify physician/advanced practitioner if interventions unsuccessful or patient reports new pain  - Educate patient/family on pain management process including their role and  importance of  reporting pain   - Provide non-pharmacologic/complimentary pain relief interventions  6/5/2025 0949 by Karen Villalobos RN  Outcome: Progressing  6/5/2025 0949 by Karen Villalobos RN  Outcome: Progressing     Problem: INFECTION - ADULT  Goal: Absence or prevention of progression during hospitalization  Description: INTERVENTIONS:  - Assess and monitor for signs and symptoms of infection  - Monitor lab/diagnostic results  - Monitor all insertion sites, i.e. indwelling lines, tubes, and drains  - Monitor endotracheal if appropriate and nasal secretions for changes in amount and color  - Thom[place]ons appropriate cooling/warming therapies per order  - Administer medications as ordered  - Instruct and encourage patient and family to use good hand hygiene technique  - Identify and instruct in appropriate isolation precautions for identified infection/condition  6/5/2025 0949 by Karen Villalobos RN  Outcome: Progressing  6/5/2025 0949 by Karen Villalobos RN  Outcome: Progressing  Goal: Absence of fever/infection during neutropenic period  Description: INTERVENTIONS:  - Monitor WBC  - Perform strict hand hygiene  - Limit to healthy visitors only  - No plants, dried, fresh or silk flowers with weeks in patient room  6/5/2025 0949 by Karen Villalobos RN  Outcome: Progressing  6/5/2025 0949 by Karen Villalobos RN  Outcome: Progressing     Problem: SAFETY ADULT  Goal: Patient will remain free of falls  Description: INTERVENTIONS:  - Educate patient/family on patient safety including physical limitations  - Instruct patient to call for assistance with activity   - Consider consulting OT/PT to assist with strengthening/mobility based on AM PAC & JH-HLM score  - Consult OT/PT to assist with strengthening/mobility   - Keep Call bell within reach  - Keep bed low and locked with side rails adjusted as appropriate  - Keep care items and personal belongings within reach  - Initiate and maintain comfort rounds  - Make Fall Risk Sign visible to  staff  - Offer Toileting every 2 Hours, in advance of need  - Initiate/Maintain bed alarm  - Obtain necessary fall risk management equipment: alarms  - Apply yellow socks and bracelet for high fall risk patients  - Consider moving patient to room near nurses station  6/5/2025 0949 by Karen Villalobos RN  Outcome: Progressing  6/5/2025 0949 by Karen Villalobos RN  Outcome: Progressing  Goal: Maintain or return to baseline ADL function  Description: INTERVENTIONS:  -  Assess patient's ability to carry out ADLs; assess patient's baseline for ADL function and identify physical deficits which impact ability to perform ADLs (bathing, care of mouth/teeth, toileting, grooming, dressing, etc.)  - Assess/evaluate cause of self-care deficits   - Assess range of motion  - Assess patient's mobility; develop plan if impaired  - Assess patient's need for assistive devices and provide as appropriate  - Encourage maximum independence but intervene and supervise when necessary  - Involve family in performance of ADLs  - Assess for home care needs following discharge   - Consider OT consult to assist with ADL evaluation and planning for discharge  - Provide patient education as appropriate  - Monitor functional capacity and physical performance, use of AM PAC & JH-HLM   - Monitor gait, balance and fatigue with ambulation    6/5/2025 0949 by Karen Villalobos RN  Outcome: Progressing  6/5/2025 0949 by Karen Villalobos RN  Outcome: Progressing  Goal: Maintains/Returns to pre admission functional level  Description: INTERVENTIONS:  - Perform AM-PAC 6 Click Basic Mobility/ Daily Activity assessment daily.  - Set and communicate daily mobility goal to care team and patient/family/caregiver.   - Collaborate with rehabilitation services on mobility goals if consulted  - Perform Range of Motion 3 times a day.  - Reposition patient every 2 hours.  - Dangle patient 3 times a day  - Stand patient 3 times a day  - Ambulate patient 3 times a day  - Out of bed  to chair 3 times a day   - Out of bed for meals 3 times a day  - Out of bed for toileting  - Record patient progress and toleration of activity level   6/5/2025 0949 by Karen Villalobos RN  Outcome: Progressing  6/5/2025 0949 by Karen Villalobos RN  Outcome: Progressing     Problem: DISCHARGE PLANNING  Goal: Discharge to home or other facility with appropriate resources  Description: INTERVENTIONS:  - Identify barriers to discharge w/patient and caregiver  - Arrange for needed discharge resources and transportation as appropriate  - Identify discharge learning needs (meds, wound care, etc.)  - Arrange for interpretive services to assist at discharge as needed  - Refer to Case Management Department for coordinating discharge planning if the patient needs post-hospital services based on physician/advanced practitioner order or complex needs related to functional status, cognitive ability, or social support system  6/5/2025 0949 by Karen Villalobos RN  Outcome: Progressing  6/5/2025 0949 by Karen Villalobos RN  Outcome: Progressing     Problem: Knowledge Deficit  Goal: Patient/family/caregiver demonstrates understanding of disease process, treatment plan, medications, and discharge instructions  Description: Complete learning assessment and assess knowledge base.  Interventions:  - Provide teaching at level of understanding  - Provide teaching via preferred learning methods  6/5/2025 0949 by Karen Villalobos RN  Outcome: Progressing  6/5/2025 0949 by Karen Villalobos RN  Outcome: Progressing     Problem: RESPIRATORY - ADULT  Goal: Achieves optimal ventilation and oxygenation  Description: INTERVENTIONS:  - Assess for changes in respiratory status  - Assess for changes in mentation and behavior  - Position to facilitate oxygenation and minimize respiratory effort  - Oxygen administered by appropriate delivery if ordered  - Initiate smoking cessation education as indicated  - Encourage broncho-pulmonary hygiene including cough, deep  breathe, Incentive Spirometry  - Assess the need for suctioning and aspirate as needed  - Assess and instruct to report SOB or any respiratory difficulty  - Respiratory Therapy support as indicated  6/5/2025 0949 by Karen Villalobos RN  Outcome: Progressing  6/5/2025 0949 by Karen Villalobos RN  Outcome: Progressing     Problem: MUSCULOSKELETAL - ADULT  Goal: Maintain or return mobility to safest level of function  Description: INTERVENTIONS:  - Assess patient's ability to carry out ADLs; assess patient's baseline for ADL function and identify physical deficits which impact ability to perform ADLs (bathing, care of mouth/teeth, toileting, grooming, dressing, etc.)  - Assess/evaluate cause of self-care deficits   - Assess range of motion  - Assess patient's mobility  - Assess patient's need for assistive devices and provide as appropriate  - Encourage maximum independence but intervene and supervise when necessary  - Involve family in performance of ADLs  - Assess for home care needs following discharge   - Consider OT consult to assist with ADL evaluation and planning for discharge  - Provide patient education as appropriate  6/5/2025 0949 by Karen Villalobos RN  Outcome: Progressing  6/5/2025 0949 by Karen Villalobos RN  Outcome: Progressing  Goal: Maintain proper alignment of affected body part  Description: INTERVENTIONS:  - Support, maintain and protect limb and body alignment  - Provide patient/ family with appropriate education  6/5/2025 0949 by Karne Villalobos RN  Outcome: Progressing  6/5/2025 0949 by Karen Villalobos RN  Outcome: Progressing     Problem: METABOLIC, FLUID AND ELECTROLYTES - ADULT  Goal: Electrolytes maintained within normal limits  Description: INTERVENTIONS:  - Monitor labs and assess patient for signs and symptoms of electrolyte imbalances  - Administer electrolyte replacement as ordered  - Monitor response to electrolyte replacements, including repeat lab results as appropriate  - Instruct patient on  fluid and nutrition as appropriate  6/5/2025 0949 by Karen Villalobos RN  Outcome: Progressing  6/5/2025 0949 by Karen Villalobos RN  Outcome: Progressing     Problem: SKIN/TISSUE INTEGRITY - ADULT  Goal: Skin Integrity remains intact(Skin Breakdown Prevention)  Description: Assess:  -Perform Abebe assessment every   -Clean and moisturize skin every   -Inspect skin when repositioning, toileting, and assisting with ADLS  -Assess under medical devices such as  every   -Assess extremities for adequate circulation and sensation     Bed Management:  -Have minimal linens on bed & keep smooth, unwrinkled  -Change linens as needed when moist or perspiring  -Avoid sitting or lying in one position for more than  hours while in bed  -Keep HOB at degrees     Toileting:  -Offer bedside commode  -Assess for incontinence every   -Use incontinent care products after each incontinent episode such as     Activity:  -Mobilize patient  times a day  -Encourage activity and walks on unit  -Encourage or provide ROM exercises   -Turn and reposition patient every  Hours  -Use appropriate equipment to lift or move patient in bed  -Instruct/ Assist with weight shifting every  when out of bed in chair  -Consider limitation of chair time  hour intervals    Skin Care:  -Avoid use of baby powder, tape, friction and shearing, hot water or constrictive clothing  -Relieve pressure over bony prominences using   -Do not massage red bony areas    Next Steps:  -Teach patient strategies to minimize risks such as    -Consider consults to  interdisciplinary teams such as   6/5/2025 0949 by Karen Villalobos RN  Outcome: Progressing  6/5/2025 0949 by Karen Villalobos RN  Outcome: Progressing

## 2025-06-05 NOTE — CONSULTS
TeleConsultation - Behavioral Health   Name: Summer Blanton 89 y.o. female I MRN: [id number]  Unit/Bed#: [place] [address] -01 I Date of Admission: 6/2/2025   Date of Service: 6/5/2025 I Hospital Day: 2  Inpatient consult to Psychiatry  Consult performed by: Fadia Cruz MD  Consult ordered by: Kitty Hopson PA-C        Physician Requesting Consult: Gilberto Boss DO  Principal Problem:Acute on chronic heart failure with preserved ejection fraction (HFpEF) ([facility])  Reason for Consult: Anxiety     Assessment & Plan   89 year old female in hospital for bilateral pleural effusion and hypoxia secondary to CHF.  Pt family concerned about anxiety in context of her worsening cognitive state.  Pt was taking Risperdal as needed instead of daily.  Pt on evaluation is alert to self only, denies anxiety or depression, unable to participate in meaningful conversation.  Pt grandson at bedside says family dynamic stresses her out at times and pt currently is not at baseline.   Cannot make psychiatric diagnosis when patient is not at baseline mental state or delirium    AMS/delirium  Neurocognitive decline  R/o adjustment disorder  R/o mood due to medical condition    TREATMENT PLAN RECOMMENDATIONS:  No criteria for involuntary intervention  Can continue Risperdal 0.25 mg daily if helpful-- also can consider seroquel 12.5 to 25 mg nightly or as needed if worsening anxiety- preferred in dementia  -discuss anti depressant with family and pt- zoloft vs mirtazapine  When pt at baseline- can reconsult     Informed consent for the above medication has been obtained including discussion of the risks, benefits and alternatives: Yes    Disposition: The patient does not currently meet criteria for inpatient psychiatric hospitalization. They deny thoughts or plans for suicide and denies homicidal thoughts or intent. They are not unable to care for themselves due to a mental illness and/or acute psychosis. They are able to  adequately participate in care planning with primary team. No criteria for an involuntary psychiatric commitment exists at this time.    Legal Status Recommendation: NA    Multiple Antipsychotic Review: N/A    Psychotherapy/Psychoeducation: N/A to this case.    Other/Medical Work Up and/or treatment modality recommendations: N/A to this case.    Patient Caregiver/Family Education: N/A    Follow-up: Re-consult PRN    Report regarding the above Assessment and Treatment plan was provided to: ASHWINI Hopson    History of Present Illness      a 89 y.o. female with a PMH of forgetfullness, HTN, AFlutter who presents with rib pain.  History obtained from patient, chart review and discussion with ED attending. She presents tonight for evaluation of rib pain. Had a fall on Saturday and was evaluated at urgent care but continued to have pain prompting evaluation. Continues to have intermittent pain on her chest but improved with pain medications. Does get occasionally short of breath when walking around. Was found to be hypoxic while walking in the ED. She is oriented to herself, place and some events. Otherwise has no further complaints.          Pt alert and oriented to self only, says she is in [facility], says year is 2004 then 2005.  She does not know why she is in the hospital, denies anxiety or depression.  She says she is tired    Grandcher Hoff says she is not at baseline, she is normally chatty. She has waxing and waning mention, has good conversations at time.  He says the family dynamic makes her anxious, her daughters worry too much.  Thinks she was supposed to be taking meds daily but was only as needed.  Not formally diagnosed with dementia       Psychiatric Review Of Systems:  Denies anxiety, denies depression    Historical Information   Past Psychiatric History:   none    Substance Abuse History:  NA        Social History:  Lives in [facility]          Medical History Review: I have reviewed the patient's PMH, PSH,  "Social History, Family History, Meds, and Allergies     Meds/Allergies   Current Facility-Administered Medications   Medication Dose Route Frequency Provider Last Rate    acetaminophen  975 mg Oral Q8H PRN Carlos A Hare PA-C      aluminum-magnesium hydroxide-simethicone  30 mL Oral Q6H PRN Carlos A Hare PA-C      amiodarone  200 mg Oral Daily Carlos A Hare PA-C      aspirin  81 mg Oral Daily Carlos A Hare PA-C      enoxaparin  40 mg Subcutaneous Daily Carlos A Hare PA-C      furosemide  20 mg Oral Daily James Valencia, DO      hydrALAZINE  10 mg Intravenous Q4H PRN Shorty Blount MD      metoprolol tartrate  37.5 mg Oral Q12H JACOB James Valencia, DO      ondansetron  4 mg Intravenous Q6H PRN Carlos A Hare PA-C      polyethylene glycol  17 g Oral Daily PRN Carlos A Hare PA-C      risperiDONE  0.25 mg Oral Daily Shorty Blount MD        Allergies[1]    Objective :  Temp:  [97 °F (36.1 °C)-97.8 °F (36.6 °C)] 97.8 °F (36.6 °C)  HR:  [50-56] 50  BP: (108-165)/(63-82) 108/63  Resp:  [17-18] 17  SpO2:  [93 %-95 %] 93 %  O2 Device: None (Room air)    Mental Status Evaluation:  Appearance:  age appropriate   Behavior:  tired   Speech:  Soft, slurred   Mood:  \"Fine\"   Affect:  blunted   Language: naming objects   Thought Process:  normal   Associations confused   Thought Content:  normal   Perceptual Disturbances: None   Risk Potential: Suicidal Ideations none  Homicidal Ideations none  Potential for Aggression No   Sensorium:  person, place, and time/date   Cognition:  recent and remote memory grossly intact   Consciousness:  tired   Attention: attention span appeared shorter than expected for age   Intellect: not examined   Fund of Knowledge: Not examined   Insight:  limited   Judgment: limited                 Lab Results: I have reviewed the following lab results:   .     06/05/25  0443   SODIUM 140   K 3.5      CO2 28   BUN 15   CREATININE 0.71   GLUC 98   MG 2.0          Lipid Profile:   Lab Results   Component " "Value Date    CHOLESTEROL 164 07/11/2017    HDL 80 (H) 07/11/2017    TRIG 48 07/11/2017    LDLCALC 74 07/11/2017   Thyroid Studies:   Lab Results   Component Value Date    PZW0YTDSFVHB 2.113 02/14/2025    FREET4 0.79 05/19/2022     Ammonia: No results found for: \"AMMONIA\"  Drug Levels: No results found for: \"VALPROICTOT\", \"VALPROICACID\", \"LITHIUM\", \"CARBAMAZEPIN\", \"CLOZAPINE\", \"NCLOZIP\"    Imaging Results Review: No pertinent imaging studies reviewed.  Other Study Results Review: No additional pertinent studies reviewed.    Code Status: Level 3 - DNAR and DNI  Advance Directive and Living Will: Yes    Power of :    POLST:      Screenings:   1. Nutrition Assessment (completed by Staff):   Nutrition  Feeding: Needs set up  Diet Type: Cardiac  2. Pain Screening  Pain Assessment  Pain Assessment Tool: 0-10  Pain Score: 0  Pain Location/Orientation: Orientation: Left, Orientation: Lower, Location: Chest  3. Suicide Screening  ED Crisis Suicide Risk Assessment:      C-SSRS Screening (Nursing Assessment - recent):    C-SSRS Screening (Nursing Assessment - since last contact):      Suicide Risk Assessment: Not obtained    Administrative Statements   VIRTUAL CARE DOCUMENTATION:     1. This service was provided via Telemedicine using Teams Virtual Rounding      2. Parties in the room with patient during teleconsult Family member: grandson     3. Confidentiality My office door was closed     4. Participants No one else was in the room    5. Patient acknowledged consent and understanding of privacy and security of the  Telemedicine consult. I informed the patient that I have reviewed their record in Epic and presented the opportunity for them to ask any questions regarding the visit today.  The patient agreed to participate.    6. I have spent a total time of 50 minutes in caring for this patient on the day of the visit/encounter including Counseling / Coordination of care, Documenting in the medical record, " Reviewing/placing orders in the medical record (including tests, medications, and/or procedures), and Communicating with other healthcare professionals , not including the time spent for establishing the audio/video connection.          [1]   Allergies  Allergen Reactions    Gabapentin      dreams    Sertraline GI Intolerance     ' felt like a zombie'

## 2025-06-06 VITALS
BODY MASS INDEX: 21.62 KG/M2 | OXYGEN SATURATION: 95 % | SYSTOLIC BLOOD PRESSURE: 159 MMHG | DIASTOLIC BLOOD PRESSURE: 90 MMHG | TEMPERATURE: 97.2 F | HEART RATE: 69 BPM | WEIGHT: 117.5 LBS | HEIGHT: 62 IN | RESPIRATION RATE: 20 BRPM

## 2025-06-06 PROBLEM — I10 HYPERTENSION: Status: ACTIVE | Noted: 2025-06-06

## 2025-06-06 LAB
ANION GAP SERPL CALCULATED.3IONS-SCNC: 5 MMOL/L (ref 4–13)
BUN SERPL-MCNC: 17 MG/DL (ref 5–25)
CALCIUM SERPL-MCNC: 9.4 MG/DL (ref 8.4–10.2)
CHLORIDE SERPL-SCNC: 102 MMOL/L (ref 96–108)
CO2 SERPL-SCNC: 30 MMOL/L (ref 21–32)
CREAT SERPL-MCNC: 0.9 MG/DL (ref 0.6–1.3)
ERYTHROCYTE [DISTWIDTH] IN BLOOD BY AUTOMATED COUNT: 14.4 % (ref 11.6–15.1)
GFR SERPL CREATININE-BSD FRML MDRD: 54 ML/MIN/1.73SQ M
GLUCOSE SERPL-MCNC: 124 MG/DL (ref 65–140)
HCT VFR BLD AUTO: 37 % (ref 34.8–46.1)
HGB BLD-MCNC: 12.3 G/DL (ref 11.5–15.4)
MAGNESIUM SERPL-MCNC: 2.2 MG/DL (ref 1.9–2.7)
MCH RBC QN AUTO: 30.9 PG (ref 26.8–34.3)
MCHC RBC AUTO-ENTMCNC: 33.2 G/DL (ref 31.4–37.4)
MCV RBC AUTO: 93 FL (ref 82–98)
PLATELET # BLD AUTO: 163 THOUSANDS/UL (ref 149–390)
PMV BLD AUTO: 9.7 FL (ref 8.9–12.7)
POTASSIUM SERPL-SCNC: 4.5 MMOL/L (ref 3.5–5.3)
RBC # BLD AUTO: 3.98 MILLION/UL (ref 3.81–5.12)
SODIUM SERPL-SCNC: 137 MMOL/L (ref 135–147)
WBC # BLD AUTO: 8.92 THOUSAND/UL (ref 4.31–10.16)

## 2025-06-06 PROCEDURE — 97166 OT EVAL MOD COMPLEX 45 MIN: CPT

## 2025-06-06 PROCEDURE — 99239 HOSP IP/OBS DSCHRG MGMT >30: CPT

## 2025-06-06 PROCEDURE — 83735 ASSAY OF MAGNESIUM: CPT

## 2025-06-06 PROCEDURE — 80048 BASIC METABOLIC PNL TOTAL CA: CPT

## 2025-06-06 PROCEDURE — 99232 SBSQ HOSP IP/OBS MODERATE 35: CPT

## 2025-06-06 PROCEDURE — 85027 COMPLETE CBC AUTOMATED: CPT

## 2025-06-06 PROCEDURE — 97162 PT EVAL MOD COMPLEX 30 MIN: CPT

## 2025-06-06 RX ORDER — LOSARTAN POTASSIUM 25 MG/1
25 TABLET ORAL DAILY
Qty: 30 TABLET | Refills: 0 | Status: SHIPPED | OUTPATIENT
Start: 2025-06-06

## 2025-06-06 RX ORDER — LOSARTAN POTASSIUM 25 MG/1
25 TABLET ORAL DAILY
Status: DISCONTINUED | OUTPATIENT
Start: 2025-06-06 | End: 2025-06-06 | Stop reason: HOSPADM

## 2025-06-06 RX ORDER — METOPROLOL TARTRATE 37.5 MG/1
37.5 TABLET ORAL EVERY 12 HOURS SCHEDULED
Qty: 60 TABLET | Refills: 0 | Status: SHIPPED | OUTPATIENT
Start: 2025-06-06

## 2025-06-06 RX ORDER — FUROSEMIDE 20 MG/1
20 TABLET ORAL DAILY
Qty: 30 TABLET | Refills: 0 | Status: SHIPPED | OUTPATIENT
Start: 2025-06-07

## 2025-06-06 RX ORDER — QUETIAPINE FUMARATE 25 MG/1
12.5 TABLET, FILM COATED ORAL
Qty: 15 TABLET | Refills: 0 | Status: SHIPPED | OUTPATIENT
Start: 2025-06-06

## 2025-06-06 RX ADMIN — ASPIRIN 81 MG CHEWABLE TABLET 81 MG: 81 TABLET CHEWABLE at 08:48

## 2025-06-06 RX ADMIN — FUROSEMIDE 20 MG: 20 TABLET ORAL at 08:48

## 2025-06-06 RX ADMIN — RISPERIDONE 0.25 MG: 0.25 TABLET, FILM COATED ORAL at 08:49

## 2025-06-06 RX ADMIN — ENOXAPARIN SODIUM 40 MG: 40 INJECTION SUBCUTANEOUS at 08:48

## 2025-06-06 RX ADMIN — METOPROLOL TARTRATE 37.5 MG: 25 TABLET, FILM COATED ORAL at 08:49

## 2025-06-06 RX ADMIN — AMIODARONE HYDROCHLORIDE 200 MG: 200 TABLET ORAL at 08:49

## 2025-06-06 RX ADMIN — LOSARTAN POTASSIUM 25 MG: 25 TABLET, FILM COATED ORAL at 11:05

## 2025-06-06 NOTE — ASSESSMENT & PLAN NOTE
Patient with history of HTN. Patient's lopressor increased to 37.5 mg bid and cardiology added on losartan 25 mg qd. Continue with lasix 20 mg qd as well.   Continue on discharge lopressor 37.5 mg bid, losartan 25 mg qd, lasix 20 mg qd. Should monitor BP outpatient and follow up with cardiology on discharge.

## 2025-06-06 NOTE — PHYSICAL THERAPY NOTE
PHYSICAL THERAPY EVALUATION          Patient Name: Summer Blanton  Today's Date: 6/6/2025 06/06/25 1046   PT Last Visit   PT Visit Date 06/06/25   Note Type   Note type Evaluation   Pain Assessment   Pain Assessment Tool 0-10   Pain Score No Pain   Restrictions/Precautions   Other Precautions Cognitive;Chair Alarm;Bed Alarm;Fall Risk   Home Living   Type of Home Assisted living   Home Equipment Walker   Additional Comments per chart review from [facility]- [facility]   Prior Function   Lives With Facility staff   Falls in the last 6 months 1 to 4   Comments unclear baseline. prev notes indicate pt is indep for ADLs and ambulation w RW. would confirm w facility   General   Additional Pertinent History pt admitted 6/2/25 for acute on chronic CHF. PMHx significant for forgetfulness   Cognition   Overall Cognitive Status Impaired   Arousal/Participation Cooperative   Orientation Level Oriented to person;Disoriented to place;Disoriented to time;Disoriented to situation   Memory Decreased recall of precautions;Decreased recall of recent events;Decreased short term memory   Following Commands Follows one step commands with increased time or repetition   Comments hx of forgetfulness however no other documented cognitive deficits. may benefit from formal cognitive assessment however does reside in memory care unit   Bed Mobility   Supine to Sit 5  Supervision   Additional items HOB elevated;Increased time required;Verbal cues   Additional Comments cues for direction   Transfers   Sit to Stand 5  Supervision   Additional items Increased time required;Other;Verbal cues  (RW)   Stand to Sit 4  Minimal assistance   Additional items Assist x 1;Armrests;Increased time required;Verbal cues;Other  (RW)   Additional Comments cues for direction. assist for guidance to chair   Ambulation/Elevation   Gait pattern Improper Weight shift;Short stride;Excessively slow;Inconsistent nathaniel    Gait Assistance   (close S- occ min A for guidance)   Additional items Assist x 1;Verbal cues  (cues for direction, occ A to guide RW)   Assistive Device Rolling walker   Distance 60-65'   Balance   Static Standing Fair -   Dynamic Standing Poor +   Ambulatory Poor +   Endurance Deficit   Endurance Deficit No   Activity Tolerance   Activity Tolerance Patient tolerated treatment well   Medical Staff Made Aware OT; PA   Nurse Made Aware yes   Assessment   Prognosis Good   Assessment Summer Blanton is a 89 y.o. female admitted to [facility] on 6/2/2025 for Acute on chronic heart failure with preserved ejection fraction (HFpEF) ([facility]). PT was consulted and pt was seen on 6/6/2025 for mobility assessment and d/c planning. Pt presents w fall risk. Poor historian however per chart review resides on memory care unit of [facility] and ambulates w RW. Pt is primarily functioning at S level for bed mobility, transfers and ambulation w RW. Pt demonstrated ability to ambulate household distances. Occasionally require min A for guidance secondary to apparent baseline cognitive deficits. Appears to be functioning at baseline. No further acute PT needs however would benefit from continued mobilization via nsg/restorative.   Barriers to Discharge None   Plan   PT Frequency   (d/c PT: maintain on restorative)   Discharge Recommendation   Rehab Resource Intensity Level, PT No post-acute rehabilitation needs  (vs PT at facility pending clarification of PLOF)   AM-PAC Basic Mobility Inpatient   Turning in Flat Bed Without Bedrails 3   Lying on Back to Sitting on Edge of Flat Bed Without Bedrails 3   Moving Bed to Chair 3   Standing Up From Chair Using Arms 3   Walk in Room 3   Climb 3-5 Stairs With Railing 3   Basic Mobility Inpatient Raw Score 18   Basic Mobility Standardized Score 41.05   [facility] Highest Level Of Mobility   -HLM Goal 6: Walk 10 steps or more   -HLM Achieved 7: Walk 25 feet or more   End of Consult   Patient Position at  End of Consult Bedside chair;Bed/Chair alarm activated;All needs within reach     History: co - morbidities including age, social background, use of assistive device, assumed assist for adl's/iadl's, cognition, current experience including fall risk  Exam: impairments in systems including multiple body structures involved; neuromuscular (balance, gait, transfers), cognition; activity limitations  (difficulties executing an action); participation restrictions (problems associated w involvement in life situations), am-pac  Clinical: stable/unpredictable  Complexity: moderate    Aracelis Martin, PT

## 2025-06-06 NOTE — CASE MANAGEMENT
Case Management Discharge Planning Note    Patient name Summer Blanton  Location [place] [address] /[address] 2 M* MRN [id number]  : 1930 Date 2025       Current Admission Date: 2025  Current Admission Diagnosis:Acute on chronic heart failure with preserved ejection fraction (HFpEF) (HCC)   Patient Active Problem List    Diagnosis Date Noted    History of complete heart block 2025    Acute on chronic heart failure with preserved ejection fraction (HFpEF) (HCC) 2025    Acute pulmonary edema (HCC) 2025    Bilateral pleural effusion 2025    Hypokalemia 2025    Hypoxia 2025    Rib pain 2025    Flutter-fibrillation (HCC) 2025    Complete heart block (HCC) -   PPM ( [employer]) 2021    Anxiety 2018    Peripheral neuropathy 2016    Forgetfulness 2016    Discoid lupus erythematosus 2012      LOS (days): 3  Geometric Mean LOS (GMLOS) (days):   Days to GMLOS:     OBJECTIVE:  Risk of Unplanned Readmission Score: 15.94         Current admission status: Inpatient   Preferred Pharmacy:   [facility]/pharmacy #[id number] [place] - [address] [place] [id number]  Phone: [phone] Fax: [phone]    Primary Care Provider: Self Self    Primary Insurance: MEDICARE  Secondary Insurance: BLUE CROSS    DISCHARGE DETAILS:    Requested Home Health Care         Is the patient interested in HHC at discharge?: Yes  Home Health Discipline requested:: Nursing, Physical Therapy, Occupational Therapy  Home Health Agency Name:: [facility]  Home Health Follow-Up Provider:: PCP  Home Health Services Needed:: Gait/ADL Training, Evaluate Functional Status and Safety, Heart Failure Management  Homebound Criteria Met:: Requires the Assistance of Another Person for Safe Ambulation or to Leave the Home, Uses an Assist Device (i.e. cane, walker, etc)  Supporting Clincal Findings:: Limited Endurance, Fatigues Easliy in Short  Distances    Other Referral/Resources/Interventions Provided:  Interventions: HHC  Referral Comments: HHC (SN, PT/OT) [facility], 'Heal at home' program    Treatment Team Recommendation: Home with Home Health Care, Facility Return, Other (Heal at home)  Discharge Destination Plan:: Home with Home Health Care, Facility Return, Other (Heal at home)        Additional Comments: CM added [facility] for HHC (SN,PT/OT) CM updated patient's discharge plan.

## 2025-06-06 NOTE — PLAN OF CARE
Problem: Potential for Falls  Goal: Patient will remain free of falls  Description: INTERVENTIONS:  - Educate patient/family on patient safety including physical limitations  - Instruct patient to call for assistance with activity   - Consider consulting OT/PT to assist with strengthening/mobility based on AM PAC & JH-HLM score  - Consult OT/PT to assist with strengthening/mobility   - Keep Call bell within reach  - Keep bed low and locked with side rails adjusted as appropriate  - Keep care items and personal belongings within reach  - Initiate and maintain comfort rounds  - Make Fall Risk Sign visible to staff  - Offer Toileting every 2 Hours, in advance of need  - Initiate/Maintain bed alarm  - Obtain necessary fall risk management equipment: alarms  - Apply yellow socks and bracelet for high fall risk patients  - Consider moving patient to room near nurses station  6/6/2025 0403 by Praveen Doyle RN  Outcome: Progressing  6/6/2025 0401 by Praveen Doyle RN  Outcome: Progressing     Problem: Prexisting or High Potential for Compromised Skin Integrity  Goal: Skin integrity is maintained or improved  Description: INTERVENTIONS:  - Identify patients at risk for skin breakdown  - Assess and monitor skin integrity including under and around medical devices   - Assess and monitor nutrition and hydration status  - Monitor labs  - Assess for incontinence   - Turn and reposition patient  - Assist with mobility/ambulation  - Relieve pressure over emelia prominences   - Avoid friction and shearing  - Provide appropriate hygiene as needed including keeping skin clean and dry  - Evaluate need for skin moisturizer/barrier cream  - Collaborate with interdisciplinary team  - Patient/family teaching  - Consider wound care consult    Assess:  - Review Abebe scale daily  - Clean and moisturize skin every shift  - Inspect skin when repositioning, toileting, and assisting with ADLS  - Assess under medical devices such as Raheel every  shift  - Assess extremities for adequate circulation and sensation     Bed Management:  - Have minimal linens on bed & keep smooth, unwrinkled  - Change linens as needed when moist or perspiring  - Avoid sitting or lying in one position for more than 2 hours while in bed?Keep HOB at 30degrees   - Toileting:  - Offer bedside commode  - Assess for incontinence every shift  - Use incontinent care products after each incontinent episode such as foam cleanser    Activity:  - Mobilize patient 3 times a day  - Encourage activity and walks on unit  - Encourage or provide ROM exercises   - Turn and reposition patient every 2 Hours  - Use appropriate equipment to lift or move patient in bed  - Instruct/ Assist with weight shifting every shift when out of bed in chair  - Consider limitation of chair time 2 hour intervals    Skin Care:  - Avoid use of baby powder, tape, friction and shearing, hot water or constrictive clothing  - Relieve pressure over bony prominences using Mapilex  - Do not massage red bony areas    Next Steps:  - Teach patient strategies to minimize risks such as skin breakdown  - Consider consults to  interdisciplinary teams such as wound care  6/6/2025 0403 by Praveen Doyle RN  Outcome: Progressing  6/6/2025 0401 by Praveen Doyle RN  Outcome: Progressing     Problem: PAIN - ADULT  Goal: Verbalizes/displays adequate comfort level or baseline comfort level  Description: Interventions:  - Encourage patient to monitor pain and request assistance  - Assess pain using appropriate pain scale  - Administer analgesics as ordered based on type and severity of pain and evaluate response  - Implement non-pharmacological measures as appropriate and evaluate response  - Consider cultural and social influences on pain and pain management  - Notify physician/advanced practitioner if interventions unsuccessful or patient reports new pain  - Educate patient/family on pain management process including their role and importance of   reporting pain   - Provide non-pharmacologic/complimentary pain relief interventions  6/6/2025 0403 by Praveen Doyle RN  Outcome: Progressing  6/6/2025 0401 by Praveen Doyle RN  Outcome: Progressing     Problem: INFECTION - ADULT  Goal: Absence or prevention of progression during hospitalization  Description: INTERVENTIONS:  - Assess and monitor for signs and symptoms of infection  - Monitor lab/diagnostic results  - Monitor all insertion sites, i.e. indwelling lines, tubes, and drains  - Monitor endotracheal if appropriate and nasal secretions for changes in amount and color  - [place] appropriate cooling/warming therapies per order  - Administer medications as ordered  - Instruct and encourage patient and family to use good hand hygiene technique  - Identify and instruct in appropriate isolation precautions for identified infection/condition  6/6/2025 0403 by Praveen Doyle RN  Outcome: Progressing  6/6/2025 0401 by Praveen Doyle RN  Outcome: Progressing  Goal: Absence of fever/infection during neutropenic period  Description: INTERVENTIONS:  - Monitor WBC  - Perform strict hand hygiene  - Limit to healthy visitors only  - No plants, dried, fresh or silk flowers with weeks in patient room  6/6/2025 0403 by Praveen Doyle RN  Outcome: Progressing  6/6/2025 0401 by Praveen Doyle RN  Outcome: Progressing     Problem: SAFETY ADULT  Goal: Patient will remain free of falls  Description: INTERVENTIONS:  - Educate patient/family on patient safety including physical limitations  - Instruct patient to call for assistance with activity   - Consider consulting OT/PT to assist with strengthening/mobility based on AM PAC & JH-HLM score  - Consult OT/PT to assist with strengthening/mobility   - Keep Call bell within reach  - Keep bed low and locked with side rails adjusted as appropriate  - Keep care items and personal belongings within reach  - Initiate and maintain comfort rounds  - Make Fall Risk Sign visible to staff  - Offer Toileting  every 2 Hours, in advance of need  - Initiate/Maintain bed alarm  - Obtain necessary fall risk management equipment: alarms  - Apply yellow socks and bracelet for high fall risk patients  - Consider moving patient to room near nurses station  6/6/2025 0403 by Praveen Doyle RN  Outcome: Progressing  6/6/2025 0401 by Praveen Doyle RN  Outcome: Progressing  Goal: Maintain or return to baseline ADL function  Description: INTERVENTIONS:  -  Assess patient's ability to carry out ADLs; assess patient's baseline for ADL function and identify physical deficits which impact ability to perform ADLs (bathing, care of mouth/teeth, toileting, grooming, dressing, etc.)  - Assess/evaluate cause of self-care deficits   - Assess range of motion  - Assess patient's mobility; develop plan if impaired  - Assess patient's need for assistive devices and provide as appropriate  - Encourage maximum independence but intervene and supervise when necessary  - Involve family in performance of ADLs  - Assess for home care needs following discharge   - Consider OT consult to assist with ADL evaluation and planning for discharge  - Provide patient education as appropriate  - Monitor functional capacity and physical performance, use of AM PAC & -HLM   - Monitor gait, balance and fatigue with ambulation    6/6/2025 0403 by Praveen Doyle RN  Outcome: Progressing  6/6/2025 0401 by Praveen Doyle RN  Outcome: Progressing  Goal: Maintains/Returns to pre admission functional level  Description: INTERVENTIONS:  - Perform AM-PAC 6 Click Basic Mobility/ Daily Activity assessment daily.  - Set and communicate daily mobility goal to care team and patient/family/caregiver.   - Collaborate with rehabilitation services on mobility goals if consulted  - Perform Range of Motion 3 times a day.  - Reposition patient every 2 hours.  - Dangle patient 3 times a day  - Stand patient 3 times a day  - Ambulate patient 3 times a day  - Out of bed to chair 3 times a day   - Out of  bed for meals 3 times a day  - Out of bed for toileting  - Record patient progress and toleration of activity level   6/6/2025 0403 by Praveen Doyle RN  Outcome: Progressing  6/6/2025 0401 by Pravene Doyle RN  Outcome: Progressing     Problem: DISCHARGE PLANNING  Goal: Discharge to home or other facility with appropriate resources  Description: INTERVENTIONS:  - Identify barriers to discharge w/patient and caregiver  - Arrange for needed discharge resources and transportation as appropriate  - Identify discharge learning needs (meds, wound care, etc.)  - Arrange for interpretive services to assist at discharge as needed  - Refer to Case Management Department for coordinating discharge planning if the patient needs post-hospital services based on physician/advanced practitioner order or complex needs related to functional status, cognitive ability, or social support system  6/6/2025 0403 by Praveen Doyle RN  Outcome: Progressing  6/6/2025 0401 by Praveen Doyle RN  Outcome: Progressing     Problem: Knowledge Deficit  Goal: Patient/family/caregiver demonstrates understanding of disease process, treatment plan, medications, and discharge instructions  Description: Complete learning assessment and assess knowledge base.  Interventions:  - Provide teaching at level of understanding  - Provide teaching via preferred learning methods  6/6/2025 0403 by Praveen Doyle RN  Outcome: Progressing  6/6/2025 0401 by Praveen Doyle RN  Outcome: Progressing     Problem: RESPIRATORY - ADULT  Goal: Achieves optimal ventilation and oxygenation  Description: INTERVENTIONS:  - Assess for changes in respiratory status  - Assess for changes in mentation and behavior  - Position to facilitate oxygenation and minimize respiratory effort  - Oxygen administered by appropriate delivery if ordered  - Initiate smoking cessation education as indicated  - Encourage broncho-pulmonary hygiene including cough, deep breathe, Incentive Spirometry  - Assess the need  for suctioning and aspirate as needed  - Assess and instruct to report SOB or any respiratory difficulty  - Respiratory Therapy support as indicated  6/6/2025 0403 by Praveen Doyle RN  Outcome: Progressing  6/6/2025 0401 by Praveen Doyle RN  Outcome: Progressing     Problem: MUSCULOSKELETAL - ADULT  Goal: Maintain or return mobility to safest level of function  Description: INTERVENTIONS:  - Assess patient's ability to carry out ADLs; assess patient's baseline for ADL function and identify physical deficits which impact ability to perform ADLs (bathing, care of mouth/teeth, toileting, grooming, dressing, etc.)  - Assess/evaluate cause of self-care deficits   - Assess range of motion  - Assess patient's mobility  - Assess patient's need for assistive devices and provide as appropriate  - Encourage maximum independence but intervene and supervise when necessary  - Involve family in performance of ADLs  - Assess for home care needs following discharge   - Consider OT consult to assist with ADL evaluation and planning for discharge  - Provide patient education as appropriate  6/6/2025 0403 by Praveen Doyle RN  Outcome: Progressing  6/6/2025 0401 by Praveen Doyle RN  Outcome: Progressing  Goal: Maintain proper alignment of affected body part  Description: INTERVENTIONS:  - Support, maintain and protect limb and body alignment  - Provide patient/ family with appropriate education  6/6/2025 0403 by Praveen Doyle RN  Outcome: Progressing  6/6/2025 0401 by Praveen Doyle RN  Outcome: Progressing     Problem: METABOLIC, FLUID AND ELECTROLYTES - ADULT  Goal: Electrolytes maintained within normal limits  Description: INTERVENTIONS:  - Monitor labs and assess patient for signs and symptoms of electrolyte imbalances  - Administer electrolyte replacement as ordered  - Monitor response to electrolyte replacements, including repeat lab results as appropriate  - Instruct patient on fluid and nutrition as appropriate  6/6/2025 0403 by Praveen Doyle  RN  Outcome: Progressing  6/6/2025 0401 by Praveen Doyle RN  Outcome: Progressing     Problem: SKIN/TISSUE INTEGRITY - ADULT  Goal: Skin Integrity remains intact(Skin Breakdown Prevention)  Description: Assess:  -Perform Abebe assessment every shift  -Clean and moisturize skin every shift  -Inspect skin when repositioning, toileting, and assisting with ADLS  -Assess under medical devices such as Raheel every shift  -Assess extremities for adequate circulation and sensation     Bed Management:  -Have minimal linens on bed & keep smooth, unwrinkled  -Change linens as needed when moist or perspiring  -Avoid sitting or lying in one position for more than 2 hours while in bed  -Keep HOB at 30 degrees     Toileting:  -Offer bedside commode  -Assess for incontinence every shift  -Use incontinent care products after each incontinent episode such as foam cleanser    Activity:  -Mobilize patient 3 times a day  -Encourage activity and walks on unit  -Encourage or provide ROM exercises   -Turn and reposition patient every 2 Hours  -Use appropriate equipment to lift or move patient in bed  -Instruct/ Assist with weight shifting every shift when out of bed in chair  -Consider limitation of chair time 2 hour intervals    Skin Care:  -Avoid use of baby powder, tape, friction and shearing, hot water or constrictive clothing  -Relieve pressure over bony prominences using Mapilex  -Do not massage red bony areas    Next Steps:  -Teach patient strategies to minimize risks such as skin breakdown   -Consider consults to  interdisciplinary teams such as wound care  6/6/2025 0403 by Praveen Doyle RN  Outcome: Progressing  6/6/2025 0401 by Praveen Doyle RN  Outcome: Progressing

## 2025-06-06 NOTE — OCCUPATIONAL THERAPY NOTE
Occupational Therapy Evaluation     Patient Name: Summer Blanton  Today's Date: 6/6/2025  Problem List  Principal Problem:    Acute on chronic heart failure with preserved ejection fraction (HFpEF) (HCC)  Active Problems:    Forgetfulness    Anxiety    Flutter-fibrillation (HCC)    Acute pulmonary edema (HCC)    Bilateral pleural effusion    Hypokalemia    Hypoxia    Rib pain    History of complete heart block    Hypertension    Past Medical History  Past Medical History[1]  Past Surgical History  Past Surgical History[2]        06/06/25 0917   OT Last Visit   OT Visit Date 06/06/25   Note Type   Note type Evaluation   Pain Assessment   Pain Assessment Tool 0-10   Pain Score No Pain   Restrictions/Precautions   Weight Bearing Precautions Per Order No   Other Precautions Cognitive;Chair Alarm;Bed Alarm;Fall Risk;Pain;Hard of hearing   Home Living   Type of Home Assisted living  ([facility] (memory care unit))   Home Layout One level;Performs ADLs on one level   Bathroom Shower/Tub Walk-in shower   Bathroom Toilet Raised   Bathroom Equipment Grab bars around toilet;Grab bars in shower   Home Equipment Walker;Cane   Additional Comments info gathered from chart review due to pt's cognitive status   Prior Function   Level of [place] Needs assistance with ADLs;Independent with functional mobility;Needs assistance with IADLS  (uses RW at baseline per chart review)   Lives With Facility staff   Receives Help From Home health  (Facility staff)   IADLs Family/Friend/Other provides transportation;Family/Friend/Other provides meals;Family/Friend/Other provides medication management   Falls in the last 6 months 1 to 4   Comments unclear baseline due to unreliable historian.  information gathered from care coordination chart   Lifestyle   Autonomy Patient lives lives alone at [facility] (memory care unit). Has a walk-in shower and raised shower chair.   PTA, patient required  (A) ADLs, required (A) IADLs, and required (A) functional transfers/mobility with RW. Facility staff assist pt with ADLs/IADLs. Information gathered from chart review due to unreliable historian.   General   Additional Pertinent History Co-morbidities affecting patient's functional performance at time of evaluation include: hypoxia, bl pleural effusion, anxiety, flutter-fibrillation, hypokalemia, HTN.   Family/Caregiver Present No   Subjective   Subjective Agreeable to OT eval   ADL   Eating Assistance 5  Supervision/Setup   Grooming Assistance 5  Supervision/Setup   UB Bathing Assistance 5  Supervision/Setup   LB Bathing Assistance 4  Minimal Assistance   UB Dressing Assistance 5  Supervision/Setup   LB Dressing Assistance 4  Minimal Assistance   Toileting Assistance  4  Minimal Assistance   Bed Mobility   Supine to Sit 5  Supervision   Additional items Assist x 1;HOB elevated;Bedrails;Increased time required;Verbal cues   Transfers   Sit to Stand 5  Supervision   Additional items Assist x 1;Bedrails;Increased time required;Verbal cues;Other  (RW)   Stand to Sit 4  Minimal assistance   Additional items Assist x 1;Armrests;Increased time required;Verbal cues  (RW)   Additional Comments required verbal cues for direction to chair   Functional Mobility   Functional Mobility 5  Supervision   Additional Comments required verbal cues for directions   Additional items Rolling walker   Balance   Static Sitting Fair +   Dynamic Sitting Fair   Static Standing Fair -   Dynamic Standing Poor +   Ambulatory Poor +   Activity Tolerance   Activity Tolerance Patient tolerated treatment well   Medical Staff Made Aware PT [place]elis   Nurse Made Aware yes   RUE Assessment   RUE Assessment WFL   LUE Assessment   LUE Assessment WFL   Hand Function   Gross Motor Coordination Functional   Fine Motor Coordination Functional   Vision-Basic Assessment   Current Vision   (Was wearing glasses during session, unclear how often)   Vision -  Complex Assessment   Ocular Range of Motion Intact   Psychosocial   Psychosocial (WDL) X   Patient Behaviors/Mood Pleasant;Cooperative;Calm   Cognition   Overall Cognitive Status Impaired   Arousal/Participation Alert;Responsive;Cooperative   Attention Attends with cues to redirect   Orientation Level Oriented to person;Disoriented to place;Disoriented to time;Disoriented to situation  (Pt oriented to 2025, unable to respond to type of place)   Memory Decreased recall of recent events;Decreased short term memory   Following Commands Follows one step commands with increased time or repetition   Comments h/o forgetfulness,no official diagnosis of dementia on chart, could benefit from formal cognitve assessment, reside in memory care   Assessment   Limitation Decreased ADL status;Decreased Safe judgement during ADL;Decreased cognition;Decreased self-care trans;Decreased high-level ADLs   Prognosis Fair   Assessment Patient is a 89 y.o. y.o female seen for OT evaluation s/p admit to [facility] on 6/2/2025 with acute on chronic heart failure with preserved ejection fraction.  Patient demonstrates the following deficits impacting occupational performance: weakness, decreased strength , decreased balance, decreased activity tolerance, limited functional reach, impaired memory, impaired sequencing, impaired problem solving, decreased safety awareness, and impaired coordination. OT order placed to assess patient's ADLs, cognitive status, functional mobility and performance in order to maximize safety and independence while making d/c recommendations that are most appropriate for the patient. Patient has activity order that includes Activity as tolerated, OOB to chair. Personal factors impacting patient at time of initial evaluation include: difficulty performing ADLs, difficulty performing IADLs, difficulty performing transfers/mobility, limited insight into deficits, decreased initiation and engagement, fall risk  , functional decline , increased reliance on DME , and advanced age. At time of eval patient's cognitive status was A&O to self and year, however disoriented to place and situation. Patient's current functional status is: Supervision  UB ADLs [place] LB ADLs and toileting, Supervision  - [place] transfers/functional mobility. The following occupational performance areas to address include: eating, grooming, bathing/shower, toilet hygiene, dressing, functional mobility, and clothing management. Patient would benefit from continued acute OT services and will be seen 1-2x/wk to address goals listed below to be met within 10-14 days. From OT standpoint, Level 3 (minimum resource intensity upon d/c. Patient was left seated in chair with alarm on and all needs within reach.   Goals   Patient Goals none offered   LTG Time Frame 10-14   Plan   Treatment Interventions ADL retraining;Cognitive reorientation;Patient/family training;Equipment evaluation/education;Compensatory technique education;Activityengagement;Continued evaluation   Goal Expiration Date 06/20/25   OT Treatment Day 0   OT Frequency 1-2x/wk   Discharge Recommendation   Rehab Resource Intensity Level, OT III (Minimum Resource Intensity)  (OT at facility)   AM-PAC Daily Activity Inpatient   Lower Body Dressing 3   Bathing 3   Toileting 3   Upper Body Dressing 3   Grooming 3   Eating 3   Daily Activity Raw Score 18   Daily Activity Standardized Score (Calc for Raw Score >=11) 38.66   AM-PAC Applied Cognition Inpatient   Following a Speech/Presentation 2   Understanding Ordinary Conversation 2   Taking Medications 1   Remembering Where Things Are Placed or Put Away 2   Remembering List of 4-5 Errands 2   Taking Care of Complicated Tasks 2   Applied Cognition Raw Score 11   Applied Cognition Standardized Score 27.03       ADL Goals  Pt will complete LB ADLs with  Supervision  level with AE/DME PRN.  Pt will be Supervision   with toileting.   Pt will have increased  carryover of body mechanics/energy conservation strategies during ADL tasks such as UB/LB bathing, UB/LB dressing, toileting and functional tranfers/mobility.    Mobility Goals  Pt will complete transfers to chair from common household surface Supervision  level with increased safety awareness.   Pt will complete toilet transfer at Supervision  level with increased safety awareness.   Pt will be Supervision  with functional mobility for community distance to facilitate improved engagement with social participation / leisure tasks    Cognition Goals  Pt will be A&O x2 for all treatment sessions with/without environmental cues.   Pt will attend to ADL task for 2-5  min without needs for re-direction.     Additional Goals   Pt will have increased standing tolerance for 15-20 mins for improved ADL performance.   Pt will require min verbal cues for carryover/safety while using assistive devices (RW) for functional activities.   Pt will improve dynamic standing balance to Fair while standing for ADL activities to decrease fall risk.   Pt will identify 2-3 activities in hospital/home/community setting which promote physical/emotional/cognitive well-being.  Pt will engage in leisure activity to promote activity engagement.   Veronica Simon         [1]   Past Medical History:  Diagnosis Date    Aortic regurgitation     Constipation     Discoid lupus erythematosus     Dysphagia     Esophagitis     Forgetfulness     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    Transient elevated blood pressure     last assessed: 5/16/2017    Weight loss    [2]   Past Surgical History:  Procedure Laterality Date    APPENDECTOMY      CATARACT EXTRACTION      CHOLECYSTECTOMY      ESOPHAGOGASTRODUODENOSCOPY  06/2013    diagnostic- neg id have dilatation    EYE SURGERY      HYSTERECTOMY      LAPAROTOMY N/A 11/3/2023    Procedure: LAPAROTOMY EXPLORATORY, ILEOCECECTOMY;  Surgeon: Katlyn Fleming MD;  Location: [facility];  Service: General    AR  ESOPHAGOGASTRODUODENOSCOPY TRANSORAL DIAGNOSTIC N/A 9/16/2016    Procedure: ESOPHAGOGASTRODUODENOSCOPY (EGD);  Surgeon: Vladislav Garcia MD;  Location: [facility];  Service: Gastroenterology    REPLACEMENT TOTAL KNEE Left 01/2007

## 2025-06-06 NOTE — ASSESSMENT & PLAN NOTE
Wt Readings from Last 3 Encounters:   06/06/25 53.3 kg (117 lb 8.1 oz)   05/31/25 63.2 kg (139 lb 5.3 oz)   02/17/25 58.1 kg (128 lb)     Previous echo reviewed, cardiology recommending limited echo  Continue diuresis with IV Lasix 20 twice daily, had good output in the ED  Despite diuresis, the patient's weight has not changed.  IV diuretics will be continued.  Replete potassium, monitor daily labs  Cardiology consult: continue with iv diuretics at this time.   Diuresed well, transition to lasix 20 mg qd. Follow up with cardiology on discharge.

## 2025-06-06 NOTE — ASSESSMENT & PLAN NOTE
With frequent falls   CT without acute fractures noted, age-indeterminate rib fracture on right 3rd-5th  Continue Tylenol as needed  PT/OT eval due to recurrent falls - can return back to [facility]. CM set up [facility].

## 2025-06-06 NOTE — DISCHARGE INSTR - AVS FIRST PAGE
Dear Summer Blanton,     It was our pleasure to care for you here at [facility]. For follow up as well as any medication refills, we recommend that you follow up with your primary care physician. Here are the most important instructions/ recommendations at discharge:     Notable Medication Adjustments -   Start taking lasix 20 mg daily  Stop taking risperidone 0.25 mg daily and start taking seroquel 12.5 mg nightly - you can discuss further outpatient with your pcp or psychiatrist regarding switching to another medication or increasing this one as needed if worsening anxiety- preferred in dementia or can switch to antidepressant such as zoloft vs mirtazapine  Continue with lopressor 37.5 mg twice daily per cardiology recommendations  Start taking losartan 25 mg daily per cardiology recommendations for blood pressure control  Continue taking amiodarone 200 mg daily  Testing Required after Discharge - ** Please contact your PCP to request testing orders for any of the testing recommended here **  Repeat cbc and bmp in 1 week  Please have repeat ECG done outpatient with starting seroquel and being on amiodarone as this can cause a prolonged qtc interval.   Repeat CXR in 6-8 weeks  Important follow up information -   Follow up with pcp in 1 week  Follow up with cardiology outpatient  Follow up with psychiatry outpatient  Other Instructions -   Please continue taking medications as prescribed. If you have any new or worsening symptoms, please return to the emergency department for further evaluation.   Please monitor your weight, if you notice a 3 pound weight gain in 1 day or 5 pound weight gain in 1 week, please call cardiology office  Please cut down on salt in the diet, it is important to have a low-sodium diet on discharge.   Please have repeat ECG done outpatient with starting seroquel and being on amiodarone as this can cause a prolonged qtc interval.   Please review this entire after visit  summary as additional general instructions including medication list, appointments, activity, diet, any pertinent wound care, and other additional recommendations from your care team that may be provided for you.      Sincerely,     Kitty Hopson PA-C

## 2025-06-06 NOTE — PROGRESS NOTES
Progress Note - Cardiology   Name: Summer Blanton 89 y.o. female I MRN: [id number]  Unit/Bed#: [place] [address] -01 I Date of Admission: 6/2/2025   Date of Service: 6/6/2025 I Hospital Day: 3     TODAY's PLAN (06/06/25):   Is euvolemic  Blood pressure still elevated will add losartan 25 mg  Continue with 37.5 mg p.o. twice daily metoprolol  Continue with p.o. Lasix 20 mg daily  Spoke to her and her daughter again today regarding the importance of sodium reduction/restriction in her diet.  Prior to this admission her daughters state that the patient would salt everything that she ate even before even tasting.  This likely contributed to her volume overload  Stable otherwise for DC today back to her facility         Assessment & Plan  Acute on chronic heart failure with preserved ejection fraction (HFpEF) (HCC)  Acute pulmonary edema (HCC)  Bilateral pleural effusion  - TTE 02/17/25: LVEF 55%, probable DD, RV systolic function is low normal, moderate LAE, mild ARMANDO, mild AR, aortic valve sclerosis, moderate MAC, mild MR, mild to moderate TR  - CT chest/abdomen/pelvis: Mild pulmonary edema type pattern with moderate pleural effusions     Echo May 2025  Mildly increased left ventricular wall thickness, low normal to borderline reduced left ventricular systolic function, EF around 50%.  Grade 2 diastolic dysfunction.  Estimated left ventricular filling pressure is increased.  Normal right ventricle size and systolic function.  Severe left atrial cavity enlargement, moderate right atrial cavity enlargement.  Aortic valve sclerosis, mild aortic valve regurgitation.  Mitral valve annular calcification.  Mitral valve exposed, mild mitral valve regurgitation.  Moderate to severe tricuspid valve regurgitation.  Mild pulmonic valve regurgitation.  Moderate approaching severe pulmonary hypertension.  Estimated RVSP/PASP is 62 mmHg.  Trace pericardial effusion.  At least moderate left-sided pleural effusion.  Dilated aortic root  "and ascending thoracic aortic aneurysm measuring 4.3 cm.    - Neurohormonal Blockade:   -- beta blocker: lopressor 25 mg BID  -- home diuretic: none   -- inpatient diuretic: IV lasix 20 mg BID  Flutter-fibrillation (HCC)  - rate control: lopressor 25 mg BID  - rhythm control: amiodarone 200 mg daily   - AC: none, discontinued in 2023 due to frequent fall and risk of internal bleeding. Takes ASA 81 mg daily   History of complete heart block  - s/p [employer] DC PM in February 2021 with lower pacing rate set to 50 bpm  Rib pain  - recent fall with age-indeterminate right anterior third through fifth rib fractures    Hypokalemia  - admission K 3.0  Hypoxia  - noted to be SOB and placed on 2-3L of oxyen  Forgetfulness    Anxiety    Hypertension      Subjective:   HPI  Doing well today, denies any shortness of breath or lower extremity swelling      Review of Systems: As noted in HPI. Rest of ROS is negative.    Vitals:   /90 (BP Location: Right arm)   Pulse 69   Temp (!) 97.2 °F (36.2 °C) (Oral)   Resp 20   Ht 5' 2\" (1.575 m)   Wt 53.3 kg (117 lb 8.1 oz)   LMP  (LMP Unknown)   SpO2 95%   BMI 21.49 kg/m²   I/O         06/04 0701  06/05 0700 06/05 0701  06/06 0700 06/06 0701  06/07 0700    P.O.  500     Total Intake(mL/kg)  500 (9.4)     Urine (mL/kg/hr) 1800 (1.5) 465 (0.4)     Stool       Total Output 1800 465     Net -1800 +35            Unmeasured Urine Occurrence  2 x           Weight (last 2 days) before discharge       Date/Time Weight    06/06/25 0537 53.3 (117.51)    06/05/25 0543 51.4 (113.32)    06/04/25 0530 63.1 (139.02)            Physical Exam   Constitutional: awake, alert and oriented, in no acute distress, no obvious deformities  Head: Normocephalic, without obvious abnormality, atraumatic  Eyes: conjunctivae clear and moist. Sclera anicteric.  No xanthelasmas. Pupils equal bilaterally.  Extraocular motions are full.  Ear nose mouth and throat: ears are symmetrical bilaterally, hearing " "appears to be equal bilaterally, no nasal discharge or epistaxis, oropharynx is clear with moist mucous membranes  Neck:  Trachea is midline, neck is supple, no thyromegaly or significant lymphadenopathy, there is full range of motion.  Lungs: clear to auscultation bilaterally, no wheezes, no rales, no rhonchi, no accessory muscle use, breathing is nonlabored  Heart: Regular rhythm with a Normal heart rate, S1, S2 normal, No Murmur, no click, rub or gallop, No lower extremity edema  Abdomen: soft, non-tender; bowel sounds normal; no masses,  no organomegaly  Psychiatric:  Patient is oriented to time, place, person, mood/affect is negative for depression, anxiety, agitation, appears to have appropriate insight  Skin: Skin is warm, dry, intact. No obvious rashes or lesions on exposed extremities.  Nail beds are pink with no cyanosis or clubbing.      TELEMETRY:   Lab Results:  Results from last 7 days   Lab Units 06/06/25  0607   WBC Thousand/uL 8.92   HEMOGLOBIN g/dL 12.3   HEMATOCRIT % 37.0   PLATELETS Thousands/uL 163     Results from last 7 days   Lab Units 06/06/25  1035   POTASSIUM mmol/L 4.5   CHLORIDE mmol/L 102   CO2 mmol/L 30   BUN mg/dL 17   CREATININE mg/dL 0.90   CALCIUM mg/dL 9.4     Results from last 7 days   Lab Units 06/06/25  1035   POTASSIUM mmol/L 4.5   CHLORIDE mmol/L 102   CO2 mmol/L 30   BUN mg/dL 17   CREATININE mg/dL 0.90   CALCIUM mg/dL 9.4           Medications:  Current Medications[1]    Portions of the record may have been created with voice recognition software. Occasional wrong word or \"sound a like\" substitutions may have occurred due to the inherent limitations of voice recognition software. Read the chart carefully and recognize, using context, where substitutions have occurred.    James Valencia DO, FACKHUSHBOO, CARLI HOOVER  6/6/2025 12:45 PM           [1]   Current Facility-Administered Medications:     acetaminophen (TYLENOL) tablet 975 mg, 975 mg, Oral, Q8H PRN, Carlos A Hare PA-C, 975 " mg at 06/03/25 2346    aluminum-magnesium hydroxide-simethicone (MAALOX) oral suspension 30 mL, 30 mL, Oral, Q6H PRN, Carlos A Hare PA-C    amiodarone tablet 200 mg, 200 mg, Oral, Daily, Carlos A Hare PA-C, 200 mg at 06/06/25 0849    aspirin chewable tablet 81 mg, 81 mg, Oral, Daily, Carlos A Hare PA-C, 81 mg at 06/06/25 0848    enoxaparin (LOVENOX) subcutaneous injection 40 mg, 40 mg, Subcutaneous, Daily, Carlos A Hare PA-C, 40 mg at 06/06/25 0848    furosemide (LASIX) tablet 20 mg, 20 mg, Oral, Daily, James Valencia DO, 20 mg at 06/06/25 0848    hydrALAZINE (APRESOLINE) injection 10 mg, 10 mg, Intravenous, Q4H PRN, Shorty Blount MD    losartan (COZAAR) tablet 25 mg, 25 mg, Oral, Daily, James Valencia DO, 25 mg at 06/06/25 1105    metoprolol tartrate (LOPRESSOR) partial tablet 37.5 mg, 37.5 mg, Oral, Q12H JACOB, James Valencia DO, 37.5 mg at 06/06/25 0849    ondansetron (ZOFRAN) injection 4 mg, 4 mg, Intravenous, Q6H PRN, Carlos A Hare PA-C    polyethylene glycol (MIRALAX) packet 17 g, 17 g, Oral, Daily PRN, Carlos A Hare PA-C    risperiDONE (RisperDAL) tablet 0.25 mg, 0.25 mg, Oral, Daily, Shorty Blount MD, 0.25 mg at 06/06/25 0849    Current Outpatient Medications:     acetaminophen (TYLENOL) 500 mg tablet, Take 1 tablet (500 mg total) by mouth every 8 (eight) hours as needed for mild pain or moderate pain for up to 7 days, Disp: 30 tablet, Rfl: 0    amiodarone 200 mg tablet, Take 1 tablet (200 mg total) by mouth 2 (two) times a day for 7 days, THEN 1 tablet (200 mg total) daily. (Patient taking differently: Take 1 tablet (200 mg total) by mouth daily), Disp: 44 tablet, Rfl: 0    ARTIFICIAL TEAR OP, , Disp: , Rfl:     aspirin 81 mg chewable tablet, Chew 1 tablet (81 mg total) daily, Disp: 90 tablet, Rfl: 3    Cholecalciferol (VITAMIN D) 2000 units CAPS, Take 1 capsule (2,000 Units total) by mouth daily, Disp: , Rfl: 0    [START ON 6/7/2025] furosemide (LASIX) 20 mg tablet, Take 1 tablet (20 mg total) by  mouth daily, Disp: 30 tablet, Rfl: 0    loperamide (IMODIUM) 2 mg capsule, Take 2 mg by mouth 3 (three) times a day, Disp: , Rfl:     losartan (COZAAR) 25 mg tablet, Take 1 tablet (25 mg total) by mouth daily, Disp: 30 tablet, Rfl: 0    metoprolol tartrate 37.5 MG TABS, Take 1 tablet (37.5 mg total) by mouth every 12 (twelve) hours, Disp: 60 tablet, Rfl: 0    Multiple Vitamins-Minerals (PRESERVISION AREDS PO), Take by mouth, Disp: , Rfl:     ondansetron (ZOFRAN) 4 mg tablet, Take 4 mg by mouth every 8 (eight) hours as needed for nausea or vomiting, Disp: , Rfl:     QUEtiapine (SEROquel) 25 mg tablet, Take 0.5 tablets (12.5 mg total) by mouth daily at bedtime, Disp: 15 tablet, Rfl: 0    senna-docusate sodium (SENOKOT-S) 8.6-50 mg per tablet, Take 1 tablet by mouth in the morning. As needed., Disp: , Rfl:     lidocaine (Lidoderm) 5 %, Apply 1 patch topically daily over 12 hours for 7 days Remove & Discard patch within 12 hours or as directed by MD, Disp: 7 patch, Rfl: 0    vitamin B-12 (VITAMIN B-12) 500 mcg tablet, Take 500 mcg by mouth in the morning. (Patient not taking: Reported on 6/2/2025), Disp: , Rfl:

## 2025-06-06 NOTE — PLAN OF CARE
Problem: Potential for Falls  Goal: Patient will remain free of falls  Description: INTERVENTIONS:  - Educate patient/family on patient safety including physical limitations  - Instruct patient to call for assistance with activity   - Consider consulting OT/PT to assist with strengthening/mobility based on AM PAC & JH-HLM score  - Consult OT/PT to assist with strengthening/mobility   - Keep Call bell within reach  - Keep bed low and locked with side rails adjusted as appropriate  - Keep care items and personal belongings within reach  - Initiate and maintain comfort rounds  - Make Fall Risk Sign visible to staff  - Offer Toileting every 2 Hours, in advance of need  - Initiate/Maintain bed alarm  - Obtain necessary fall risk management equipment: alarms  - Apply yellow socks and bracelet for high fall risk patients  - Consider moving patient to room near nurses station  Outcome: Adequate for Discharge     Problem: Prexisting or High Potential for Compromised Skin Integrity  Goal: Skin integrity is maintained or improved  Description: INTERVENTIONS:  - Identify patients at risk for skin breakdown  - Assess and monitor skin integrity including under and around medical devices   - Assess and monitor nutrition and hydration status  - Monitor labs  - Assess for incontinence   - Turn and reposition patient  - Assist with mobility/ambulation  - Relieve pressure over emelia prominences   - Avoid friction and shearing  - Provide appropriate hygiene as needed including keeping skin clean and dry  - Evaluate need for skin moisturizer/barrier cream  - Collaborate with interdisciplinary team  - Patient/family teaching  - Consider wound care consult    Assess:  - Review Abebe scale daily  - Clean and moisturize skin every shift  - Inspect skin when repositioning, toileting, and assisting with ADLS  - Assess under medical devices such as Raheel every shift  - Assess extremities for adequate circulation and sensation     Bed  Management:  - Have minimal linens on bed & keep smooth, unwrinkled  - Change linens as needed when moist or perspiring  - Avoid sitting or lying in one position for more than 2 hours while in bed?Keep HOB at 30degrees   - Toileting:  - Offer bedside commode  - Assess for incontinence every shift  - Use incontinent care products after each incontinent episode such as foam cleanser    Activity:  - Mobilize patient 3 times a day  - Encourage activity and walks on unit  - Encourage or provide ROM exercises   - Turn and reposition patient every 2 Hours  - Use appropriate equipment to lift or move patient in bed  - Instruct/ Assist with weight shifting every shift when out of bed in chair  - Consider limitation of chair time 2 hour intervals    Skin Care:  - Avoid use of baby powder, tape, friction and shearing, hot water or constrictive clothing  - Relieve pressure over bony prominences using Mapilex  - Do not massage red bony areas    Next Steps:  - Teach patient strategies to minimize risks such as skin breakdown  - Consider consults to  interdisciplinary teams such as wound care  Outcome: Adequate for Discharge     Problem: PAIN - ADULT  Goal: Verbalizes/displays adequate comfort level or baseline comfort level  Description: Interventions:  - Encourage patient to monitor pain and request assistance  - Assess pain using appropriate pain scale  - Administer analgesics as ordered based on type and severity of pain and evaluate response  - Implement non-pharmacological measures as appropriate and evaluate response  - Consider cultural and social influences on pain and pain management  - Notify physician/advanced practitioner if interventions unsuccessful or patient reports new pain  - Educate patient/family on pain management process including their role and importance of  reporting pain   - Provide non-pharmacologic/complimentary pain relief interventions  Outcome: Adequate for Discharge     Problem: INFECTION -  ADULT  Goal: Absence or prevention of progression during hospitalization  Description: INTERVENTIONS:  - Assess and monitor for signs and symptoms of infection  - Monitor lab/diagnostic results  - Monitor all insertion sites, i.e. indwelling lines, tubes, and drains  - Monitor endotracheal if appropriate and nasal secretions for changes in amount and color  - [place] appropriate cooling/warming therapies per order  - Administer medications as ordered  - Instruct and encourage patient and family to use good hand hygiene technique  - Identify and instruct in appropriate isolation precautions for identified infection/condition  Outcome: Adequate for Discharge  Goal: Absence of fever/infection during neutropenic period  Description: INTERVENTIONS:  - Monitor WBC  - Perform strict hand hygiene  - Limit to healthy visitors only  - No plants, dried, fresh or silk flowers with weeks in patient room  Outcome: Adequate for Discharge     Problem: SAFETY ADULT  Goal: Patient will remain free of falls  Description: INTERVENTIONS:  - Educate patient/family on patient safety including physical limitations  - Instruct patient to call for assistance with activity   - Consider consulting OT/PT to assist with strengthening/mobility based on AM PAC & JH-HLM score  - Consult OT/PT to assist with strengthening/mobility   - Keep Call bell within reach  - Keep bed low and locked with side rails adjusted as appropriate  - Keep care items and personal belongings within reach  - Initiate and maintain comfort rounds  - Make Fall Risk Sign visible to staff  - Offer Toileting every 2 Hours, in advance of need  - Initiate/Maintain bed alarm  - Obtain necessary fall risk management equipment: alarms  - Apply yellow socks and bracelet for high fall risk patients  - Consider moving patient to room near nurses station  Outcome: Adequate for Discharge  Goal: Maintain or return to baseline ADL function  Description: INTERVENTIONS:  -  Assess patient's  ability to carry out ADLs; assess patient's baseline for ADL function and identify physical deficits which impact ability to perform ADLs (bathing, care of mouth/teeth, toileting, grooming, dressing, etc.)  - Assess/evaluate cause of self-care deficits   - Assess range of motion  - Assess patient's mobility; develop plan if impaired  - Assess patient's need for assistive devices and provide as appropriate  - Encourage maximum independence but intervene and supervise when necessary  - Involve family in performance of ADLs  - Assess for home care needs following discharge   - Consider OT consult to assist with ADL evaluation and planning for discharge  - Provide patient education as appropriate  - Monitor functional capacity and physical performance, use of AM PAC & JH-HLM   - Monitor gait, balance and fatigue with ambulation    Outcome: Adequate for Discharge  Goal: Maintains/Returns to pre admission functional level  Description: INTERVENTIONS:  - Perform AM-PAC 6 Click Basic Mobility/ Daily Activity assessment daily.  - Set and communicate daily mobility goal to care team and patient/family/caregiver.   - Collaborate with rehabilitation services on mobility goals if consulted  - Perform Range of Motion 3 times a day.  - Reposition patient every 2 hours.  - Dangle patient 3 times a day  - Stand patient 3 times a day  - Ambulate patient 3 times a day  - Out of bed to chair 3 times a day   - Out of bed for meals 3 times a day  - Out of bed for toileting  - Record patient progress and toleration of activity level   Outcome: Adequate for Discharge     Problem: DISCHARGE PLANNING  Goal: Discharge to home or other facility with appropriate resources  Description: INTERVENTIONS:  - Identify barriers to discharge w/patient and caregiver  - Arrange for needed discharge resources and transportation as appropriate  - Identify discharge learning needs (meds, wound care, etc.)  - Arrange for interpretive services to assist at  discharge as needed  - Refer to Case Management Department for coordinating discharge planning if the patient needs post-hospital services based on physician/advanced practitioner order or complex needs related to functional status, cognitive ability, or social support system  Outcome: Adequate for Discharge     Problem: Knowledge Deficit  Goal: Patient/family/caregiver demonstrates understanding of disease process, treatment plan, medications, and discharge instructions  Description: Complete learning assessment and assess knowledge base.  Interventions:  - Provide teaching at level of understanding  - Provide teaching via preferred learning methods  Outcome: Adequate for Discharge     Problem: RESPIRATORY - ADULT  Goal: Achieves optimal ventilation and oxygenation  Description: INTERVENTIONS:  - Assess for changes in respiratory status  - Assess for changes in mentation and behavior  - Position to facilitate oxygenation and minimize respiratory effort  - Oxygen administered by appropriate delivery if ordered  - Initiate smoking cessation education as indicated  - Encourage broncho-pulmonary hygiene including cough, deep breathe, Incentive Spirometry  - Assess the need for suctioning and aspirate as needed  - Assess and instruct to report SOB or any respiratory difficulty  - Respiratory Therapy support as indicated  Outcome: Adequate for Discharge     Problem: MUSCULOSKELETAL - ADULT  Goal: Maintain or return mobility to safest level of function  Description: INTERVENTIONS:  - Assess patient's ability to carry out ADLs; assess patient's baseline for ADL function and identify physical deficits which impact ability to perform ADLs (bathing, care of mouth/teeth, toileting, grooming, dressing, etc.)  - Assess/evaluate cause of self-care deficits   - Assess range of motion  - Assess patient's mobility  - Assess patient's need for assistive devices and provide as appropriate  - Encourage maximum independence but  intervene and supervise when necessary  - Involve family in performance of ADLs  - Assess for home care needs following discharge   - Consider OT consult to assist with ADL evaluation and planning for discharge  - Provide patient education as appropriate  Outcome: Adequate for Discharge  Goal: Maintain proper alignment of affected body part  Description: INTERVENTIONS:  - Support, maintain and protect limb and body alignment  - Provide patient/ family with appropriate education  Outcome: Adequate for Discharge     Problem: METABOLIC, FLUID AND ELECTROLYTES - ADULT  Goal: Electrolytes maintained within normal limits  Description: INTERVENTIONS:  - Monitor labs and assess patient for signs and symptoms of electrolyte imbalances  - Administer electrolyte replacement as ordered  - Monitor response to electrolyte replacements, including repeat lab results as appropriate  - Instruct patient on fluid and nutrition as appropriate  Outcome: Adequate for Discharge     Problem: SKIN/TISSUE INTEGRITY - ADULT  Goal: Skin Integrity remains intact(Skin Breakdown Prevention)  Description: Assess:  -Perform Abebe assessment every shift  -Clean and moisturize skin every shift  -Inspect skin when repositioning, toileting, and assisting with ADLS  -Assess under medical devices such as Raheel every shift  -Assess extremities for adequate circulation and sensation     Bed Management:  -Have minimal linens on bed & keep smooth, unwrinkled  -Change linens as needed when moist or perspiring  -Avoid sitting or lying in one position for more than 2 hours while in bed  -Keep HOB at 30 degrees     Toileting:  -Offer bedside commode  -Assess for incontinence every shift  -Use incontinent care products after each incontinent episode such as foam cleanser    Activity:  -Mobilize patient 3 times a day  -Encourage activity and walks on unit  -Encourage or provide ROM exercises   -Turn and reposition patient every 2 Hours  -Use appropriate equipment  to lift or move patient in bed  -Instruct/ Assist with weight shifting every shift when out of bed in chair  -Consider limitation of chair time 2 hour intervals    Skin Care:  -Avoid use of baby powder, tape, friction and shearing, hot water or constrictive clothing  -Relieve pressure over bony prominences using Mapilex  -Do not massage red bony areas    Next Steps:  -Teach patient strategies to minimize risks such as skin breakdown   -Consider consults to  interdisciplinary teams such as wound care  Outcome: Adequate for Discharge

## 2025-06-06 NOTE — ASSESSMENT & PLAN NOTE
Patient with history of anxiety. At home, was taking risperidone 0.25 mg qd prn.   Discussed with daughters at bedside, would like to have psych consult to discuss medications/adjustments. Patient currently on risperidone 0.25 mg qd while admitted.   Psych consult: Can continue Risperdal 0.25 mg daily if helpful-- also can consider seroquel 12.5 to 25 mg nightly or as needed if worsening anxiety- preferred in dementia. Discuss anti depressant with family and pt- zoloft vs mirtazapine  Outpatient follow up with psych on discharge. Discussed with patient's daughter, would like to stop risperdal and start seroquel, will start on 12.5 mg qd. Should have repeat ECG done outpatient with starting seroquel and being on amiodarone as this can cause a prolonged qtc interval. Put on AVS.

## 2025-06-06 NOTE — ASSESSMENT & PLAN NOTE
Suspect this is secondary to diastolic heart failure  Monitor with diuresis   Outpatient repeat cxr or ct chest in 6-8 weeks.

## 2025-06-06 NOTE — OCCUPATIONAL THERAPY NOTE
Patient is a 89 y.o. y.o female seen for OT evaluation s/p admit to [facility] on 6/2/2025 with acute on chronic heart failure with preserved ejection fraction.  Patient demonstrates the following deficits impacting occupational performance: weakness, decreased strength , decreased balance, decreased activity tolerance, impaired memory, impaired sequencing, impaired problem solving, decreased safety awareness, and impaired coordination. OT order placed to assess patient's ADLs, cognitive status, functional mobility and performance in order to maximize safety and independence while making d/c recommendations that are most appropriate for the patient. Patient has activity order that includes Activity as tolerated, OOB to chair. Personal factors impacting patient at time of initial evaluation include: difficulty performing ADLs, difficulty performing IADLs, difficulty performing transfers/mobility, limited insight into deficits, fall risk , and functional decline . At time of eval patient's cognitive status was A&O to self and year, however disoriented to place and situation. Patient's current functional status is: Supervision  UB ADLs Supervision  LB ADLs, Supervision  transfers/functional mobility. The following occupational performance areas to address include: eating, grooming, bathing/shower, toilet hygiene, dressing, functional mobility, and clothing management. Patient would benefit from continued acute OT services and will be seen 1-2x/wk to address goals listed below to be met within 10-14 days. From OT standpoint, Level 3 (minimum resource intensity upon d/c. Patient was left seated in chair with alarm on and all needs within reach.      ADL Goals  Pt will complete grooming tasks at Supervision  level.   Pt will complete UB ADLs with  Supervision  level with AE/DME PRN.  Pt will complete LB ADLs with  Supervision  level with AE/DME PRN.  Pt will be Supervision   with toileting.   Pt will have  increased carryover of body mechanics/energy conservation strategies during ADL tasks such as UB/LB bathing, UB/LB dressing, toileting and functional tranfers/mobility.  Pt will participate in simulated IADL management task to increase independence to Supervision with good safety and endurance.     Mobility Goals  Pt will complete transfers to chair from common household surface Supervision  level with increased safety awareness.   Pt will complete toilet transfer at Supervision  level with increased safety awareness.   Pt will be Supervision  with functional mobility for ADL/IADLs tasks.     Cognition Goals  Pt will be A&O x4 for all treatment sessions with/without environmental cues.   Pt will attend to ADL task for 2-5  min with/without needs for re-direction.     Additional Goals   Pt will have increased standing tolerance for 10-15 mins for improved ADL performance.   Pt will require min verbal cues for carryover/safety while using assistive devices (RW) for functional activities.   Pt will improve dynamic standing balance to [place] while standing for ADL activities to decrease fall risk.   Pt will identify 2-3 activities in hospital/home/community setting which promote physical/emotional/cognitive well-being.  Pt will engage in leisure activity to promote activity engagement.

## 2025-06-06 NOTE — ASSESSMENT & PLAN NOTE
Secondary to CHF  Now weaned to room air  Outpatient follow up with pcp. Repeat cxr in 6-8 weeks.

## 2025-06-06 NOTE — DISCHARGE SUMMARY
Discharge Summary - Hospitalist   Name: Summer Blanton 89 y.o. female I MRN: [id number]  Unit/Bed#: [place] [address] -01 I Date of Admission: 6/2/2025   Date of Service: 6/6/2025 I Hospital Day: 3     Assessment & Plan  Acute on chronic heart failure with preserved ejection fraction (HFpEF) (HCC)  Wt Readings from Last 3 Encounters:   06/06/25 53.3 kg (117 lb 8.1 oz)   05/31/25 63.2 kg (139 lb 5.3 oz)   02/17/25 58.1 kg (128 lb)     Previous echo reviewed, cardiology recommending limited echo  Continue diuresis with IV Lasix 20 twice daily, had good output in the ED  Despite diuresis, the patient's weight has not changed.  IV diuretics will be continued.  Replete potassium, monitor daily labs  Cardiology consult: continue with iv diuretics at this time.   Diuresed well, transition to lasix 20 mg qd. Follow up with cardiology on discharge.   Hypoxia  Secondary to CHF  Now weaned to room air  Outpatient follow up with pcp. Repeat cxr in 6-8 weeks.   Bilateral pleural effusion  Suspect this is secondary to diastolic heart failure  Monitor with diuresis   Outpatient repeat cxr or ct chest in 6-8 weeks.   Anxiety  Patient with history of anxiety. At home, was taking risperidone 0.25 mg qd prn.   Discussed with daughters at bedside, would like to have psych consult to discuss medications/adjustments. Patient currently on risperidone 0.25 mg qd while admitted.   Psych consult: Can continue Risperdal 0.25 mg daily if helpful-- also can consider seroquel 12.5 to 25 mg nightly or as needed if worsening anxiety- preferred in dementia. Discuss anti depressant with family and pt- zoloft vs mirtazapine  Outpatient follow up with psych on discharge. Discussed with patient's daughter, would like to stop risperdal and start seroquel, will start on 12.5 mg qd. Should have repeat ECG done outpatient with starting seroquel and being on amiodarone as this can cause a prolonged qtc interval. Put on AVS.   Flutter-fibrillation (HCC)  EKG  showing ventricular paced  Continue PTA amiodarone 200mg daily, lopressor 25mg BID  Continue aspirin 81 mg daily is, not on anticoagulation due to history of falls  Hypokalemia  Replete and monitor  Check magnesium  Rib pain  With frequent falls   CT without acute fractures noted, age-indeterminate rib fracture on right 3rd-5th  Continue Tylenol as needed  PT/OT eval due to recurrent falls - can return back to [facility]. CM set up [facility].   History of complete heart block  S/p Medtronic pacemaker  Hypertension  Patient with history of HTN. Patient's lopressor increased to 37.5 mg bid and cardiology added on losartan 25 mg qd. Continue with lasix 20 mg qd as well.   Continue on discharge lopressor 37.5 mg bid, losartan 25 mg qd, lasix 20 mg qd. Should monitor BP outpatient and follow up with cardiology on discharge.      Medical Problems       Resolved Problems  Date Reviewed: 6/6/2025   None       Discharging Physician / Practitioner: Kitty Hopson PA-C  PCP: Self Self  Admission Date:   Admission Orders (From admission, onward)       Ordered        06/03/25 1117  INPATIENT ADMISSION  Once            06/02/25 1955  Place in Observation  Once                          Discharge Date: 06/06/25    Next Steps for Physician/AP Assuming Care:  Continue to monitor blood pressure  Adjust BP medications as needed  Repeat cxr in 6-8 weeks  Repeat cbc and bmp in 1 week  ECG in 1 week outpatient.   Follow up with PCP in 1 week and cardiology outpatient  Follow up with psych outpatient    Test Results Pending at Discharge (will require follow up):  none    Medication Changes for Discharge & Rationale:   Start taking lasix 20 mg qd  Start taking lopressor 37.5 mg bid  Start taking losartan 25 mg qd  Start taking seroquel 12.5 mg qd  See after visit summary for reconciled discharge medications provided to patient and/or family.     Consultations During Hospital Stay:  Cardiology, psych    Procedures Performed:   none    Significant  Findings / Test Results:   CT chest abdomen pelvis w contrast   Final Result by Fritz Murray MD (06/02 1818)      1. Age-indeterminate right anterior third through fifth rib fractures. The evaluation for rib fracture is limited by respiratory motion notified.   2. Mild pulmonary edema type pattern with moderate pleural effusions.   3. No acute process in the abdomen or pelvis.      The study was marked in EPIC for immediate notification.         Computerized Assisted Algorithm (CAA) may have aided analysis of applicable images.      Workstation performed: [id number]           K: 3.0  BNP: 546  Echo: Mildly increased left ventricular wall thickness, low normal to borderline reduced left ventricular systolic function, EF around 50%.  Grade 2 diastolic dysfunction.  Estimated left ventricular filling pressure is increased. Normal right ventricle size and systolic function. Severe left atrial cavity enlargement, moderate right atrial cavity enlargement. Aortic valve sclerosis, mild aortic valve regurgitation. Mitral valve annular calcification.  Mitral valve exposed, mild mitral valve regurgitation. Moderate to severe tricuspid valve regurgitation.  Mild pulmonic valve regurgitation. Moderate approaching severe pulmonary hypertension.  Estimated RVSP/PASP is 62 mmHg. Trace pericardial effusion.  At least moderate left-sided pleural effusion. Dilated aortic root and ascending thoracic aortic aneurysm measuring 4.3 cm.    Incidental Findings:   none     Hospital Course:   Summer Blanton is a 89 y.o. female patient who originally presented to the hospital on 6/2/2025 due to shortness of breath. Found to have pulmonary edema and chf exacerbation started on IV diuretics. Cardiology was consulted. Echo ordered with results above. Patient improved on IV diuretics and weight significant improved. Patient was weaned off o2 and transitioned to oral diuretics. Patient also with HTN, cardiology adjusted BP medication,  "final regimen was lasix 20 mg qd, lopressor 37.5 mg bid, losartan 25 mg qd. PT/OT evaluated patient and recommended patient was ok to return back to [facility], CM assisted with [facility] for patient. Did also have psych evaluate patient due to history of anxiety, recommended to continue with risperdal at this time and outpatient can discuss adjustment of medications to possibly seroquel for anxiety or zoloft vs mirtazapine for depression. Discussed with patient's daughter, would like to stop taking risperdal, they think this is making her more confused, will start seroquel 12.5 mg qd on discharge. Outpatient follow up with psych. On discharge, instructed to follow up with cardiology. Follow up with PCP in 1 week. Repeat cbc and bmp in 1 week. Should follow up with psych. Should follow with low salt diet on discharge. Should return for new or worsening symptoms, verbalized understanding and agreement to plans above.     Please see above list of diagnoses and related plan for additional information.     Discharge Day Visit / Exam:   Subjective:  Seen and examined today, said that she feels fine. No chest pain or SOB. No fevers or chills. Said that she is eating and drinking well. She would like to go home. Said that she is happy to be leaving the hospital.   Vitals: Blood Pressure: 159/90 (06/06/25 0850)  Pulse: 69 (06/06/25 0850)  Temperature: (!) 97.2 °F (36.2 °C) (06/06/25 0850)  Temp Source: Oral (06/06/25 0850)  Respirations: 20 (06/06/25 0850)  Height: 5' 2\" (157.5 cm) (06/03/25 1515)  Weight - Scale: 53.3 kg (117 lb 8.1 oz) (06/06/25 0537)  SpO2: 95 % (06/06/25 0850)  Physical Exam  Vitals reviewed.   Constitutional:       General: She is not in acute distress.     Appearance: She is ill-appearing (frail). She is not toxic-appearing.   HENT:      Head: Normocephalic and atraumatic.      Mouth/Throat:      Mouth: Mucous membranes are moist.     Cardiovascular:      Rate and Rhythm: Normal rate and regular rhythm.      Heart " sounds: No murmur heard.  Pulmonary:      Effort: No respiratory distress.      Breath sounds: No stridor. No wheezing.      Comments: Decreased breath sounds, saturating well on room air  Abdominal:      General: There is no distension.      Palpations: Abdomen is soft. There is no mass.      Tenderness: There is no abdominal tenderness.     Musculoskeletal:      Right lower leg: No edema.      Left lower leg: No edema.     Skin:     General: Skin is warm and dry.     Neurological:      Mental Status: She is alert.      Comments: Oriented to self   Psychiatric:         Mood and Affect: Mood normal.         Behavior: Behavior normal.          Discussion with Family: Updated  (daughter) at bedside.    Discharge instructions/Information to patient and family:   See after visit summary for information provided to patient and family.      Provisions for Follow-Up Care:  See after visit summary for information related to follow-up care and any pertinent home health orders.      Mobility at time of Discharge:   Basic Mobility Inpatient Raw Score: 17  JH-HLM Goal: 5: Stand one or more mins  JH-HLM Achieved: 5: Stand (1 or more minutes)  HLM Goal achieved. Continue to encourage appropriate mobility.     Disposition:   Assisted Living Facility at [facility]    Planned Readmission: no    Administrative Statements   Discharge Statement:  I have spent a total time of 41 minutes in caring for this patient on the day of the visit/encounter. >30 minutes of time was spent on: Diagnostic results, Prognosis, Risks and benefits of tx options, Instructions for management, Patient and family education, Importance of tx compliance, Risk factor reductions, Impressions, Counseling / Coordination of care, Documenting in the medical record, Reviewing / ordering tests, medicine, procedures  , and Communicating with other healthcare professionals .    **Please Note: This note may have been constructed using a  voice recognition system**

## 2025-06-07 ENCOUNTER — HOME CARE VISIT (OUTPATIENT)
Dept: HOME HEALTH SERVICES | Facility: HOME HEALTHCARE | Age: OVER 89
End: 2025-06-07
Payer: MEDICARE

## 2025-06-07 VITALS
SYSTOLIC BLOOD PRESSURE: 110 MMHG | WEIGHT: 117 LBS | TEMPERATURE: 97.8 F | DIASTOLIC BLOOD PRESSURE: 50 MMHG | OXYGEN SATURATION: 98 % | RESPIRATION RATE: 18 BRPM | HEIGHT: 62 IN | HEART RATE: 80 BPM | BODY MASS INDEX: 21.53 KG/M2

## 2025-06-07 PROCEDURE — 400013 VN SOC

## 2025-06-07 PROCEDURE — 10330081 VN NO-PAY CLAIM PROCEDURE

## 2025-06-07 PROCEDURE — G0299 HHS/HOSPICE OF RN EA 15 MIN: HCPCS

## 2025-06-09 ENCOUNTER — TELEPHONE (OUTPATIENT)
Dept: CARDIOLOGY CLINIC | Facility: CLINIC | Age: OVER 89
End: 2025-06-09

## 2025-06-09 ENCOUNTER — EPISODE CHANGES (OUTPATIENT)
Dept: GERIATRICS | Facility: OTHER | Age: OVER 89
End: 2025-06-09

## 2025-06-09 NOTE — TELEPHONE ENCOUNTER
Called and spoke with patients daughter Kirstin. Offered appointment this week with Dr. Valencia but Kirstin declined appointment. Patient scheduled 6/25/25 at 1pm with Magaly MARADIAGA.

## 2025-06-09 NOTE — TELEPHONE ENCOUNTER
Spoke to daughter. States PT lives at a Senior living facility. States PT is doing better since being home. Daughter will call us if anything changes.

## 2025-06-10 ENCOUNTER — HOME CARE VISIT (OUTPATIENT)
Dept: HOME HEALTH SERVICES | Facility: HOME HEALTHCARE | Age: OVER 89
End: 2025-06-10
Payer: MEDICARE

## 2025-06-12 ENCOUNTER — HOME CARE VISIT (OUTPATIENT)
Dept: HOME HEALTH SERVICES | Facility: HOME HEALTHCARE | Age: OVER 89
End: 2025-06-12
Payer: MEDICARE

## 2025-06-12 VITALS
TEMPERATURE: 97.5 F | DIASTOLIC BLOOD PRESSURE: 84 MMHG | OXYGEN SATURATION: 99 % | SYSTOLIC BLOOD PRESSURE: 142 MMHG | RESPIRATION RATE: 18 BRPM | HEART RATE: 77 BPM

## 2025-06-12 PROCEDURE — G0180 MD CERTIFICATION HHA PATIENT: HCPCS | Performed by: INTERNAL MEDICINE

## 2025-06-12 PROCEDURE — G0300 HHS/HOSPICE OF LPN EA 15 MIN: HCPCS

## 2025-06-13 ENCOUNTER — HOME CARE VISIT (OUTPATIENT)
Dept: HOME HEALTH SERVICES | Facility: HOME HEALTHCARE | Age: OVER 89
End: 2025-06-13
Payer: MEDICARE

## 2025-06-15 ENCOUNTER — HOME CARE VISIT (OUTPATIENT)
Dept: HOME HEALTH SERVICES | Facility: HOME HEALTHCARE | Age: OVER 89
End: 2025-06-15
Payer: MEDICARE

## 2025-06-18 ENCOUNTER — HOME CARE VISIT (OUTPATIENT)
Dept: HOME HEALTH SERVICES | Facility: HOME HEALTHCARE | Age: OVER 89
End: 2025-06-18
Payer: MEDICARE

## 2025-07-02 ENCOUNTER — APPOINTMENT (EMERGENCY)
Dept: CT IMAGING | Facility: HOSPITAL | Age: OVER 89
End: 2025-07-02
Payer: MEDICARE

## 2025-07-02 ENCOUNTER — APPOINTMENT (EMERGENCY)
Dept: RADIOLOGY | Facility: HOSPITAL | Age: OVER 89
End: 2025-07-02
Payer: MEDICARE

## 2025-07-02 ENCOUNTER — HOSPITAL ENCOUNTER (OUTPATIENT)
Facility: HOSPITAL | Age: OVER 89
Setting detail: OBSERVATION
Discharge: NURSING, REHAB OR LONG-TERM CARE | End: 2025-07-03
Attending: EMERGENCY MEDICINE | Admitting: INTERNAL MEDICINE
Payer: MEDICARE

## 2025-07-02 DIAGNOSIS — S00.83XA FOREHEAD CONTUSION: ICD-10-CM

## 2025-07-02 DIAGNOSIS — S22.49XA RIB FRACTURES: ICD-10-CM

## 2025-07-02 DIAGNOSIS — S42.402A: ICD-10-CM
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
## 2025-07-02 DIAGNOSIS — S42.401A: ICD-10-CM

## 2025-07-02 DIAGNOSIS — M54.9 BACK PAIN: ICD-10-CM

## 2025-07-02 LAB
ANION GAP SERPL CALCULATED.3IONS-SCNC: 7 MMOL/L (ref 4–13)
BASOPHILS # BLD AUTO: 0.03 THOUSANDS/ÂΜL (ref 0–0.1)
BASOPHILS NFR BLD AUTO: 0 % (ref 0–1)
BUN SERPL-MCNC: 20 MG/DL (ref 5–25)
CALCIUM SERPL-MCNC: 8.7 MG/DL (ref 8.4–10.2)
CHLORIDE SERPL-SCNC: 105 MMOL/L (ref 96–108)
CO2 SERPL-SCNC: 28 MMOL/L (ref 21–32)
CREAT SERPL-MCNC: 0.67 MG/DL (ref 0.6–1.3)
EOSINOPHIL # BLD AUTO: 0.07 THOUSAND/ÂΜL (ref 0–0.61)
EOSINOPHIL NFR BLD AUTO: 1 % (ref 0–6)
ERYTHROCYTE [DISTWIDTH] IN BLOOD BY AUTOMATED COUNT: 14.3 % (ref 11.6–15.1)
GFR SERPL CREATININE-BSD FRML MDRD: 75 ML/MIN/1.73SQ M
GLUCOSE SERPL-MCNC: 114 MG/DL (ref 65–140)
HCT VFR BLD AUTO: 32.3 % (ref 34.8–46.1)
HGB BLD-MCNC: 10.4 G/DL (ref 11.5–15.4)
IMM GRANULOCYTES # BLD AUTO: 0.07 THOUSAND/UL (ref 0–0.2)
IMM GRANULOCYTES NFR BLD AUTO: 1 % (ref 0–2)
LYMPHOCYTES # BLD AUTO: 0.98 THOUSANDS/ÂΜL (ref 0.6–4.47)
LYMPHOCYTES NFR BLD AUTO: 9 % (ref 14–44)
MCH RBC QN AUTO: 30.3 PG (ref 26.8–34.3)
MCHC RBC AUTO-ENTMCNC: 32.2 G/DL (ref 31.4–37.4)
MCV RBC AUTO: 94 FL (ref 82–98)
MONOCYTES # BLD AUTO: 1.02 THOUSAND/ÂΜL (ref 0.17–1.22)
MONOCYTES NFR BLD AUTO: 9 % (ref 4–12)
NEUTROPHILS # BLD AUTO: 8.77 THOUSANDS/ÂΜL (ref 1.85–7.62)
NEUTS SEG NFR BLD AUTO: 80 % (ref 43–75)
NRBC BLD AUTO-RTO: 0 /100 WBCS
PLATELET # BLD AUTO: 145 THOUSANDS/UL (ref 149–390)
PMV BLD AUTO: 9.8 FL (ref 8.9–12.7)
POTASSIUM SERPL-SCNC: 2.9 MMOL/L (ref 3.5–5.3)
RBC # BLD AUTO: 3.43 MILLION/UL (ref 3.81–5.12)
SODIUM SERPL-SCNC: 140 MMOL/L (ref 135–147)
WBC # BLD AUTO: 10.94 THOUSAND/UL (ref 4.31–10.16)

## 2025-07-02 PROCEDURE — 29105 APPLICATION LONG ARM SPLINT: CPT | Performed by: EMERGENCY MEDICINE

## 2025-07-02 PROCEDURE — 99284 EMERGENCY DEPT VISIT MOD MDM: CPT

## 2025-07-02 PROCEDURE — 73080 X-RAY EXAM OF ELBOW: CPT

## 2025-07-02 PROCEDURE — 85025 COMPLETE CBC W/AUTO DIFF WBC: CPT | Performed by: EMERGENCY MEDICINE

## 2025-07-02 PROCEDURE — 80048 BASIC METABOLIC PNL TOTAL CA: CPT | Performed by: EMERGENCY MEDICINE

## 2025-07-02 PROCEDURE — 71250 CT THORAX DX C-: CPT

## 2025-07-02 PROCEDURE — 36415 COLL VENOUS BLD VENIPUNCTURE: CPT | Performed by: EMERGENCY MEDICINE

## 2025-07-02 PROCEDURE — 74176 CT ABD & PELVIS W/O CONTRAST: CPT

## 2025-07-02 PROCEDURE — 72125 CT NECK SPINE W/O DYE: CPT

## 2025-07-02 PROCEDURE — 90471 IMMUNIZATION ADMIN: CPT

## 2025-07-02 PROCEDURE — 70450 CT HEAD/BRAIN W/O DYE: CPT

## 2025-07-02 PROCEDURE — 99285 EMERGENCY DEPT VISIT HI MDM: CPT | Performed by: EMERGENCY MEDICINE

## 2025-07-02 PROCEDURE — 90715 TDAP VACCINE 7 YRS/> IM: CPT | Performed by: EMERGENCY MEDICINE

## 2025-07-02 RX ORDER — ACETAMINOPHEN 325 MG/1
650 TABLET ORAL ONCE
Status: COMPLETED | OUTPATIENT
Start: 2025-07-02 | End: 2025-07-02

## 2025-07-02 RX ORDER — POTASSIUM CHLORIDE 1500 MG/1
20 TABLET, EXTENDED RELEASE ORAL ONCE
Status: COMPLETED | OUTPATIENT
Start: 2025-07-02 | End: 2025-07-02

## 2025-07-02 RX ADMIN — ACETAMINOPHEN 650 MG: 325 TABLET ORAL at 19:22

## 2025-07-02 RX ADMIN — POTASSIUM CHLORIDE 20 MEQ: 1500 TABLET, EXTENDED RELEASE ORAL at 22:56

## 2025-07-02 RX ADMIN — TETANUS TOXOID, REDUCED DIPHTHERIA TOXOID AND ACELLULAR PERTUSSIS VACCINE, ADSORBED 0.5 ML: 5; 2.5; 8; 8; 2.5 SUSPENSION INTRAMUSCULAR at 22:37

## 2025-07-02 NOTE — ED NOTES
Xray at bedside     [facility]  07/02/25 1915

## 2025-07-03 VITALS
HEART RATE: 62 BPM | SYSTOLIC BLOOD PRESSURE: 116 MMHG | WEIGHT: 127.65 LBS | DIASTOLIC BLOOD PRESSURE: 76 MMHG | TEMPERATURE: 97.3 F | BODY MASS INDEX: 23.35 KG/M2 | RESPIRATION RATE: 18 BRPM | OXYGEN SATURATION: 97 %
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
## 2025-07-03 PROBLEM — J18.9 NOSOCOMIAL PNEUMONIA: Status: ACTIVE | Noted: 2025-07-03

## 2025-07-03 PROCEDURE — 87081 CULTURE SCREEN ONLY: CPT | Performed by: INTERNAL MEDICINE

## 2025-07-03 PROCEDURE — 99223 1ST HOSP IP/OBS HIGH 75: CPT | Performed by: NURSE PRACTITIONER

## 2025-07-03 PROCEDURE — NC001 PR NO CHARGE: Performed by: INTERNAL MEDICINE

## 2025-07-03 RX ORDER — AMIODARONE HYDROCHLORIDE 200 MG/1
200 TABLET ORAL DAILY
Status: DISCONTINUED | OUTPATIENT
Start: 2025-07-03 | End: 2025-07-03 | Stop reason: HOSPADM

## 2025-07-03 RX ORDER — POTASSIUM CHLORIDE 14.9 MG/ML
20 INJECTION INTRAVENOUS ONCE
Status: COMPLETED | OUTPATIENT
Start: 2025-07-03 | End: 2025-07-03

## 2025-07-03 RX ORDER — AMOXICILLIN 250 MG
2 CAPSULE ORAL DAILY PRN
Status: DISCONTINUED | OUTPATIENT
Start: 2025-07-03 | End: 2025-07-03 | Stop reason: HOSPADM

## 2025-07-03 RX ORDER — POTASSIUM CHLORIDE 1500 MG/1
40 TABLET, EXTENDED RELEASE ORAL ONCE
Status: COMPLETED | OUTPATIENT
Start: 2025-07-03 | End: 2025-07-03

## 2025-07-03 RX ORDER — LOPERAMIDE HYDROCHLORIDE 2 MG/1
2 CAPSULE ORAL 4 TIMES DAILY PRN
Status: DISCONTINUED | OUTPATIENT
Start: 2025-07-03 | End: 2025-07-03 | Stop reason: HOSPADM

## 2025-07-03 RX ORDER — LOSARTAN POTASSIUM 25 MG/1
25 TABLET ORAL DAILY
Status: DISCONTINUED | OUTPATIENT
Start: 2025-07-03 | End: 2025-07-03 | Stop reason: HOSPADM

## 2025-07-03 RX ORDER — QUETIAPINE FUMARATE 25 MG/1
12.5 TABLET, FILM COATED ORAL
Status: DISCONTINUED | OUTPATIENT
Start: 2025-07-03 | End: 2025-07-03 | Stop reason: HOSPADM

## 2025-07-03 RX ORDER — ONDANSETRON 2 MG/ML
4 INJECTION INTRAMUSCULAR; INTRAVENOUS EVERY 6 HOURS PRN
Status: DISCONTINUED | OUTPATIENT
Start: 2025-07-03 | End: 2025-07-03 | Stop reason: HOSPADM

## 2025-07-03 RX ORDER — FUROSEMIDE 20 MG/1
20 TABLET ORAL DAILY
Status: DISCONTINUED | OUTPATIENT
Start: 2025-07-03 | End: 2025-07-03 | Stop reason: HOSPADM

## 2025-07-03 RX ORDER — ASPIRIN 81 MG/1
81 TABLET, CHEWABLE ORAL DAILY
Status: DISCONTINUED | OUTPATIENT
Start: 2025-07-03 | End: 2025-07-03 | Stop reason: HOSPADM

## 2025-07-03 RX ORDER — ACETAMINOPHEN 325 MG/1
650 TABLET ORAL EVERY 8 HOURS SCHEDULED
Status: DISCONTINUED | OUTPATIENT
Start: 2025-07-03 | End: 2025-07-03 | Stop reason: HOSPADM

## 2025-07-03 RX ADMIN — Medication 1000 UNITS: at 08:36

## 2025-07-03 RX ADMIN — POTASSIUM CHLORIDE 40 MEQ: 1500 TABLET, EXTENDED RELEASE ORAL at 13:23

## 2025-07-03 RX ADMIN — ACETAMINOPHEN 650 MG: 325 TABLET ORAL at 13:11

## 2025-07-03 RX ADMIN — METOPROLOL TARTRATE 37.5 MG: 25 TABLET, FILM COATED ORAL at 08:36

## 2025-07-03 RX ADMIN — FUROSEMIDE 20 MG: 20 TABLET ORAL at 08:36

## 2025-07-03 RX ADMIN — LOSARTAN POTASSIUM 25 MG: 25 TABLET, FILM COATED ORAL at 08:36

## 2025-07-03 RX ADMIN — ACETAMINOPHEN 650 MG: 325 TABLET ORAL at 06:45

## 2025-07-03 RX ADMIN — POTASSIUM CHLORIDE 20 MEQ: 14.9 INJECTION, SOLUTION INTRAVENOUS at 13:23

## 2025-07-03 RX ADMIN — Medication 1 TABLET: at 08:36

## 2025-07-03 RX ADMIN — ASPIRIN 81 MG CHEWABLE TABLET 81 MG: 81 TABLET CHEWABLE at 08:36

## 2025-07-03 RX ADMIN — AMIODARONE HYDROCHLORIDE 200 MG: 200 TABLET ORAL at 08:37

## 2025-07-03 NOTE — ASSESSMENT & PLAN NOTE
CT CAP: [facility]Rad  Significant consolidation in the dorsal right and left lower lobes suggestive of pneumonia.   Small bilateral pleural effusions.  Hospice patient, family reported limited intervention  Unclear if okay to treat with antibiotics. Monitor off antibiotics for now until discussion with hospice care team in a.m.  Follow-up CT read by [facility]  On furosemide 20 mg daily

## 2025-07-03 NOTE — CASE MANAGEMENT
Case Management Discharge Planning Note    Patient name Summer Blantno  Location [facility] 2 /E2 -* MRN [id number]  : 1930 Date 7/3/2025       Current Admission Date: 2025  Current Admission Diagnosis:Fall at nursing home   Patient Active Problem List    Diagnosis Date Noted    Fall at nursing home 2025    Nosocomial pneumonia 2025    Hypertension 2025    History of complete heart block 2025    Acute on chronic heart failure with preserved ejection fraction (HFpEF) (HCC) 2025    Acute pulmonary edema (HCC) 2025    Bilateral pleural effusion 2025    Hypokalemia 2025    Hypoxia 2025    Rib pain 2025    Flutter-fibrillation (HCC) 2025    Complete heart block (HCC) -   PPM ( Medtronic) 2021    Anxiety 2018    Peripheral neuropathy 2016    Mood disorder ([facility]) 2016    Forgetfulness 2016    Discoid lupus erythematosus 2012      LOS (days): 0  Geometric Mean LOS (GMLOS) (days):   Days to GMLOS:     OBJECTIVE:            Current admission status: Observation   Preferred Pharmacy:   [id number] [place] [place] [facility] [address] [address]  Phone: [phone] Fax: [phone]    Primary Care Provider: Self Self    Primary Insurance: MEDICARE  Secondary Insurance: BLUE CROSS    DISCHARGE DETAILS:    Discharge planning discussed with:: Kirstin Reaves  Freedom of Choice: Yes  Comments - Freedom of Choice: Agreeable to return to [facility] with [facility] for hospice  CM contacted family/caregiver?: Yes  Were Treatment Team discharge recommendations reviewed with patient/caregiver?: Yes  Did patient/caregiver verbalize understanding of patient care needs?: N/A- going to facility       Contacts  Patient Contacts: Kirstin (Daughter)  Relationship to Patient:: Family  Contact Method: Phone  Phone Number: [phone]  Reason/Outcome: Emergency Contact,  Discharge Planning    Requested Home Health Care         Is the patient interested in HHC at discharge?: No    DME Referral Provided  Referral made for DME?: No    Other Referral/Resources/Interventions Provided:  Interventions: Facility Return, Hospice         Treatment Team Recommendation: Facility Return, Hospice  Expected Discharge Disposition: Assisted Living Facility  Additional Discharge Dispositions: Assisted Living Facility, Hospice  Transport at Discharge : [facility]  Discussed with family there may be an out of pocket expense for transport.          Transported by (Company and Unit #): "[employer]  ETA of Transport (Date): 07/03/25  ETA of Transport (Time): 1745                       Additional Comments: Spoke with pt's daughter, Kirstin, regarding discharge today back to [facility]. Daughter agreeable with plan for return to [facility] with [facility] resuming care. CM placed call to [facility] to confirm return today with Katie. Katie confirmed ability to return today with transport of 1745. Call placed to [facility] to inform them of discharge back to [facility] as well.

## 2025-07-03 NOTE — ED NOTES
Patient transported to CT     [facility]  07/02/25 2003

## 2025-07-03 NOTE — CASE MANAGEMENT
Case Management Assessment & Discharge Planning Note    Patient name Summer Blanton  Location [facility] 2 /E2 -* MRN [id number]  : 1930 Date 7/3/2025       Current Admission Date: 2025  Current Admission Diagnosis:Fall at nursing home   Patient Active Problem List    Diagnosis Date Noted    Fall at nursing home 2025    Nosocomial pneumonia 2025    Hypertension 2025    History of complete heart block 2025    Acute on chronic heart failure with preserved ejection fraction (HFpEF) (HCC) 2025    Acute pulmonary edema (HCC) 2025    Bilateral pleural effusion 2025    Hypokalemia 2025    Hypoxia 2025    Rib pain 2025    Flutter-fibrillation (HCC) 2025    Complete heart block (HCC) -   PPM ( Medtronic) 2021    Anxiety 2018    Peripheral neuropathy 2016    Mood disorder ([facility]) 2016    Forgetfulness 2016    Discoid lupus erythematosus 2012      LOS (days): 0  Geometric Mean LOS (GMLOS) (days):   Days to GMLOS:     OBJECTIVE:              Current admission status: Observation       Preferred Pharmacy:   [facility]/pharmacy #[id number] - [place], [place] - [address] [address]  Phone: [phone] Fax: [phone]    Primary Care Provider: Self Self    Primary Insurance: MEDICARE  Secondary Insurance: BLUE CROSS    ASSESSMENT:  Active Health Care Proxies    There are no active Health Care Proxies on file.                 Readmission Root Cause  30 Day Readmission: No    Patient Information  Admitted from:: Facility ([facility])  Mental Status: Confused  During Assessment patient was accompanied by: Not accompanied during assessment  Assessment information provided by:: Daughter, Other - please comment (Katie (Veronica Boykin))  Primary Caregiver: Other (Comment)  Caregiver's Name:: Veronica Boykin [facility]  Caregiver's Relationship to Patient:: Facility Staff  Support Systems:  Children, Daughter, Friends/neighbors, Other (Comment) (staff at [facility])  [facility] of Residence: [place]  [facility] do you live in?: [place]  Home entry access options. Select all that apply.: No steps to enter home  Type of Current Residence: Facility ([facility])  Upon entering residence, is there a bedroom on the main floor (no further steps)?: Yes  Upon entering residence, is there a bathroom on the main floor (no further steps)?: Yes  Living Arrangements: Lives in Facility  Is patient a ?: No    Activities of Daily Living Prior to Admission  Functional Status: Assistance  Completes ADLs independently?: No  Level of ADL dependence: Assistance (Pt requires assistance with ADLs and IADLs which are performed by the staff at [facility])  Ambulates independently?: No  Level of ambulatory dependence: Assistance  Does patient use assisted devices?: Yes  Assisted Devices (DME) used: Walker, Wheelchair  Does patient currently own DME?: Yes  What DME does the patient currently own?: Walker, Wheelchair  Does patient have a history of Outpatient Therapy (PT/OT)?: No  Does the patient have a history of Short-Term Rehab?: Yes  Does patient have a history of HHC?: Yes  Does patient currently have HHC?: No         Patient Information Continued  Income Source: SSI/SSD  Does patient have prescription coverage?: Yes  Can the patient afford their medications and any related supplies (such as glucometers or test strips)?: Yes  Does patient receive dialysis treatments?: No  Does patient have a history of substance abuse?: No  Does patient have a history of Mental Health Diagnosis?: No         Means of Transportation  Means of Transport to Appts:: Family transport ([facility])          DISCHARGE DETAILS:    Discharge planning discussed with:: Kirstin Reaves  Freedom of Choice: Yes  Comments - Freedom of Choice: Agreeable to return to [facility] with [facility] for hospice  CM contacted family/caregiver?:  Yes  Were Treatment Team discharge recommendations reviewed with patient/caregiver?: Yes  Did patient/caregiver verbalize understanding of patient care needs?: N/A- going to facility       Contacts  Patient Contacts: Kirstin (Daughter)  Relationship to Patient:: Family  Contact Method: Phone  Phone Number: [phone]  Reason/Outcome: Emergency Contact, Discharge Planning    Requested Home Health Care         Is the patient interested in HHC at discharge?: No    DME Referral Provided  Referral made for DME?: No    Other Referral/Resources/Interventions Provided:  Interventions: Facility Return, Hospice         Treatment Team Recommendation: Facility Return, Hospice  Expected Discharge Disposition: Assisted Living Facility  Additional Discharge Dispositions: Assisted Living Facility, Hospice  Transport at Discharge : [facility]  Discussed with family there may be an out of pocket expense for transport.          Transported by (Company and Unit #): [email or web address]  ETA of Transport (Date): 07/03/25  ETA of Transport (Time): 1745                       Additional Comments: Spoke with pt's daughter, Kirstin, regarding discharge today back to [facility]. Daughter agreeable with plan for return to [facility] with [facility] resuming care. CM placed call to [facility] to confirm return today with Katie. Katie confirmed ability to return today with transport of 1745. Call placed to [facility] to inform them of discharge back to [facility] as well.

## 2025-07-03 NOTE — ASSESSMENT & PLAN NOTE
"CT CAP:   \"Mild interstitial pulmonary edema with left greater than right basilar effusions and associated probable compressive atelectasis at the lung bases. \"  Hospice patient, family reported limited intervention  0/4 SIRS criteria at present; denies cough, fever, chills; does not appear to be active pneumonia.  Small jump in white count likely secondary to fall with bilateral elbow fracture.  Have discussed with patient's family who understands she has fluid on her lungs, can return to the ED should she develop fever or other signs of pneumonia  On furosemide 20 mg daily at present  "

## 2025-07-03 NOTE — H&P
H&P - Hospitalist   Name: Summer Blanton 89 y.o. female I MRN: [id number]  Unit/Bed#: E2 -01 I Date of Admission: 7/2/2025   Date of Service: 7/3/2025 I Hospital Day: 0     Assessment & Plan  Fall at nursing home  Hospice patient at [facility] presents status post fall. Per ER, discussed with hospice care team, given B/L elbow fractures, admit overnight until they figure out safe discharge back in AM. Splints applied to both arms  CTH and C-spine negative for trauma  CT CAP: Acute appearing fractures of the right 2nd through 4th anterolateral ribs and left anterolateral 6th rib  PRN analgesia: APAP 650mg TID  Case management consult  Nosocomial pneumonia  Bilateral pleural effusion  CT CAP: vRad  Significant consolidation in the dorsal right and left lower lobes suggestive of pneumonia.   Small bilateral pleural effusions.  Hospice patient, family reported limited intervention  Unclear if okay to treat with antibiotics. Monitor off antibiotics for now until discussion with hospice care team in a.m.  Follow-up CT read by [facility]  On furosemide 20 mg daily  Mood disorder (HCC)  Seroquel 12.5 mg at bedtime  Complete heart block (HCC) -   PPM ( [employer]) Feb 2021  Status post PPM  Flutter-fibrillation (HCC)  Amiodarone 200 mg daily + metoprolol 37.5mg BID  ASA 81 mg daily  Hypertension  Losartan 25 mg daily, metoprolol 37.5mg BID      VTE Pharmacologic Prophylaxis:   High Risk (Score >/= 5) - Pharmacological DVT Prophylaxis Contraindicated. Sequential Compression Devices Ordered.  Code Status: Prior hospice  Discussion with family: update in a.m.     Anticipated Length of Stay: Patient will be admitted on an observation basis with an anticipated length of stay of less than 2 midnights secondary to bilateral elbow fracture, rib fracture.    History of Present Illness   Chief Complaint:     Summer Blanton is a 89 y.o. female with a PMH of above who was sent in from [facility] s/p  "fall.  Patient has history of dementia, unable to obtain HPI.  Per EMS, patient is hospice, was sent in for x-rays to evaluate for trauma.    ER note: \"Comes in after a mechanical fall, sustained injury to bilateral elbows, patient had fall last week also, has bluish-yellow ecchymosis to the forehead and nasal bone, patient complains of generalized back pain and bilateral elbow pain.\"     Review of Systems   Unable to perform ROS: Dementia       Historical Information   Past Medical History[1]  Past Surgical History[2]  Social History[3]  E-Cigarette/Vaping    E-Cigarette Use Never User      E-Cigarette/Vaping Substances    Nicotine No     THC No     CBD No     Flavoring No     Other No     Unknown No      Family History[4]  Social History:  Marital Status: /Civil Union   Occupation: retired  Patient Pre-hospital Living Situation: [facility]   Patient Pre-hospital Level of Mobility:   Patient Pre-hospital Diet Restrictions: Reg/thin    Meds/Allergies   I have reveiwed home medications using records provided by SNF.  Prior to Admission medications    Medication Sig Start Date End Date Taking? Authorizing Provider   amiodarone 200 mg tablet Take 1 tablet (200 mg total) by mouth 2 (two) times a day for 7 days, THEN 1 tablet (200 mg total) daily.  Patient taking differently: Take 1 tablet (200 mg total) by mouth daily 2/18/25 6/2/25  Bill Alcazar DO   amiodarone 200 mg tablet Take 1 tablet by mouth daily. 6/6/25 6/13/25  Historical Provider, MD   ARTIFICIAL TEAR OP Instill 1 drop into both eyes as needed every 6 hours for dryness 11/21/24   Historical Provider, MD   aspirin 81 mg chewable tablet Chew 1 tablet (81 mg total) daily 11/18/24   James Valencia DO   Cholecalciferol (VITAMIN D) 2000 units CAPS Take 1 capsule (2,000 Units total) by mouth daily  Patient taking differently: Take 2 capsules by mouth daily. 8/15/18   London Farias DO   furosemide (LASIX) 20 mg tablet Take 1 tablet (20 mg total) by " mouth daily 6/7/25   Kitty Hopson PA-C   lidocaine (Lidoderm) 5 % Apply 1 patch topically daily over 12 hours for 7 days Remove & Discard patch within 12 hours or as directed by MD  Patient taking differently: Apply 1 patch topically daily. Remove & Discard patch within 12 hours or as directed by MD. Apply to back. 6/1/25 6/8/25  Enmanuel Marin DO   loperamide (IMODIUM) 2 mg capsule Take 1 capsule by mouth 4 (four) times a day as needed for diarrhea    Historical Provider, MD   losartan (COZAAR) 25 mg tablet Take 1 tablet (25 mg total) by mouth daily 6/6/25   Kitty Hopson PA-C   metoprolol tartrate 37.5 MG TABS Take 1 tablet (37.5 mg total) by mouth every 12 (twelve) hours 6/6/25   Kitty Hopson PA-C   Multiple Vitamins-Minerals (PRESERVISION AREDS PO) Take 1 capsule by mouth in the morning and 1 capsule before bedtime.    Historical Provider, MD   ondansetron (ZOFRAN) 4 mg tablet Take 1 tablet by mouth every 8 (eight) hours as needed for nausea or vomiting    Historical Provider, MD   QUEtiapine (SEROquel) 25 mg tablet Take 0.5 tablets (12.5 mg total) by mouth daily at bedtime 6/6/25   Kitty Hopson PA-C   senna-docusate sodium (SENOKOT-S) 8.6-50 mg per tablet Take 1 tablet by mouth daily as needed for constipation As needed    Historical Provider, MD   vitamin B-12 (VITAMIN B-12) 500 mcg tablet Take 500 mcg by mouth in the morning.  Patient not taking: Reported on 6/2/2025    Historical Provider, MD     Allergies   Allergen Reactions    Gabapentin      dreams    Sertraline GI Intolerance     ' felt like a zombie'       Objective :  Temp:  [98.3 °F (36.8 °C)-98.4 °F (36.9 °C)] 98.3 °F (36.8 °C)  HR:  [109-116] 109  BP: (124-127)/(72-82) 124/72  Resp:  [16-20] 20  SpO2:  [93 %-95 %] 93 %  O2 Device: None (Room air)    Physical Exam  Constitutional:       General: She is not in acute distress.     Appearance: Normal appearance. She is not ill-appearing, toxic-appearing or diaphoretic.       Comments: Underweight   HENT:      Head: Normocephalic.     Eyes:      Conjunctiva/sclera: Conjunctivae normal.       Cardiovascular:      Rate and Rhythm: Normal rate and regular rhythm.      Comments: Left chest PPM  Pulmonary:      Effort: Pulmonary effort is normal.      Breath sounds: Normal breath sounds.   Abdominal:      Palpations: Abdomen is soft.     Musculoskeletal:      Right lower leg: No edema.      Left lower leg: No edema.     Skin:     Coloration: Skin is pale.      Comments: Ecchymosis on forehead, eyes, nasal bridge, right knee in various stages of healing     Neurological:      Mental Status: She is alert. She is disoriented.     Psychiatric:      Comments: Dementia          Lines/Drains:            Lab Results: I have reviewed the following results:  Results from last 7 days   Lab Units 07/02/25  2157   WBC Thousand/uL 10.94*   HEMOGLOBIN g/dL 10.4*   HEMATOCRIT % 32.3*   PLATELETS Thousands/uL 145*   SEGS PCT % 80*   LYMPHO PCT % 9*   MONO PCT % 9   EOS PCT % 1     Results from last 7 days   Lab Units 07/02/25  2157   SODIUM mmol/L 140   POTASSIUM mmol/L 2.9*   CHLORIDE mmol/L 105   CO2 mmol/L 28   BUN mg/dL 20   CREATININE mg/dL 0.67   ANION GAP mmol/L 7   CALCIUM mg/dL 8.7   GLUCOSE RANDOM mg/dL 114             Lab Results   Component Value Date    HGBA1C 5.9 (H) 05/19/2022           Imaging Results Review: I reviewed radiology reports from this admission including: CT chest, CT abdomen/pelvis, CT head, CT C-spine, CT  , and xray(s).  Other Study Results Review: EKG was reviewed.         ** Please Note: This note has been constructed using a voice recognition system. **         [1]   Past Medical History:  Diagnosis Date    Aortic regurgitation     Constipation     Discoid lupus erythematosus     Dysphagia     Esophagitis     Forgetfulness     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    Transient elevated blood pressure     last assessed: 5/16/2017    Weight loss    [2]   Past Surgical  History:  Procedure Laterality Date    APPENDECTOMY      CATARACT EXTRACTION      CHOLECYSTECTOMY      ESOPHAGOGASTRODUODENOSCOPY  06/2013    diagnostic- neg id have dilatation    EYE SURGERY      HYSTERECTOMY      LAPAROTOMY N/A 11/3/2023    Procedure: LAPAROTOMY EXPLORATORY, ILEOCECECTOMY;  Surgeon: Katlyn Fleming MD;  Location: [facility];  Service: General    FL ESOPHAGOGASTRODUODENOSCOPY TRANSORAL DIAGNOSTIC N/A 9/16/2016    Procedure: ESOPHAGOGASTRODUODENOSCOPY (EGD);  Surgeon: Vladislav Garcia MD;  Location: [facility];  Service: Gastroenterology    REPLACEMENT TOTAL KNEE Left 01/2007   [3]   Social History  Tobacco Use    Smoking status: Never    Smokeless tobacco: Never   Vaping Use    Vaping status: Never Used   Substance and Sexual Activity    Alcohol use: Not Currently    Drug use: No    Sexual activity: Not Currently   [4]   Family History  Problem Relation Name Age of Onset    Diabetes Mother      Coronary artery disease Father

## 2025-07-03 NOTE — ASSESSMENT & PLAN NOTE
Hospice patient at [facility] presents status post fall. Per ER, discussed with hospice care team, given B/L elbow fractures, admit overnight until they figure out safe discharge back in AM. Splints applied to both arms  CTH and C-spine negative for trauma  CT CAP: Acute appearing fractures of the right 2nd through 4th anterolateral ribs and left anterolateral 6th rib  PRN analgesia: APAP 650mg TID  Case management consult

## 2025-07-03 NOTE — ED PROVIDER NOTES
Time reflects when diagnosis was documented in both MDM as applicable and the Disposition within this note       Time User Action Codes Description Comment    7/2/2025 10:10 PM Venu Constantino Add [W19.XXXA] Fall, initial encounter     7/2/2025 10:10 PM Venu Constantino Add [S42.401A,  S42.402A] Bilateral elbow fractures     7/3/2025  1:44 AM AlaFaith walshaz Add [S22.49XA] Rib fractures     7/3/2025  2:41 PM AlaKylee walshVenu Add [M54.9] Back pain     7/3/2025  2:43 PM AlaKylee walhsVenu Add [S00.83XA] Forehead contusion           ED Disposition       ED Disposition   Admit    Condition   Stable    Date/Time   u Jul 3, 2025  1:44 AM    Comment   Case was discussed with Loli Kendall and the patient's admission status was agreed to be Admission Status: observation status to the service of Dr. Ortega.               Assessment & Plan       Medical Decision Making  Patient is a 89-year-old female, comes in after a mechanical fall, sustained injury to bilateral elbows, patient has had fall last week also, has bluish-yellow ecchymosis to the forehead and nasal bone, patient complains of generalized back pain, patient denies headache, neck pain, does complain of bilateral elbow pain patient has dressings applied at the facility, on exam, patient is conscious, alert, stable vitals, patient has swelling and decreased range of movement of bilateral elbows with superficial abrasions and skin tears.  Differential diagnosis: Fall, bilateral elbow injuries, rule out fracture, head injury, will check CT head, C-spine, chest and pelvis, thoracic lumbar recon, bilateral elbow x-rays, pain meds.    Problems Addressed:  Bilateral elbow fractures: acute illness or injury  Fall, initial encounter: acute illness or injury  Rib fractures: acute illness or injury    Amount and/or Complexity of Data Reviewed  Labs: ordered. Decision-making details documented in ED Course.  Radiology: ordered and independent interpretation performed. Decision-making  details documented in ED Course.    Risk  OTC drugs.  Prescription drug management.  Decision regarding hospitalization.        ED Course as of 07/03/25 1449   Wed Jul 02, 2025 2000 XR elbow 3+ views RIGHT  X-ray right elbow independently reviewed, olecranon process fracture noted.   2000 XR elbow 3+ views LEFT  X-ray left elbow independently reviewed, nondisplaced olecranon fracture noted.   2023 Patient and family informed about the x-ray results/interpretation.   2100 Bilateral elbow wounds evaluated, no deep wounds, superficial abrasions, skin tears, wounds washed, Xeroform dressing applied, sugar-tong splint supplied both elbows.   2147 CT head without contrast  CT Head independently reviewed, no significant acute abnormality noted   2252 WBC(!): 10.94   2252 Hemoglobin(!): 10.4   2252 Platelet Count(!): 145   2252 Sodium: 140   2252 Potassium(!): 2.9  Will give dose of oral potassium.   2252 BUN: 20   2252 Creatinine: 0.67   2252 GLUCOSE: 114   2329 CT head without contrast  IMPRESSION:     No acute intracranial abnormality.        2329 CT cervical spine without contrast  IMPRESSION:     No evidence of acute cervical spine fracture or traumatic malalignment.     u Jul 03, 2025   0124 CT chest abdomen pelvis wo contrast  IMPRESSION:  Acute appearing fractures of the right 2nd through 4th anterolateral ribs and left anterolateral 6th rib.  Significant consolidation in the dorsal right and left lower lobes suggestive of pneumonia.  Small bilateral pleural effusions.    CT Abdomen:    IMPRESSION:  No acute findings.     0132 CT recon only thoracolumbar (No Charge)  IMPRESSION:  No acute findings.  Prominent posterior disc bulge at L4-L5 contributing to moderate to severe central canal narrowing.   0144 We will admit for bilateral elbow fractures as patient with bilateral splints, as discussed with Family, pt's Care team would be contacted in AM.       Medications   acetaminophen (TYLENOL) tablet 650 mg (650 mg  Oral Given 7/2/25 1922)   tetanus-diphtheria-acellular pertussis (BOOSTRIX) IM injection 0.5 mL (0.5 mL Intramuscular Given 7/2/25 2237)   potassium chloride (Klor-Con M20) CR tablet 20 mEq (20 mEq Oral Given 7/2/25 2256)       ED Risk Strat Scores                    No data recorded        SBIRT 22yo+      Flowsheet Row Most Recent Value   Initial Alcohol Screen: US AUDIT-C     1. How often do you have a drink containing alcohol? 0 Filed at: 07/02/2025 1848   2. How many drinks containing alcohol do you have on a typical day you are drinking?  0 Filed at: 07/02/2025 1848   3a. Male UNDER 65: How often do you have five or more drinks on one occasion? 0 Filed at: 07/02/2025 1848   3b. FEMALE Any Age, or MALE 65+: How often do you have 4 or more drinks on one occassion? 0 Filed at: 07/02/2025 1848   Audit-C Score 0 Filed at: 07/02/2025 1848   MI[place]: How many times in the past year have you...    Used an illegal drug or used a prescription medication for non-medical reasons? Never Filed at: 07/02/2025 1848                            History of Present Illness       Chief Complaint   Patient presents with    Fall     Pt reports for witnessed fall while getting into bus at nursing home. Per ems pt is hospice and they just want xrays to make sure nothing is broke. Per ems if any care is needed prior to care call daughter shai tsai. +head strike no loc no thinenrs        Past Medical History[1]   Past Surgical History[2]   Family History[3]   Social History[4]   E-Cigarette/Vaping    E-Cigarette Use Never User       E-Cigarette/Vaping Substances    Nicotine No     THC No     CBD No     Flavoring No     Other No     Unknown No       I have reviewed and agree with the history as documented.       History provided by:  Patient and relative   used: No    Fall  Mechanism of injury: fall    Injury location:  Shoulder/arm and head/neck  Head/neck injury location:  Head  Shoulder/arm injury location:  R  elbow and L elbow  Incident location:  alf  Time since incident: earlier today.  Arrived directly from scene: yes    Fall:     Fall occurred:  Walking    Height of fall:  Unable to specify    Impact surface:  Hard floor    Point of impact:  Head    Entrapped after fall: no    Protective equipment: none    Suspicion of alcohol use: no    Suspicion of drug use: no    Tetanus status:  Unknown  Prior to arrival data:     Bystander interventions:  None    Patient ambulatory at scene: no      Blood loss:  Minimal    Responsiveness at scene:  Alert    Orientation at scene:  Person, place and situation    Loss of consciousness: no      Amnesic to event: no      Airway interventions:  None    Breathing interventions:  None    IV access status:  None    IO access:  None    Fluids administered:  None    Cardiac interventions:  None    Medications administered:  None    Immobilization:  None    Airway condition since incident:  Stable    Breathing condition since incident:  Stable    Circulation condition since incident:  Stable    Mental status condition since incident:  Stable    Disability condition since incident:  Stable  Associated symptoms: back pain    Associated symptoms: no abdominal pain, no chest pain, no headaches, no nausea, no neck pain and no vomiting    Risk factors: no anticoagulation therapy        Review of Systems   Constitutional:  Negative for chills and fever.   HENT:  Negative for facial swelling, sore throat and trouble swallowing.         Head injjury   Eyes:  Negative for pain and visual disturbance.   Respiratory:  Negative for cough, chest tightness and shortness of breath.    Cardiovascular:  Negative for chest pain and leg swelling.   Gastrointestinal:  Negative for abdominal pain, diarrhea, nausea and vomiting.   Genitourinary:  Negative for dysuria and flank pain.   Musculoskeletal:  Positive for back pain. Negative for neck pain and neck stiffness.        Bilateral elbow pain and  injury   Skin:  Negative for pallor and rash.   Allergic/Immunologic: Negative for environmental allergies and immunocompromised state.   Neurological:  Negative for dizziness and headaches.   Hematological:  Negative for adenopathy. Does not bruise/bleed easily.   Psychiatric/Behavioral:  Negative for agitation and behavioral problems.    All other systems reviewed and are negative.          Objective       ED Triage Vitals   Temperature Pulse Blood Pressure Respirations SpO2 Patient Position - Orthostatic VS   07/02/25 1809 07/02/25 1809 07/02/25 1809 07/02/25 1809 07/02/25 1809 07/03/25 0357   98.4 °F (36.9 °C) (!) 110 127/82 16 95 % Lying      Temp Source Heart Rate Source BP Location FiO2 (%) Pain Score    07/03/25 0357 07/03/25 0108 07/03/25 0357 -- 07/02/25 1922    Temporal Monitor Right leg  8      Vitals      Date and Time Temp Pulse SpO2 Resp BP Pain Score FACES Pain Rating User   07/03/25 1311 -- -- -- -- -- 4 -- SH   07/03/25 0717 97.3 °F (36.3 °C) 62 97 % 18 116/76 -- -- MS   07/03/25 0645 -- -- -- -- -- -- pt states she does not know --    07/03/25 0357 98.3 °F (36.8 °C) 109 93 % 20 124/72 -- --    07/03/25 0108 -- 116 93 % 18 -- -- -- AS   07/02/25 1922 -- -- -- -- -- 8 -- AS   07/02/25 1809 98.4 °F (36.9 °C) 110 95 % 16 127/82 -- -- JOHAN            Physical Exam  Vitals and nursing note reviewed.   Constitutional:       General: She is not in acute distress.     Appearance: She is well-developed.   HENT:      Head: Normocephalic.      Comments: Contusion to forehead, nasal bridge, bluish yellow ecchymosis     Eyes:      Extraocular Movements: Extraocular movements intact.       Cardiovascular:      Rate and Rhythm: Normal rate and regular rhythm.      Heart sounds: Normal heart sounds.   Pulmonary:      Effort: Pulmonary effort is normal.      Breath sounds: Normal breath sounds.   Abdominal:      Palpations: Abdomen is soft.      Tenderness: There is no abdominal tenderness. There is no guarding  or rebound.     Musculoskeletal:      Cervical back: Normal range of motion and neck supple.      Comments: Right elbow: Superficial abrasion, skin tear over right posterior elbow, no deep extension of the wound, swelling over right elbow posterior laterally, decreased range of movement due to pain, neurovascular tact distally    Left elbow: Superficial abrasion, skin tear over posterior lateral elbow area, no deep extension of wound, decreased range of movement due to pain, neurovascularly intact distally     Skin:     General: Skin is warm and dry.     Neurological:      General: No focal deficit present.      Mental Status: She is alert and oriented to person, place, and time.     Psychiatric:         Mood and Affect: Mood normal.         Behavior: Behavior normal.         Results Reviewed       Procedure Component Value Units Date/Time    Basic metabolic panel [[id number]]  (Abnormal) Collected: 07/02/25 2157    Lab Status: Final result Specimen: Blood from Hand, Left Updated: 07/02/25 2220     Sodium 140 mmol/L      Potassium 2.9 mmol/L      Chloride 105 mmol/L      CO2 28 mmol/L      ANION GAP 7 mmol/L      BUN 20 mg/dL      Creatinine 0.67 mg/dL      Glucose 114 mg/dL      Calcium 8.7 mg/dL      eGFR 75 ml/min/1.73sq m     Narrative:      National Kid[employer] Foundation guidelines for Chronic Kidney Disease (CKD):     Stage 1 with normal or high GFR (GFR > 90 mL/min/1.73 square meters)    Stage 2 Mild CKD (GFR = 60-89 mL/min/1.73 square meters)    Stage 3A Moderate CKD (GFR = 45-59 mL/min/1.73 square meters)    Stage 3B Moderate CKD (GFR = 30-44 mL/min/1.73 square meters)    Stage 4 Severe CKD (GFR = 15-29 mL/min/1.73 square meters)    Stage 5 End Stage CKD (GFR <15 mL/min/1.73 square meters)  Note: GFR calculation is accurate only with a steady state creatinine    CBC and differential [[id number]]  (Abnormal) Collected: 07/02/25 2157    Lab Status: Final result Specimen: Blood from Hand, Left Updated:  07/02/25 2203     WBC 10.94 Thousand/uL      RBC 3.43 Million/uL      Hemoglobin 10.4 g/dL      Hematocrit 32.3 %      MCV 94 fL      MCH 30.3 pg      MCHC 32.2 g/dL      RDW 14.3 %      MPV 9.8 fL      Platelets 145 Thousands/uL      nRBC 0 /100 WBCs      Segmented % 80 %      Immature Grans % 1 %      Lymphocytes % 9 %      Monocytes % 9 %      Eosinophils Relative 1 %      Basophils Relative 0 %      Absolute Neutrophils 8.77 Thousands/µL      Absolute Immature Grans 0.07 Thousand/uL      Absolute Lymphocytes 0.98 Thousands/µL      Absolute Monocytes 1.02 Thousand/µL      Eosinophils Absolute 0.07 Thousand/µL      Basophils Absolute 0.03 Thousands/µL             CT head without contrast   Final Interpretation by Gian Patterson MD (07/02 2258)      No acute intracranial abnormality.                  Workstation performed: [id number]         CT cervical spine without contrast   Final Interpretation by Gian Patterson MD (07/02 2301)      No evidence of acute cervical spine fracture or traumatic malalignment.                  Workstation performed: [id number]         CT chest abdomen pelvis wo contrast   Final Interpretation by Demond Covington MD (07/03 1020)      1.  No evidence of acute posttraumatic injury throughout the chest, abdomen or pelvis.   2.  Redemonstration of previously characterized right second through fifth anterior rib fractures without pneumothorax.   3.  Mild interstitial pulmonary edema with left greater than right basilar effusions and probable compressive atelectasis.   4.  Fusiform ascending thoracic aortic aneurysm measuring up to 42 mm. Vascular surgery consultation advised.      Virtual radiologic report was provided at 0121 hours on 7/3/2021.      Computerized Assisted Algorithm (CAA) may have aided analysis of applicable images.         Workstation performed: [id number]         CT recon only thoracolumbar (No Charge)   Final Interpretation by Dash Newman DO (07/03 0843)       No fracture or traumatic subluxation. Advanced facet degenerative changes are seen in the lumbar spine.               Workstation performed: [id number]         XR elbow 3+ views RIGHT    (Results Pending)   XR elbow 3+ views LEFT    (Results Pending)       Splint application    Date/Time: 7/2/2025 9:00 PM    Performed by: Venu Constantino MD  Authorized by: Venu Constantino MD    Other Assisting Provider: Yes (comment) (RN)    Verbal consent obtained?: Yes    Consent given by:  Patient  Patient states understanding of procedure being performed: Yes    Patient's understanding of procedure matches consent: Yes    Test results available and properly labeled: Yes    Patient identity confirmed:  Verbally with patient  Pre-procedure details:     Sensation:  Normal    Skin color:  Pink  Procedure details:     Laterality:  Left and right    Location:  Elbow    Elbow:  L elbow and R elbow    Splint composition: static      Splint type:  Double sugar tong    Supplies:  Cotton padding and fiberglass  Post-procedure details:     Pain:  Improved    Sensation:  Normal    Skin color:  Pink    Patient tolerance of procedure:  Tolerated well, no immediate complications  Comments:      Sugar tong Splints applied to both elbow/forearms      ED Medication and Procedure Management   Prior to Admission Medications   Prescriptions Last Dose Informant Patient Reported? Taking?   ARTIFICIAL TEAR OP   Yes No   Sig: Instill 1 drop into both eyes as needed every 6 hours for dryness   Cholecalciferol (VITAMIN D3) 1,000 units tablet   Yes Yes   Sig: Take 1,000 Units by mouth daily   Multiple Vitamins-Minerals (PRESERVISION AREDS PO)   Yes No   Sig: Take 1 capsule by mouth in the morning and 1 capsule before bedtime.   QUEtiapine (SEROquel) 25 mg tablet   No No   Sig: Take 0.5 tablets (12.5 mg total) by mouth daily at bedtime   amiodarone 200 mg tablet   Yes No   Sig: Take 1 tablet by mouth daily.   aspirin 81 mg chewable tablet   No No   Sig: Chew  1 tablet (81 mg total) daily   furosemide (LASIX) 20 mg tablet   No No   Sig: Take 1 tablet (20 mg total) by mouth daily   loperamide (IMODIUM) 2 mg capsule   Yes No   Sig: Take 1 capsule by mouth 4 (four) times a day as needed for diarrhea   losartan (COZAAR) 25 mg tablet   No No   Sig: Take 1 tablet (25 mg total) by mouth daily   metoprolol tartrate 37.5 MG TABS   No No   Sig: Take 1 tablet (37.5 mg total) by mouth every 12 (twelve) hours   ondansetron (ZOFRAN) 4 mg tablet   Yes No   Sig: Take 1 tablet by mouth every 8 (eight) hours as needed for nausea or vomiting   senna-docusate sodium (SENOKOT-S) 8.6-50 mg per tablet   Yes No   Sig: Take 1 tablet by mouth daily as needed for constipation As needed      Facility-Administered Medications: None     Current Discharge Medication List        CONTINUE these medications which have NOT CHANGED    Details   Cholecalciferol (VITAMIN D3) 1,000 units tablet Take 1,000 Units by mouth daily      amiodarone 200 mg tablet Take 1 tablet by mouth daily.      ARTIFICIAL TEAR OP Instill 1 drop into both eyes as needed every 6 hours for dryness    Comments: Instill 1 drop into both eyes as needed for dryness      furosemide (LASIX) 20 mg tablet Take 1 tablet (20 mg total) by mouth daily  Qty: 30 tablet, Refills: 0    Associated Diagnoses: Acute on chronic heart failure with preserved ejection fraction (HFpEF) (HCC); Hypertension, unspecified type      loperamide (IMODIUM) 2 mg capsule Take 1 capsule by mouth 4 (four) times a day as needed for diarrhea      losartan (COZAAR) 25 mg tablet Take 1 tablet (25 mg total) by mouth daily  Qty: 30 tablet, Refills: 0    Associated Diagnoses: Hypertension, unspecified type      metoprolol tartrate 37.5 MG TABS Take 1 tablet (37.5 mg total) by mouth every 12 (twelve) hours  Qty: 60 tablet, Refills: 0    Associated Diagnoses: Flutter-fibrillation (HCC); Hypertension, unspecified type      Multiple Vitamins-Minerals (PRESERVISION AREDS PO) Take 1  capsule by mouth in the morning and 1 capsule before bedtime.      ondansetron (ZOFRAN) 4 mg tablet Take 1 tablet by mouth every 8 (eight) hours as needed for nausea or vomiting      QUEtiapine (SEROquel) 25 mg tablet Take 0.5 tablets (12.5 mg total) by mouth daily at bedtime  Qty: 15 tablet, Refills: 0    Associated Diagnoses: Anxiety; Forgetfulness      senna-docusate sodium (SENOKOT-S) 8.6-50 mg per tablet Take 1 tablet by mouth daily as needed for constipation As needed           STOP taking these medications       aspirin 81 mg chewable tablet Comments:   Reason for Stopping:             Outpatient Discharge Orders   Discharge Diet     YEISON Pathway:  Medications to avoid: phosphate or magnesium based laxatives, NSAIDs     YEISON Pathway: Maintain SBP between 120-140 mm/Hg     YEISON Pathway: Call Nursing Home MD for decreased oral intake     YEISON Pathway: Daily Weights     YEISON Pathway: Strict I&O     YEISON Pathway: Follow up bloodwork     Discharge Condtion:  Stabilized     Free of Communicable Disease:   Yes     Patient Aware of Diagnosis: Yes     Physical Therapy Eval And Treat     Occupational Therapy Eval and Treat     Activity:  Per Rehab Recommendations     ED SEPSIS DOCUMENTATION   Time reflects when diagnosis was documented in both MDM as applicable and the Disposition within this note       Time User Action Codes Description Comment    7/2/2025 10:10 PM AlaKylee walshVenu Add [W19.XXXA] Fall, initial encounter     7/2/2025 10:10 PM AlaKylee walshVenu Add [S42.401A,  S42.402A] Bilateral elbow fractures     7/3/2025  1:44 AM Alanico Venu Add [S22.49XA] Rib fractures     7/3/2025  2:41 PM Alanico Venu Add [M54.9] Back pain     7/3/2025  2:43 PM Alam Venu Add [S00.83XA] Forehead contusion                      [1]   Past Medical History:  Diagnosis Date    Aortic regurgitation     Constipation     Discoid lupus erythematosus     Dysphagia     Esophagitis     Forgetfulness     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     Transient elevated blood pressure     last assessed: 5/16/2017    Weight loss    [2]   Past Surgical History:  Procedure Laterality Date    APPENDECTOMY      CATARACT EXTRACTION      CHOLECYSTECTOMY      ESOPHAGOGASTRODUODENOSCOPY  06/2013    diagnostic- neg id have dilatation    EYE SURGERY      HYSTERECTOMY      LAPAROTOMY N/A 11/3/2023    Procedure: LAPAROTOMY EXPLORATORY, ILEOCECECTOMY;  Surgeon: Katlyn Fleming MD;  Location: [facility];  Service: General    [place] ESOPHAGOGASTRODUODENOSCOPY TRANSORAL DIAGNOSTIC N/A 9/16/2016    Procedure: ESOPHAGOGASTRODUODENOSCOPY (EGD);  Surgeon: Vladislav Garcia MD;  Location: [facility];  Service: Gastroenterology    REPLACEMENT TOTAL KNEE Left 01/2007   [3]   Family History  Problem Relation Name Age of Onset    Diabetes Mother      Coronary artery disease Father     [4]   Social History  Tobacco Use    Smoking status: Never    Smokeless tobacco: Never   Vaping Use    Vaping status: Never Used   Substance Use Topics    Alcohol use: Not Currently    Drug use: No        Venu Constantino MD  07/03/25 6816

## 2025-07-03 NOTE — RESTORATIVE TECHNICIAN NOTE
Restorative Technician Note      Patient Name: Summer Blanton     Restorative Tech Visit Date: 07/03/25  Note Type: Mobility  Patient Position Upon Consult: Bedside chair  Activity Performed: Ambulated  Assistive Device: Roller walker  Patient Position at End of Consult: All needs within reach; Bedside chair

## 2025-07-03 NOTE — CASE MANAGEMENT
Case Management Discharge Planning Note    Patient name Summer Blanton  [facility] -* MRN [id number]  : 1930 Date 7/3/2025       Current Admission Date: 2025  Current Admission Diagnosis:Fall at nursing home   Patient Active Problem List    Diagnosis Date Noted    Fall at nursing home 2025    Nosocomial pneumonia 2025    Hypertension 2025    History of complete heart block 2025    Acute on chronic heart failure with preserved ejection fraction (HFpEF) (HCC) 2025    Acute pulmonary edema (HCC) 2025    Bilateral pleural effusion 2025    Hypokalemia 2025    Hypoxia 2025    Rib pain 2025    Flutter-fibrillation (HCC) 2025    Complete heart block (HCC) -   PPM ( [employer]) 2021    Anxiety 2018    Peripheral neuropathy 2016    Mood disorder ([facility]) 2016    Forgetfulness 2016    Discoid lupus erythematosus 2012      LOS (days): 0  Geometric Mean LOS (GMLOS) (days):   Days to GMLOS:     OBJECTIVE:            Current admission status: Observation   [facility]erred Pharmacy:   [id number] [place] [facility] [address] [address]  Phone: [phone] Fax: [phone]    Primary Care Provider: Self Self    Primary Insurance: MEDICARE  Secondary Insurance: BLUE CROSS    DISCHARGE DETAILS:                                                                                                    Additional Comments: Called [facility] and spoke with Katie. Katie did confirm that pt was at the facility under hospice services with [facility]. EDGARD was sent to [facility]. Relayed to Katie that pt has b/l elbow fractures with splints on both arms. Katie reported that she will be speaking with the  and will call this CM back regarding needs for discharge as pt will likely be a heavier assist due to these fractures.

## 2025-07-03 NOTE — ASSESSMENT & PLAN NOTE
CT CAP: [facility]  Significant consolidation in the dorsal right and left lower lobes suggestive of pneumonia.   Small bilateral pleural effusions.  Hospice patient, family reported limited intervention  Unclear if okay to treat with antibiotics. Monitor off antibiotics for now until discussion with hospice care team in a.m.  Follow-up CT read by [facility]  On furosemide 20 mg daily

## 2025-07-03 NOTE — ASSESSMENT & PLAN NOTE
"Hospice patient at [facility] presents status post fall. Per ER, discussed with hospice care team, given B/L elbow fractures, admit overnight until they figure out safe discharge back in AM. Splints applied to both arms  CTH and C-spine negative for trauma  CT CAP: \"Redemonstration of previously characterized right second through fifth anterior rib fractures without pneumothorax.\"  PRN analgesia: APAP 650mg TID  Case management consult    7/3-update: Awaiting official read on bilateral elbow x-rays, but currently splinted and given patient's comorbidities, age, and location of fractures; doubtful she would be surgical candidate.  Supportive care and have discussed all with family (daughter Kirstni) who agrees.  Plan to discharge patient back to [facility] today following repletion of potassium.  "

## 2025-07-03 NOTE — DISCHARGE SUMMARY
"Discharge Summary - Hospitalist   Name: Summer Blanton 89 y.o. female I MRN: [id number]  Unit/Bed#: E2 -01 I Date of Admission: 7/2/2025   Date of Service: 7/3/2025 I Hospital Day: 0     Assessment & Plan  Fall at nursing home  Hospice patient at [facility] presents status post fall. Per ER, discussed with hospice care team, given B/L elbow fractures, admit overnight until they figure out safe discharge back in AM. Splints applied to both arms  CTH and C-spine negative for trauma  CT CAP: \"Redemonstration of previously characterized right second through fifth anterior rib fractures without pneumothorax.\"  PRN analgesia: APAP 650mg TID  Case management consult    7/3-update: Awaiting official read on bilateral elbow x-rays, but currently splinted and given patient's comorbidities, age, and location of fractures; doubtful she would be surgical candidate.  Supportive care and have discussed all with family (daughter Kirstin) who agrees.  Plan to discharge patient back to [facility] today following repletion of potassium.  Bilateral pleural effusion  CT CAP:   \"Mild interstitial pulmonary edema with left greater than right basilar effusions and associated probable compressive atelectasis at the lung bases. \"  Hospice patient, family reported limited intervention  0/4 SIRS criteria at present; denies cough, fever, chills; does not appear to be active pneumonia.  Small jump in white count likely secondary to fall with bilateral elbow fracture.  Have discussed with patient's family who understands she has fluid on her lungs, can return to the ED should she develop fever or other signs of pneumonia  On furosemide 20 mg daily at present  Mood disorder (HCC)  Seroquel 12.5 mg at bedtime  Complete heart block (HCC) -   PPM ( [employer]) Feb 2021  Status post PPM  Flutter-fibrillation (HCC)  Amiodarone 200 mg daily + metoprolol 37.5mg BID  ASA 81 mg daily  Hypertension  Losartan 25 mg daily, metoprolol 37.5mg BID   " "  Medical Problems       Resolved Problems  Date Reviewed: 7/3/2025   None       Discharging Physician / Practitioner: Dave Ortega MD  PCP: Self Self  Admission Date:   Admission Orders (From admission, onward)       Ordered        07/03/25 0312  Place in Observation  Once                          Discharge Date: 07/03/25    Next Steps for Physician/AP Assuming Care:  Recommend stopping aspirin as soon as possible given recurrent falls in a 89-year-old on hospice; feel that risk outweigh benefits  May consider working with hospice and other clinical providers to stop nonessential medications such as multivitamins, vitamin D3, losartan, aspirin, etc.  Would continue amiodarone, metoprolol, Lasix if patient reports symptoms of chest pain/discomfort without them  Supportive care and pain control for bilateral elbow injuries  PT/OT; fall precautions at her facility    Test Results Pending at Discharge (will require follow up):  None    Medication Changes for Discharge & Rationale:   No medication changes made; would encourage cessation of aspirin and nonessential medications  Patient given potassium repletion at family request  See after visit summary for reconciled discharge medications provided to patient and/or family.     Consultations During Hospital Stay:  None    Procedures Performed:   CT head  CT cervical spine  CT chest abdomen pelvis  CT thoracolumbar spine  X-ray bilateral elbows    Significant Findings / Test Results:   Presumed bilateral elbow fractures  Hypokalemia    Incidental Findings:   Imaging again shows right 2 through 4 rib fractures that appear to be chronic  Mild bilateral pleural effusions with possible atelectasis; repeat read does not report consolidation; patient is 0/4 SIRS criteria; low suspicion for acute pneumonia    Hospital Course:   As per HPI:    \"Summer Blanton is a 89 y.o. female with a PMH of above who was sent in from [facility] s/p fall.  Patient has " "history of dementia, unable to obtain HPI.  Per EMS, patient is hospice, was sent in for x-rays to evaluate for trauma.     ER note: \"Comes in after a mechanical fall, sustained injury to bilateral elbows, patient had fall last week also, has bluish-yellow ecchymosis to the forehead and nasal bone, patient complains of generalized back pain and bilateral elbow pain.\"\"      Please see above list of diagnoses and related plan for additional information.     Discharge Day Visit / Exam:   * Please refer to separate progress note for these details *    Discussion with Family: Updated  (daughter) via phone.    Discharge instructions/Information to patient and family:   See after visit summary for information provided to patient and family.      Provisions for Follow-Up Care:  See after visit summary for information related to follow-up care and any pertinent home health orders.      Mobility at time of Discharge:   Basic Mobility Inpatient Raw Score: 12  JH-HLM Goal: 4: Move to chair/commode  JH-HLM Achieved: 6: Walk 10 steps or more  HLM Goal achieved. Continue to encourage appropriate mobility.     Disposition:   Other Skilled Nursing Facility at [facility]    Planned Readmission: None    Administrative Statements   Discharge Statement:  I have spent a total time of 45 minutes in caring for this patient on the day of the visit/encounter. .    **Please Note: This note may have been constructed using a voice recognition system**  "

## 2025-07-03 NOTE — ASSESSMENT & PLAN NOTE
CT CAP: vRad  Significant consolidation in the dorsal right and left lower lobes suggestive of pneumonia.   Small bilateral pleural effusions.  Hospice patient, family reported no drastic measures  Unclear if okay to treat with antibiotics. Monitor off antibiotics for now until discussion with hospice care team in a.m.  Follow-up CT reading  On furosemide 20 mg daily

## 2025-07-03 NOTE — NURSING NOTE
"Transport picked up at 1730, all belongings sent along. Attempted to call \"Jeanie\" at [facility] x 3. Continued to leave this nurse on hold. Did call one more time and left call back number.   "

## 2025-07-04 LAB — MRSA NOSE QL CULT: NORMAL

## (undated) DEVICE — BETHLEHEM UNIVERSAL LAPAROTOMY: Brand: CARDINAL HEALTH

## (undated) DEVICE — SUT VICRYL 3-0 SH 27 IN J416H

## (undated) DEVICE — DRESSING MEPILEX AG 4 X 8IN

## (undated) DEVICE — TRAY FOLEY 16FR URIMETER SURESTEP

## (undated) DEVICE — PROXIMATE LINEAR CUTTER RELOAD, BLUE, 75MM: Brand: PROXIMATE

## (undated) DEVICE — ENSEAL 20 CM SHAFT, LARGE JAW: Brand: ENSEAL X1

## (undated) DEVICE — CHLORAPREP HI-LITE 26ML ORANGE

## (undated) DEVICE — GAUZE SPONGES,16 PLY: Brand: CURITY

## (undated) DEVICE — DRAPE LAPAROTOMY W/POUCHES

## (undated) DEVICE — VIOLET BRAIDED (POLYGLACTIN 910), SYNTHETIC ABSORBABLE SUTURE: Brand: COATED VICRYL

## (undated) DEVICE — SCD SEQUENTIAL COMPRESSION COMFORT SLEEVE MEDIUM KNEE LENGTH: Brand: KENDALL SCD

## (undated) DEVICE — MEDI-VAC YANKAUER SUCTION HANDLE W/BULBOUS AND CONTROL VENT: Brand: CARDINAL HEALTH

## (undated) DEVICE — SINGLE PORT MANIFOLD: Brand: NEPTUNE 2

## (undated) DEVICE — INTENDED FOR TISSUE SEPARATION, AND OTHER PROCEDURES THAT REQUIRE A SHARP SURGICAL BLADE TO PUNCTURE OR CUT.: Brand: BARD-PARKER SAFETY BLADES SIZE 10, STERILE

## (undated) DEVICE — SUT PDS II 1 TP-1 96 IN Z880G

## (undated) DEVICE — DRAPE FLUID WARMER (BIRD BATH)

## (undated) DEVICE — PROXIMATE SKIN STAPLERS (35 WIDE) CONTAINS 35 STAINLESS STEEL STAPLES (FIXED HEAD): Brand: PROXIMATE

## (undated) DEVICE — SUT VICRYL 0 REEL 54 IN J287G

## (undated) DEVICE — GLOVE INDICATOR PI UNDERGLOVE SZ 6.5 BLUE

## (undated) DEVICE — PENCIL ELECTROSURG E-Z CLEAN -0035H

## (undated) DEVICE — GLOVE SRG BIOGEL 6.5

## (undated) DEVICE — SUT ETHILON 2-0 FSLX 30 IN 1674H

## (undated) DEVICE — INTENDED FOR TISSUE SEPARATION, AND OTHER PROCEDURES THAT REQUIRE A SHARP SURGICAL BLADE TO PUNCTURE OR CUT.: Brand: BARD-PARKER SAFETY BLADES SIZE 15, STERILE

## (undated) DEVICE — PROXIMATE RELOADABLE LINEAR CUTTER WITH SAFETY LOCK-OUT, 75MM: Brand: PROXIMATE

## (undated) DEVICE — DRESSING MEPILEX AG BORDER POST-OP 4 X 12 IN